# Patient Record
Sex: MALE | Race: WHITE | NOT HISPANIC OR LATINO | Employment: OTHER | URBAN - METROPOLITAN AREA
[De-identification: names, ages, dates, MRNs, and addresses within clinical notes are randomized per-mention and may not be internally consistent; named-entity substitution may affect disease eponyms.]

---

## 2017-02-12 ENCOUNTER — APPOINTMENT (OUTPATIENT)
Dept: GENERAL RADIOLOGY | Facility: HOSPITAL | Age: 67
End: 2017-02-12

## 2017-02-12 ENCOUNTER — HOSPITAL ENCOUNTER (EMERGENCY)
Facility: HOSPITAL | Age: 67
Discharge: HOME | End: 2017-02-12
Attending: EMERGENCY MEDICINE | Admitting: EMERGENCY MEDICINE

## 2017-02-12 VITALS
HEIGHT: 72 IN | WEIGHT: 210 LBS | TEMPERATURE: 97.9 F | DIASTOLIC BLOOD PRESSURE: 90 MMHG | RESPIRATION RATE: 18 BRPM | HEART RATE: 74 BPM | BODY MASS INDEX: 28.44 KG/M2 | SYSTOLIC BLOOD PRESSURE: 150 MMHG | OXYGEN SATURATION: 96 %

## 2017-02-12 DIAGNOSIS — S92.502A FRACTURE OF THIRD TOE, LEFT, CLOSED, INITIAL ENCOUNTER: Primary | ICD-10-CM

## 2017-02-12 DIAGNOSIS — L03.032 CELLULITIS OF TOE OF LEFT FOOT: ICD-10-CM

## 2017-02-12 DIAGNOSIS — S60.221A CONTUSION OF RIGHT HAND, INITIAL ENCOUNTER: ICD-10-CM

## 2017-02-12 DIAGNOSIS — I10 ESSENTIAL HYPERTENSION: ICD-10-CM

## 2017-02-12 PROCEDURE — 99283 EMERGENCY DEPT VISIT LOW MDM: CPT

## 2017-02-12 PROCEDURE — 73660 X-RAY EXAM OF TOE(S): CPT

## 2017-02-12 PROCEDURE — 73130 X-RAY EXAM OF HAND: CPT

## 2017-02-12 RX ORDER — NAPROXEN 250 MG/1
500 TABLET ORAL ONCE
Status: COMPLETED | OUTPATIENT
Start: 2017-02-12 | End: 2017-02-12

## 2017-02-12 RX ORDER — OMEPRAZOLE 20 MG/1
20 CAPSULE, DELAYED RELEASE ORAL DAILY
COMMUNITY
End: 2019-02-11 | Stop reason: SDUPTHER

## 2017-02-12 RX ORDER — METOPROLOL TARTRATE 50 MG/1
50 TABLET, FILM COATED ORAL 2 TIMES DAILY
COMMUNITY
End: 2019-03-02 | Stop reason: SDUPTHER

## 2017-02-12 RX ORDER — HYDROCODONE BITARTRATE AND ACETAMINOPHEN 10; 325 MG/1; MG/1
1 TABLET ORAL EVERY 6 HOURS PRN
COMMUNITY

## 2017-02-12 RX ORDER — GABAPENTIN 400 MG/1
400 CAPSULE ORAL 2 TIMES DAILY
COMMUNITY

## 2017-02-12 RX ORDER — TRAMADOL HYDROCHLORIDE 50 MG/1
50 TABLET ORAL EVERY 4 HOURS PRN
Qty: 20 TABLET | Refills: 0 | Status: SHIPPED | OUTPATIENT
Start: 2017-02-12 | End: 2018-07-16 | Stop reason: ALTCHOICE

## 2017-02-12 RX ORDER — PRAVASTATIN SODIUM 10 MG
10 TABLET ORAL DAILY
COMMUNITY
End: 2020-06-01

## 2017-02-12 RX ORDER — CEPHALEXIN 500 MG/1
500 CAPSULE ORAL 4 TIMES DAILY
Qty: 28 CAPSULE | Refills: 0 | Status: SHIPPED | OUTPATIENT
Start: 2017-02-12 | End: 2018-07-16 | Stop reason: ALTCHOICE

## 2017-02-12 RX ADMIN — NAPROXEN 500 MG: 250 TABLET ORAL at 23:23

## 2017-02-13 NOTE — ED PROVIDER NOTES
Subjective   HPI Comments: Behzad Guzman is a 66 y.o.male who presents to the ED with c/o toe pain onset 2 weeks ago. The patient reports he dropped a log on his left foot two weeks ago, injuring his left middle toe. He notes the pain tolerable when the incident occurred, however it has continued to worsen over the last few days. He presents to the ED for evaluation. Additionally, the patient c/o pain in his right hand, which he reports was also smashed by the log during the accident.The patient denies any other complaints at this time.    Patient is a 66 y.o. male presenting with lower extremity pain.   History provided by:  Patient  Lower Extremity Issue   Location:  Toe  Time since incident:  2 weeks  Injury: yes    Mechanism of injury comment:  Dropped log on foot  Toe location:  L third toe  Pain details:     Quality:  Unable to specify    Radiates to:  Does not radiate    Severity:  Moderate    Onset quality:  Gradual    Duration:  2 weeks    Timing:  Constant    Progression:  Worsening  Chronicity:  New  Dislocation: no    Foreign body present:  No foreign bodies  Tetanus status:  Unknown  Prior injury to area:  No  Relieved by:  None tried  Worsened by:  Nothing  Ineffective treatments:  None tried  Associated symptoms: no back pain, no fever and no numbness        Review of Systems   Constitutional: Negative for chills and fever.   HENT: Negative for congestion and rhinorrhea.    Respiratory: Negative for cough, chest tightness and shortness of breath.    Cardiovascular: Negative for chest pain.   Gastrointestinal: Negative for diarrhea, nausea and vomiting.   Musculoskeletal: Negative for back pain.        Left toe pain and right hand pain.   Neurological: Negative for dizziness, weakness and headaches.   All other systems reviewed and are negative.      Past Medical History   Diagnosis Date   • Hyperlipidemia    • Hypertension    • Neuropathy    • Prediabetes        Allergies   Allergen Reactions   •  Penicillins        Past Surgical History   Procedure Laterality Date   • Leg surgery     • Rotator cuff repair         History reviewed. No pertinent family history.    Social History     Social History   • Marital status:      Spouse name: N/A   • Number of children: N/A   • Years of education: N/A     Social History Main Topics   • Smoking status: Former Smoker     Quit date: 1987   • Smokeless tobacco: None   • Alcohol use No   • Drug use: No   • Sexual activity: Not Asked     Other Topics Concern   • None     Social History Narrative   • None         Objective   Physical Exam   Constitutional: He is oriented to person, place, and time. He appears well-developed and well-nourished. No distress.   HENT:   Head: Normocephalic and atraumatic.   Eyes: Conjunctivae are normal. No scleral icterus.   Neck: Normal range of motion. Neck supple.   Cardiovascular: Normal rate, regular rhythm, normal heart sounds and intact distal pulses.    Pulmonary/Chest: Effort normal and breath sounds normal. No respiratory distress.   Abdominal: Soft. Bowel sounds are normal. There is no tenderness.   Musculoskeletal: Normal range of motion. He exhibits no edema.   Dried scab over left middle toe with erythema.  Left third toe has erythema and swelling distally.  There is no fluctuance or abscess.  The area of superficial avulsed epidermis was removed to evaluate underneath.  There is no open wounds noted.  Pain with attempted manipulation.  No gross deformity.  Right hand demonstrates mild tenderness on the dorsal aspect around the third and fourth metacarpals.  No deformity and neurovascularly intact.   Neurological: He is alert and oriented to person, place, and time.   Skin: Skin is warm and dry. No erythema.   Psychiatric: He has a normal mood and affect. His behavior is normal.   Nursing note and vitals reviewed.      Procedures         ED Course  ED Course       No results found for this or any previous visit (from the  "past 24 hour(s)).  Note: In addition to lab results from this visit, the labs listed above may include labs taken at another facility or during a different encounter within the last 24 hours. Please correlate lab times with ED admission and discharge times for further clarification of the services performed during this visit.    XR Hand 3+ View Right   ED Interpretation   Degenerative changes otherwise no acute fracture dislocations on   3 views the right hand.      XR Toe 2+ View Left   ED Interpretation   Abnormal   Comminuted fracture of the distal aspect of the distal phalanx of   the third digit of the left foot.  No involvement of the joint   noted.  No foreign bodies noted.        Vitals:    02/12/17 2123   BP: 150/90   BP Location: Left arm   Patient Position: Sitting   Pulse: 74   Resp: 18   Temp: 97.9 °F (36.6 °C)   TempSrc: Oral   SpO2: 96%   Weight: 210 lb (95.3 kg)   Height: 72\" (182.9 cm)     Medications   naproxen (NAPROSYN) tablet 500 mg (500 mg Oral Given 2/12/17 2323)     ECG/EMG Results (last 24 hours)     ** No results found for the last 24 hours. **                      MDM  Number of Diagnoses or Management Options  Cellulitis of toe of left foot:   Contusion of right hand, initial encounter:   Essential hypertension:   Fracture of third toe, left, closed, initial encounter:      Amount and/or Complexity of Data Reviewed  Tests in the radiology section of CPT®: reviewed  Independent visualization of images, tracings, or specimens: yes        Final diagnoses:   Fracture of third toe, left, closed, initial encounter   Cellulitis of toe of left foot   Contusion of right hand, initial encounter   Essential hypertension       Documentation assistance provided by jyoti Frye.  Information recorded by the jyoti was done at my direction and has been verified and validated by me.     Enid Frye  02/12/17 2220       Enid Frye  02/12/17 2303       Wero Castellanos, " MD  02/13/17 0036

## 2017-03-16 ENCOUNTER — TRANSCRIBE ORDERS (OUTPATIENT)
Dept: PAIN MEDICINE | Facility: CLINIC | Age: 67
End: 2017-03-16

## 2017-03-16 DIAGNOSIS — G89.4 CHRONIC PAIN SYNDROME: Primary | ICD-10-CM

## 2017-11-27 ENCOUNTER — HOSPITAL ENCOUNTER (EMERGENCY)
Facility: HOSPITAL | Age: 67
Discharge: LEFT BEFORE DISCHARGE | End: 2017-11-27

## 2017-11-27 VITALS
HEIGHT: 72 IN | RESPIRATION RATE: 16 BRPM | BODY MASS INDEX: 27.77 KG/M2 | TEMPERATURE: 98.5 F | OXYGEN SATURATION: 96 % | WEIGHT: 205 LBS | DIASTOLIC BLOOD PRESSURE: 109 MMHG | HEART RATE: 81 BPM | SYSTOLIC BLOOD PRESSURE: 198 MMHG

## 2017-11-27 PROCEDURE — 99211 OFF/OP EST MAY X REQ PHY/QHP: CPT

## 2017-11-27 PROCEDURE — 93005 ELECTROCARDIOGRAM TRACING: CPT

## 2018-07-16 ENCOUNTER — APPOINTMENT (OUTPATIENT)
Dept: GENERAL RADIOLOGY | Facility: HOSPITAL | Age: 68
End: 2018-07-16

## 2018-07-16 ENCOUNTER — HOSPITAL ENCOUNTER (EMERGENCY)
Facility: HOSPITAL | Age: 68
Discharge: HOME | End: 2018-07-16
Attending: EMERGENCY MEDICINE | Admitting: EMERGENCY MEDICINE

## 2018-07-16 VITALS
RESPIRATION RATE: 16 BRPM | OXYGEN SATURATION: 93 % | DIASTOLIC BLOOD PRESSURE: 90 MMHG | WEIGHT: 193 LBS | HEART RATE: 83 BPM | BODY MASS INDEX: 26.14 KG/M2 | TEMPERATURE: 97.8 F | SYSTOLIC BLOOD PRESSURE: 118 MMHG | HEIGHT: 72 IN

## 2018-07-16 DIAGNOSIS — Z87.828 HISTORY OF TRAUMATIC INJURY TO MUSCULOSKELETAL SYSTEM: ICD-10-CM

## 2018-07-16 DIAGNOSIS — I10 ELEVATED BLOOD PRESSURE READING WITH DIAGNOSIS OF HYPERTENSION: ICD-10-CM

## 2018-07-16 DIAGNOSIS — S70.11XA HEMATOMA OF RIGHT THIGH, INITIAL ENCOUNTER: Primary | ICD-10-CM

## 2018-07-16 DIAGNOSIS — S40.012A CONTUSION OF LEFT SHOULDER, INITIAL ENCOUNTER: ICD-10-CM

## 2018-07-16 PROCEDURE — 73010 X-RAY EXAM OF SHOULDER BLADE: CPT

## 2018-07-16 PROCEDURE — 99284 EMERGENCY DEPT VISIT MOD MDM: CPT

## 2018-07-16 PROCEDURE — 73030 X-RAY EXAM OF SHOULDER: CPT

## 2018-07-16 RX ORDER — LORATADINE 10 MG/1
CAPSULE, LIQUID FILLED ORAL
COMMUNITY
End: 2018-11-09 | Stop reason: SDUPTHER

## 2018-07-16 RX ORDER — CLONIDINE HYDROCHLORIDE 0.1 MG/1
0.2 TABLET ORAL ONCE
Status: COMPLETED | OUTPATIENT
Start: 2018-07-16 | End: 2018-07-16

## 2018-07-16 RX ORDER — OXYCODONE AND ACETAMINOPHEN 10; 325 MG/1; MG/1
1 TABLET ORAL ONCE
Status: COMPLETED | OUTPATIENT
Start: 2018-07-16 | End: 2018-07-16

## 2018-07-16 RX ORDER — LISINOPRIL 20 MG/1
20 TABLET ORAL DAILY
COMMUNITY
End: 2019-03-02 | Stop reason: SDUPTHER

## 2018-07-16 RX ADMIN — CLONIDINE HYDROCHLORIDE 0.2 MG: 0.1 TABLET ORAL at 20:02

## 2018-07-16 RX ADMIN — OXYCODONE HYDROCHLORIDE AND ACETAMINOPHEN 1 TABLET: 10; 325 TABLET ORAL at 20:02

## 2018-07-18 ENCOUNTER — TRANSCRIBE ORDERS (OUTPATIENT)
Dept: ADMINISTRATIVE | Facility: HOSPITAL | Age: 68
End: 2018-07-18

## 2018-07-18 DIAGNOSIS — R22.41 MASS OF THIGH, RIGHT: Primary | ICD-10-CM

## 2018-07-23 ENCOUNTER — OFFICE VISIT (OUTPATIENT)
Dept: ORTHOPEDIC SURGERY | Facility: CLINIC | Age: 68
End: 2018-07-23

## 2018-07-23 ENCOUNTER — APPOINTMENT (OUTPATIENT)
Dept: MRI IMAGING | Facility: HOSPITAL | Age: 68
End: 2018-07-23

## 2018-07-23 VITALS — HEART RATE: 73 BPM | WEIGHT: 190.48 LBS | OXYGEN SATURATION: 98 % | BODY MASS INDEX: 25.8 KG/M2 | HEIGHT: 72 IN

## 2018-07-23 DIAGNOSIS — S79.921A INJURY OF RIGHT THIGH, INITIAL ENCOUNTER: ICD-10-CM

## 2018-07-23 DIAGNOSIS — S46.002A ROTATOR CUFF INJURY, LEFT, INITIAL ENCOUNTER: ICD-10-CM
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
## 2018-07-23 DIAGNOSIS — M25.512 ACUTE PAIN OF LEFT SHOULDER: Primary | ICD-10-CM

## 2018-07-23 PROCEDURE — 99203 OFFICE O/P NEW LOW 30 MIN: CPT | Performed by: PHYSICIAN ASSISTANT

## 2018-07-23 RX ORDER — AMLODIPINE BESYLATE 5 MG/1
5 TABLET ORAL DAILY
Refills: 0 | COMMUNITY
Start: 2018-07-18 | End: 2018-11-09

## 2018-07-23 RX ORDER — ALBUTEROL SULFATE 90 UG/1
AEROSOL, METERED RESPIRATORY (INHALATION)
Refills: 0 | COMMUNITY
Start: 2018-04-18 | End: 2020-06-01 | Stop reason: SDUPTHER

## 2018-07-23 RX ORDER — CLONIDINE HYDROCHLORIDE 0.1 MG/1
TABLET ORAL
Refills: 0 | COMMUNITY
Start: 2018-07-18 | End: 2018-11-09

## 2018-07-23 RX ORDER — BUSPIRONE HYDROCHLORIDE 10 MG/1
10 TABLET ORAL 2 TIMES DAILY
Refills: 0 | COMMUNITY
Start: 2018-07-18 | End: 2018-11-09

## 2018-07-23 NOTE — PROGRESS NOTES
[facility] Note    Subjective     Pain of the Left Shoulder (Hit by bull on May 1st, 2018)      LANIE Guzman is a 67 y.o. male.  Patient presents to orthopedic clinic for evaluation of left shoulder pain.  He states that on May 1, 2018 he was trampled by a pool resulting in significant left shoulder pain.  He denies any numbness or tingling into the extremity.  She reports pain scale to the shoulder 6/10.  Severity the pain moderate to severe.  Quality pain constant aching with intermittent sharpness and stabbing.  Associated symptoms grinding and popping with any type of movement.  Pain worsens with movement, reaching, lifting, any type of overhead activities.    Additionally he was recently seen in the emergency department on 16 July for severe right thigh pain.  This was also due to being stepped on by the bull.  He does have an MRI ordered for this area on 26 July.    No reported fever, chills, night sweats or other constitutional symptoms.     Past Medical History:   Diagnosis Date   • Hyperlipidemia    • Hypertension    • Neuropathy    • Prediabetes       Past Surgical History:   Procedure Laterality Date   • LEG SURGERY     • ROTATOR CUFF REPAIR     • WRIST SURGERY Left       Family History   Problem Relation Age of Onset   • Heart attack Father      Social History     Social History   • Marital status:      Spouse name: N/A   • Number of children: N/A   • Years of education: N/A     Occupational History   • Not on file.     Social History Main Topics   • Smoking status: Former Smoker     Quit date: 1987   • Smokeless tobacco: Never Used   • Alcohol use No   • Drug use: No   • Sexual activity: Defer     Other Topics Concern   • Not on file     Social History Narrative   • No narrative on file      Current Outpatient Prescriptions on File Prior to Visit   Medication Sig Dispense Refill   • gabapentin (NEURONTIN) 400 MG capsule Take 400 mg by mouth 3 (Three) Times a  "Day.     • GLIMEPIRIDE PO Take  by mouth.     • HYDROcodone-acetaminophen (NORCO)  MG per tablet Take 1 tablet by mouth Every 6 (Six) Hours As Needed for moderate pain (4-6).     • lisinopril (PRINIVIL,ZESTRIL) 20 MG tablet Take 20 mg by mouth Daily.     • Loratadine 10 MG capsule Take  by mouth.     • metoprolol tartrate (LOPRESSOR) 50 MG tablet Take 50 mg by mouth 2 (Two) Times a Day.     • NON FORMULARY GLIMEREX FOR PREDIABETES     • omeprazole (priLOSEC) 20 MG capsule Take 20 mg by mouth Daily.     • pravastatin (PRAVACHOL) 10 MG tablet Take 10 mg by mouth Daily.       No current facility-administered medications on file prior to visit.       Allergies   Allergen Reactions   • Penicillins         The following portions of the patient's history were reviewed and updated as appropriate: allergies, current medications, past family history, past medical history, past social history, past surgical history and problem list.    Review of Systems   Constitutional: Positive for activity change and fatigue.   Eyes: Negative.    Respiratory: Negative.    Cardiovascular: Negative.    Gastrointestinal: Negative.    Endocrine: Negative.    Genitourinary: Negative.    Musculoskeletal: Positive for arthralgias.   Skin: Negative.    Allergic/Immunologic: Negative.    Neurological: Positive for light-headedness, numbness and headaches.   Hematological: Negative.    Psychiatric/Behavioral: Positive for agitation. The patient is nervous/anxious.         Objective      Physical Exam  Pulse 73   Ht 182.9 cm (72.01\")   Wt 86.4 kg (190 lb 7.6 oz)   SpO2 98%   BMI 25.83 kg/m²     Body mass index is 25.83 kg/m².    General  Mental Status - alert  General Appearance - cooperative, well groomed, not in acute distress  Orientation - Oriented X3  Build & Nutrition - well developed and well nourished  Posture - normal posture  Gait - antalgic gait secondary to right thigh injury     Integumentary  Global Assessment  Examination of " related systems reveals - no lymphadenopathy  General Characteristics  Overall examination of the patient's skin reveals - no rashes, no evidence of scars, no suspicious lesions and no bruises.  Color - normal coloration of skin.    Ortho Exam  Musculoskeletal   Upper Extremity   Left Shoulder     Inspection and Palpation:     Tenderness - tender throughout but predominantly anterior, lateral and posterior aspect.  No significant single superior shoulder pain.    Crepitus - none    Sensation is normal    Examination reveals no ecchymosis.    Motor - grossly intact deltoid, biceps, triceps    Sensory - grossly intact to light touch axillary, musculocutaneous, radial, ulnar, median nerve distributions.      Strength and Tone:    Supraspinatus - 4-/5    External Rotators- 4-/5    Infraspinatus - 4-/5    Subscapularis - 4-/5    Deltoid - 4/5     Range of Motion   Right shoulder:    Internal Rotation: ROM - T10    External Rotation: AROM - 80 degrees    Elevation through flexion: AROM - 180 degrees    Left Shoulder:    Internal Rotation: ROM - back left hip pocket    External Rotation: AROM - 30 degrees    Elevation through flexion: AROM - 160 degrees with popping and grinding noted throughout motion     Instability   Left shoulder    Sulcus sign positive    Apprehension test positive    Ivelisse relocation test positive    Jerk test negative     Impingement   Left shoulder    Wooten-Edgardo impingement test positive    Neer impingement test positive     Functional Testing   Left shoulder    AC crossover adduction test negative    Abdominal compression test positive    Lift-off sign positive    Speed's test positive    Marrero's test positive    Hornblower's sign negative     Right thigh  Skin: Grossly intact thigh redness, warmth or swelling.  No defect is appreciable.  Tenderness: Medial thigh  Motion: Patient is able to demonstrate full range of motion of the hip however he does note pain with range of motion.  Motor:  Grossly intact quadriceps, hamstring, anterior tib, gastroc, EHL, peroneals.  Sensory: Grossly intact to light touch to deep peroneal, superficial, saphenous, sural, tibial nerve distributions.    Imaging/Studies  Imaging Results (last 24 hours)     ** No results found for the last 24 hours. **      Reviewed plain film imaging performed on 7/16/18 of left shoulder and scapula.  Also ordered new shoulder films today.  All imaging reviewed by Dr. Rosen.  No acute bony abnormality, fracture or dislocation appreciated.  Humeral head aligned within glenoid.  See chart for official report.    Assessment:  1. Acute pain of left shoulder    2. Rotator cuff injury, left, initial encounter    3. Injury of right thigh, initial encounter    4. Injury caused by animal, initial encounter        Plan:  1. Discussion was had with patient regarding exam, imaging, assessment and treatment options.    2. Patient already has an MRI ordered for the right femur.    3. Suspect rotator cuff tear therefore have ordered MRI of the left shoulder.   4. Continue pain management through his PCP.  If allowed to do so would recommend over-the-counter anti-inflammatories for inflammation and pain.   5. Follow-up after MRI is completed to discuss results and further treatment options.  6. Questions and concerns answered.    Patient was examined by Dr. Rosen and he agrees with the above assessment and plan.      Medical Decision Making  Management Options : over-the-counter medicine and prescription/IM medicine  Data/Risk: radiology tests    Zaira Knight PA-C  07/26/18  8:01 AM

## 2018-07-25 ENCOUNTER — TELEPHONE (OUTPATIENT)
Dept: ORTHOPEDIC SURGERY | Facility: CLINIC | Age: 68
End: 2018-07-25

## 2018-07-25 NOTE — TELEPHONE ENCOUNTER
THE PATIENT IS CALLING BECAUSE HE HAS SOME THINGS HE NEEDS TO DO TODAY AND HE WANTS TO KNOW IF WALKING ON HIS LEG WILL HURT HIM MORE THAN HE ALREADY IS. HIS MRI IS [facility] AT 5PM. THE PATIENT CAN BE REACHED [phone].

## 2018-07-25 NOTE — TELEPHONE ENCOUNTER
After speaking with Zaira I called  and let him know that he can do what he is able to do.  If he feels like it is causing too much pain then he should stop.  I explained that until the MRI is completed there is no way of knowing what is going on.  I explained this to him and he understood.  Sarah.

## 2018-07-27 ENCOUNTER — APPOINTMENT (OUTPATIENT)
Dept: MRI IMAGING | Facility: HOSPITAL | Age: 68
End: 2018-07-27

## 2018-07-30 ENCOUNTER — HOSPITAL ENCOUNTER (OUTPATIENT)
Dept: MRI IMAGING | Facility: HOSPITAL | Age: 68
Discharge: HOME | End: 2018-07-30
Admitting: FAMILY MEDICINE

## 2018-07-30 DIAGNOSIS — R22.41 MASS OF THIGH, RIGHT: ICD-10-CM

## 2018-07-30 PROCEDURE — 73718 MRI LOWER EXTREMITY W/O DYE: CPT

## 2018-07-31 ENCOUNTER — TELEPHONE (OUTPATIENT)
Dept: ORTHOPEDIC SURGERY | Facility: CLINIC | Age: 68
End: 2018-07-31

## 2018-07-31 NOTE — TELEPHONE ENCOUNTER
PT CALLED REGARDING HIS MRI RESULTS. HE WOULD RATHER NOT WAIT UNTIL HIS F/U APPT ON 8/13 IF POSSIBLE.

## 2018-07-31 NOTE — TELEPHONE ENCOUNTER
I called and spoke with Mr. Guzman and let him know that I could not give him the results of his MRI and that Zaira was out of the office. I told him  to call the physician that ordered it, which is Dr. Gonzalez .He stated that Dr. Gonzalez would not give him the results or do anything about it. . I let him know that I would talk to Dr. Rosen tomorrow about this when he was in the office. He understood.

## 2018-07-31 NOTE — TELEPHONE ENCOUNTER
I spoke with Dr. Rosen and he said that Mr. Guzman could wait until his follow up appointment with Zaira to get his results or contact Dr. Gonzalez.

## 2018-07-31 NOTE — TELEPHONE ENCOUNTER
Mr. Guzman called back and said that he called Dr. Gonzalez's office said  that he did not have the MRI report.

## 2018-08-06 ENCOUNTER — HOSPITAL ENCOUNTER (OUTPATIENT)
Dept: MRI IMAGING | Facility: HOSPITAL | Age: 68
Discharge: HOME | End: 2018-08-06
Admitting: PHYSICIAN ASSISTANT

## 2018-08-06 DIAGNOSIS — S46.002A ROTATOR CUFF INJURY, LEFT, INITIAL ENCOUNTER: ICD-10-CM

## 2018-08-06 DIAGNOSIS — M25.512 ACUTE PAIN OF LEFT SHOULDER: ICD-10-CM

## 2018-08-06 PROCEDURE — 73221 MRI JOINT UPR EXTREM W/O DYE: CPT

## 2018-08-08 ENCOUNTER — TELEPHONE (OUTPATIENT)
Dept: ORTHOPEDIC SURGERY | Facility: CLINIC | Age: 68
End: 2018-08-08

## 2018-08-08 NOTE — TELEPHONE ENCOUNTER
PT WANTS TO KNOW IF HE CAN BE SEEN SOONER FOR MRI RESULTS OR IF THEY CAN BE GIVEN OVER THE PHONE.

## 2018-08-08 NOTE — TELEPHONE ENCOUNTER
I spoke with Mr. Guzman and let him know that I could not give his results of his MRI over the phone, he wants Zaira to call him if possible tomorrow . I offered him an appointment but he said he really didn't want to take off work and requested Zaira to call him , if not he will try and see if he can get here for an appointment .

## 2018-08-09 NOTE — TELEPHONE ENCOUNTER
Called patient. Informed him of the tear to  per Zaira. He will come for appt on Monday 08/13/2018 at 3:00pm.     Kimi

## 2018-08-09 NOTE — TELEPHONE ENCOUNTER
Please call and inform patient that he does have a massive rotator cuff tear.  He needs to keep his appointment on Monday so that we can go over the MRI results and discuss treatment options.  Patient already has a previously scheduled appointment for that day.

## 2018-08-13 ENCOUNTER — OFFICE VISIT (OUTPATIENT)
Dept: ORTHOPEDIC SURGERY | Facility: CLINIC | Age: 68
End: 2018-08-13

## 2018-08-13 VITALS — HEIGHT: 72 IN | HEART RATE: 105 BPM | BODY MASS INDEX: 26.1 KG/M2 | OXYGEN SATURATION: 98 % | WEIGHT: 192.68 LBS

## 2018-08-13 DIAGNOSIS — M25.512 ACUTE PAIN OF LEFT SHOULDER: Primary | ICD-10-CM

## 2018-08-13 DIAGNOSIS — M75.122 COMPLETE TEAR OF LEFT ROTATOR CUFF: ICD-10-CM
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
## 2018-08-13 PROCEDURE — 99213 OFFICE O/P EST LOW 20 MIN: CPT | Performed by: PHYSICIAN ASSISTANT

## 2018-08-13 RX ORDER — TIZANIDINE 4 MG/1
TABLET ORAL
Refills: 0 | COMMUNITY
Start: 2018-08-06 | End: 2018-11-19

## 2018-08-13 RX ORDER — DIAZEPAM 5 MG/1
5 TABLET ORAL EVERY 6 HOURS PRN
Refills: 0 | COMMUNITY
Start: 2018-08-11

## 2018-08-13 NOTE — PROGRESS NOTES
"    [facility] Note    Subjective     CC: Follow-up (4 week f/u Acute pain of left shoulder after MRI)      LANIE Guzman is a 67 y.o. male.  Patient presents to clinic for follow-up MRI review left shoulder.  Past history in May 2018 he was trampled by a bull [place] an injury to his left shoulder.  He's had pain since.  He's had no change in pain level since previous exam reports pain scale 6-7/10.  Severity the pain moderate to severe.  Quality pain aching and stabbing.  He does state that his pain travels into his neck along the trapezium.  He also notes popping, grinding and giving away.       ROS:    Constiutional:Pt denies fever, chills, nausea, or vomiting.  MSK:as above    Objective      Past Medical History  Past Medical History:   Diagnosis Date   • Hyperlipidemia    • Hypertension    • Neuropathy    • Prediabetes          Physical Exam  Pulse 105   Ht 182.9 cm (72.01\")   Wt 87.4 kg (192 lb 10.9 oz)   SpO2 98%   BMI 26.13 kg/m²     Body mass index is 26.13 kg/m².    Patient is well nourished and well developed.        Ortho Exam  Musculoskeletal              Upper Extremity              Left Shoulder                           Inspection and Palpation:                           Tenderness - tender throughout but predominantly anterior, lateral and posterior aspect.  No significant tenderness superior shoulder over ACJ but positive along upper.                          Crepitus - positive                          Sensation is normal                          Examination reveals no ecchymosis.                          Motor - grossly intact deltoid, biceps, triceps                          Sensory - grossly intact to light touch axillary, musculocutaneous, radial, ulnar, median nerve distributions.                                Strength and Tone:                          Supraspinatus - 4-/5                          External Rotators- 4-/5                          " Infraspinatus - 4-/5                          Subscapularis - 4-/5                          Deltoid - 4/5                 Range of Motion              Left Shoulder:                          Internal Rotation: ROM - back left hip pocket                          External Rotation: AROM - 30 degrees                          Elevation through flexion: AROM - 160 degrees with popping and grinding noted throughout motion                 Instability              Left shoulder                          Sulcus sign positive                          Apprehension test positive                          Ivelisse relocation test positive                          Jerk test negative                 Impingement              Left shoulder                          Wooten-Edgardo impingement test positive                          Neer impingement test positive                 Functional Testing              Left shoulder                          AC crossover adduction test negative                          Abdominal compression test positive                          Lift-off sign positive                          Speed's test positive                          Marrero's test positive                          Hornblower's sign negative      Right thigh - no change from prior exam  Skin: Grossly intact thigh redness, warmth or swelling.  No defect is appreciable.  Tenderness: Medial thigh  Motion: Patient is able to demonstrate full range of motion of the hip however he does note pain with range of motion.  Motor: Grossly intact quadriceps, hamstring, anterior tib, gastroc, EHL, peroneals.  Sensory: Grossly intact to light touch to deep peroneal, superficial, saphenous, sural, tibial nerve distributions.    Imaging/Labs/EMG Reviewed:  Imaging Results (last 24 hours)     ** No results found for the last 24 hours. **      Reviewed MRI that was performed on 8/11/18.  Massive rotator cuff tear with retracted supraspinatus, infraspinatus tear,  near complete subscapularis tear and medial migration of the biceps tendon.  Probable superior labral tear.  Mild to moderate atrophy and fatty changes noted supraspinatus, infraspinatus and subscapularis.  See chart for official report.    Assessment:  1. Acute pain of left shoulder    2. Complete tear of left rotator cuff    3. Injury caused by animal, subsequent encounter        Plan:  1. Discussion was had with patient regarding exam, imaging, assessment and treatment options.   2. Based on his exam and MRI findings patient was introduced Dr. Rader to discuss surgical intervention.  Patient agreed to undergo rotator cuff repair by Dr. Rader.  He will be scheduled for this procedure.    3. Patient also under care of pain management clinic which she'll need to contact and inform them that he will be undergoing surgery on his left shoulder as they might have to adjust his pain medication so he has adequate control following the surgery.    4. Patient and wife verbalized understanding and agreement.   5. Question and concerns answered.    Indications, risks, benefits and alternatives of surgical versus continued nonsurgical treatment were discussed with the patient. Surgical risks include but are not limited to pain, bleeding, infection, failure to relieve symptoms, need for further procedures, recurrence of symptoms, damage to healthy adjacent structures, stiffness, weakness, scar, DVT, loss of limb or life. We also discussed the postoperative protocol and expected outcome. All questions were answered; the patient would like to proceed with surgical intervention.       Medical Decision Making  Management Options : major surgery with risk factors  Data/Risk: radiology tests       Zaira Knight PA-C  08/14/18  10:26 AM

## 2018-11-09 ENCOUNTER — OFFICE VISIT (OUTPATIENT)
Dept: FAMILY MEDICINE CLINIC | Facility: CLINIC | Age: 68
End: 2018-11-09

## 2018-11-09 VITALS
BODY MASS INDEX: 26.71 KG/M2 | DIASTOLIC BLOOD PRESSURE: 82 MMHG | RESPIRATION RATE: 16 BRPM | OXYGEN SATURATION: 96 % | HEIGHT: 72 IN | HEART RATE: 74 BPM | SYSTOLIC BLOOD PRESSURE: 154 MMHG | TEMPERATURE: 97.6 F | WEIGHT: 197.2 LBS

## 2018-11-09 DIAGNOSIS — M25.552 CHRONIC HIP PAIN, LEFT: ICD-10-CM

## 2018-11-09 DIAGNOSIS — G89.29 CHRONIC PAIN IN LEFT SHOULDER: ICD-10-CM

## 2018-11-09 DIAGNOSIS — M25.512 CHRONIC PAIN IN LEFT SHOULDER: ICD-10-CM

## 2018-11-09 DIAGNOSIS — M21.70 LEG LENGTH DISCREPANCY: ICD-10-CM

## 2018-11-09 DIAGNOSIS — I10 ESSENTIAL HYPERTENSION: ICD-10-CM

## 2018-11-09 DIAGNOSIS — G62.9 NEUROPATHY: ICD-10-CM

## 2018-11-09 DIAGNOSIS — F41.0 PANIC ATTACKS: ICD-10-CM

## 2018-11-09 DIAGNOSIS — M54.2 NECK PAIN, CHRONIC: ICD-10-CM

## 2018-11-09 DIAGNOSIS — J45.20 MILD INTERMITTENT ASTHMA WITHOUT COMPLICATION: ICD-10-CM

## 2018-11-09 DIAGNOSIS — E78.5 HYPERLIPIDEMIA, UNSPECIFIED HYPERLIPIDEMIA TYPE: ICD-10-CM

## 2018-11-09 DIAGNOSIS — G89.29 CHRONIC HIP PAIN, LEFT: ICD-10-CM

## 2018-11-09 DIAGNOSIS — R73.03 PRE-DIABETES: ICD-10-CM

## 2018-11-09 DIAGNOSIS — J01.80 OTHER ACUTE SINUSITIS, RECURRENCE NOT SPECIFIED: Primary | ICD-10-CM

## 2018-11-09 DIAGNOSIS — G89.29 NECK PAIN, CHRONIC: ICD-10-CM

## 2018-11-09 PROBLEM — R09.81 CONGESTION OF NASAL SINUS: Status: ACTIVE | Noted: 2017-10-16

## 2018-11-09 PROCEDURE — 99204 OFFICE O/P NEW MOD 45 MIN: CPT | Performed by: NURSE PRACTITIONER

## 2018-11-09 RX ORDER — BLOOD SUGAR DIAGNOSTIC
STRIP MISCELLANEOUS DAILY
Refills: 0 | COMMUNITY
Start: 2018-09-25 | End: 2021-09-18 | Stop reason: SDUPTHER

## 2018-11-09 RX ORDER — AMLODIPINE BESYLATE 5 MG/1
TABLET ORAL
COMMUNITY
Start: 2018-11-06 | End: 2019-05-01 | Stop reason: SDUPTHER

## 2018-11-09 RX ORDER — DEXTROMETHORPHAN HYDROBROMIDE AND PROMETHAZINE HYDROCHLORIDE 15; 6.25 MG/5ML; MG/5ML
5 SYRUP ORAL 4 TIMES DAILY PRN
Qty: 240 ML | Refills: 0 | Status: SHIPPED | OUTPATIENT
Start: 2018-11-09 | End: 2018-11-19

## 2018-11-09 RX ORDER — OXYCODONE AND ACETAMINOPHEN 10; 325 MG/1; MG/1
TABLET ORAL
Refills: 0 | COMMUNITY
Start: 2018-11-02 | End: 2018-11-19

## 2018-11-09 RX ORDER — AZITHROMYCIN 250 MG/1
TABLET, FILM COATED ORAL
Qty: 6 TABLET | Refills: 0 | Status: SHIPPED | OUTPATIENT
Start: 2018-11-09 | End: 2018-11-19

## 2018-11-09 RX ORDER — LORATADINE 10 MG/1
TABLET ORAL
Refills: 0 | COMMUNITY
Start: 2018-10-30 | End: 2018-11-09

## 2018-11-09 RX ORDER — OXYCODONE HCL 10 MG/1
10 TABLET, FILM COATED, EXTENDED RELEASE ORAL EVERY 12 HOURS PRN
Refills: 0 | COMMUNITY
Start: 2018-10-15 | End: 2021-06-29
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
## 2018-11-19 ENCOUNTER — OFFICE VISIT (OUTPATIENT)
Dept: FAMILY MEDICINE CLINIC | Facility: CLINIC | Age: 68
End: 2018-11-19

## 2018-11-19 ENCOUNTER — RESULTS ENCOUNTER (OUTPATIENT)
Dept: FAMILY MEDICINE CLINIC | Facility: CLINIC | Age: 68
End: 2018-11-19

## 2018-11-19 VITALS
HEART RATE: 78 BPM | SYSTOLIC BLOOD PRESSURE: 142 MMHG | DIASTOLIC BLOOD PRESSURE: 86 MMHG | HEIGHT: 72 IN | RESPIRATION RATE: 16 BRPM | OXYGEN SATURATION: 97 % | WEIGHT: 203 LBS | BODY MASS INDEX: 27.5 KG/M2

## 2018-11-19 DIAGNOSIS — Z12.11 COLON CANCER SCREENING: ICD-10-CM

## 2018-11-19 DIAGNOSIS — I10 ESSENTIAL HYPERTENSION: ICD-10-CM

## 2018-11-19 DIAGNOSIS — Z00.00 MEDICARE ANNUAL WELLNESS VISIT, SUBSEQUENT: Primary | ICD-10-CM

## 2018-11-19 DIAGNOSIS — E11.9 DIABETES MELLITUS WITHOUT COMPLICATION (HCC): ICD-10-CM

## 2018-11-19 DIAGNOSIS — E78.2 MIXED HYPERLIPIDEMIA: ICD-10-CM

## 2018-11-19 DIAGNOSIS — S46.002S ROTATOR CUFF INJURY, LEFT, SEQUELA: ICD-10-CM

## 2018-11-19 LAB
ALBUMIN SERPL-MCNC: 4.57 G/DL (ref 3.2–4.8)
ALBUMIN/GLOB SERPL: 2.5 G/DL (ref 1.5–2.5)
ALP SERPL-CCNC: 59 U/L (ref 25–100)
ALT SERPL W P-5'-P-CCNC: 24 U/L (ref 7–40)
ANION GAP SERPL CALCULATED.3IONS-SCNC: 7 MMOL/L (ref 3–11)
ARTICHOKE IGE QN: 156 MG/DL (ref 0–130)
AST SERPL-CCNC: 24 U/L (ref 0–33)
BASOPHILS # BLD AUTO: 0.04 10*3/MM3 (ref 0–0.2)
BASOPHILS NFR BLD AUTO: 0.5 % (ref 0–1)
BILIRUB SERPL-MCNC: 0.4 MG/DL (ref 0.3–1.2)
BUN BLD-MCNC: 12 MG/DL (ref 9–23)
BUN/CREAT SERPL: 12.9 (ref 7–25)
CALCIUM SPEC-SCNC: 9.4 MG/DL (ref 8.7–10.4)
CHLORIDE SERPL-SCNC: 101 MMOL/L (ref 99–109)
CHOLEST SERPL-MCNC: 223 MG/DL (ref 0–200)
CO2 SERPL-SCNC: 31 MMOL/L (ref 20–31)
CREAT BLD-MCNC: 0.93 MG/DL (ref 0.6–1.3)
DEPRECATED RDW RBC AUTO: 44.4 FL (ref 37–54)
EOSINOPHIL # BLD AUTO: 0.34 10*3/MM3 (ref 0–0.3)
EOSINOPHIL NFR BLD AUTO: 4.3 % (ref 0–3)
ERYTHROCYTE [DISTWIDTH] IN BLOOD BY AUTOMATED COUNT: 13.4 % (ref 11.3–14.5)
GFR SERPL CREATININE-BSD FRML MDRD: 81 ML/MIN/1.73
GLOBULIN UR ELPH-MCNC: 1.8 GM/DL
GLUCOSE BLD-MCNC: 167 MG/DL (ref 70–100)
HBA1C MFR BLD: 9.1 % (ref 4.8–5.6)
HCT VFR BLD AUTO: 48.7 % (ref 38.9–50.9)
HDLC SERPL-MCNC: 42 MG/DL (ref 40–60)
HGB BLD-MCNC: 15.7 G/DL (ref 13.1–17.5)
IMM GRANULOCYTES # BLD: 0.02 10*3/MM3 (ref 0–0.03)
IMM GRANULOCYTES NFR BLD: 0.3 % (ref 0–0.6)
LYMPHOCYTES # BLD AUTO: 2.78 10*3/MM3 (ref 0.6–4.8)
LYMPHOCYTES NFR BLD AUTO: 35.1 % (ref 24–44)
MCH RBC QN AUTO: 29.6 PG (ref 27–31)
MCHC RBC AUTO-ENTMCNC: 32.2 G/DL (ref 32–36)
MCV RBC AUTO: 91.9 FL (ref 80–99)
MONOCYTES # BLD AUTO: 0.71 10*3/MM3 (ref 0–1)
MONOCYTES NFR BLD AUTO: 9 % (ref 0–12)
NEUTROPHILS # BLD AUTO: 4.05 10*3/MM3 (ref 1.5–8.3)
NEUTROPHILS NFR BLD AUTO: 51.1 % (ref 41–71)
PLATELET # BLD AUTO: 183 10*3/MM3 (ref 150–450)
PMV BLD AUTO: 12.9 FL (ref 6–12)
POTASSIUM BLD-SCNC: 4.5 MMOL/L (ref 3.5–5.5)
PROT SERPL-MCNC: 6.4 G/DL (ref 5.7–8.2)
RBC # BLD AUTO: 5.3 10*6/MM3 (ref 4.2–5.76)
SODIUM BLD-SCNC: 139 MMOL/L (ref 132–146)
TRIGL SERPL-MCNC: 402 MG/DL (ref 0–150)
TSH SERPL DL<=0.05 MIU/L-ACNC: 3.12 MIU/ML (ref 0.35–5.35)
WBC NRBC COR # BLD: 7.92 10*3/MM3 (ref 3.5–10.8)

## 2018-11-19 PROCEDURE — 83036 HEMOGLOBIN GLYCOSYLATED A1C: CPT | Performed by: FAMILY MEDICINE

## 2018-11-19 PROCEDURE — 80061 LIPID PANEL: CPT | Performed by: FAMILY MEDICINE

## 2018-11-19 PROCEDURE — G0439 PPPS, SUBSEQ VISIT: HCPCS | Performed by: FAMILY MEDICINE

## 2018-11-19 PROCEDURE — 80053 COMPREHEN METABOLIC PANEL: CPT | Performed by: FAMILY MEDICINE

## 2018-11-19 PROCEDURE — 36415 COLL VENOUS BLD VENIPUNCTURE: CPT | Performed by: FAMILY MEDICINE

## 2018-11-19 PROCEDURE — G0444 DEPRESSION SCREEN ANNUAL: HCPCS | Performed by: FAMILY MEDICINE

## 2018-11-19 PROCEDURE — 85025 COMPLETE CBC W/AUTO DIFF WBC: CPT | Performed by: FAMILY MEDICINE

## 2018-11-19 PROCEDURE — 99397 PER PM REEVAL EST PAT 65+ YR: CPT | Performed by: FAMILY MEDICINE

## 2018-11-19 PROCEDURE — 84443 ASSAY THYROID STIM HORMONE: CPT | Performed by: FAMILY MEDICINE

## 2018-11-19 RX ORDER — HYDROCHLOROTHIAZIDE 12.5 MG/1
12.5 TABLET ORAL DAILY
Qty: 30 TABLET | Refills: 3 | Status: SHIPPED | OUTPATIENT
Start: 2018-11-19 | End: 2019-03-08 | Stop reason: SDUPTHER

## 2018-11-19 RX ORDER — METHYLPREDNISOLONE 4 MG/1
TABLET ORAL
Qty: 1 EACH | Refills: 0 | Status: SHIPPED | OUTPATIENT
Start: 2018-11-19 | End: 2018-12-19

## 2018-11-19 NOTE — PROGRESS NOTES
QUICK REFERENCE INFORMATION:  The ABCs of the Annual Wellness Visit    Subsequent Medicare Wellness Visit    HEALTH RISK ASSESSMENT    1950    Recent Hospitalizations:  No hospitalization(s) within the last year..        Current Medical Providers:  Patient Care Team:  James Do, DNP, APRN as PCP - General (Family Medicine)  Dr Lay-pain management  Ortho-[facility]  Neuro-Sheth      Smoking Status:  Social History     Tobacco Use   Smoking Status Former Smoker   • Last attempt to quit:    • Years since quittin.9   Smokeless Tobacco Never Used       Alcohol Consumption:  Social History     Substance and Sexual Activity   Alcohol Use No       Depression Screen:   PHQ-2/PHQ-9 Depression Screening 2018   Little interest or pleasure in doing things 1   Feeling down, depressed, or hopeless 0   Total Score 1       Health Habits and Functional and Cognitive Screening:  Functional & Cognitive Status 2018   Do you have difficulty preparing food and eating? No   Do you have difficulty bathing yourself, getting dressed or grooming yourself? No   Do you have difficulty using the toilet? No   Do you have difficulty moving around from place to place? No   Do you have trouble with steps or getting out of a bed or a chair? No   In the past year have you fallen or experienced a near fall? No   Current Diet Well Balanced Diet   Dental Exam Unknown   Eye Exam Unknown   Exercise (times per week) 5 times per week   Current Exercise Activities Include Aerobics   Do you need help using the phone?  No   Are you deaf or do you have serious difficulty hearing?  No   Do you need help with transportation? No   Do you need help shopping? No   Do you need help preparing meals?  No   Do you need help with housework?  No   Do you need help with laundry? No   Do you need help taking your medications? No   Do you need help managing money? No   Do you ever drive or ride in a car without wearing a seat belt? No   Have  you felt unusual stress, anger or loneliness in the last month? No   Who do you live with? Spouse   If you need help, do you have trouble finding someone available to you? No   Have you been bothered in the last four weeks by sexual problems? No   Do you have difficulty concentrating, remembering or making decisions? No           Does the patient have evidence of cognitive impairment? No    Aspirin use counseling: Taking ASA appropriately as indicated      Recent Lab Results:  CMP:     Lipid Panel:     HbA1c:       Visual Acuity:  Hearing Screening Comments: Normal hearing  Vision Screening Comments: Per ophth    Age-appropriate Screening Schedule:  Refer to the list below for future screening recommendations based on patient's age, sex and/or medical conditions. Orders for these recommended tests are listed in the plan section. The patient has been provided with a written plan.    Health Maintenance   Topic Date Due   • COLONOSCOPY  11/27/2017   • TDAP/TD VACCINES (1 - Tdap) 11/19/2018 (Originally 8/28/1969)   • ZOSTER VACCINE (1 of 2) 11/19/2018 (Originally 8/28/2001)   • LIPID PANEL  11/19/2019   • PNEUMOCOCCAL VACCINES (65+ LOW/MEDIUM RISK)  Completed   • INFLUENZA VACCINE  Addressed        Subjective   History of Present Illness    Behzad Guzman is a 68 y.o. male who presents for an Subsequent Wellness Visit.    The following portions of the patient's history were reviewed and updated as appropriate: allergies, current medications, past family history, past medical history, past social history, past surgical history and problem list.    Outpatient Medications Prior to Visit   Medication Sig Dispense Refill   • amLODIPine (NORVASC) 5 MG tablet Take  by mouth.     • diazePAM (VALIUM) 5 MG tablet Take 5 mg by mouth 2 (Two) Times a Day.  0   • gabapentin (NEURONTIN) 400 MG capsule Take 400 mg by mouth 3 (Three) Times a Day.     • GLIMEPIRIDE PO Take  by mouth.     • HYDROcodone-acetaminophen (NORCO)  MG per  tablet Take 1 tablet by mouth Every 6 (Six) Hours As Needed for moderate pain (4-6).     • lisinopril (PRINIVIL,ZESTRIL) 20 MG tablet Take 20 mg by mouth Daily.     • metoprolol tartrate (LOPRESSOR) 50 MG tablet Take 50 mg by mouth 2 (Two) Times a Day.     • NON FORMULARY GLIMEREX FOR PREDIABETES     • omeprazole (priLOSEC) 20 MG capsule Take 20 mg by mouth Daily.     • ONETOUCH VERIO test strip Daily. for testing  0   • oxyCODONE (oxyCONTIN) 10 MG 12 hr tablet Take 10 mg by mouth Every 12 (Twelve) Hours As Needed.  0   • pravastatin (PRAVACHOL) 10 MG tablet Take 10 mg by mouth Daily.     • VENTOLIN  (90 Base) MCG/ACT inhaler inhalation 2 puffs by mouth every 4 to 6 hours if needed  0   • azithromycin (ZITHROMAX) 250 MG tablet Take 2 tablets the first day, then 1 tablet daily for 4 days. 6 tablet 0   • Cetirizine HCl (ZYRTEC ALLERGY) 10 MG capsule Take 10 mg by mouth Daily. 30 capsule 5   • oxyCODONE-acetaminophen (PERCOCET)  MG per tablet take 1/2 to 1 tablet by mouth once daily if needed as directed  0   • promethazine-dextromethorphan (PROMETHAZINE-DM) 6.25-15 MG/5ML syrup Take 5 mL by mouth 4 (Four) Times a Day As Needed for Cough. 240 mL 0   • tiZANidine (ZANAFLEX) 4 MG tablet take 1 tablet by mouth every 8 hours if needed DO NOT EXCEED 3 DOSES IN 24 HOURS  0     No facility-administered medications prior to visit.        Patient Active Problem List   Diagnosis   • Chronic pain in left shoulder   • Chronic hip pain, left   • Neck pain, chronic   • Leg length discrepancy   • Panic attacks   • Pre-diabetes   • Asthma   • Spinal stenosis   • Prediabetes   • Neuropathy   • Hypertension   • Hyperlipidemia   • Benign essential hypertension   • Congestion of nasal sinus       Advance Care Planning:  has an advance directive - a copy has been provided and is in file    Identification of Risk Factors:  Risk factors include: weight , unhealthy diet, cardiovascular risk, inactivity, increased fall risk and  polypharmacy.    Review of Systems   Constitutional: Negative.  Negative for activity change, fatigue, fever and unexpected weight change.   HENT: Negative.  Negative for congestion, sneezing and sore throat.    Eyes: Negative.  Negative for visual disturbance.   Respiratory: Negative.  Negative for cough, chest tightness, shortness of breath and wheezing.    Cardiovascular: Negative.  Negative for chest pain, palpitations and leg swelling.   Gastrointestinal: Negative.  Negative for abdominal distention, abdominal pain, blood in stool, constipation, diarrhea and nausea.   Endocrine: Negative.  Negative for cold intolerance and heat intolerance.   Genitourinary: Negative.  Negative for dysuria, frequency and urgency.   Musculoskeletal: Negative.  Negative for arthralgias and myalgias.   Skin: Negative.  Negative for rash.   Allergic/Immunologic: Negative.    Neurological: Negative.  Negative for dizziness, syncope and numbness.   Hematological: Negative.  Negative for adenopathy.   Psychiatric/Behavioral: Negative.  Negative for suicidal ideas. The patient is not nervous/anxious.        Compared to one year ago, the patient feels his physical health is the same.  Compared to one year ago, the patient feels his mental health is the same.    Objective     Physical Exam   Constitutional: He is oriented to person, place, and time. He appears well-developed and well-nourished. No distress.   HENT:   Right Ear: External ear normal.   Left Ear: External ear normal.   Nose: Nose normal.   Mouth/Throat: Oropharynx is clear and moist.   Eyes: Conjunctivae and EOM are normal. Pupils are equal, round, and reactive to light.   Neck: Normal range of motion. Neck supple. No thyromegaly present.   Cardiovascular: Normal rate, regular rhythm and normal heart sounds.   No murmur heard.  Pulmonary/Chest: Effort normal and breath sounds normal. No respiratory distress. He has no wheezes.   Abdominal: Soft. Bowel sounds are normal. He  "exhibits no distension and no mass. There is no tenderness. There is no rebound and no guarding. No hernia.   Musculoskeletal: Normal range of motion. He exhibits no edema or tenderness.   Lymphadenopathy:     He has no cervical adenopathy.   Neurological: He is alert and oriented to person, place, and time. He has normal reflexes.   Skin: Skin is warm and dry. No rash noted. He is not diaphoretic. No erythema. No pallor.   Psychiatric: He has a normal mood and affect. His behavior is normal. Judgment and thought content normal.   Nursing note and vitals reviewed.      Vitals:    11/19/18 0918   BP: 142/86   Pulse: 78   Resp: 16   SpO2: 97%   Weight: 92.1 kg (203 lb)   Height: 182.9 cm (72\")   PainSc:   8   PainLoc: Shoulder       Patient's Body mass index is 27.53 kg/m². BMI is above normal parameters. Recommendations include: nutrition counseling.      Assessment/Plan   Patient Self-Management and Personalized Health Advice  The patient has been provided with information about: diet, exercise, weight management and prevention of cardiac or vascular disease and preventive services including:   · Colorectal cancer screening, cologuard test ordered, Diabetes screening, see lab orders, Exercise counseling provided, Fall Risk assessment done, Nutrition counseling provided.    Visit Diagnoses:    ICD-10-CM ICD-9-CM   1. Medicare annual wellness visit, subsequent Z00.00 V70.0   2. Essential hypertension I10 401.9   3. Rotator cuff injury, left, sequela S46.002S 908.9   4. Mixed hyperlipidemia E78.2 272.2   5. Diabetes mellitus without complication (CMS/[facility]) E11.9 250.00   6. Colon cancer screening Z12.11 V76.51       Orders Placed This Encounter   Procedures   • Comprehensive Metabolic Panel   • Lipid Panel   • TSH   • Cologuard - Stool, Per Rectum     Standing Status:   Future     Standing Expiration Date:   11/19/2019   • Hemoglobin A1c   • CBC Auto Differential   • CBC & Differential     Order Specific Question:   " Manual Differential     Answer:   No       Outpatient Encounter Medications as of 11/19/2018   Medication Sig Dispense Refill   • amLODIPine (NORVASC) 5 MG tablet Take  by mouth.     • diazePAM (VALIUM) 5 MG tablet Take 5 mg by mouth 2 (Two) Times a Day.  0   • gabapentin (NEURONTIN) 400 MG capsule Take 400 mg by mouth 3 (Three) Times a Day.     • GLIMEPIRIDE PO Take  by mouth.     • HYDROcodone-acetaminophen (NORCO)  MG per tablet Take 1 tablet by mouth Every 6 (Six) Hours As Needed for moderate pain (4-6).     • lisinopril (PRINIVIL,ZESTRIL) 20 MG tablet Take 20 mg by mouth Daily.     • metoprolol tartrate (LOPRESSOR) 50 MG tablet Take 50 mg by mouth 2 (Two) Times a Day.     • NON FORMULARY GLIMEREX FOR PREDIABETES     • omeprazole (priLOSEC) 20 MG capsule Take 20 mg by mouth Daily.     • ONETOUCH VERIO test strip Daily. for testing  0   • oxyCODONE (oxyCONTIN) 10 MG 12 hr tablet Take 10 mg by mouth Every 12 (Twelve) Hours As Needed.  0   • pravastatin (PRAVACHOL) 10 MG tablet Take 10 mg by mouth Daily.     • VENTOLIN  (90 Base) MCG/ACT inhaler inhalation 2 puffs by mouth every 4 to 6 hours if needed  0   • hydrochlorothiazide (HYDRODIURIL) 12.5 MG tablet Take 1 tablet by mouth Daily. 30 tablet 3   • MethylPREDNISolone (MEDROL, ASHLEIGH,) 4 MG tablet Take as directed on package instructions. 1 each 0   • [DISCONTINUED] azithromycin (ZITHROMAX) 250 MG tablet Take 2 tablets the first day, then 1 tablet daily for 4 days. 6 tablet 0   • [DISCONTINUED] Cetirizine HCl (ZYRTEC ALLERGY) 10 MG capsule Take 10 mg by mouth Daily. 30 capsule 5   • [DISCONTINUED] oxyCODONE-acetaminophen (PERCOCET)  MG per tablet take 1/2 to 1 tablet by mouth once daily if needed as directed  0   • [DISCONTINUED] promethazine-dextromethorphan (PROMETHAZINE-DM) 6.25-15 MG/5ML syrup Take 5 mL by mouth 4 (Four) Times a Day As Needed for Cough. 240 mL 0   • [DISCONTINUED] tiZANidine (ZANAFLEX) 4 MG tablet take 1 tablet by mouth every  8 hours if needed DO NOT EXCEED 3 DOSES IN 24 HOURS  0     No facility-administered encounter medications on file as of 11/19/2018.        Reviewed use of high risk medication in the elderly: yes  Reviewed for potential of harmful drug interactions in the elderly: yes    Follow Up:  Return in about 2 months (around 1/19/2019).   Annual depression screen complete. 15 minutes.    An After Visit Summary and PPPS with all of these plans were given to the patient.

## 2018-12-08 ENCOUNTER — TELEPHONE (OUTPATIENT)
Dept: FAMILY MEDICINE CLINIC | Facility: CLINIC | Age: 68
End: 2018-12-08

## 2018-12-08 NOTE — TELEPHONE ENCOUNTER
----- Message from Emy Mars sent at 12/7/2018  3:33 PM EST -----  Regarding: PLEASE CALL   Contact: [phone]  PLEASE CALL WITH LABS FROM 11/19

## 2018-12-19 ENCOUNTER — OFFICE VISIT (OUTPATIENT)
Dept: FAMILY MEDICINE CLINIC | Facility: CLINIC | Age: 68
End: 2018-12-19

## 2018-12-19 VITALS
WEIGHT: 204 LBS | TEMPERATURE: 97.8 F | HEART RATE: 84 BPM | HEIGHT: 72 IN | BODY MASS INDEX: 27.63 KG/M2 | RESPIRATION RATE: 18 BRPM | OXYGEN SATURATION: 97 %

## 2018-12-19 DIAGNOSIS — R21 RASH AND NONSPECIFIC SKIN ERUPTION: Primary | ICD-10-CM

## 2018-12-19 PROCEDURE — 99213 OFFICE O/P EST LOW 20 MIN: CPT | Performed by: NURSE PRACTITIONER

## 2018-12-19 PROCEDURE — 96372 THER/PROPH/DIAG INJ SC/IM: CPT | Performed by: NURSE PRACTITIONER

## 2018-12-19 RX ORDER — ESCITALOPRAM OXALATE 10 MG/1
10 TABLET ORAL DAILY
Refills: 0 | COMMUNITY
Start: 2018-11-27 | End: 2019-03-28

## 2018-12-19 RX ORDER — HYDROXYZINE HYDROCHLORIDE 25 MG/1
25 TABLET, FILM COATED ORAL 3 TIMES DAILY PRN
Qty: 30 TABLET | Refills: 0 | Status: SHIPPED | OUTPATIENT
Start: 2018-12-19 | End: 2019-02-27

## 2018-12-19 RX ORDER — METHYLPREDNISOLONE 4 MG/1
TABLET ORAL
Qty: 1 EACH | Refills: 0 | Status: CANCELLED | OUTPATIENT
Start: 2018-12-19

## 2018-12-19 RX ORDER — DEXAMETHASONE SODIUM PHOSPHATE 4 MG/ML
4 INJECTION, SOLUTION INTRA-ARTICULAR; INTRALESIONAL; INTRAMUSCULAR; INTRAVENOUS; SOFT TISSUE ONCE
Status: COMPLETED | OUTPATIENT
Start: 2018-12-19 | End: 2018-12-19

## 2018-12-19 RX ADMIN — DEXAMETHASONE SODIUM PHOSPHATE 4 MG: 4 INJECTION, SOLUTION INTRA-ARTICULAR; INTRALESIONAL; INTRAMUSCULAR; INTRAVENOUS; SOFT TISSUE at 20:05

## 2018-12-20 NOTE — PATIENT INSTRUCTIONS
Rash  A rash is a change in the color of the skin. A rash can also change the way your skin feels. There are many different conditions and factors that can cause a rash.  Follow these instructions at home:  Pay attention to any changes in your symptoms. Follow these instructions to help with your condition:  Medicine  Take or apply over-the-counter and prescription medicines only as told by your health care provider. These may include:  · Corticosteroid cream.  · Anti-itch lotions.  · Oral antihistamines.    Skin Care  · Apply cool compresses to the affected areas.  · Try taking a bath with:  ? Epsom salts. Follow the instructions on the packaging. You can get these at your local pharmacy or grocery store.  ? Baking soda. Pour a small amount into the bath as told by your health care provider.  ? Colloidal oatmeal. Follow the instructions on the packaging. You can get this at your local pharmacy or grocery store.  · Try applying baking soda paste to your skin. Stir water into baking soda until it reaches a paste-like consistency.  · Do not scratch or rub your skin.  · Avoid covering the rash. Make sure the rash is exposed to air as much as possible.  General instructions  · Avoid hot showers or baths, which can make itching worse. A cold shower may help.  · Avoid scented soaps, detergents, and perfumes. Use gentle soaps, detergents, perfumes, and other cosmetic products.  · Avoid any substance that causes your rash. Keep a journal to help track what causes your rash. Write down:  ? What you eat.  ? What cosmetic products you use.  ? What you drink.  ? What you wear. This includes jewelry.  · Keep all follow-up visits as told by your health care provider. This is important.  Contact a health care provider if:  · You sweat at night.  · You lose weight.  · You urinate more than normal.  · You feel weak.  · You vomit.  · Your skin or the whites of your eyes look yellow (jaundice).  · Your skin:  ? Tingles.  ? Is  numb.  · Your rash:  ? Does not go away after several days.  ? Gets worse.  · You are:  ? Unusually thirsty.  ? More tired than normal.  · You have:  ? New symptoms.  ? Pain in your abdomen.  ? A fever.  ? Diarrhea.  Get help right away if:  · You develop a rash that covers all or most of your body. The rash may or may not be painful.  · You develop blisters that:  ? Are on top of the rash.  ? Grow larger or grow together.  ? Are painful.  ? Are inside your nose or mouth.  · You develop a rash that:  ? Looks like purple pinprick-sized spots all over your body.  ? Has a “bull's eye” or looks like a target.  ? Is not related to sun exposure, is red and painful, and causes your skin to peel.  This information is not intended to replace advice given to you by your health care provider. Make sure you discuss any questions you have with your health care provider.  Document Released: 12/08/2003 Document Revised: 05/23/2017 Document Reviewed: 05/04/2016  [employer] Patient Education © 2018 [employer]  Dexamethasone injection  What is this medicine?  DEXAMETHASONE (dex a METH a sone) is a corticosteroid. It is used to treat inflammation of the skin, joints, lungs, and other organs. Common conditions treated include asthma, allergies, and arthritis. It is also used for other conditions, like blood disorders and diseases of the adrenal glands.  This medicine may be used for other purposes; ask your health care provider or pharmacist if you have questions.  COMMON BRAND NAME(S): Decadron, DoubleDex, Simplist Dexamethasone, Solurex  What should I tell my health care provider before I take this medicine?  They need to know if you have any of these conditions:  -blood clotting problems  -Cushing's syndrome  -diabetes  -glaucoma  -heart problems or disease  -high blood pressure  -infection like herpes, measles, tuberculosis, or chickenpox  -kidney disease  -liver disease  -mental problems  -myasthenia  gravis  -osteoporosis  -previous heart attack  -seizures  -stomach, ulcer or intestine disease including colitis and diverticulitis  -thyroid problem  -an unusual or allergic reaction to dexamethasone, corticosteroids, other medicines, lactose, foods, dyes, or preservatives  -pregnant or trying to get pregnant  -breast-feeding  How should I use this medicine?  This medicine is for injection into a muscle, joint, lesion, soft tissue, or vein. It is given by a health care professional in a hospital or clinic setting.  Talk to your pediatrician regarding the use of this medicine in children. Special care may be needed.  Overdosage: If you think you have taken too much of this medicine contact a poison control center or emergency room at once.  NOTE: This medicine is only for you. Do not share this medicine with others.  What if I miss a dose?  This may not apply. If you are having a series of injections over a prolonged period, try not to miss an appointment. Call your doctor or health care professional to reschedule if you are unable to keep an appointment.  What may interact with this medicine?  Do not take this medicine with any of the following medications:  -mifepristone, RU-486  -vaccines  This medicine may also interact with the following medications:  -amphotericin B  -antibiotics like clarithromycin, erythromycin, and troleandomycin  -aspirin and aspirin-like drugs  -barbiturates like phenobarbital  -carbamazepine  -cholestyramine  -cholinesterase inhibitors like donepezil, galantamine, rivastigmine, and tacrine  -cyclosporine  -digoxin  -diuretics  -ephedrine  -female hormones, like estrogens or progestins and birth control pills  -indinavir  -isoniazid  -ketoconazole  -medicines for diabetes  -medicines that improve muscle tone or strength for conditions like myasthenia gravis  -NSAIDs, medicines for pain and inflammation, like ibuprofen or naproxen  -phenytoin  -rifampin  -thalidomide  -warfarin  This list  may not describe all possible interactions. Give your health care provider a list of all the medicines, herbs, non-prescription drugs, or dietary supplements you use. Also tell them if you smoke, drink alcohol, or use illegal drugs. Some items may interact with your medicine.  What should I watch for while using this medicine?  Your condition will be monitored carefully while you are receiving this medicine.  If you are taking this medicine for a long time, carry an identification card with your name and address, the type and dose of your medicine, and your doctor's name and address.  This medicine may increase your risk of getting an infection. Stay away from people who are sick. Tell your doctor or health care professional if you are around anyone with measles or chickenpox. Talk to your health care provider before you get any vaccines that you take this medicine.  If you are going to have surgery, tell your doctor or health care professional that you have taken this medicine within the last twelve months.  Ask your doctor or health care professional about your diet. You may need to lower the amount of salt you eat.  The medicine can increase your blood sugar. If you are a diabetic check with your doctor if you need help adjusting the dose of your diabetic medicine.  What side effects may I notice from receiving this medicine?  Side effects that you should report to your doctor or health care professional as soon as possible:  -allergic reactions like skin rash, itching or hives, swelling of the face, lips, or tongue  -black or tarry stools  -change in the amount of urine  -changes in vision  -confusion, excitement, restlessness, a false sense of well-being  -fever, sore throat, sneezing, cough, or other signs of infection, wounds that will not heal  -hallucinations  -increased thirst  -mental depression, mood swings, mistaken feelings of self importance or of being mistreated  -pain in hips, back, ribs, arms,  shoulders, or legs  -pain, redness, or irritation at the injection site  -redness, blistering, peeling or loosening of the skin, including inside the mouth  -rounding out of face  -swelling of feet or lower legs  -unusual bleeding or bruising  -unusual tired or weak  -wounds that do not heal  Side effects that usually do not require medical attention (report to your doctor or health care professional if they continue or are bothersome):  -diarrhea or constipation  -change in taste  -headache  -nausea, vomiting  -skin problems, acne, thin and shiny skin  -touble sleeping  -unusual growth of hair on the face or body  -weight gain  This list may not describe all possible side effects. Call your doctor for medical advice about side effects. You may report side effects to FDA at 6-876-FDA-1215.  Where should I keep my medicine?  This drug is given in a hospital or clinic and will not be stored at home.  NOTE: This sheet is a summary. It may not cover all possible information. If you have questions about this medicine, talk to your doctor, pharmacist, or health care provider.  © 2018 [employer] (2009-04-09 14:04:12)  Hydroxyzine capsules or tablets  What is this medicine?  HYDROXYZINE (mai DROX i zeen) is an antihistamine. This medicine is used to treat allergy symptoms. It is also used to treat anxiety and tension. This medicine can be used with other medicines to induce sleep before surgery.  This medicine may be used for other purposes; ask your health care provider or pharmacist if you have questions.  COMMON BRAND NAME(S): ANX, Atarax, Rezine, Vistaril  What should I tell my health care provider before I take this medicine?  They need to know if you have any of these conditions:  -any chronic illness  -difficulty passing urine  -glaucoma  -heart disease  -kidney disease  -liver disease  -lung disease  -an unusual or allergic reaction to hydroxyzine, cetirizine, other medicines, foods, dyes, or  preservatives  -pregnant or trying to get pregnant  -breast-feeding  How should I use this medicine?  Take this medicine by mouth with a full glass of water. Follow the directions on the prescription label. You may take this medicine with food or on an empty stomach. Take your medicine at regular intervals. Do not take your medicine more often than directed.  Talk to your pediatrician regarding the use of this medicine in children. Special care may be needed. While this drug may be prescribed for children as young as 6 years of age for selected conditions, precautions do apply.  Patients over 65 years old may have a stronger reaction and need a smaller dose.  Overdosage: If you think you have taken too much of this medicine contact a poison control center or emergency room at once.  NOTE: This medicine is only for you. Do not share this medicine with others.  What if I miss a dose?  If you miss a dose, take it as soon as you can. If it is almost time for your next dose, take only that dose. Do not take double or extra doses.  What may interact with this medicine?  -alcohol  -barbiturate medicines for sleep or seizures  -medicines for colds, allergies  -medicines for depression, anxiety, or emotional disturbances  -medicines for pain  -medicines for sleep  -muscle relaxants  This list may not describe all possible interactions. Give your health care provider a list of all the medicines, herbs, non-prescription drugs, or dietary supplements you use. Also tell them if you smoke, drink alcohol, or use illegal drugs. Some items may interact with your medicine.  What should I watch for while using this medicine?  Tell your doctor or health care professional if your symptoms do not improve.  You may get drowsy or dizzy. Do not drive, use machinery, or do anything that needs mental alertness until you know how this medicine affects you. Do not stand or sit up quickly, especially if you are an older patient. This reduces the  risk of dizzy or fainting spells. Alcohol may interfere with the effect of this medicine. Avoid alcoholic drinks.  Your mouth may get dry. Chewing sugarless gum or sucking hard candy, and drinking plenty of water may help. Contact your doctor if the problem does not go away or is severe.  This medicine may cause dry eyes and blurred vision. If you wear contact lenses you may feel some discomfort. Lubricating drops may help. See your eye doctor if the problem does not go away or is severe.  If you are receiving skin tests for allergies, tell your doctor you are using this medicine.  What side effects may I notice from receiving this medicine?  Side effects that you should report to your doctor or health care professional as soon as possible:  -fast or irregular heartbeat  -difficulty passing urine  -seizures  -slurred speech or confusion  -tremor  Side effects that usually do not require medical attention (report to your doctor or health care professional if they continue or are bothersome):  -constipation  -drowsiness  -fatigue  -headache  -stomach upset  This list may not describe all possible side effects. Call your doctor for medical advice about side effects. You may report side effects to FDA at 5-907-FDA-9071.  Where should I keep my medicine?  Keep out of the reach of children.  Store at room temperature between 15 and 30 degrees C (59 and 86 degrees F). Keep container tightly closed. Throw away any unused medicine after the expiration date.  NOTE: This sheet is a summary. It may not cover all possible information. If you have questions about this medicine, talk to your doctor, pharmacist, or health care provider.  © 2018 [employer] (2009-05-01 14:50:59)

## 2018-12-20 NOTE — PROGRESS NOTES
Subjective   Behzad Guzman is a 68 y.o. male.     Rash   This is a new problem. The current episode started 1 to 4 weeks ago. The problem has been gradually worsening since onset. The affected locations include the chest, abdomen, back, left arm, right arm, left upper leg, right upper leg, left lower leg and right lower leg. The rash is characterized by redness and itchiness. Associated with: Thinks it may be related to having a different brand () of his Norco. Pertinent negatives include no congestion, cough, diarrhea, facial edema, fatigue, fever, shortness of breath, sore throat or vomiting. Past treatments include anti-itch cream. The treatment provided no relief. His past medical history is significant for allergies.       The following portions of the patient's history were reviewed and updated as appropriate: allergies, current medications, past family history, past medical history, past social history, past surgical history and problem list.     Allergies   Allergen Reactions   • Amoxicillin Rash   • Penicillins Rash     Current Outpatient Medications on File Prior to Visit   Medication Sig   • amLODIPine (NORVASC) 5 MG tablet Take  by mouth.   • diazePAM (VALIUM) 5 MG tablet Take 5 mg by mouth 2 (Two) Times a Day.   • escitalopram (LEXAPRO) 10 MG tablet Take 10 mg by mouth Daily.   • gabapentin (NEURONTIN) 400 MG capsule Take 400 mg by mouth 3 (Three) Times a Day.   • GLIMEPIRIDE PO Take  by mouth.   • hydrochlorothiazide (HYDRODIURIL) 12.5 MG tablet Take 1 tablet by mouth Daily.   • HYDROcodone-acetaminophen (NORCO)  MG per tablet Take 1 tablet by mouth Every 6 (Six) Hours As Needed for moderate pain (4-6).   • lisinopril (PRINIVIL,ZESTRIL) 20 MG tablet Take 20 mg by mouth Daily.   • metoprolol tartrate (LOPRESSOR) 50 MG tablet Take 50 mg by mouth 2 (Two) Times a Day.   • NON FORMULARY GLIMEREX FOR PREDIABETES   • omeprazole (priLOSEC) 20 MG capsule Take 20 mg by mouth Daily.   •  ONETOUCH VERIO test strip Daily. for testing   • oxyCODONE (oxyCONTIN) 10 MG 12 hr tablet Take 10 mg by mouth Every 12 (Twelve) Hours As Needed.   • pravastatin (PRAVACHOL) 10 MG tablet Take 10 mg by mouth Daily.   • VENTOLIN  (90 Base) MCG/ACT inhaler inhalation 2 puffs by mouth every 4 to 6 hours if needed     No current facility-administered medications on file prior to visit.      Review of Systems   Constitutional: Negative for appetite change, chills, diaphoresis, fatigue and fever.   HENT: Negative.  Negative for congestion and sore throat.    Respiratory: Negative.  Negative for cough and shortness of breath.    Cardiovascular: Negative.    Gastrointestinal: Negative.  Negative for abdominal pain, diarrhea, nausea and vomiting.   Genitourinary: Negative for dysuria.   Musculoskeletal: Negative for arthralgias and myalgias.   Skin: Positive for rash.   Hematological: Negative for adenopathy. Does not bruise/bleed easily.       Objective   Physical Exam   Constitutional: He is oriented to person, place, and time. Vital signs are normal. He appears well-developed and well-nourished. He is cooperative. He does not appear ill. No distress.   HENT:   Mouth/Throat: Uvula is midline and mucous membranes are normal.   Cardiovascular: Normal rate, regular rhythm and normal heart sounds.   Pulmonary/Chest: Effort normal and breath sounds normal.   Neurological: He is alert and oriented to person, place, and time.   Skin: Skin is warm, dry and intact. Rash noted. Rash is urticarial. He is not diaphoretic.   Urticarial rash on bilateral upper extremities, torso, bilateral legs   Psychiatric: He has a normal mood and affect. His behavior is normal. Judgment and thought content normal.   Vitals reviewed.    Vitals:    12/19/18 1939   Pulse: 84   Resp: 18   Temp: 97.8 °F (36.6 °C)   SpO2: 97%   Body mass index is 27.67 kg/m².     Assessment/Plan   Behzad was seen today for rash.    Diagnoses and all orders for this  visit:    Rash and nonspecific skin eruption  -     dexamethasone (DECADRON) injection 4 mg; Inject 1 mL into the appropriate muscle as directed by prescriber 1 (One) Time.  -     hydrOXYzine (ATARAX) 25 MG tablet; Take 1 tablet by mouth 3 (Three) Times a Day As Needed for Itching.  -     Menthol-Zinc Oxide 0.44-20.6 % ointment; Apply 4 g topically to the appropriate area as directed 3 (Three) Times a Day As Needed (apply to affected area) for up to 10 days.     Discussed the nature of the medical condition(s) risks, complications, implications, management, safe and proper use of medications. Encouraged medication compliance, and keeping scheduled follow up appointments with me and any other providers.     RTC if symptoms fail to improve, to ER if symptoms worsen.  F/U with PCP at next scheduled appointment or sooner if needed.

## 2018-12-21 ENCOUNTER — OFFICE VISIT (OUTPATIENT)
Dept: FAMILY MEDICINE CLINIC | Facility: CLINIC | Age: 68
End: 2018-12-21

## 2018-12-21 VITALS
WEIGHT: 200.38 LBS | SYSTOLIC BLOOD PRESSURE: 144 MMHG | HEIGHT: 72 IN | OXYGEN SATURATION: 96 % | HEART RATE: 72 BPM | BODY MASS INDEX: 27.14 KG/M2 | TEMPERATURE: 97.4 F | RESPIRATION RATE: 18 BRPM | DIASTOLIC BLOOD PRESSURE: 88 MMHG

## 2018-12-21 DIAGNOSIS — M79.10 MYALGIA: ICD-10-CM

## 2018-12-21 DIAGNOSIS — IMO0001 UNCONTROLLED TYPE 2 DIABETES MELLITUS WITHOUT COMPLICATION, WITHOUT LONG-TERM CURRENT USE OF INSULIN: Primary | ICD-10-CM

## 2018-12-21 DIAGNOSIS — L30.9 DERMATITIS: ICD-10-CM

## 2018-12-21 LAB
ALBUMIN SERPL-MCNC: 4.77 G/DL (ref 3.2–4.8)
ALBUMIN/GLOB SERPL: 2.2 G/DL (ref 1.5–2.5)
ALP SERPL-CCNC: 61 U/L (ref 25–100)
ALT SERPL W P-5'-P-CCNC: 26 U/L (ref 7–40)
ANION GAP SERPL CALCULATED.3IONS-SCNC: 9 MMOL/L (ref 3–11)
AST SERPL-CCNC: 23 U/L (ref 0–33)
BASOPHILS # BLD AUTO: 0.05 10*3/MM3 (ref 0–0.2)
BASOPHILS NFR BLD AUTO: 0.5 % (ref 0–1)
BILIRUB SERPL-MCNC: 0.6 MG/DL (ref 0.3–1.2)
BUN BLD-MCNC: 12 MG/DL (ref 9–23)
BUN/CREAT SERPL: 11.4 (ref 7–25)
CALCIUM SPEC-SCNC: 9.4 MG/DL (ref 8.7–10.4)
CHLORIDE SERPL-SCNC: 101 MMOL/L (ref 99–109)
CK SERPL-CCNC: 50 U/L (ref 26–174)
CO2 SERPL-SCNC: 30 MMOL/L (ref 20–31)
CREAT BLD-MCNC: 1.05 MG/DL (ref 0.6–1.3)
CRP SERPL-MCNC: 0.01 MG/DL (ref 0–1)
DEPRECATED RDW RBC AUTO: 44.4 FL (ref 37–54)
EOSINOPHIL # BLD AUTO: 0.14 10*3/MM3 (ref 0–0.3)
EOSINOPHIL NFR BLD AUTO: 1.4 % (ref 0–3)
ERYTHROCYTE [DISTWIDTH] IN BLOOD BY AUTOMATED COUNT: 13.5 % (ref 11.3–14.5)
ERYTHROCYTE [SEDIMENTATION RATE] IN BLOOD: 5 MM/HR (ref 0–20)
GFR SERPL CREATININE-BSD FRML MDRD: 70 ML/MIN/1.73
GLOBULIN UR ELPH-MCNC: 2.1 GM/DL
GLUCOSE BLD-MCNC: 148 MG/DL (ref 70–100)
HCT VFR BLD AUTO: 49.9 % (ref 38.9–50.9)
HGB BLD-MCNC: 16.4 G/DL (ref 13.1–17.5)
IMM GRANULOCYTES # BLD AUTO: 0.03 10*3/MM3 (ref 0–0.03)
IMM GRANULOCYTES NFR BLD AUTO: 0.3 % (ref 0–0.6)
LYMPHOCYTES # BLD AUTO: 3.71 10*3/MM3 (ref 0.6–4.8)
LYMPHOCYTES NFR BLD AUTO: 38.3 % (ref 24–44)
MCH RBC QN AUTO: 29.8 PG (ref 27–31)
MCHC RBC AUTO-ENTMCNC: 32.9 G/DL (ref 32–36)
MCV RBC AUTO: 90.7 FL (ref 80–99)
MONOCYTES # BLD AUTO: 0.76 10*3/MM3 (ref 0–1)
MONOCYTES NFR BLD AUTO: 7.8 % (ref 0–12)
NEUTROPHILS # BLD AUTO: 5.03 10*3/MM3 (ref 1.5–8.3)
NEUTROPHILS NFR BLD AUTO: 52 % (ref 41–71)
PLATELET # BLD AUTO: 227 10*3/MM3 (ref 150–450)
PMV BLD AUTO: 12.7 FL (ref 6–12)
POTASSIUM BLD-SCNC: 4.6 MMOL/L (ref 3.5–5.5)
PROT SERPL-MCNC: 6.9 G/DL (ref 5.7–8.2)
RBC # BLD AUTO: 5.5 10*6/MM3 (ref 4.2–5.76)
SODIUM BLD-SCNC: 140 MMOL/L (ref 132–146)
WBC NRBC COR # BLD: 9.69 10*3/MM3 (ref 3.5–10.8)

## 2018-12-21 PROCEDURE — 85025 COMPLETE CBC W/AUTO DIFF WBC: CPT | Performed by: NURSE PRACTITIONER

## 2018-12-21 PROCEDURE — 99214 OFFICE O/P EST MOD 30 MIN: CPT | Performed by: NURSE PRACTITIONER

## 2018-12-21 PROCEDURE — 86038 ANTINUCLEAR ANTIBODIES: CPT | Performed by: NURSE PRACTITIONER

## 2018-12-21 PROCEDURE — 86430 RHEUMATOID FACTOR TEST QUAL: CPT | Performed by: NURSE PRACTITIONER

## 2018-12-21 PROCEDURE — 36415 COLL VENOUS BLD VENIPUNCTURE: CPT | Performed by: NURSE PRACTITIONER

## 2018-12-21 PROCEDURE — 80053 COMPREHEN METABOLIC PANEL: CPT | Performed by: NURSE PRACTITIONER

## 2018-12-21 PROCEDURE — 86140 C-REACTIVE PROTEIN: CPT | Performed by: NURSE PRACTITIONER

## 2018-12-21 PROCEDURE — 82550 ASSAY OF CK (CPK): CPT | Performed by: NURSE PRACTITIONER

## 2018-12-21 NOTE — PROGRESS NOTES
Subjective   Behzad Guzman is a 68 y.o. male.     History of Present Illness Mr Guzman is here to follow up on a rash that developed 4 weeks ago after there was a change in the  of his Norco. He had an injection of Decadron with good relief from the itching. He did not  Atarax. The rash is still present on legs and arms but less red. Today he is requesting labs to see why he still has the rash. Denies fever, known sick exposures. Going to PT.   Recently seen for Medicare wellness exam A1c 9.1. He is currently taking Glimeperide only.  eGFR is 81.    The following portions of the patient's history were reviewed and updated as appropriate: allergies, current medications, past family history, past medical history, past social history, past surgical history and problem list.    Review of Systems   Constitutional: Negative for appetite change, fever and unexpected weight loss.   HENT: Negative for nosebleeds, sore throat and trouble swallowing.    Eyes: Negative for visual disturbance.   Respiratory: Negative for cough, shortness of breath and wheezing.    Cardiovascular: Negative for chest pain, palpitations and leg swelling.   Gastrointestinal: Negative for abdominal pain, blood in stool, constipation, diarrhea, nausea and vomiting.   Endocrine: Negative for polydipsia, polyphagia and polyuria.   Genitourinary: Negative for urinary incontinence, dysuria, frequency and hematuria.   Musculoskeletal: Positive for arthralgias, back pain, joint swelling, myalgias, neck pain and neck stiffness. Negative for gait problem.   Skin: Positive for dry skin and rash.   Neurological: Negative for dizziness, seizures, syncope and numbness.   Hematological: Negative for adenopathy. Does not bruise/bleed easily.   Psychiatric/Behavioral: Negative for sleep disturbance and depressed mood. The patient is not nervous/anxious.        Objective   Physical Exam   Constitutional: He is oriented to person, place, and time.  He appears well-developed and well-nourished. No distress.   HENT:   Head: Normocephalic and atraumatic.   Right Ear: Tympanic membrane and external ear normal.   Left Ear: Tympanic membrane and external ear normal.   Nose: Nose normal.   Mouth/Throat: Oropharynx is clear and moist. No oropharyngeal exudate.   Eyes: Conjunctivae are normal. Pupils are equal, round, and reactive to light. Right eye exhibits no discharge. Left eye exhibits no discharge. No scleral icterus.   Neck: Neck supple.   Cardiovascular: Normal rate.   Musculoskeletal: He exhibits no edema or deformity.   Neurological: He is alert and oriented to person, place, and time. Coordination normal.   Skin: Skin is warm and dry. Capillary refill takes less than 2 seconds. Rash noted. There is erythema.   Forearms with erythematous papules with scaling. Slight excoriations. LE with erythematous papules. Less scaling.   Psychiatric: He has a normal mood and affect. His speech is normal and behavior is normal. Judgment and thought content normal.   Nursing note and vitals reviewed.        Assessment/Plan   Behzad was seen today for rash.    Diagnoses and all orders for this visit:    Uncontrolled type 2 diabetes mellitus without complication, without long-term current use of insulin (CMS/[facility])  -     CBC & Differential  -     Comprehensive Metabolic Panel  -     metFORMIN (GLUCOPHAGE) 500 MG tablet; Take 1 tablet by mouth Daily With Breakfast.  -     CBC Auto Differential    Dermatitis  -     CBC & Differential  -     Comprehensive Metabolic Panel  -     C-reactive Protein  -     CK  -     Cancel: C-reactive Protein  -     ADELAIDA With / DsDNA, RNP, Sjogrens A / B, Smith  -     Rheumatoid Factor  -     Sedimentation Rate  -     CBC Auto Differential    Myalgia  -     Cancel: C-reactive Protein  -     ADELAIDA With / DsDNA, RNP, Sjogrens A / B, Smith  -     Rheumatoid Factor  -     Sedimentation Rate      Discussed the importance of low carb diet, annual dilated  eye exam, nightly foot checks, annual dentist visit, daily exercise. Monitor blood sugar at least twice a week. Maintain BMI under 25. Have regular follow up appointments for labs and screenings.  Discussed the nature of the disease including, risks, complications, implications, management, safe and proper use of medications. Encouraged therapeutic lifestyle changes including low calorie diet with plenty of fruits and vegetables, daily exercise, medication compliance, and keeping scheduled follow up appointments with me and any other providers. Encouraged patient to have appointment for complete physical, fasting labs, appropriate screenings, and immunizations on an annual basis.  Will check labs. Continue current treatment for rash. He will  Atarax.  Follow up in 4 weeks or sooner prn.

## 2018-12-21 NOTE — PATIENT INSTRUCTIONS
Diabetes Mellitus and Nutrition  When you have diabetes (diabetes mellitus), it is very important to have healthy eating habits because your blood sugar (glucose) levels are greatly affected by what you eat and drink. Eating healthy foods in the appropriate amounts, at about the same times every day, can help you:  · Control your blood glucose.  · Lower your risk of heart disease.  · Improve your blood pressure.  · Reach or maintain a healthy weight.    Every person with diabetes is different, and each person has different needs for a meal plan. Your health care provider may recommend that you work with a diet and nutrition specialist (dietitian) to make a meal plan that is best for you. Your meal plan may vary depending on factors such as:  · The calories you need.  · The medicines you take.  · Your weight.  · Your blood glucose, blood pressure, and cholesterol levels.  · Your activity level.  · Other health conditions you have, such as heart or kidney disease.    How do carbohydrates affect me?  Carbohydrates affect your blood glucose level more than any other type of food. Eating carbohydrates naturally increases the amount of glucose in your blood. Carbohydrate counting is a method for keeping track of how many carbohydrates you eat. Counting carbohydrates is important to keep your blood glucose at a healthy level, especially if you use insulin or take certain oral diabetes medicines.  It is important to know how many carbohydrates you can safely have in each meal. This is different for every person. Your dietitian can help you calculate how many carbohydrates you should have at each meal and for snack.  Foods that contain carbohydrates include:  · Bread, cereal, rice, pasta, and crackers.  · Potatoes and corn.  · Peas, beans, and lentils.  · Milk and yogurt.  · Fruit and juice.  · Desserts, such as cakes, cookies, ice cream, and candy.    How does alcohol affect me?  Alcohol can cause a sudden decrease in blood  "glucose (hypoglycemia), especially if you use insulin or take certain oral diabetes medicines. Hypoglycemia can be a life-threatening condition. Symptoms of hypoglycemia (sleepiness, dizziness, and confusion) are similar to symptoms of having too much alcohol.  If your health care provider says that alcohol is safe for you, follow these guidelines:  · Limit alcohol intake to no more than 1 drink per day for nonpregnant women and 2 drinks per day for men. One drink equals 12 oz of beer, 5 oz of wine, or 1½ oz of hard liquor.  · Do not drink on an empty stomach.  · Keep yourself hydrated with water, diet soda, or unsweetened iced tea.  · Keep in mind that regular soda, juice, and other mixers may contain a lot of sugar and must be counted as carbohydrates.    What are tips for following this plan?  Reading food labels  · Start by checking the serving size on the label. The amount of calories, carbohydrates, fats, and other nutrients listed on the label are based on one serving of the food. Many foods contain more than one serving per package.  · Check the total grams (g) of carbohydrates in one serving. You can calculate the number of servings of carbohydrates in one serving by dividing the total carbohydrates by 15. For example, if a food has 30 g of total carbohydrates, it would be equal to 2 servings of carbohydrates.  · Check the number of grams (g) of saturated and trans fats in one serving. Choose foods that have low or no amount of these fats.  · Check the number of milligrams (mg) of sodium in one serving. Most people should limit total sodium intake to less than 2,300 mg per day.  · Always check the nutrition information of foods labeled as \"low-fat\" or \"nonfat\". These foods may be higher in added sugar or refined carbohydrates and should be avoided.  · Talk to your dietitian to identify your daily goals for nutrients listed on the label.  Shopping  · Avoid buying canned, premade, or processed foods. These " foods tend to be high in fat, sodium, and added sugar.  · Shop around the outside edge of the grocery store. This includes fresh fruits and vegetables, bulk grains, fresh meats, and fresh dairy.  Cooking  · Use low-heat cooking methods, such as baking, instead of high-heat cooking methods like deep frying.  · Cook using healthy oils, such as olive, canola, or sunflower oil.  · Avoid cooking with butter, cream, or high-fat meats.  Meal planning  · Eat meals and snacks regularly, preferably at the same times every day. Avoid going long periods of time without eating.  · Eat foods high in fiber, such as fresh fruits, vegetables, beans, and whole grains. Talk to your dietitian about how many servings of carbohydrates you can eat at each meal.  · Eat 4-6 ounces of lean protein each day, such as lean meat, chicken, fish, eggs, or tofu. 1 ounce is equal to 1 ounce of meat, chicken, or fish, 1 egg, or 1/4 cup of tofu.  · Eat some foods each day that contain healthy fats, such as avocado, nuts, seeds, and fish.  Lifestyle    · Check your blood glucose regularly.  · Exercise at least 30 minutes 5 or more days each week, or as told by your health care provider.  · Take medicines as told by your health care provider.  · Do not use any products that contain nicotine or tobacco, such as cigarettes and e-cigarettes. If you need help quitting, ask your health care provider.  · Work with a counselor or diabetes educator to identify strategies to manage stress and any emotional and social challenges.  What are some questions to ask my health care provider?  · Do I need to meet with a diabetes educator?  · Do I need to meet with a dietitian?  · What number can I call if I have questions?  · When are the best times to check my blood glucose?  Where to find more information:  · American Diabetes Association: diabetes.org/food-and-fitness/food  · Academy of Nutrition and Dietetics:  www.eatright.org/resources/health/diseases-and-conditions/diabetes  · National [place] of Diabetes and Digestive and Kidney Diseases (NIH): www.niddk.nih.gov/health-information/diabetes/overview/diet-eating-physical-activity  Summary  · A healthy meal plan will help you control your blood glucose and maintain a healthy lifestyle.  · Working with a diet and nutrition specialist (dietitian) can help you make a meal plan that is best for you.  · Keep in mind that carbohydrates and alcohol have immediate effects on your blood glucose levels. It is important to count carbohydrates and to use alcohol carefully.  This information is not intended to replace advice given to you by your health care provider. Make sure you discuss any questions you have with your health care provider.  Document Released: 09/14/2006 Document Revised: 01/22/2018 Document Reviewed: 01/22/2018  [employer] Interactive Patient Education © 2018 [employer]  Blood Glucose Monitoring, Adult  Monitoring your blood sugar (glucose) helps you manage your diabetes. It also helps you and your health care provider determine how well your diabetes management plan is working. Blood glucose monitoring involves checking your blood glucose as often as directed, and keeping a record (log) of your results over time.  Why should I monitor my blood glucose?  Checking your blood glucose regularly can:  · Help you understand how food, exercise, illnesses, and medicines affect your blood glucose.  · Let you know what your blood glucose is at any time. You can quickly tell if you are having low blood glucose (hypoglycemia) or high blood glucose (hyperglycemia).  · Help you and your health care provider adjust your medicines as needed.    When should I check my blood glucose?  Follow instructions from your health care provider about how often to check your blood glucose. This may depend on:  · The type of diabetes you have.  · How well-controlled your diabetes  is.  · Medicines you are taking.    If you have type 1 diabetes:  · Check your blood glucose at least 2 times a day.  · Also check your blood glucose:  ? Before every insulin injection.  ? Before and after exercise.  ? Between meals.  ? 2 hours after a meal.  ? Occasionally between 2:00 a.m. and 3:00 a.m., as directed.  ? Before potentially dangerous tasks, like driving or using heavy machinery.  ? At bedtime.  · You may need to check your blood glucose more often, up to 6-10 times a day:  ? If you use an insulin pump.  ? If you need multiple daily injections (MDI).  ? If your diabetes is not well-controlled.  ? If you are ill.  ? If you have a history of severe hypoglycemia.  ? If you have a history of not knowing when your blood glucose is getting low (hypoglycemia unawareness).  If you have type 2 diabetes:  · If you take insulin or other diabetes medicines, check your blood glucose at least 2 times a day.  · If you are on intensive insulin therapy, check your blood glucose at least 4 times a day. Occasionally, you may also need to check between 2:00 a.m. and 3:00 a.m., as directed.  · Also check your blood glucose:  ? Before and after exercise.  ? Before potentially dangerous tasks, like driving or using heavy machinery.  · You may need to check your blood glucose more often if:  ? Your medicine is being adjusted.  ? Your diabetes is not well-controlled.  ? You are ill.  What is a blood glucose log?  · A blood glucose log is a record of your blood glucose readings. It helps you and your health care provider:  ? Look for patterns in your blood glucose over time.  ? Adjust your diabetes management plan as needed.  · Every time you check your blood glucose, write down your result and notes about things that may be affecting your blood glucose, such as your diet and exercise for the day.  · Most glucose meters store a record of glucose readings in the meter. Some meters allow you to download your records to a  computer.  How do I check my blood glucose?  Follow these steps to get accurate readings of your blood glucose:  Supplies needed    · Blood glucose meter.  · Test strips for your meter. Each meter has its own strips. You must use the strips that come with your meter.  · A needle to prick your finger (lancet). Do not use lancets more than once.  · A device that holds the lancet (lancing device).  · A journal or log book to write down your results.  Procedure  · Wash your hands with soap and water.  · Prick the side of your finger (not the tip) with the lancet. Use a different finger each time.  · Gently rub the finger until a small drop of blood appears.  · Follow instructions that come with your meter for inserting the test strip, applying blood to the strip, and using your blood glucose meter.  · Write down your result and any notes.  Alternative testing sites  · Some meters allow you to use areas of your body other than your finger (alternative sites) to test your blood.  · If you think you may have hypoglycemia, or if you have hypoglycemia unawareness, do not use alternative sites. Use your finger instead.  · Alternative sites may not be as accurate as the fingers, because blood flow is slower in these areas. This means that the result you get may be delayed, and it may be different from the result that you would get from your finger.  · The most common alternative sites are:  ? Forearm.  ? Thigh.  ? Palm of the hand.  Additional tips  · Always keep your supplies with you.  · If you have questions or need help, all blood glucose meters have a 24-hour “hotline” number that you can call. You may also contact your health care provider.  · After you use a few boxes of test strips, adjust (calibrate) your blood glucose meter by following instructions that came with your meter.  This information is not intended to replace advice given to you by your health care provider. Make sure you discuss any questions you have  with your health care provider.  Document Released: 12/20/2004 Document Revised: 07/07/2017 Document Reviewed: 05/29/2017  [employer]vier Interactive Patient Education © 2017 [employer]

## 2018-12-22 LAB — RHEUMATOID FACT SERPL-ACNC: NEGATIVE [IU]/ML

## 2018-12-23 LAB — ANA SER QL: NEGATIVE

## 2018-12-28 ENCOUNTER — TELEPHONE (OUTPATIENT)
Dept: FAMILY MEDICINE CLINIC | Facility: CLINIC | Age: 68
End: 2018-12-28

## 2018-12-28 NOTE — TELEPHONE ENCOUNTER
Called him back. No answer. His lab letter went out yesterday.        ----- Message from Hiarl Vergara sent at 12/28/2018  3:10 PM EST -----  Contact: [phone]  Pt is wanting someone to call concerning the labs that were ordered.

## 2019-01-04 ENCOUNTER — TELEPHONE (OUTPATIENT)
Dept: FAMILY MEDICINE CLINIC | Facility: CLINIC | Age: 69
End: 2019-01-04

## 2019-01-04 NOTE — TELEPHONE ENCOUNTER
----- Message from Kimi Abbott sent at 1/4/2019  8:32 AM EST -----  Regarding: RETURNING PHONE CALL  Contact: [phone]  Patient called and was returning a phone call.  Please call and advise @ [phone].  Thank you.

## 2019-01-22 ENCOUNTER — OFFICE VISIT (OUTPATIENT)
Dept: FAMILY MEDICINE CLINIC | Facility: CLINIC | Age: 69
End: 2019-01-22

## 2019-01-22 VITALS
HEART RATE: 78 BPM | HEIGHT: 72 IN | RESPIRATION RATE: 16 BRPM | BODY MASS INDEX: 27.9 KG/M2 | SYSTOLIC BLOOD PRESSURE: 142 MMHG | WEIGHT: 206 LBS | OXYGEN SATURATION: 97 % | DIASTOLIC BLOOD PRESSURE: 82 MMHG

## 2019-01-22 DIAGNOSIS — M79.605 LEFT LEG PAIN: ICD-10-CM

## 2019-01-22 DIAGNOSIS — M25.511 RIGHT SHOULDER PAIN, UNSPECIFIED CHRONICITY: ICD-10-CM

## 2019-01-22 DIAGNOSIS — E11.65 UNCONTROLLED TYPE 2 DIABETES MELLITUS WITH HYPERGLYCEMIA (HCC): Primary | ICD-10-CM

## 2019-01-22 DIAGNOSIS — L30.8 OTHER ECZEMA: ICD-10-CM

## 2019-01-22 DIAGNOSIS — I10 ESSENTIAL HYPERTENSION: ICD-10-CM

## 2019-01-22 LAB — HBA1C MFR BLD: 7.6 %

## 2019-01-22 PROCEDURE — 99214 OFFICE O/P EST MOD 30 MIN: CPT | Performed by: FAMILY MEDICINE

## 2019-01-22 PROCEDURE — 83036 HEMOGLOBIN GLYCOSYLATED A1C: CPT | Performed by: FAMILY MEDICINE

## 2019-01-22 RX ORDER — TRIAMCINOLONE ACETONIDE 1 MG/G
CREAM TOPICAL 2 TIMES DAILY
Qty: 80 G | Refills: 1 | Status: SHIPPED | OUTPATIENT
Start: 2019-01-22 | End: 2019-02-06
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
## 2019-02-01 DIAGNOSIS — R19.5 POSITIVE COLORECTAL CANCER SCREENING USING COLOGUARD TEST: Primary | ICD-10-CM

## 2019-02-02 ENCOUNTER — TELEPHONE (OUTPATIENT)
Dept: FAMILY MEDICINE CLINIC | Facility: CLINIC | Age: 69
End: 2019-02-02

## 2019-02-02 NOTE — TELEPHONE ENCOUNTER
Called patient   Discussed his positive cologard cancer screening results. He is very upset. discussed this is a screening and he will need to see a GI specialist before being determined he has cancer.   He is agreeable to call the GI specialist back on Monday to set up appointment.  He is planning shoulder surgery later this month.    Patient instructed to follow up with his PCP for any additional questions.       WILMAN Romero        ---- Message from Kayla Foster sent at 2/2/2019  2:09 PM EST -----  Regarding: COLOGUARD TEST RESULTS  Contact: [phone]  PT IS ANXIOUS ABOUT COLOGUARD RESULTS AND NEEDS THEM TODAY PLEASE CALL.

## 2019-02-06 DIAGNOSIS — Z12.11 SCREENING FOR COLON CANCER: Primary | ICD-10-CM

## 2019-02-06 RX ORDER — SODIUM, POTASSIUM,MAG SULFATES 17.5-3.13G
SOLUTION, RECONSTITUTED, ORAL ORAL
Qty: 2 BOTTLE | Refills: 0 | OUTPATIENT
Start: 2019-02-06 | End: 2019-07-14

## 2019-02-07 ENCOUNTER — TELEPHONE (OUTPATIENT)
Dept: URGENT CARE | Facility: CLINIC | Age: 69
End: 2019-02-07

## 2019-02-11 ENCOUNTER — OFFICE VISIT (OUTPATIENT)
Dept: ORTHOPEDIC SURGERY | Facility: CLINIC | Age: 69
End: 2019-02-11

## 2019-02-11 VITALS — OXYGEN SATURATION: 97 % | HEIGHT: 72 IN | WEIGHT: 205.03 LBS | BODY MASS INDEX: 27.77 KG/M2 | HEART RATE: 83 BPM

## 2019-02-11 DIAGNOSIS — S82.025A CLOSED NONDISPLACED LONGITUDINAL FRACTURE OF LEFT PATELLA, INITIAL ENCOUNTER: ICD-10-CM
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
## 2019-02-11 DIAGNOSIS — M25.562 ACUTE PAIN OF LEFT KNEE: Primary | ICD-10-CM

## 2019-02-11 PROCEDURE — 99214 OFFICE O/P EST MOD 30 MIN: CPT | Performed by: PHYSICIAN ASSISTANT

## 2019-02-11 RX ORDER — OMEPRAZOLE 40 MG/1
CAPSULE, DELAYED RELEASE ORAL
Refills: 0 | COMMUNITY
Start: 2019-02-08 | End: 2019-07-11 | Stop reason: SDUPTHER

## 2019-02-11 RX ORDER — HYDROCODONE BITARTRATE AND ACETAMINOPHEN 10; 300 MG/1; MG/1
TABLET ORAL
Refills: 0 | COMMUNITY
Start: 2019-01-21 | End: 2021-07-08 | Stop reason: SDUPTHER

## 2019-02-11 NOTE — PROGRESS NOTES
[facility] Orthopaedic Surgery Clinic Note    Subjective     Pain of the Left Knee      HPI    Behzad Guzman is a 68 y.o. male.  Patient presents to orthopedic clinic for evaluation of his left knee.  He states 10 days ago he fell resulting in a fracture to his left patella.  He was seen in urgent care on 2/6/19.  He presents today attempt use lives a walker to help assist with ambulation.  He reports 8/10 pain.  Severity the pain moderate to severe.  Quality pain dull, aching.  Associated symptoms swelling, popping, stiffness, grinding, giving away.  Symptoms are worse with walking, standing, sitting, stair climbing and it does affect his sleep.  Patient is early on chronic opioids for other medical issues.  No reported fever, chills, night sweats or other constitutional symptoms.    Patient previously seen by me in August 2018 for left shoulder issues.    Past Medical History:   Diagnosis Date   • Asthma    • Chronic hip pain, left    • Chronic pain in left shoulder    • Hyperlipidemia    • Hypertension    • Leg length discrepancy    • Neck pain, chronic    • Neuropathy    • Panic attacks    • Pre-diabetes    • Prediabetes    • Spinal stenosis       Past Surgical History:   Procedure Laterality Date   • LEG SURGERY     • ROTATOR CUFF REPAIR Right    • SHOULDER SURGERY Left    • WRIST SURGERY Left       Family History   Problem Relation Age of Onset   • Heart attack Father    • Other Mother         accident     Social History     Socioeconomic History   • Marital status:      Spouse name: Not on file   • Number of children: Not on file   • Years of education: Not on file   • Highest education level: Not on file   Social Needs   • Financial resource strain: Not on file   • Food insecurity - worry: Not on file   • Food insecurity - inability: Not on file   • Transportation needs - medical: Not on file   • Transportation needs - non-medical: Not on file   Occupational History   • Occupation: farmer   Tobacco  Use   • Smoking status: Former Smoker     Last attempt to quit:      Years since quittin.1   • Smokeless tobacco: Never Used   Substance and Sexual Activity   • Alcohol use: No   • Drug use: No   • Sexual activity: Defer   Other Topics Concern   • Not on file   Social History Narrative    Lives with wife on a farm      Current Outpatient Medications on File Prior to Visit   Medication Sig Dispense Refill   • amLODIPine (NORVASC) 5 MG tablet Take  by mouth.     • diazePAM (VALIUM) 5 MG tablet Take 5 mg by mouth 2 (Two) Times a Day.  0   • escitalopram (LEXAPRO) 10 MG tablet Take 10 mg by mouth Daily.  0   • gabapentin (NEURONTIN) 400 MG capsule Take 400 mg by mouth 3 (Three) Times a Day.     • GLIMEPIRIDE PO Take  by mouth.     • hydrochlorothiazide (HYDRODIURIL) 12.5 MG tablet Take 1 tablet by mouth Daily. 30 tablet 3   • HYDROcodone-acetaminophen (NORCO)  MG per tablet Take 1 tablet by mouth Every 6 (Six) Hours As Needed for moderate pain (4-6).     • Hydrocodone-Acetaminophen (XODOL )  MG per tablet   0   • hydrOXYzine (ATARAX) 25 MG tablet Take 1 tablet by mouth 3 (Three) Times a Day As Needed for Itching. 30 tablet 0   • lisinopril (PRINIVIL,ZESTRIL) 20 MG tablet Take 20 mg by mouth Daily.     • metFORMIN (GLUCOPHAGE) 500 MG tablet Take 1 tablet by mouth Daily With Breakfast. 30 tablet 5   • metoprolol tartrate (LOPRESSOR) 50 MG tablet Take 50 mg by mouth 2 (Two) Times a Day.     • NON FORMULARY GLIMEREX FOR PREDIABETES     • omeprazole (priLOSEC) 40 MG capsule take 1 capsule by mouth once daily before A MEAL  0   • ONETOUCH VERIO test strip Daily. for testing  0   • oxyCODONE (oxyCONTIN) 10 MG 12 hr tablet Take 10 mg by mouth Every 12 (Twelve) Hours As Needed.  0   • pravastatin (PRAVACHOL) 10 MG tablet Take 10 mg by mouth Daily.     • sodium-potassium-magnesium sulfates (SUPREP BOWEL PREP KIT) 17.5-3.13-1.6 GM/177ML solution oral solution No solid food day prior to procedure; liquid  "diet only. Follow instructions mailed to your home. Questions call [phone] 2 bottle 0   • VENTOLIN  (90 Base) MCG/ACT inhaler inhalation 2 puffs by mouth every 4 to 6 hours if needed  0     No current facility-administered medications on file prior to visit.       Allergies   Allergen Reactions   • Amoxicillin Rash   • Penicillins Rash        The following portions of the patient's history were reviewed and updated as appropriate: allergies, current medications, past family history, past medical history, past social history, past surgical history and problem list.    Review of Systems   Constitutional: Negative.    HENT: Negative.    Eyes: Negative.    Respiratory: Negative.    Cardiovascular: Negative.    Gastrointestinal: Negative.    Endocrine: Negative.    Genitourinary: Negative.    Musculoskeletal: Positive for arthralgias.   Skin: Negative.    Allergic/Immunologic: Negative.    Neurological: Negative.    Hematological: Negative.    Psychiatric/Behavioral: Negative.         Objective      Physical Exam  Pulse 83   Ht 182.9 cm (72.01\")   Wt 93 kg (205 lb 0.4 oz)   SpO2 97%   BMI 27.80 kg/m²     Body mass index is 27.8 kg/m².    General  Mental Status - alert  General Appearance - cooperative, well groomed, not in acute distress  Orientation - Oriented X3  Build & Nutrition - well developed and well nourished  Posture - normal posture  Gait - antalgic gait     Integumentary  Global Assessment  Examination of related systems reveals - no lymphadenopathy  Ears:  No abnormality  Nose:  No mucous drainage  General Characteristics  Overall examination of the patient's skin reveals - no rashes, no evidence of scars, no suspicious lesions and no bruises.  Color - normal coloration of skin.  Vascular: Brisk capillary refill in all extremities    Ortho Exam  Peripheral Vascular:    Upper Extremity:   Inspection:  Left--no cyanotic nail beds Right--no cyanotic nail beds   Bilateral:  Pink nail beds with " brisk capillary refill   Palpation:  Bilateral radial pulse normal    Musculoskeletal:  Global Assessment:  Overall assessment of Lower Extremity Muscle Strength and Tone:  Left quadriceps--4/5   Left hamstrings--5/5       Left tibialis anterior--5/5  Left gastroc-soleus--5/5  Left EHL--5/5    Inspection and Palpation:    Left knee:  Tenderness:  Positive global tenderness throughout knee with increased pain noted over the patella.  Effusion:  1+  Crepitus:  none  Pulses:  2+  Ecchymosis:  None  Warmth:  None     ROM:  Left:  Extension:0     Flexion:100    Deformities/Malalignments/Discrepancies:    Left:  none  Right:  none    Patient is able to perform a straight leg raise.  Positive bilateral lower extremity edema    Imaging/Studies    Imaging Results (last 24 hours)     ** No results found for the last 24 hours. **      Dr. Rader and I reviewed plain film imaging of the left knee, tib-fib and left foot performed on 2/6/19 which showed questionable fracture noted medial aspect the patella.  See chart for official report.    Repeat imaging of the left knee today.  Images reviewed by Dr. Rader.  Positive nondisplaced hairline vertical patellar fracture.  See chart for official report.    Assessment:  1. Acute pain of left knee    2. Closed nondisplaced longitudinal fracture of left patella, initial encounter    3. Fall, initial encounter        Plan:  1. Patient's acute knee pain is secondary to the known fracture to the patella.    2. He was placed in a hinged neoprene knee brace today which she reported gave more stability and comfort to his knee.    3. He was also provided crutches as he's having difficulty in using the walker.   4. Pain control will be through his PCP since he is on narcotics already.    5. We discussed the use of compression socks/stockings to help assist with the swelling that he notes to his lower legs.  6. Elbow up in 3 weeks for repeat evaluation this will include pre-clinic imaging of  left knee.  7. Question and concerns answered.      Medical Decision Making  Management Options : prescription/IM medicine and close treatment of fracture or dislocation  Data/Risk: radiology tests       Zaira Knight PA-C  02/12/19  3:28 PM

## 2019-02-14 ENCOUNTER — OUTSIDE FACILITY SERVICE (OUTPATIENT)
Dept: GASTROENTEROLOGY | Facility: CLINIC | Age: 69
End: 2019-02-14

## 2019-02-14 ENCOUNTER — TELEPHONE (OUTPATIENT)
Dept: GASTROENTEROLOGY | Facility: CLINIC | Age: 69
End: 2019-02-14

## 2019-02-14 ENCOUNTER — LAB REQUISITION (OUTPATIENT)
Dept: LAB | Facility: HOSPITAL | Age: 69
End: 2019-02-14

## 2019-02-14 DIAGNOSIS — Z12.11 ENCOUNTER FOR SCREENING FOR MALIGNANT NEOPLASM OF COLON: ICD-10-CM

## 2019-02-14 PROCEDURE — 45385 COLONOSCOPY W/LESION REMOVAL: CPT | Performed by: INTERNAL MEDICINE

## 2019-02-14 PROCEDURE — 88305 TISSUE EXAM BY PATHOLOGIST: CPT | Performed by: INTERNAL MEDICINE

## 2019-02-14 PROCEDURE — 45380 COLONOSCOPY AND BIOPSY: CPT | Performed by: INTERNAL MEDICINE

## 2019-02-14 NOTE — TELEPHONE ENCOUNTER
PATIENT CALLED TO ADVISE HE DID TAKE A HYDROCODONE THIS MORNING WITHOUT THINKING.  SPOKE TO SILVIA IN REGARDS TO THIS AND SHE ADVISED IT WOULD BE OKAY TO GO THROUGH WITH HIS PROCEDURE.  ADVISED PATIENT TO MAKE SURE HE LETS REGISTRATION KNOW HE DID TAKE THE MEDICATION.  PATIENT VOICED UNDERSTANDING.

## 2019-02-15 LAB
CYTO UR: NORMAL
LAB AP CASE REPORT: NORMAL
LAB AP CLINICAL INFORMATION: NORMAL
LAB AP DIAGNOSIS COMMENT: NORMAL
PATH REPORT.FINAL DX SPEC: NORMAL
PATH REPORT.GROSS SPEC: NORMAL

## 2019-02-21 ENCOUNTER — TELEPHONE (OUTPATIENT)
Dept: ORTHOPEDIC SURGERY | Facility: CLINIC | Age: 69
End: 2019-02-21

## 2019-02-21 NOTE — TELEPHONE ENCOUNTER
Patient called and stated that he was supposed to have shoulder surgery at [facility] in [place], but they cancelled due to his current knee fracture. He called to ask what he could be doing to help aid in the healing process of his knee (exercise, heat, ice, etc) so that he can get his shoulder surgery rescheduled. He says he's been using his brace as directed and that it has really been helping as well as that he has been up and walking with his knee for up to four hours a day until it hurts. He just wants to know what he can do on his part to help him.

## 2019-02-21 NOTE — TELEPHONE ENCOUNTER
Patient to continue with gentle range of motion and use of the brace.  He also needs to continue using crutches or walker until he sees me on 3/4.  At that point we will get new x-rays and depending on exam and imaging I can give him a better idea regarding when he can proceed on with his shoulder surgery.

## 2019-02-22 ENCOUNTER — TELEPHONE (OUTPATIENT)
Dept: GASTROENTEROLOGY | Facility: CLINIC | Age: 69
End: 2019-02-22

## 2019-02-22 ENCOUNTER — TELEPHONE (OUTPATIENT)
Dept: FAMILY MEDICINE CLINIC | Facility: CLINIC | Age: 69
End: 2019-02-22

## 2019-02-22 NOTE — TELEPHONE ENCOUNTER
----- Message from Kayla Trujillo sent at 2/22/2019  2:03 PM EST -----  Contact: [phone]  PT CALLED BC SOMEONE WAS SUPPOSED TO FOLLOW UP WITH HIM ABOUT HIS COLONOSCOPY AND THE MEDICATION FOR HIS ULCER. HE'S CONCERNED.

## 2019-02-25 RX ORDER — TESTOSTERONE CYPIONATE 200 MG/ML
INJECTION, SOLUTION INTRAMUSCULAR
Qty: 10 ML | Refills: 0 | OUTPATIENT
Start: 2019-02-25

## 2019-02-25 NOTE — TELEPHONE ENCOUNTER
Notified pt that Dr. Stone does not prescribe this type of medication.  Referred him to Dr. Tran's office.  BBailey, CMA

## 2019-02-27 ENCOUNTER — OFFICE VISIT (OUTPATIENT)
Dept: FAMILY MEDICINE CLINIC | Facility: CLINIC | Age: 69
End: 2019-02-27

## 2019-02-27 VITALS
OXYGEN SATURATION: 96 % | WEIGHT: 202 LBS | SYSTOLIC BLOOD PRESSURE: 138 MMHG | DIASTOLIC BLOOD PRESSURE: 82 MMHG | HEIGHT: 72 IN | HEART RATE: 68 BPM | BODY MASS INDEX: 27.36 KG/M2

## 2019-02-27 DIAGNOSIS — R79.89 LOW TESTOSTERONE IN MALE: Primary | ICD-10-CM

## 2019-02-27 DIAGNOSIS — I10 ESSENTIAL HYPERTENSION: ICD-10-CM

## 2019-02-27 DIAGNOSIS — E78.2 MIXED HYPERLIPIDEMIA: ICD-10-CM

## 2019-02-27 DIAGNOSIS — E11.65 TYPE 2 DIABETES MELLITUS WITH HYPERGLYCEMIA, WITHOUT LONG-TERM CURRENT USE OF INSULIN (HCC): ICD-10-CM

## 2019-02-27 PROCEDURE — 99204 OFFICE O/P NEW MOD 45 MIN: CPT | Performed by: INTERNAL MEDICINE

## 2019-02-27 NOTE — PROGRESS NOTES
Chief Complaint:  Low testosterone    HPI:  Behzad Guzman is a 68 y.o. male who presents today for establish care and discuss treatment of her low testosterone hypertension diabetes.  He has a history of chronic pain following the pain management.  Reports he usually takes a testosterone injection every 3 weeks.  Reports fatigue and decreased libido.  He just took an injection 3 week ago and is due for his next shot.  He reports morning sugars around 120s on average.  Currently on oral diabetic medication.  He does not check his blood pressure at home.  He has no acute complaints or concerns today.     ROS:  Constitutional: no fevers, night sweats or unexplained weight loss  Eyes: no vision changes  ENT: no runny nose, ear pain, sore throat  Cardio: no chest pain, palpitations  Pulm: no shortness of breath, wheezing, or cough  GI: no abdominal pain or changes in bowel movements  : no difficulty urinating  MSK: no difficulty ambulating, no joint pain  Neuro: no weakness, dizziness or headache  Psych: no trouble sleeping  Endo: no change in appetite      Past Medical History:   Diagnosis Date   • Asthma    • Chronic hip pain, left    • Chronic pain in left shoulder    • Hyperlipidemia    • Hypertension    • Leg length discrepancy    • Neck pain, chronic    • Neuropathy    • Panic attacks    • Pre-diabetes    • Prediabetes    • Spinal stenosis       Family History   Problem Relation Age of Onset   • Heart attack Father    • Other Mother         accident      Social History     Socioeconomic History   • Marital status:      Spouse name: Not on file   • Number of children: Not on file   • Years of education: Not on file   • Highest education level: Not on file   Social Needs   • Financial resource strain: Not on file   • Food insecurity - worry: Not on file   • Food insecurity - inability: Not on file   • Transportation needs - medical: Not on file   • Transportation needs - non-medical: Not on file    Occupational History   • Occupation: farmer   Tobacco Use   • Smoking status: Former Smoker     Last attempt to quit: 1987     Years since quittin.1   • Smokeless tobacco: Never Used   Substance and Sexual Activity   • Alcohol use: No   • Drug use: No   • Sexual activity: Defer   Other Topics Concern   • Not on file   Social History Narrative    Lives with wife on a farm      Allergies   Allergen Reactions   • Amoxicillin Rash   • Penicillins Rash      Immunization History   Administered Date(s) Administered   • Flu Vaccine Quad PF >36MO 2015   • Pneumococcal Conjugate 13-Valent (PCV13) 2016   • Pneumococcal Polysaccharide (PPSV23) 2018        PE:  Vitals:    19 1458   BP: 138/82   Pulse: 68   SpO2: 96%        Gen Appearance: NAD  HEENT: Normocephalic, PERRLA, no thyromegaly, trache midline  Heart: RRR, normal S1 and S2, no murmur  Lungs: CTA b/l, no wheezing, no crackles  Abdomen: Soft, non-tender, non-distended, no guarding and BSx4  MSK: Moves all extremities well, normal gait, no peripheral edema  Pulses: Palpable and equal b/l  Lymph nodes: No palpable lymphadenopathy   Neuro: No focal deficits      Current Outpatient Medications   Medication Sig Dispense Refill   • diazePAM (VALIUM) 5 MG tablet Take 5 mg by mouth 2 (Two) Times a Day.  0   • gabapentin (NEURONTIN) 400 MG capsule Take 400 mg by mouth 3 (Three) Times a Day.     • GLIMEPIRIDE PO Take  by mouth.     • hydrochlorothiazide (HYDRODIURIL) 12.5 MG tablet Take 1 tablet by mouth Daily. 30 tablet 3   • HYDROcodone-acetaminophen (NORCO)  MG per tablet Take 1 tablet by mouth Every 6 (Six) Hours As Needed for moderate pain (4-6).     • Hydrocodone-Acetaminophen (XODOL )  MG per tablet   0   • lisinopril (PRINIVIL,ZESTRIL) 20 MG tablet Take 20 mg by mouth Daily.     • metFORMIN (GLUCOPHAGE) 500 MG tablet Take 1 tablet by mouth Daily With Breakfast. 30 tablet 5   • metoprolol tartrate (LOPRESSOR) 50 MG tablet  Take 50 mg by mouth 2 (Two) Times a Day.     • NON FORMULARY GLIMEREX FOR PREDIABETES     • omeprazole (priLOSEC) 40 MG capsule take 1 capsule by mouth once daily before A MEAL  0   • ONETOUCH VERIO test strip Daily. for testing  0   • oxyCODONE (oxyCONTIN) 10 MG 12 hr tablet Take 10 mg by mouth Every 12 (Twelve) Hours As Needed.  0   • pravastatin (PRAVACHOL) 10 MG tablet Take 10 mg by mouth Daily.     • sodium-potassium-magnesium sulfates (SUPREP BOWEL PREP KIT) 17.5-3.13-1.6 GM/177ML solution oral solution No solid food day prior to procedure; liquid diet only. Follow instructions mailed to your home. Questions call [phone] 2 bottle 0   • VENTOLIN  (90 Base) MCG/ACT inhaler inhalation 2 puffs by mouth every 4 to 6 hours if needed  0   • amLODIPine (NORVASC) 5 MG tablet Take  by mouth.     • escitalopram (LEXAPRO) 10 MG tablet Take 10 mg by mouth Daily.  0     No current facility-administered medications for this visit.         Behzad was seen today for establish care.    Diagnoses and all orders for this visit:    Low testosterone in male  -     Testosterone; Future  Checking testosterone today.  Control substance agreement signed.  Sridhar reviewed urine drug change today.  Type 2 diabetes mellitus with hyperglycemia, without long-term current use of insulin (CMS/[facility])  A1c checked last month at 7.6.  Plan on rechecking in 2 months.  Stable on oral diabetic medications.  Goal will be less than 7%.  Essential hypertension  Stable today 130/82.  Continue current medications.  Mixed hyperlipidemia  Stable on statin.  Lipids checked recently.       Return in about 3 months (around 5/27/2019).

## 2019-02-28 ENCOUNTER — TELEPHONE (OUTPATIENT)
Dept: FAMILY MEDICINE CLINIC | Facility: CLINIC | Age: 69
End: 2019-02-28

## 2019-02-28 ENCOUNTER — LAB (OUTPATIENT)
Dept: LAB | Facility: HOSPITAL | Age: 69
End: 2019-02-28

## 2019-02-28 DIAGNOSIS — R79.89 LOW TESTOSTERONE IN MALE: ICD-10-CM

## 2019-02-28 LAB — TESTOST SERPL-MCNC: 111.55 NG/DL (ref 86.98–780.1)

## 2019-02-28 PROCEDURE — 84403 ASSAY OF TOTAL TESTOSTERONE: CPT | Performed by: INTERNAL MEDICINE

## 2019-02-28 PROCEDURE — 36415 COLL VENOUS BLD VENIPUNCTURE: CPT

## 2019-03-02 RX ORDER — METOPROLOL TARTRATE 50 MG/1
TABLET, FILM COATED ORAL
Qty: 60 TABLET | Refills: 0 | Status: SHIPPED | OUTPATIENT
Start: 2019-03-02 | End: 2019-04-02 | Stop reason: SDUPTHER

## 2019-03-02 RX ORDER — LISINOPRIL 20 MG/1
TABLET ORAL
Qty: 60 TABLET | Refills: 0 | Status: SHIPPED | OUTPATIENT
Start: 2019-03-02 | End: 2019-04-02 | Stop reason: SDUPTHER

## 2019-03-04 ENCOUNTER — OFFICE VISIT (OUTPATIENT)
Dept: ORTHOPEDIC SURGERY | Facility: CLINIC | Age: 69
End: 2019-03-04

## 2019-03-04 VITALS — HEIGHT: 72 IN | OXYGEN SATURATION: 98 % | HEART RATE: 62 BPM | BODY MASS INDEX: 27.35 KG/M2 | WEIGHT: 201.94 LBS

## 2019-03-04 DIAGNOSIS — M25.562 ACUTE PAIN OF LEFT KNEE: Primary | ICD-10-CM

## 2019-03-04 DIAGNOSIS — S82.025D CLOSED NONDISPLACED LONGITUDINAL FRACTURE OF LEFT PATELLA WITH ROUTINE HEALING, SUBSEQUENT ENCOUNTER: ICD-10-CM

## 2019-03-04 PROCEDURE — 99212 OFFICE O/P EST SF 10 MIN: CPT | Performed by: PHYSICIAN ASSISTANT

## 2019-03-04 NOTE — PROGRESS NOTES
"    [facility] Orth[facility] Clinic Note    Subjective     CC: Follow-up (3 weeks - Acute pain of Left knee & Closed nondisplaced longitudinal fracture of left patella,)      LANIE Guzman is a 68 y.o. male.  Patient returns today for one-month follow-up of left knee patella fracture.  Patient is weightbearing as tolerated and wearing a neoprene hinged brace.  He has no new complaints or issues.  Continues to endorse pain scale 5/10 but has chronic pain throughout his body.  Severity the pain moderate.  Quality pain dull, aching, shooting.  Regards to his knee he notices some popping, swelling and stiffness.  States symptoms in his knee are worse with walking, prolonged standing.  Patient continues use opioids for chronic pain issues.  No reported radiculopathy or paresthesias into the left lower extremity.  He does feel his knee has improved.    ROS:    Constiutional:Pt denies fever, chills, nausea, or vomiting.  MSK:as above    Objective      Past Medical History  Past Medical History:   Diagnosis Date   • Asthma    • Chronic hip pain, left    • Chronic pain in left shoulder    • Hyperlipidemia    • Hypertension    • Leg length discrepancy    • Neck pain, chronic    • Neuropathy    • Panic attacks    • Pre-diabetes    • Prediabetes    • Spinal stenosis          Physical Exam  Pulse 62   Ht 182.9 cm (72.01\")   Wt 91.6 kg (201 lb 15.1 oz)   SpO2 98%   BMI 27.38 kg/m²     Body mass index is 27.38 kg/m².    Patient is well nourished and well developed.        Ortho Exam  Peripheral Vascular:    Upper Extremity:   Inspection:  Left--no cyanotic nail beds Right--no cyanotic nail beds   Bilateral:  Pink nail beds with brisk capillary refill   Palpation:  Bilateral radial pulse normal    Musculoskeletal:  Global Assessment:  Overall assessment of Lower Extremity Muscle Strength and Tone:  Left quadriceps--5/5   Left hamstrings--5/5       Left tibialis anterior--5/5  Left gastroc-soleus--5/5  Left " EHL--5/5      Lower Extremity:  Knee/Patella:  No digital clubbing or cyanosis.    Examination of left knee reveals:  Normal deep tendon reflexes, coordination, strength, tone, sensation.  No known fractures or deformities.    Inspection and Palpation:    Left knee:  Tenderness:  Mild tenderness fabiana/retropatella  Effusion:  none  Crepitus:  none  Pulses:  2+  Ecchymosis:  None  Warmth:  None     ROM:  Left:  Extension:0     Flexion:130    Instability:    Left:  Lachman Test:  Negative, Varus stress test negative, Valgus stress test negative   Anterior Drawer Test:  Negative, Posterior Drawer Test:  Negative      Deformities/Malalignments/Discrepancies:    Left:  none  Right:  none    Functional Testing:  Left:  Tato's test:  Negative  Patella grind test:  Negative  Able to perform a straight leg raise.  Positive bilateral lower extremity edema      Imaging/Labs/EMG Reviewed:    Imaging Results (last 24 hours)     ** No results found for the last 24 hours. **      Ordered plain film imaging of the left knee.  Images reviewed by Dr. Rader.  Healing longitudinal fracture of the patella.  Joint spaces remain preserved.  See chart for official report.    Assessment:  1. Acute pain of left knee    2. Closed nondisplaced longitudinal fracture of left patella with routine healing, subsequent encounter        Plan:  1. Patient to continue wearing the hinged knee brace until completely asymptomatic.    2. Continue with gentle range of motion and strengthening to the lower extremity.  3. Pain control through PCP/pain management clinic as patient is already on narcotics.    4. Still recommend use of compression socks/stockings to help assist with swelling to bilateral lower legs.    5. Patient due to have left shoulder surgery performed at [facility] in [place], but the orthopedic surgeon performing the procedure wanted to have clearance from us before proceeding on.  At this time he is cleared to proceed on with  the left shoulder surgery.   6. Follow-up in 2 months for repeat evaluation of his left knee until cleared for clinic imaging.  At that time we can also evaluate his right knee pain with exam and imaging.  7. Question concerns answered.      Medical Decision Making  Management Options : prescription/IM medicine and close treatment of fracture or dislocation  Data/Risk: radiology tests       Zaira Knight PA-C  03/04/19  4:16 PM

## 2019-03-05 ENCOUNTER — TELEPHONE (OUTPATIENT)
Dept: FAMILY MEDICINE CLINIC | Facility: CLINIC | Age: 69
End: 2019-03-05

## 2019-03-05 DIAGNOSIS — R79.89 LOW TESTOSTERONE: Primary | ICD-10-CM

## 2019-03-05 DIAGNOSIS — E34.9 TESTOSTERONE DEFICIENCY: ICD-10-CM

## 2019-03-05 DIAGNOSIS — E34.9 TESTOSTERONE DEFICIENCY: Primary | ICD-10-CM

## 2019-03-05 RX ORDER — TESTOSTERONE CYPIONATE 200 MG/ML
200 INJECTION, SOLUTION INTRAMUSCULAR
Qty: 10 ML | Refills: 0 | Status: SHIPPED | OUTPATIENT
Start: 2019-03-05 | End: 2019-12-24 | Stop reason: SDUPTHER

## 2019-03-05 RX ORDER — TESTOSTERONE CYPIONATE 200 MG/ML
200 INJECTION, SOLUTION INTRAMUSCULAR
Qty: 1 ML | Refills: 5 | Status: SHIPPED | OUTPATIENT
Start: 2019-03-05 | End: 2019-03-05 | Stop reason: SDUPTHER

## 2019-03-05 NOTE — TELEPHONE ENCOUNTER
TESTOSTERONE WAS SENT IN THIS MORNING BUT WAS CALLED IN THE FORM OF 1 ML BOTTLE.    PATIENT IS USED TO GETTING THE 10 ML BOTTLES OF TESTOSTERONE, THIS IS CHEAPER FOR HIM AND HE IS ON A FIXED INCOME. SAID WE TOOK A COPY OF THE BOX OF HIS LATEST 10 ML BOTTLE. PATIENT REQUESTING WE CALL IN A 10 ML BOTTLE FOR HIM WITH REFILLS. DOES NOT LIKE THE 1 ML BOTTLE. SAID HE HAS BEEN GETTING THE 10 ML BOTTLES SINCE 2016.    COMPLETELY OUT OF [employer] ON [address] IN [place].

## 2019-03-08 RX ORDER — HYDROCHLOROTHIAZIDE 12.5 MG/1
TABLET ORAL
Qty: 30 TABLET | Refills: 3 | Status: SHIPPED | OUTPATIENT
Start: 2019-03-08 | End: 2020-06-20

## 2019-04-02 RX ORDER — METOPROLOL TARTRATE 50 MG/1
TABLET, FILM COATED ORAL
Qty: 60 TABLET | Refills: 0 | Status: SHIPPED | OUTPATIENT
Start: 2019-04-02 | End: 2019-05-30 | Stop reason: SDUPTHER

## 2019-04-02 RX ORDER — GLIMEPIRIDE 1 MG/1
TABLET ORAL
Qty: 30 TABLET | Refills: 3 | Status: SHIPPED | OUTPATIENT
Start: 2019-04-02 | End: 2019-06-13 | Stop reason: SDUPTHER

## 2019-04-02 RX ORDER — LISINOPRIL 20 MG/1
TABLET ORAL
Qty: 60 TABLET | Refills: 0 | Status: SHIPPED | OUTPATIENT
Start: 2019-04-02 | End: 2019-07-02 | Stop reason: SDUPTHER

## 2019-04-12 ENCOUNTER — OFFICE VISIT (OUTPATIENT)
Dept: FAMILY MEDICINE CLINIC | Facility: CLINIC | Age: 69
End: 2019-04-12

## 2019-04-12 VITALS
DIASTOLIC BLOOD PRESSURE: 92 MMHG | TEMPERATURE: 97 F | OXYGEN SATURATION: 98 % | HEART RATE: 81 BPM | WEIGHT: 200.2 LBS | RESPIRATION RATE: 14 BRPM | BODY MASS INDEX: 27.12 KG/M2 | SYSTOLIC BLOOD PRESSURE: 148 MMHG | HEIGHT: 72 IN

## 2019-04-12 DIAGNOSIS — H66.90 ACUTE OTITIS MEDIA, UNSPECIFIED OTITIS MEDIA TYPE: Primary | ICD-10-CM

## 2019-04-12 DIAGNOSIS — H53.8 BLURRY VISION: ICD-10-CM

## 2019-04-12 DIAGNOSIS — R09.89 OTHER SPECIFIED SYMPTOMS AND SIGNS INVOLVING THE CIRCULATORY AND RESPIRATORY SYSTEMS: ICD-10-CM

## 2019-04-12 DIAGNOSIS — H10.9 CONJUNCTIVITIS, BACTERIAL: ICD-10-CM

## 2019-04-12 DIAGNOSIS — M79.604 ACUTE PAIN OF RIGHT LOWER EXTREMITY: ICD-10-CM

## 2019-04-12 PROCEDURE — 99214 OFFICE O/P EST MOD 30 MIN: CPT | Performed by: INTERNAL MEDICINE

## 2019-04-12 RX ORDER — ERYTHROMYCIN 5 MG/G
OINTMENT OPHTHALMIC
Qty: 3.5 G | Refills: 0 | Status: SHIPPED | OUTPATIENT
Start: 2019-04-12 | End: 2020-12-24

## 2019-04-12 RX ORDER — MUPIROCIN CALCIUM 20 MG/G
CREAM TOPICAL 3 TIMES DAILY
Qty: 15 G | Refills: 0 | Status: SHIPPED | OUTPATIENT
Start: 2019-04-12 | End: 2019-04-19

## 2019-04-12 RX ORDER — CEFDINIR 300 MG/1
300 CAPSULE ORAL 2 TIMES DAILY
Qty: 14 CAPSULE | Refills: 0 | Status: SHIPPED | OUTPATIENT
Start: 2019-04-12 | End: 2019-04-19

## 2019-04-12 NOTE — PROGRESS NOTES
Chief Complaint:  Leg pain, ear pain    HPI:  Behzad Guzman is a 68 y.o. male who presents today for right leg pain for 2-week duration.  Patient denies any trauma.  He reports he has been putting on topical antibiotic which is helped with overlying erythema.  He reports continued ear pain, blurry vision both on the left side.  He would like referral to ophthalmology if possible.  He recently went to urgent care for these issues.  He was prescribed an ointment for his high which improved his symptoms some but he is out of the ointment.  He denies any fevers or chills.    ROS:  Constitutional: no fevers, night sweats or unexplained weight loss  Eyes: no vision changes  ENT: no runny nose, ear pain, sore throat  Cardio: no chest pain, palpitations  Pulm: no shortness of breath, wheezing,  +cough-nonproductive  GI: no abdominal pain or changes in bowel movements  : no difficulty urinating  MSK: no difficulty ambulating, no joint pain  Neuro: no weakness, dizziness or headache  Psych: no trouble sleeping  Endo: no change in appetite      Past Medical History:   Diagnosis Date   • Asthma    • Chronic hip pain, left    • Chronic pain in left shoulder    • Hyperlipidemia    • Hypertension    • Leg length discrepancy    • Neck pain, chronic    • Neuropathy    • Panic attacks    • Pre-diabetes    • Prediabetes    • Spinal stenosis       Family History   Problem Relation Age of Onset   • Heart attack Father    • Other Mother         accident      Social History     Socioeconomic History   • Marital status:      Spouse name: Not on file   • Number of children: Not on file   • Years of education: Not on file   • Highest education level: Not on file   Occupational History   • Occupation: farmer   Tobacco Use   • Smoking status: Former Smoker     Last attempt to quit:      Years since quittin.2   • Smokeless tobacco: Never Used   Substance and Sexual Activity   • Alcohol use: No   • Drug use: No   • Sexual  activity: Defer   Social History Narrative    [place] with wife [place] a farm      Allergies   Allergen Reactions   • Amoxicillin Rash   • Penicillins Rash      Immunization History   Administered Date(s) Administered   • Flu Vaccine Quad PF >36MO 09/30/2015   • Pneumococcal Conjugate 13-Valent (PCV13) 09/22/2016   • Pneumococcal Polysaccharide (PPSV23) 05/18/2018        PE:  Vitals:    04/12/19 1458   BP: 148/92   Pulse: 81   Resp: 14   Temp: 97 °F (36.1 °C)   SpO2: 98%        Gen Appearance: NAD  HEENT: Normocephalic, PERRLA, no thyromegaly, trache midline, erythematous TM left side, minimal erythema left conjunctiva  Heart: RRR, normal S1 and S2, no murmur  Lungs: CTA b/l, no wheezing, no crackles  Abdomen: Soft, non-tender, non-distended, no guarding and BSx4  MSK: Moves all extremities well, normal gait, no peripheral edema, erythema right anterior leg with possible underlying fluid collection.  Pain to palpation.  Pulses: Palpable and equal b/l  Lymph nodes: No palpable lymphadenopathy   Neuro: No focal deficits      Current Outpatient Medications   Medication Sig Dispense Refill   • amLODIPine (NORVASC) 5 MG tablet Take  by mouth.     • diazePAM (VALIUM) 5 MG tablet Take 5 mg by mouth 2 (Two) Times a Day.  0   • erythromycin (ROMYCIN) 5 MG/GM ophthalmic ointment Administer  to both eyes Every 4 (Four) Hours While Awake. 3.5 g 0   • gabapentin (NEURONTIN) 400 MG capsule Take 400 mg by mouth 3 (Three) Times a Day.     • glimepiride (AMARYL) 1 MG tablet take 1 tablet by mouth once daily 30 tablet 3   • GLIMEPIRIDE PO Take  by mouth.     • hydrochlorothiazide (HYDRODIURIL) 12.5 MG tablet take 1 tablet by mouth once daily 30 tablet 3   • HYDROcodone-acetaminophen (NORCO)  MG per tablet Take 1 tablet by mouth Every 6 (Six) Hours As Needed for moderate pain (4-6).     • Hydrocodone-Acetaminophen (XODOL )  MG per tablet   0   • hydrocortisone 2.5 % cream Apply  topically to the appropriate area as  directed 3 (Three) Times a Day. 60 g 3   • lisinopril (PRINIVIL,ZESTRIL) 20 MG tablet take 2 tablets by mouth once daily 60 tablet 0   • metFORMIN (GLUCOPHAGE) 500 MG tablet Take 1 tablet by mouth Daily With Breakfast. 30 tablet 5   • metoprolol tartrate (LOPRESSOR) 50 MG tablet take 1 tablet by mouth twice a day 60 tablet 0   • NON FORMULARY GLIMEREX FOR PREDIABETES     • omeprazole (priLOSEC) 40 MG capsule take 1 capsule by mouth once daily before A MEAL  0   • ONETOUCH VERIO test strip Daily. for testing  0   • oxyCODONE (oxyCONTIN) 10 MG 12 hr tablet Take 10 mg by mouth Every 12 (Twelve) Hours As Needed.  0   • pravastatin (PRAVACHOL) 10 MG tablet Take 10 mg by mouth Daily.     • predniSONE (DELTASONE) 10 MG tablet Daily taper - 6/5/4/3/2/1 21 tablet 0   • promethazine-codeine (PHENERGAN with CODEINE) 6.25-10 MG/5ML syrup Take 5 mL by mouth Every 4 (Four) Hours As Needed for Cough. 90 mL 0   • sodium-potassium-magnesium sulfates (SUPREP BOWEL PREP KIT) 17.5-3.13-1.6 GM/177ML solution oral solution No solid food day prior to procedure; liquid diet only. Follow instructions mailed to your home. Questions call [phone] 2 bottle 0   • sulfamethoxazole-trimethoprim (BACTRIM DS,SEPTRA DS) 800-160 MG per tablet Take 1 tablet by mouth 2 (Two) Times a Day. 20 tablet 0   • Testosterone Cypionate (DEPOTESTOTERONE CYPIONATE) 200 MG/ML injection Inject 1 mL into the appropriate muscle as directed by prescriber Every 21 (Twenty-One) Days. 10 mL 0   • VENTOLIN  (90 Base) MCG/ACT inhaler inhalation 2 puffs by mouth every 4 to 6 hours if needed  0   • cefdinir (OMNICEF) 300 MG capsule Take 1 capsule by mouth 2 (Two) Times a Day for 7 days. 14 capsule 0   • mupirocin (BACTROBAN) 2 % cream Apply  topically to the appropriate area as directed 3 (Three) Times a Day for 7 days. 15 g 0     No current facility-administered medications for this visit.         Behzad was seen today for leg pain.    Diagnoses and all orders  for this visit:    Acute otitis media, unspecified otitis media type  -     cefdinir (OMNICEF) 300 MG capsule; Take 1 capsule by mouth 2 (Two) Times a Day for 7 days.  Otitis media on the left.  Patient has penicillin allergy, will use Ceftin ear for the next 7 days.  Blurry vision  Blurred vision of left eye.  Patient reports erythromycin ointment helps.  He is out of this.  Will refill for 7 more days.  Referring to ophthalmology for further evaluation.  He has some slight erythema cornea on exam although otherwise normal.  No discharge.  Acute pain of right lower extremity  -     Duplex Lower Extremity Art / Grafts - Right CAR; Future  -     mupirocin (BACTROBAN) 2 % cream; Apply  topically to the appropriate area as directed 3 (Three) Times a Day for 7 days.  Rule out DVT with ultrasound.  X-ray normal.  Continue mupirocin ointment since this was effective before.  Other specified symptoms and signs involving the circulatory and respiratory systems   -     Duplex Lower Extremity Art / Grafts - Right CAR; Future         No Follow-up on file.

## 2019-04-18 ENCOUNTER — HOSPITAL ENCOUNTER (OUTPATIENT)
Dept: CARDIOLOGY | Facility: HOSPITAL | Age: 69
Setting detail: HOSPITAL OUTPATIENT SURGERY
Discharge: HOME | End: 2019-04-18
Admitting: INTERNAL MEDICINE

## 2019-04-18 ENCOUNTER — LAB (OUTPATIENT)
Dept: LAB | Facility: HOSPITAL | Age: 69
End: 2019-04-18

## 2019-04-18 ENCOUNTER — APPOINTMENT (OUTPATIENT)
Dept: CARDIOLOGY | Facility: HOSPITAL | Age: 69
End: 2019-04-18

## 2019-04-18 ENCOUNTER — OFFICE VISIT (OUTPATIENT)
Dept: FAMILY MEDICINE CLINIC | Facility: CLINIC | Age: 69
End: 2019-04-18

## 2019-04-18 ENCOUNTER — HOSPITAL ENCOUNTER (OUTPATIENT)
Dept: CT IMAGING | Facility: HOSPITAL | Age: 69
Discharge: HOME | End: 2019-04-18
Admitting: INTERNAL MEDICINE

## 2019-04-18 VITALS
BODY MASS INDEX: 27.09 KG/M2 | WEIGHT: 200 LBS | HEIGHT: 72 IN | DIASTOLIC BLOOD PRESSURE: 86 MMHG | OXYGEN SATURATION: 98 % | HEART RATE: 67 BPM | SYSTOLIC BLOOD PRESSURE: 132 MMHG

## 2019-04-18 DIAGNOSIS — R09.89 OTHER SPECIFIED SYMPTOMS AND SIGNS INVOLVING THE CIRCULATORY AND RESPIRATORY SYSTEMS: ICD-10-CM

## 2019-04-18 DIAGNOSIS — Z83.49 FAMILY HISTORY OF THYROID DISEASE: ICD-10-CM

## 2019-04-18 DIAGNOSIS — R22.41 MASS OF LOWER EXTREMITY, RIGHT: ICD-10-CM

## 2019-04-18 DIAGNOSIS — M79.604 PAIN AND SWELLING OF LOWER EXTREMITY, RIGHT: Primary | ICD-10-CM

## 2019-04-18 DIAGNOSIS — R22.41 MASS OF LOWER EXTREMITY, RIGHT: Primary | ICD-10-CM

## 2019-04-18 DIAGNOSIS — M79.89 PAIN AND SWELLING OF LOWER EXTREMITY, RIGHT: Primary | ICD-10-CM

## 2019-04-18 DIAGNOSIS — M79.604 ACUTE PAIN OF RIGHT LOWER EXTREMITY: ICD-10-CM

## 2019-04-18 LAB
BH CV ECHO MEAS - BSA(HAYCOCK): 2.2 M^2
BH CV ECHO MEAS - BSA: 2.1 M^2
BH CV ECHO MEAS - BZI_BMI: 27.5 KILOGRAMS/M^2
BH CV ECHO MEAS - BZI_METRIC_HEIGHT: 182.9 CM
BH CV ECHO MEAS - BZI_METRIC_WEIGHT: 92.1 KG
BH CV LOWER VASCULAR LEFT COMMON FEMORAL AUGMENT: NORMAL
BH CV LOWER VASCULAR LEFT COMMON FEMORAL COMPRESS: NORMAL
BH CV LOWER VASCULAR LEFT COMMON FEMORAL PHASIC: NORMAL
BH CV LOWER VASCULAR LEFT COMMON FEMORAL SPONT: NORMAL
BH CV LOWER VASCULAR RIGHT COMMON FEMORAL AUGMENT: NORMAL
BH CV LOWER VASCULAR RIGHT COMMON FEMORAL COMPRESS: NORMAL
BH CV LOWER VASCULAR RIGHT COMMON FEMORAL PHASIC: NORMAL
BH CV LOWER VASCULAR RIGHT COMMON FEMORAL SPONT: NORMAL
BH CV LOWER VASCULAR RIGHT DISTAL FEMORAL AUGMENT: NORMAL
BH CV LOWER VASCULAR RIGHT DISTAL FEMORAL COMPRESS: NORMAL
BH CV LOWER VASCULAR RIGHT GASTRONEMIUS COMPRESS: NORMAL
BH CV LOWER VASCULAR RIGHT GREATER SAPH AK COMPRESS: NORMAL
BH CV LOWER VASCULAR RIGHT GREATER SAPH BK COMPRESS: NORMAL
BH CV LOWER VASCULAR RIGHT LESSER SAPH COMPRESS: NORMAL
BH CV LOWER VASCULAR RIGHT MID FEMORAL AUGMENT: NORMAL
BH CV LOWER VASCULAR RIGHT MID FEMORAL COMPRESS: NORMAL
BH CV LOWER VASCULAR RIGHT MID FEMORAL PHASIC: NORMAL
BH CV LOWER VASCULAR RIGHT MID FEMORAL SPONT: NORMAL
BH CV LOWER VASCULAR RIGHT PERONEAL AUGMENT: NORMAL
BH CV LOWER VASCULAR RIGHT PERONEAL COMPRESS: NORMAL
BH CV LOWER VASCULAR RIGHT POPLITEAL AUGMENT: NORMAL
BH CV LOWER VASCULAR RIGHT POPLITEAL COMPETENT: NORMAL
BH CV LOWER VASCULAR RIGHT POPLITEAL COMPRESS: NORMAL
BH CV LOWER VASCULAR RIGHT POPLITEAL PHASIC: NORMAL
BH CV LOWER VASCULAR RIGHT POPLITEAL SPONT: NORMAL
BH CV LOWER VASCULAR RIGHT POSTERIOR TIBIAL AUGMENT: NORMAL
BH CV LOWER VASCULAR RIGHT POSTERIOR TIBIAL COMPRESS: NORMAL
BH CV LOWER VASCULAR RIGHT PROFUNDA FEMORAL COMPRESS: NORMAL
BH CV LOWER VASCULAR RIGHT PROXIMAL FEMORAL AUGMENT: NORMAL
BH CV LOWER VASCULAR RIGHT PROXIMAL FEMORAL COMPRESS: NORMAL
BH CV LOWER VASCULAR RIGHT SAPHENOFEMORAL JUNCTION AUGMENT: NORMAL
BH CV LOWER VASCULAR RIGHT SAPHENOFEMORAL JUNCTION COMPRESS: NORMAL
BH CV LOWER VASCULAR RIGHT SAPHENOFEMORAL JUNCTION PHASIC: NORMAL
BH CV LOWER VASCULAR RIGHT SAPHENOFEMORAL JUNCTION SPONT: NORMAL
BH CV VAS POP FLUID COLLECTED: 1

## 2019-04-18 PROCEDURE — 93971 EXTREMITY STUDY: CPT

## 2019-04-18 PROCEDURE — 80048 BASIC METABOLIC PNL TOTAL CA: CPT

## 2019-04-18 PROCEDURE — 93971 EXTREMITY STUDY: CPT | Performed by: INTERNAL MEDICINE

## 2019-04-18 PROCEDURE — 99213 OFFICE O/P EST LOW 20 MIN: CPT | Performed by: INTERNAL MEDICINE

## 2019-04-18 PROCEDURE — 84443 ASSAY THYROID STIM HORMONE: CPT

## 2019-04-18 PROCEDURE — 73700 CT LOWER EXTREMITY W/O DYE: CPT

## 2019-04-18 PROCEDURE — 36415 COLL VENOUS BLD VENIPUNCTURE: CPT

## 2019-04-18 RX ORDER — LORATADINE 10 MG/1
10 TABLET ORAL DAILY
Refills: 1 | COMMUNITY
Start: 2019-04-12 | End: 2019-12-24 | Stop reason: SDUPTHER

## 2019-04-19 ENCOUNTER — TELEPHONE (OUTPATIENT)
Dept: FAMILY MEDICINE CLINIC | Facility: CLINIC | Age: 69
End: 2019-04-19

## 2019-04-19 LAB
ANION GAP SERPL CALCULATED.3IONS-SCNC: 13 MMOL/L
BUN BLD-MCNC: 10 MG/DL (ref 8–23)
BUN/CREAT SERPL: 10.5 (ref 7–25)
CALCIUM SPEC-SCNC: 9.3 MG/DL (ref 8.6–10.5)
CHLORIDE SERPL-SCNC: 96 MMOL/L (ref 98–107)
CO2 SERPL-SCNC: 27 MMOL/L (ref 22–29)
CREAT BLD-MCNC: 0.95 MG/DL (ref 0.76–1.27)
GFR SERPL CREATININE-BSD FRML MDRD: 79 ML/MIN/1.73
GLUCOSE BLD-MCNC: 172 MG/DL (ref 65–99)
POTASSIUM BLD-SCNC: 4.5 MMOL/L (ref 3.5–5.2)
SODIUM BLD-SCNC: 136 MMOL/L (ref 136–145)
TSH SERPL DL<=0.05 MIU/L-ACNC: 1.31 MIU/ML (ref 0.27–4.2)

## 2019-04-19 NOTE — TELEPHONE ENCOUNTER
PER PT CALLED,  IS CONCERNED OF WHAT IS GOING ON WITH HIS LEG. PT WOULD LIKE TO KNOW THE RESULTS OF THE TESTS/X-RAYS THAT WERE DONE BEFORE THE WEEKEND. PLEASE ADVISE.

## 2019-04-22 DIAGNOSIS — M79.604 PAIN AND SWELLING OF LOWER EXTREMITY, RIGHT: Primary | ICD-10-CM

## 2019-04-22 DIAGNOSIS — M79.89 PAIN AND SWELLING OF LOWER EXTREMITY, RIGHT: Primary | ICD-10-CM

## 2019-04-23 ENCOUNTER — TELEPHONE (OUTPATIENT)
Dept: FAMILY MEDICINE CLINIC | Facility: CLINIC | Age: 69
End: 2019-04-23

## 2019-04-23 NOTE — TELEPHONE ENCOUNTER
Pt called about MRI , please advise     Pt is wanting to know will standing and working on his leg cause long terms issues , he needs to work

## 2019-04-24 RX ORDER — PRAVASTATIN SODIUM 20 MG
TABLET ORAL
Qty: 30 TABLET | Refills: 3 | Status: SHIPPED | OUTPATIENT
Start: 2019-04-24 | End: 2019-08-12 | Stop reason: SDUPTHER

## 2019-04-24 NOTE — TELEPHONE ENCOUNTER
Spoke with patient and advised that it is okay for the patient to work per Dr. Tran. Pt verbalized understanding and did not have any further questions.

## 2019-04-25 ENCOUNTER — HOSPITAL ENCOUNTER (OUTPATIENT)
Dept: MRI IMAGING | Facility: HOSPITAL | Age: 69
Discharge: HOME | End: 2019-04-25
Admitting: INTERNAL MEDICINE

## 2019-04-25 DIAGNOSIS — M79.89 PAIN AND SWELLING OF LOWER EXTREMITY, RIGHT: ICD-10-CM

## 2019-04-25 DIAGNOSIS — M79.604 PAIN AND SWELLING OF LOWER EXTREMITY, RIGHT: ICD-10-CM

## 2019-04-25 PROCEDURE — A9577 INJ MULTIHANCE: HCPCS | Performed by: INTERNAL MEDICINE

## 2019-04-25 PROCEDURE — 0 GADOBENATE DIMEGLUMINE 529 MG/ML SOLUTION: Performed by: INTERNAL MEDICINE

## 2019-04-25 PROCEDURE — 73720 MRI LWR EXTREMITY W/O&W/DYE: CPT

## 2019-04-25 RX ADMIN — GADOBENATE DIMEGLUMINE 19 ML: 529 INJECTION, SOLUTION INTRAVENOUS at 15:42

## 2019-04-26 ENCOUNTER — TELEPHONE (OUTPATIENT)
Dept: FAMILY MEDICINE CLINIC | Facility: CLINIC | Age: 69
End: 2019-04-26

## 2019-05-01 ENCOUNTER — OFFICE VISIT (OUTPATIENT)
Dept: FAMILY MEDICINE CLINIC | Facility: CLINIC | Age: 69
End: 2019-05-01

## 2019-05-01 VITALS
WEIGHT: 198.2 LBS | TEMPERATURE: 97.6 F | HEIGHT: 72 IN | DIASTOLIC BLOOD PRESSURE: 82 MMHG | SYSTOLIC BLOOD PRESSURE: 146 MMHG | HEART RATE: 71 BPM | BODY MASS INDEX: 26.84 KG/M2 | OXYGEN SATURATION: 98 %

## 2019-05-01 DIAGNOSIS — L98.9 SKIN ABNORMALITIES: ICD-10-CM

## 2019-05-01 DIAGNOSIS — I10 ESSENTIAL HYPERTENSION: ICD-10-CM

## 2019-05-01 DIAGNOSIS — M79.604 ANTERIOR LEG PAIN, RIGHT: Primary | ICD-10-CM

## 2019-05-01 DIAGNOSIS — L85.3 DRY SKIN DERMATITIS: ICD-10-CM

## 2019-05-01 PROCEDURE — 99214 OFFICE O/P EST MOD 30 MIN: CPT | Performed by: INTERNAL MEDICINE

## 2019-05-01 RX ORDER — AMLODIPINE BESYLATE 5 MG/1
5 TABLET ORAL DAILY
Qty: 90 TABLET | Refills: 3 | OUTPATIENT
Start: 2019-05-01 | End: 2021-06-29

## 2019-05-01 NOTE — PROGRESS NOTES
Chief Complaint:  F/u leg pain    HPI:  Behzad Guzman is a 68 y.o. male who presents today for follow-up anterior leg pain.  Patient reports his anterior leg pain and swelling has decreased in size over last week.  Pain is slowly improving.  He has been applying topical steroid which has been improving the itching and pain.  He has had CT scan and MRI completed.  He would like to review results today.  He needs a refill on his blood pressure medication today as well.  He does not check his blood pressure at home.    ROS:  Constitutional: no fevers, night sweats or unexplained weight loss  Eyes: no vision changes  ENT: no runny nose, ear pain, sore throat  Cardio: no chest pain, palpitations  Pulm: no shortness of breath, wheezing, or cough  GI: no abdominal pain or changes in bowel movements  : no difficulty urinating  MSK: no difficulty ambulating, no joint pain  Neuro: no weakness, dizziness or headache  Psych: no trouble sleeping  Endo: no change in appetite      Past Medical History:   Diagnosis Date   • Asthma    • Chronic hip pain, left    • Chronic pain in left shoulder    • Hyperlipidemia    • Hypertension    • Leg length discrepancy    • Neck pain, chronic    • Neuropathy    • Panic attacks    • Pre-diabetes    • Prediabetes    • Spinal stenosis       Family History   Problem Relation Age of Onset   • Heart attack Father    • Other Mother         accident      Social History     Socioeconomic History   • Marital status:      Spouse name: Not on file   • Number of children: Not on file   • Years of education: Not on file   • Highest education level: Not on file   Occupational History   • Occupation: farmer   Tobacco Use   • Smoking status: Former Smoker     Last attempt to quit:      Years since quittin.3   • Smokeless tobacco: Never Used   Substance and Sexual Activity   • Alcohol use: No   • Drug use: No   • Sexual activity: Defer   Social History Narrative    Lives with wife on a farm       Allergies   Allergen Reactions   • Amoxicillin Rash   • Penicillins Rash      Immunization History   Administered Date(s) Administered   • Flu Vaccine Quad PF >36MO 09/30/2015   • Pneumococcal Conjugate 13-Valent (PCV13) 09/22/2016   • Pneumococcal Polysaccharide (PPSV23) 05/18/2018        PE:  Vitals:    05/01/19 1444   BP: 146/82   Pulse: 71   Temp: 97.6 °F (36.4 °C)   SpO2: 98%        Gen Appearance: NAD  HEENT: Normocephalic, PERRLA, no thyromegaly, trache midline  Heart: RRR, normal S1 and S2, no murmur  Lungs: CTA b/l, no wheezing, no crackles  Abdomen: Soft, non-tender, non-distended, no guarding and BSx4  MSK: Moves all extremities well, normal gait, no peripheral edema, erythematous skin on the right leg, minimal swelling, decreased size of mass.  Now measuring 1 to 2 cm.  Pulses: Palpable and equal b/l  Lymph nodes: No palpable lymphadenopathy   Neuro: No focal deficits      Current Outpatient Medications   Medication Sig Dispense Refill   • amLODIPine (NORVASC) 5 MG tablet Take 1 tablet by mouth Daily. 90 tablet 3   • diazePAM (VALIUM) 5 MG tablet Take 5 mg by mouth 2 (Two) Times a Day.  0   • erythromycin (ROMYCIN) 5 MG/GM ophthalmic ointment Administer  to both eyes Every 4 (Four) Hours While Awake. 3.5 g 0   • gabapentin (NEURONTIN) 400 MG capsule Take 400 mg by mouth 3 (Three) Times a Day.     • glimepiride (AMARYL) 1 MG tablet take 1 tablet by mouth once daily 30 tablet 3   • GLIMEPIRIDE PO Take  by mouth.     • hydrochlorothiazide (HYDRODIURIL) 12.5 MG tablet take 1 tablet by mouth once daily 30 tablet 3   • HYDROcodone-acetaminophen (NORCO)  MG per tablet Take 1 tablet by mouth Every 6 (Six) Hours As Needed for moderate pain (4-6).     • Hydrocodone-Acetaminophen (XODOL )  MG per tablet   0   • hydrocortisone 2.5 % cream Apply  topically to the appropriate area as directed 3 (Three) Times a Day. 60 g 3   • lisinopril (PRINIVIL,ZESTRIL) 20 MG tablet take 2 tablets by mouth  once daily 60 tablet 0   • loratadine (CLARITIN) 10 MG tablet Take 10 mg by mouth Daily.  1   • metFORMIN (GLUCOPHAGE) 500 MG tablet Take 1 tablet by mouth Daily With Breakfast. 30 tablet 5   • metoprolol tartrate (LOPRESSOR) 50 MG tablet take 1 tablet by mouth twice a day 60 tablet 0   • NON FORMULARY GLIMEREX FOR PREDIABETES     • omeprazole (priLOSEC) 40 MG capsule take 1 capsule by mouth once daily before A MEAL  0   • ONETOUCH VERIO test strip Daily. for testing  0   • oxyCODONE (oxyCONTIN) 10 MG 12 hr tablet Take 10 mg by mouth Every 12 (Twelve) Hours As Needed.  0   • pravastatin (PRAVACHOL) 10 MG tablet Take 10 mg by mouth Daily.     • pravastatin (PRAVACHOL) 20 MG tablet take 1 tablet by mouth once daily 30 tablet 3   • predniSONE (DELTASONE) 10 MG tablet Daily taper - 6/5/4/3/2/1 21 tablet 0   • promethazine-codeine (PHENERGAN with CODEINE) 6.25-10 MG/5ML syrup Take 5 mL by mouth Every 4 (Four) Hours As Needed for Cough. 90 mL 0   • sodium-potassium-magnesium sulfates (SUPREP BOWEL PREP KIT) 17.5-3.13-1.6 GM/177ML solution oral solution No solid food day prior to procedure; liquid diet only. Follow instructions mailed to your home. Questions call [phone] 2 bottle 0   • sulfamethoxazole-trimethoprim (BACTRIM DS,SEPTRA DS) 800-160 MG per tablet Take 1 tablet by mouth 2 (Two) Times a Day. 20 tablet 0   • Testosterone Cypionate (DEPOTESTOTERONE CYPIONATE) 200 MG/ML injection Inject 1 mL into the appropriate muscle as directed by prescriber Every 21 (Twenty-One) Days. 10 mL 0   • VENTOLIN  (90 Base) MCG/ACT inhaler inhalation 2 puffs by mouth every 4 to 6 hours if needed  0     No current facility-administered medications for this visit.         Behzad was seen today for mass of the lower extremity.    Diagnoses and all orders for this visit:    Anterior leg pain, right  Patient has obvious swelling and underlying cyst or mass.  Ultrasound, CT scan and MRI all negative evaluation of the right  anterior leg.  This seems to be self resolving.  Pain improving and swelling decreasing.  No evidence of DVT on ultrasound.  No soft tissue abnormality on MRI.  Dry skin dermatitis  -     Ambulatory Referral to Dermatology  Patient would like referral to dermatology for full skin evaluation and screening.  Essential hypertension  -     amLODIPine (NORVASC) 5 MG tablet; Take 1 tablet by mouth Daily.  Elevated today 146/82.  Recommend checking blood pressure at home.  Refill blood pressure medication today.  Likely elevated due to running out of amlodipine.  Skin abnormalities  -     Ambulatory Referral to Dermatology         No Follow-up on file.

## 2019-05-06 ENCOUNTER — TELEPHONE (OUTPATIENT)
Dept: ORTHOPEDIC SURGERY | Facility: CLINIC | Age: 69
End: 2019-05-06

## 2019-05-10 ENCOUNTER — TELEPHONE (OUTPATIENT)
Dept: FAMILY MEDICINE CLINIC | Facility: CLINIC | Age: 69
End: 2019-05-10

## 2019-05-10 NOTE — TELEPHONE ENCOUNTER
Patient called wanting to speak with Dr Tran about his dermatology referral, per pt Dr Tran was supposed to call them to get him in sooner. Please call back at [phone]

## 2019-05-11 NOTE — TELEPHONE ENCOUNTER
Please advise, I do not see any documentation of you stating you would get him in sooner in his chart.

## 2019-05-13 ENCOUNTER — TELEPHONE (OUTPATIENT)
Dept: FAMILY MEDICINE CLINIC | Facility: CLINIC | Age: 69
End: 2019-05-13

## 2019-05-13 NOTE — TELEPHONE ENCOUNTER
Patient needs a letter stating that he is cleared for surgery faxed to his surgeon in [place], Dr Perez at [phone] attn to [place] or April. Patient would also like a call back at [phone] or [phone]

## 2019-05-21 NOTE — TELEPHONE ENCOUNTER
Called patient to advised. Patient stated that he only needs a letter stating that he does not have a blood clot in his leg

## 2019-05-28 ENCOUNTER — TELEPHONE (OUTPATIENT)
Dept: FAMILY MEDICINE CLINIC | Facility: CLINIC | Age: 69
End: 2019-05-28

## 2019-05-31 RX ORDER — METOPROLOL TARTRATE 50 MG/1
TABLET, FILM COATED ORAL
Qty: 60 TABLET | Refills: 0 | Status: SHIPPED | OUTPATIENT
Start: 2019-05-31 | End: 2019-05-31 | Stop reason: SDUPTHER

## 2019-05-31 RX ORDER — METOPROLOL TARTRATE 50 MG/1
TABLET, FILM COATED ORAL
Qty: 180 TABLET | Refills: 0 | Status: SHIPPED | OUTPATIENT
Start: 2019-05-31 | End: 2019-11-05 | Stop reason: SDUPTHER

## 2019-06-07 NOTE — TELEPHONE ENCOUNTER
Called patient and asked him if the dr office received the note/letter. He states they have received it and he has been scheduled for surgery.

## 2019-06-13 RX ORDER — GLIMEPIRIDE 1 MG/1
TABLET ORAL
Qty: 90 TABLET | Refills: 1 | Status: SHIPPED | OUTPATIENT
Start: 2019-06-13 | End: 2019-11-05 | Stop reason: SDUPTHER

## 2019-06-17 ENCOUNTER — OFFICE VISIT (OUTPATIENT)
Dept: ORTHOPEDIC SURGERY | Facility: CLINIC | Age: 69
End: 2019-06-17

## 2019-06-17 VITALS — WEIGHT: 192.02 LBS | HEART RATE: 76 BPM | BODY MASS INDEX: 26.01 KG/M2 | HEIGHT: 72 IN | OXYGEN SATURATION: 99 %

## 2019-06-17 DIAGNOSIS — M19.071 ARTHRITIS OF RIGHT FOOT: ICD-10-CM

## 2019-06-17 DIAGNOSIS — M79.672 PAIN IN BOTH FEET: Primary | ICD-10-CM

## 2019-06-17 DIAGNOSIS — M19.072 ARTHRITIS OF LEFT FOOT: ICD-10-CM

## 2019-06-17 DIAGNOSIS — Z78.9 POOR CONDITIONING: ICD-10-CM

## 2019-06-17 DIAGNOSIS — M79.671 PAIN IN BOTH FEET: Primary | ICD-10-CM

## 2019-06-17 PROCEDURE — 99213 OFFICE O/P EST LOW 20 MIN: CPT | Performed by: PHYSICIAN ASSISTANT

## 2019-06-17 NOTE — PROGRESS NOTES
"    [facility] [place] [facility] Note    Subjective     No chief complaint on file.       LANIE Guzman is a 68 y.o. male.  Patient presents today for evaluation of his right foot, although he has been diagnosed with arthritis to both feet.  Patient is a self-referral.  He has been seeing Dr. Geradro Dee DPM.  Patient reports history of a crush injury years ago and since then he has had pain.  He notes 7/10 pain.  Severity the pain moderate.  Quality the pain aching, shooting.  Associated symptoms swelling, stiffness and giving way.  Symptoms are worse with walking, standing, stair climbing.  Pain is localized predominantly along the dorsum of both feet and into the longitudinal medial arch.    He reports 5/8\" leg length discrepancy, with right shorter than left.  States he was given a pair of orthotics that were ill fitting and and worn on the wrong side which only exacerbated his symptoms.  He has since gone back to wearing his old orthotics and states that his ankles felt better with these orthotics but they are broken down and he is requesting a new pair.  He also states that he feels his ankle needs to be better stabilized and is requesting brace similar to that of an [place], [place] brace.    New problem of previously seen him for both his left shoulder and left knee.    Past Medical History:   Diagnosis Date   • Asthma    • Chronic hip pain, left    • Chronic pain in left shoulder    • Hyperlipidemia    • Hypertension    • Leg length discrepancy    • Neck pain, chronic    • Neuropathy    • Panic attacks    • Pre-diabetes    • Prediabetes    • Spinal stenosis       Past Surgical History:   Procedure Laterality Date   • LEG SURGERY     • ROTATOR CUFF REPAIR Right    • SHOULDER SURGERY Left    • WRIST SURGERY Left       Family History   Problem Relation Age of Onset   • Heart attack Father    • Other Mother         accident     Social History     Socioeconomic History   • Marital status:  "     Spouse name: Not on file   • Number of children: Not on file   • Years of education: Not on file   • Highest education level: Not on file   Occupational History   • Occupation: farmer   Tobacco Use   • Smoking status: Former Smoker     Last attempt to quit:      Years since quittin.4   • Smokeless tobacco: Never Used   Substance and Sexual Activity   • Alcohol use: No   • Drug use: No   • Sexual activity: Defer   Social History Narrative    [place] wife [place] a farm      Current Outpatient Medications on File Prior to Visit   Medication Sig Dispense Refill   • amLODIPine (NORVASC) 5 MG tablet Take 1 tablet by mouth Daily. 90 tablet 3   • diazePAM (VALIUM) 5 MG tablet Take 5 mg by mouth 2 (Two) Times a Day.  0   • erythromycin (ROMYCIN) 5 MG/GM ophthalmic ointment Administer  to both eyes Every 4 (Four) Hours While Awake. 3.5 g 0   • gabapentin (NEURONTIN) 400 MG capsule Take 400 mg by mouth 3 (Three) Times a Day.     • glimepiride (AMARYL) 1 MG tablet TAKE 1 TABLET BY MOUTH ONCE DAILY 90 tablet 1   • GLIMEPIRIDE PO Take  by mouth.     • hydrochlorothiazide (HYDRODIURIL) 12.5 MG tablet take 1 tablet by mouth once daily 30 tablet 3   • HYDROcodone-acetaminophen (NORCO)  MG per tablet Take 1 tablet by mouth Every 6 (Six) Hours As Needed for moderate pain (4-6).     • Hydrocodone-Acetaminophen (XODOL )  MG per tablet   0   • hydrocortisone 2.5 % cream Apply  topically to the appropriate area as directed 3 (Three) Times a Day. 60 g 3   • lisinopril (PRINIVIL,ZESTRIL) 20 MG tablet take 2 tablets by mouth once daily 60 tablet 0   • loratadine (CLARITIN) 10 MG tablet Take 10 mg by mouth Daily.  1   • metFORMIN (GLUCOPHAGE) 500 MG tablet Take 1 tablet by mouth Daily With Breakfast. 30 tablet 5   • metoprolol tartrate (LOPRESSOR) 50 MG tablet TAKE 1 TABLET BY MOUTH TWICE A  tablet 0   • NON FORMULARY GLIMEREX FOR PREDIABETES     • omeprazole (priLOSEC) 40 MG capsule take 1 capsule by  mouth once daily before A MEAL  0   • ONETOUCH VERIO test strip Daily. for testing  0   • oxyCODONE (oxyCONTIN) 10 MG 12 hr tablet Take 10 mg by mouth Every 12 (Twelve) Hours As Needed.  0   • pravastatin (PRAVACHOL) 10 MG tablet Take 10 mg by mouth Daily.     • pravastatin (PRAVACHOL) 20 MG tablet take 1 tablet by mouth once daily 30 tablet 3   • predniSONE (DELTASONE) 10 MG tablet Daily taper - 6/5/4/3/2/1 21 tablet 0   • promethazine-codeine (PHENERGAN with CODEINE) 6.25-10 MG/5ML syrup Take 5 mL by mouth Every 4 (Four) Hours As Needed for Cough. 90 mL 0   • sodium-potassium-magnesium sulfates (SUPREP BOWEL PREP KIT) 17.5-3.13-1.6 GM/177ML solution oral solution No solid food day prior to procedure; liquid diet only. Follow instructions mailed to your home. Questions call [phone] 2 bottle 0   • sulfamethoxazole-trimethoprim (BACTRIM DS,SEPTRA DS) 800-160 MG per tablet Take 1 tablet by mouth 2 (Two) Times a Day. 20 tablet 0   • Testosterone Cypionate (DEPOTESTOTERONE CYPIONATE) 200 MG/ML injection Inject 1 mL into the appropriate muscle as directed by prescriber Every 21 (Twenty-One) Days. 10 mL 0   • VENTOLIN  (90 Base) MCG/ACT inhaler inhalation 2 puffs by mouth every 4 to 6 hours if needed  0     No current facility-administered medications on file prior to visit.       Allergies   Allergen Reactions   • Amoxicillin Rash   • Penicillins Rash        The following portions of the patient's history were reviewed and updated as appropriate: allergies, current medications, past family history, past medical history, past social history, past surgical history and problem list.    Review of Systems   Constitutional: Negative.    HENT: Negative.    Eyes: Negative.    Respiratory: Negative.    Cardiovascular: Negative.    Gastrointestinal: Negative.    Endocrine: Negative.    Genitourinary: Negative.    Musculoskeletal: Positive for arthralgias (foot pain).   Skin: Negative.    Allergic/Immunologic: Negative.  "   Neurological: Negative.    Hematological: Negative.    Psychiatric/Behavioral: Negative.         Objective      Physical Exam  Pulse 76   Ht 182.9 cm (72.01\")   Wt 87.1 kg (192 lb 0.3 oz)   SpO2 99%   BMI 26.04 kg/m²     Body mass index is 26.04 kg/m².    GENERAL APPEARANCE: awake, alert & oriented x 3, in no acute distress and well developed, well nourished  PSYCH: normal mood and affect  LUNGS:  breathing nonlabored, no wheezing  EYES: sclera anicteric, pupils equal  CARDIOVASCULAR: palpable pulses dorsalis pedis, palpable posterior tibial bilaterally. Capillary refill less than 2 seconds  INTEGUMENTARY: skin intact, no clubbing, cyanosis  NEUROLOGIC:  Normal Sensation and reflexes         Ortho Exam  V:  Dorsalis Pedis:  Right: 2+; Left:2+    Posterior Tibial: Right:2+; Left:2+    Capillary Refill:  Brisk  MSK:  Hand:right handed      Tibia:  Right:  normal motor function and tender over subcutaneous border; Left:  non tender and normal motor function      Ankle:  Right: tender Dorsum of the midfoot into the longitudinal arch, ROM  normal and motor function  normal; Left:  tender Dorsum of the midfoot into the longitudinal arch but not as painful as on the right side, ROM  normal and motor function  normal      Foot:  Right: tender Dorsum of the midfoot into the longitudinal arch but not as painful as on the right side, ROM  normal and motor function  Normal; Left: tender dorsum of the midfoot into the longitudinal arch but not as painful as on the right side, ROM  normal and motor function  Normal.     NEURO: Heel Walking:  Right:  painful; Left:  painful    Toe Walking:  Right:  limited ability, painful; Left:  limited ability, painful     [place] 5.07 monofilament test: not evaluated    Lower extremity sensation: intact     Calf Atrophy:none    Motor Function: 4/5 right, 5/5 left    Bilateral lower leg with 1+ edema.      Imaging/Studies  Imaging Results (last 7 days)     ** No results found " for the last 168 hours. **      Plain film imaging of the right foot.  Images were read by Dr. Rader.  No acute bony abnormality, fracture or dislocation.  See chart for official report.  Prasanth I also reviewed plain film imaging performed on 2/6/2019 of the left foot which also showed chronic degenerative changes without acute bony abnormality, fracture or dislocation.    Right Foot X-Ray  Indication: Pain  AP, Lateral, and Oblique views     Findings: midfoot arthritis no fracture  No bony lesion  Normal soft tissues        No prior studies were available for comparison.      Assessment/Plan        ICD-10-CM ICD-9-CM   1. Pain in both feet M79.671 729.5    M79.672    2. Arthritis of right foot M19.071 716.97   3. Arthritis of left foot M19.072 716.97   4. Poor conditioning Z78.9 V69.0       Orders Placed This Encounter   Procedures   • Ambulatory Referral to Physical Therapy Evaluate and treat, Ortho        -Bilateral feet pain with osteoarthritis.  -Patient with a history of chronic pain, currently using OxyContin and Norco for pain control.  Continue to recommend pain control through PCP/pain management clinic.  -Once again recommend use of compression socks/stockings.  Patient states he does routinely wear these now just did not have them on today.  -Wrote a prescription for custom-made orthotics.  -Follow-up as needed.      Medical Decision Making  Management Options : prescription/IM medicine  Data/Risk: radiology tests       Zaira Knight PA-C  06/18/19  1:49 PM         EMR Dragon/Transcription disclaimer:  Much of this encounter note is an electronic transcription of spoken language to printed text. Electronic transcription of spoken language may permit erroneous, or at times, nonsensical words or phrases to be inadvertently transcribed. Although I have reviewed the note for such errors, some may still exist.

## 2019-07-02 DIAGNOSIS — IMO0001 UNCONTROLLED TYPE 2 DIABETES MELLITUS WITHOUT COMPLICATION, WITHOUT LONG-TERM CURRENT USE OF INSULIN: ICD-10-CM

## 2019-07-02 RX ORDER — LISINOPRIL 20 MG/1
TABLET ORAL
Qty: 60 TABLET | Refills: 0 | Status: SHIPPED | OUTPATIENT
Start: 2019-07-02 | End: 2019-07-02 | Stop reason: SDUPTHER

## 2019-07-02 RX ORDER — LISINOPRIL 20 MG/1
TABLET ORAL
Qty: 180 TABLET | Refills: 0 | Status: SHIPPED | OUTPATIENT
Start: 2019-07-02 | End: 2019-11-05 | Stop reason: SDUPTHER

## 2019-07-05 DIAGNOSIS — IMO0001 UNCONTROLLED TYPE 2 DIABETES MELLITUS WITHOUT COMPLICATION, WITHOUT LONG-TERM CURRENT USE OF INSULIN: ICD-10-CM

## 2019-07-11 RX ORDER — OMEPRAZOLE 40 MG/1
40 CAPSULE, DELAYED RELEASE ORAL DAILY
Qty: 90 CAPSULE | Refills: 1 | Status: SHIPPED | OUTPATIENT
Start: 2019-07-11 | End: 2020-02-01

## 2019-07-14 ENCOUNTER — APPOINTMENT (OUTPATIENT)
Dept: GENERAL RADIOLOGY | Facility: HOSPITAL | Age: 69
End: 2019-07-14

## 2019-07-14 ENCOUNTER — APPOINTMENT (OUTPATIENT)
Dept: CT IMAGING | Facility: HOSPITAL | Age: 69
End: 2019-07-14

## 2019-07-14 ENCOUNTER — HOSPITAL ENCOUNTER (EMERGENCY)
Facility: HOSPITAL | Age: 69
Discharge: HOME | End: 2019-07-14
Attending: EMERGENCY MEDICINE | Admitting: EMERGENCY MEDICINE

## 2019-07-14 VITALS
RESPIRATION RATE: 16 BRPM | SYSTOLIC BLOOD PRESSURE: 125 MMHG | DIASTOLIC BLOOD PRESSURE: 75 MMHG | WEIGHT: 191.6 LBS | BODY MASS INDEX: 25.95 KG/M2 | TEMPERATURE: 97.6 F | HEART RATE: 69 BPM | OXYGEN SATURATION: 98 % | HEIGHT: 72 IN

## 2019-07-14 DIAGNOSIS — M25.512 ACUTE PAIN OF LEFT SHOULDER: ICD-10-CM

## 2019-07-14 DIAGNOSIS — R06.00 DYSPNEA, UNSPECIFIED TYPE: ICD-10-CM

## 2019-07-14 DIAGNOSIS — J98.11 ATELECTASIS: ICD-10-CM

## 2019-07-14 DIAGNOSIS — R07.9 LEFT SIDED CHEST PAIN: Primary | ICD-10-CM

## 2019-07-14 DIAGNOSIS — G89.18 POST-OPERATIVE PAIN: ICD-10-CM

## 2019-07-14 LAB
ALBUMIN SERPL-MCNC: 3.6 G/DL (ref 3.5–5.2)
ALBUMIN/GLOB SERPL: 1.1 G/DL
ALP SERPL-CCNC: 143 U/L (ref 39–117)
ALT SERPL W P-5'-P-CCNC: 34 U/L (ref 1–41)
ANION GAP SERPL CALCULATED.3IONS-SCNC: 14 MMOL/L (ref 5–15)
AST SERPL-CCNC: 27 U/L (ref 1–40)
BASOPHILS # BLD AUTO: 0.06 10*3/MM3 (ref 0–0.2)
BASOPHILS NFR BLD AUTO: 0.5 % (ref 0–1.5)
BILIRUB SERPL-MCNC: 0.6 MG/DL (ref 0.2–1.2)
BUN BLD-MCNC: 14 MG/DL (ref 8–23)
BUN/CREAT SERPL: 16.5 (ref 7–25)
CALCIUM SPEC-SCNC: 9.2 MG/DL (ref 8.6–10.5)
CHLORIDE SERPL-SCNC: 88 MMOL/L (ref 98–107)
CO2 SERPL-SCNC: 28 MMOL/L (ref 22–29)
CREAT BLD-MCNC: 0.85 MG/DL (ref 0.76–1.27)
DEPRECATED RDW RBC AUTO: 42.3 FL (ref 37–54)
EOSINOPHIL # BLD AUTO: 0.44 10*3/MM3 (ref 0–0.4)
EOSINOPHIL NFR BLD AUTO: 3.6 % (ref 0.3–6.2)
ERYTHROCYTE [DISTWIDTH] IN BLOOD BY AUTOMATED COUNT: 13.2 % (ref 12.3–15.4)
GFR SERPL CREATININE-BSD FRML MDRD: 90 ML/MIN/1.73
GLOBULIN UR ELPH-MCNC: 3.4 GM/DL
GLUCOSE BLD-MCNC: 128 MG/DL (ref 65–99)
HCT VFR BLD AUTO: 36.1 % (ref 37.5–51)
HGB BLD-MCNC: 11.6 G/DL (ref 13–17.7)
HOLD SPECIMEN: NORMAL
HOLD SPECIMEN: NORMAL
IMM GRANULOCYTES # BLD AUTO: 0.07 10*3/MM3 (ref 0–0.05)
IMM GRANULOCYTES NFR BLD AUTO: 0.6 % (ref 0–0.5)
LIPASE SERPL-CCNC: 12 U/L (ref 13–60)
LYMPHOCYTES # BLD AUTO: 2.36 10*3/MM3 (ref 0.7–3.1)
LYMPHOCYTES NFR BLD AUTO: 19.1 % (ref 19.6–45.3)
MCH RBC QN AUTO: 28 PG (ref 26.6–33)
MCHC RBC AUTO-ENTMCNC: 32.1 G/DL (ref 31.5–35.7)
MCV RBC AUTO: 87.2 FL (ref 79–97)
MONOCYTES # BLD AUTO: 1.27 10*3/MM3 (ref 0.1–0.9)
MONOCYTES NFR BLD AUTO: 10.3 % (ref 5–12)
NEUTROPHILS # BLD AUTO: 8.17 10*3/MM3 (ref 1.7–7)
NEUTROPHILS NFR BLD AUTO: 65.9 % (ref 42.7–76)
NRBC BLD AUTO-RTO: 0 /100 WBC (ref 0–0.2)
NT-PROBNP SERPL-MCNC: 209.8 PG/ML (ref 5–900)
PLATELET # BLD AUTO: 396 10*3/MM3 (ref 140–450)
PMV BLD AUTO: 10.2 FL (ref 6–12)
POTASSIUM BLD-SCNC: 4.1 MMOL/L (ref 3.5–5.2)
PROT SERPL-MCNC: 7 G/DL (ref 6–8.5)
RBC # BLD AUTO: 4.14 10*6/MM3 (ref 4.14–5.8)
SODIUM BLD-SCNC: 130 MMOL/L (ref 136–145)
TROPONIN T SERPL-MCNC: <0.01 NG/ML (ref 0–0.03)
WBC NRBC COR # BLD: 12.37 10*3/MM3 (ref 3.4–10.8)
WHOLE BLOOD HOLD SPECIMEN: NORMAL
WHOLE BLOOD HOLD SPECIMEN: NORMAL

## 2019-07-14 PROCEDURE — 99285 EMERGENCY DEPT VISIT HI MDM: CPT

## 2019-07-14 PROCEDURE — 83880 ASSAY OF NATRIURETIC PEPTIDE: CPT | Performed by: EMERGENCY MEDICINE

## 2019-07-14 PROCEDURE — 25010000002 MORPHINE PER 10 MG: Performed by: EMERGENCY MEDICINE

## 2019-07-14 PROCEDURE — 93005 ELECTROCARDIOGRAM TRACING: CPT

## 2019-07-14 PROCEDURE — 93005 ELECTROCARDIOGRAM TRACING: CPT | Performed by: EMERGENCY MEDICINE

## 2019-07-14 PROCEDURE — 84484 ASSAY OF TROPONIN QUANT: CPT | Performed by: EMERGENCY MEDICINE

## 2019-07-14 PROCEDURE — 25010000002 CEFTRIAXONE PER 250 MG: Performed by: EMERGENCY MEDICINE

## 2019-07-14 PROCEDURE — 80053 COMPREHEN METABOLIC PANEL: CPT | Performed by: EMERGENCY MEDICINE

## 2019-07-14 PROCEDURE — 85025 COMPLETE CBC W/AUTO DIFF WBC: CPT | Performed by: EMERGENCY MEDICINE

## 2019-07-14 PROCEDURE — 25010000002 HYDROMORPHONE PER 4 MG: Performed by: EMERGENCY MEDICINE

## 2019-07-14 PROCEDURE — 71045 X-RAY EXAM CHEST 1 VIEW: CPT

## 2019-07-14 PROCEDURE — 83690 ASSAY OF LIPASE: CPT | Performed by: EMERGENCY MEDICINE

## 2019-07-14 PROCEDURE — 96375 TX/PRO/DX INJ NEW DRUG ADDON: CPT

## 2019-07-14 PROCEDURE — 71275 CT ANGIOGRAPHY CHEST: CPT

## 2019-07-14 PROCEDURE — 0 IOPAMIDOL PER 1 ML: Performed by: EMERGENCY MEDICINE

## 2019-07-14 PROCEDURE — 96365 THER/PROPH/DIAG IV INF INIT: CPT

## 2019-07-14 RX ORDER — SODIUM CHLORIDE 0.9 % (FLUSH) 0.9 %
10 SYRINGE (ML) INJECTION AS NEEDED
Status: DISCONTINUED | OUTPATIENT
Start: 2019-07-14 | End: 2019-07-15 | Stop reason: HOSPADM

## 2019-07-14 RX ORDER — ASPIRIN 81 MG/1
324 TABLET, CHEWABLE ORAL ONCE
Status: DISCONTINUED | OUTPATIENT
Start: 2019-07-14 | End: 2019-07-14

## 2019-07-14 RX ORDER — HYDROMORPHONE HYDROCHLORIDE 1 MG/ML
0.5 INJECTION, SOLUTION INTRAMUSCULAR; INTRAVENOUS; SUBCUTANEOUS ONCE
Status: COMPLETED | OUTPATIENT
Start: 2019-07-14 | End: 2019-07-14

## 2019-07-14 RX ORDER — MORPHINE SULFATE 4 MG/ML
4 INJECTION, SOLUTION INTRAMUSCULAR; INTRAVENOUS ONCE
Status: COMPLETED | OUTPATIENT
Start: 2019-07-14 | End: 2019-07-14

## 2019-07-14 RX ADMIN — SODIUM CHLORIDE, PRESERVATIVE FREE 10 ML: 5 INJECTION INTRAVENOUS at 22:45

## 2019-07-14 RX ADMIN — IOPAMIDOL 80 ML: 755 INJECTION, SOLUTION INTRAVENOUS at 20:23

## 2019-07-14 RX ADMIN — HYDROMORPHONE HYDROCHLORIDE 0.5 MG: 1 INJECTION, SOLUTION INTRAMUSCULAR; INTRAVENOUS; SUBCUTANEOUS at 21:51

## 2019-07-14 RX ADMIN — MORPHINE SULFATE 4 MG: 4 INJECTION INTRAVENOUS at 22:43

## 2019-07-14 RX ADMIN — CEFTRIAXONE SODIUM 1 G: 1 INJECTION, POWDER, FOR SOLUTION INTRAMUSCULAR; INTRAVENOUS at 21:55

## 2019-07-14 RX ADMIN — SODIUM CHLORIDE, PRESERVATIVE FREE 10 ML: 5 INJECTION INTRAVENOUS at 21:52

## 2019-07-14 NOTE — ED PROVIDER NOTES
Subjective   Behzad Guzman is a 68 y.o. male who presents to the emergency department with complaints of chest pain and shortness of breath that started one day ago. The patient reports that his chest pain is left sided and constant. The patient had a cough that was productive with brown sputum. The patient had a fever of 100.2 this morning. The patient had a shoulder replacement 3 weeks ago in [place] by Dr. Perez at [facility]. The patient had an increase in pain, red streaking, and swelling around his shoulder incisions one week ago. The patient was prescribed Bactrim DS that he filled but has not taken yet. The patient is seen by pain management Dr. Romano who prescribed the patient 10 mg of Oxycodone every 4 hours. The patients last stress test was normal. The patient denies any back pain, nausea, vomiting, and any other acute symptoms at this time.         History provided by:  Patient  Chest Pain   Pain location:  L chest  Pain quality: aching    Pain radiates to:  L arm  Pain severity:  Mild  Onset quality:  Sudden  Duration:  1 day  Timing:  Constant  Progression:  Unchanged  Chronicity:  New  Relieved by:  Nothing  Worsened by:  Nothing  Ineffective treatments:  None tried  Associated symptoms: cough, fever and shortness of breath    Associated symptoms: no abdominal pain, no back pain, no nausea and no vomiting    Risk factors: high cholesterol, hypertension and male sex    Risk factors: no smoking    Wound Infection   Associated symptoms: chest pain, cough, fever and shortness of breath    Associated symptoms: no abdominal pain, no nausea and no vomiting        Review of Systems   Constitutional: Positive for fever.   Respiratory: Positive for cough and shortness of breath.    Cardiovascular: Positive for chest pain.   Gastrointestinal: Negative for abdominal pain, nausea and vomiting.   Musculoskeletal: Negative for back pain.   Skin: Positive for color change.   All other systems reviewed and are  negative.      Past Medical History:   Diagnosis Date   • Asthma    • Chronic hip pain, left    • Chronic pain in left shoulder    • Hyperlipidemia    • Hypertension    • Leg length discrepancy    • Neck pain, chronic    • Neuropathy    • Panic attacks    • Pre-diabetes    • Prediabetes    • Spinal stenosis        Allergies   Allergen Reactions   • Amoxicillin Rash   • Penicillins Rash       Past Surgical History:   Procedure Laterality Date   • LEG SURGERY     • ROTATOR CUFF REPAIR Right    • SHOULDER SURGERY Left    • WRIST SURGERY Left        Family History   Problem Relation Age of Onset   • Heart attack Father    • Other Mother         accident       Social History     Socioeconomic History   • Marital status:      Spouse name: Not on file   • Number of children: Not on file   • Years of education: Not on file   • Highest education level: Not on file   Occupational History   • Occupation: farmer   Tobacco Use   • Smoking status: Former Smoker     Last attempt to quit:      Years since quittin.5   • Smokeless tobacco: Never Used   Substance and Sexual Activity   • Alcohol use: No   • Drug use: No   • Sexual activity: Defer   Social History Narrative    Lives with wife on a farm         Objective   Physical Exam   Constitutional: He is oriented to person, place, and time. He appears well-developed and well-nourished. No distress.   HENT:   Head: Normocephalic and atraumatic.   Eyes: Conjunctivae are normal. No scleral icterus.   Neck: Normal range of motion. Neck supple.   Cardiovascular: Normal rate, regular rhythm and normal heart sounds.   Pulmonary/Chest: Effort normal. No respiratory distress. He has no wheezes. He has no rales.   The patient has diminished breath sounds in the lower lung fields but the patient is splinting with his respirations possibly secondary to left shoulder discomfort.    Abdominal: Soft. There is no tenderness. There is no guarding.   Musculoskeletal: Normal range of  motion.   The patients left lateral shoulder with multiple surgical incision sites to the superior lateral site is mildly erythematous with minimal increase in warmth as compared to surrounding tissue. There is no fluctuance, no induration, no lymphogenic streaking, and no lymphadenopathy.     Neurological: He is alert and oriented to person, place, and time.   Skin: Skin is warm and dry. He is not diaphoretic. There is pallor (mild).   Psychiatric: He has a normal mood and affect. His behavior is normal.   Nursing note and vitals reviewed.      Procedures         ED Course  ED Course as of Jul 15 1158   Sun Jul 14, 2019 2132 Dr. Carroll spoke with Dr. Caba and discussed the patients case in detail to include CT findings, lab work, patient presentation, etc. He recommends that the patient take his antibiotics as prescribed and follow up in his office. Dr. Caba recommends calling first thing in the morning to try to schedule an appointment  for tomorrow afternoon.    I will administer a dose of IV antibiotics and have the patient take his oral antibiotics as previously prescribed. -MS   [CN]   2140 Dr. Carroll is at the patients bedside at this time discussing results and plan. -MS   [CN]      ED Course User Index  [CN] Melissa Rich     Recent Results (from the past 24 hour(s))   Troponin    Collection Time: 07/14/19  6:57 PM   Result Value Ref Range    Troponin T <0.010 0.000 - 0.030 ng/mL   Comprehensive Metabolic Panel    Collection Time: 07/14/19  6:57 PM   Result Value Ref Range    Glucose 128 (H) 65 - 99 mg/dL    BUN 14 8 - 23 mg/dL    Creatinine 0.85 0.76 - 1.27 mg/dL    Sodium 130 (L) 136 - 145 mmol/L    Potassium 4.1 3.5 - 5.2 mmol/L    Chloride 88 (L) 98 - 107 mmol/L    CO2 28.0 22.0 - 29.0 mmol/L    Calcium 9.2 8.6 - 10.5 mg/dL    Total Protein 7.0 6.0 - 8.5 g/dL    Albumin 3.60 3.50 - 5.20 g/dL    ALT (SGPT) 34 1 - 41 U/L    AST (SGOT) 27 1 - 40 U/L    Alkaline Phosphatase 143 (H) 39 - 117 U/L     Total Bilirubin 0.6 0.2 - 1.2 mg/dL    eGFR Non African Amer 90 >60 mL/min/1.73    Globulin 3.4 gm/dL    A/G Ratio 1.1 g/dL    BUN/Creatinine Ratio 16.5 7.0 - 25.0    Anion Gap 14.0 5.0 - 15.0 mmol/L   Lipase    Collection Time: 07/14/19  6:57 PM   Result Value Ref Range    Lipase 12 (L) 13 - 60 U/L   BNP    Collection Time: 07/14/19  6:57 PM   Result Value Ref Range    proBNP 209.8 5.0 - 900.0 pg/mL   Light Blue Top    Collection Time: 07/14/19  6:57 PM   Result Value Ref Range    Extra Tube hold for add-on    Green Top (Gel)    Collection Time: 07/14/19  6:57 PM   Result Value Ref Range    Extra Tube Hold for add-ons.    Lavender Top    Collection Time: 07/14/19  6:57 PM   Result Value Ref Range    Extra Tube hold for add-on    Gold Top - SST    Collection Time: 07/14/19  6:57 PM   Result Value Ref Range    Extra Tube Hold for add-ons.    CBC Auto Differential    Collection Time: 07/14/19  6:57 PM   Result Value Ref Range    WBC 12.37 (H) 3.40 - 10.80 10*3/mm3    RBC 4.14 4.14 - 5.80 10*6/mm3    Hemoglobin 11.6 (L) 13.0 - 17.7 g/dL    Hematocrit 36.1 (L) 37.5 - 51.0 %    MCV 87.2 79.0 - 97.0 fL    MCH 28.0 26.6 - 33.0 pg    MCHC 32.1 31.5 - 35.7 g/dL    RDW 13.2 12.3 - 15.4 %    RDW-SD 42.3 37.0 - 54.0 fl    MPV 10.2 6.0 - 12.0 fL    Platelets 396 140 - 450 10*3/mm3    Neutrophil % 65.9 42.7 - 76.0 %    Lymphocyte % 19.1 (L) 19.6 - 45.3 %    Monocyte % 10.3 5.0 - 12.0 %    Eosinophil % 3.6 0.3 - 6.2 %    Basophil % 0.5 0.0 - 1.5 %    Immature Grans % 0.6 (H) 0.0 - 0.5 %    Neutrophils, Absolute 8.17 (H) 1.70 - 7.00 10*3/mm3    Lymphocytes, Absolute 2.36 0.70 - 3.10 10*3/mm3    Monocytes, Absolute 1.27 (H) 0.10 - 0.90 10*3/mm3    Eosinophils, Absolute 0.44 (H) 0.00 - 0.40 10*3/mm3    Basophils, Absolute 0.06 0.00 - 0.20 10*3/mm3    Immature Grans, Absolute 0.07 (H) 0.00 - 0.05 10*3/mm3    nRBC 0.0 0.0 - 0.2 /100 WBC     Note: In addition to lab results from this visit, the labs listed above may include labs  taken at another facility or during a different encounter within the last 24 hours. Please correlate lab times with ED admission and discharge times for further clarification of the services performed during this visit.    CT Angiogram Chest With Contrast   Final Result   1.  No pulmonary embolism.   2.  There is elevation of the left hemidiaphragm with atelectasis of portions of the left lower lobe. There is no convincing finding to suggest the presence of pneumonia. The appearance may indicate the presence of diaphragmatic paralysis in the   appropriate clinical context. This has persisted since the recent chest radiograph.   3.  Air and fluid within the left glenohumeral joint, given reported timing of the surgery presence of air would be somewhat atypical unless there was a drain present which was recently removed. If this is not the case, intra-articular air raises the   possibility of joint infection, though it may also be postsurgical.   4.  Old granulomatous disease of the chest.      Signer Name: Tom Graf MD    Signed: 7/14/2019 9:05 PM    Workstation Name: [id number]          XR Chest 1 View   Final Result      1.  Coalescent left lower lung opacity new from the 2016 radiographs partially obscuring the left hemidiaphragm and left heart border.  This could reflect pleural fluid, airspace opacity, or some combination of the 2.      Signer Name: Tom Graf MD    Signed: 7/14/2019 7:54 PM    Workstation Name: [id number]            Vitals:    07/14/19 2055 07/14/19 2150 07/14/19 2230 07/14/19 2316   BP: 126/80 148/91 131/80 125/75   BP Location:       Patient Position:       Pulse: 66 66 67 69   Resp: 18 16 16 16   Temp:       TempSrc:       SpO2: 93% 97% 96% 98%   Weight:       Height:         Medications   iopamidol (ISOVUE-370) 76 % injection 100 mL (80 mL Intravenous Given 7/14/19 2023)   cefTRIAXone (ROCEPHIN) 1 g/100 mL 0.9% NS (MBP) (0 g Intravenous Stopped 7/14/19 2225)    HYDROmorphone (DILAUDID) injection 0.5 mg (0.5 mg Intravenous Given 7/14/19 2151)   Morphine sulfate (PF) injection 4 mg (4 mg Intravenous Given 7/14/19 2921)     ECG/EMG Results (last 24 hours)     Procedure Component Value Units Date/Time    ECG 12 Lead [[id number]] Collected:  07/14/19 1856     Updated:  07/14/19 1855        ECG 12 Lead   Final Result   Test Reason : chest pain   Blood Pressure : **/** mmHG   Vent. Rate : 071 BPM     Atrial Rate : 071 BPM      P-R Int : 182 ms          QRS Dur : 102 ms       QT Int : 392 ms       P-R-T Axes : -13 -09 010 degrees      QTc Int : 425 ms      Normal sinus rhythm   ST elevation, consider early repolarization, pericarditis, or injury   Abnormal ECG   When compared with ECG of 27-NOV-2017 13:04,   No significant change was found   Confirmed by ANISHA KELLY MD ([id number]) on 7/14/2019 11:21:56 PM      Referred By: MD ER           Confirmed By:ANISHA KELLY MD                          [facility]    Final diagnoses:   Left sided chest pain   Dyspnea, unspecified type   Atelectasis   Acute pain of left shoulder   Post-operative pain       Documentation assistance provided by scrjanes Rich.  Information recorded by the scribe was done at my direction and has been verified and validated by me.     Melissa Rich  07/14/19 2009       Anisha Kelly MD  07/15/19 1676

## 2019-07-15 NOTE — DISCHARGE INSTRUCTIONS
Follow-up with your pain management specialist tomorrow as already planned/scheduled.    Take your pain medications and your antibiotics as previously ordered.  Take your first dose of the Bactrim when you get home tonight.    Call your orthopedist tomorrow morning when they open so that you can schedule an appointment with him later tomorrow if at all possible.    Take your pain medications as previously prescribed.    Utilize your incentive spirometer that we have given you, taking deep breaths at least every 15 to 30 minutes while awake.     If you develop fever or worsening pain I recommend you be seen in a facility where Dr. Caba has privileges.    Please review the medications you are supposed to be taking according to prior physician recommendations. I have not changed your home medications during this visit. If your discharge instructions indicate that I have changed your home medications, this is not the case, and you should disregard. If you have any questions about the medication you should be taking at home, please call your physician.

## 2019-07-24 ENCOUNTER — TELEPHONE (OUTPATIENT)
Dept: FAMILY MEDICINE CLINIC | Facility: CLINIC | Age: 69
End: 2019-07-24

## 2019-07-25 ENCOUNTER — HOSPITAL ENCOUNTER (OUTPATIENT)
Dept: OTHER | Facility: HOSPITAL | Age: 69
Discharge: HOME | End: 2019-07-25
Attending: INTERNAL MEDICINE

## 2019-07-25 LAB
BASOPHILS # BLD AUTO: 0.08 10*3/UL (ref 0–0.2)
BASOPHILS NFR BLD AUTO: 0.9 % (ref 0–3)
CONV ABS IMM GRAN: 0.02 10*3/UL (ref 0–0.2)
CONV IMMATURE GRAN: 0.2 % (ref 0–1.8)
DEPRECATED RDW RBC AUTO: 42.2 FL (ref 35.1–43.9)
EOSINOPHIL # BLD AUTO: 0.28 10*3/UL (ref 0–0.7)
EOSINOPHIL # BLD AUTO: 3.1 % (ref 0–7)
ERYTHROCYTE [DISTWIDTH] IN BLOOD BY AUTOMATED COUNT: 13.3 % (ref 11.6–14.4)
HBA1C MFR BLD: 11 G/DL (ref 14–18)
HCT VFR BLD AUTO: 34.4 % (ref 42–52)
LYMPHOCYTES # BLD AUTO: 2.42 10*3/UL (ref 1–5)
MCH RBC QN AUTO: 27.4 PG (ref 27–31)
MCHC RBC AUTO-ENTMCNC: 32 G/DL (ref 33–37)
MCV RBC AUTO: 85.8 FL (ref 80–96)
MONOCYTES # BLD AUTO: 0.55 10*3/UL (ref 0.2–1.2)
MONOCYTES NFR BLD AUTO: 6.1 % (ref 3–10)
NEUTROPHILS # BLD AUTO: 5.63 10*3/UL (ref 2–8)
NEUTROPHILS NFR BLD AUTO: 62.8 % (ref 30–85)
NRBC CBCN: 0 % (ref 0–0.7)
PLATELET # BLD AUTO: 319 10*3/UL (ref 130–400)
PMV BLD AUTO: 11.8 FL (ref 9.4–12.4)
RBC # BLD AUTO: 4.01 10*6/UL (ref 4.7–6.1)
VARIANT LYMPHS NFR BLD MANUAL: 26.9 % (ref 20–45)
WBC # BLD AUTO: 8.98 10*3/UL (ref 4.8–10.8)

## 2019-07-26 ENCOUNTER — HOSPITAL ENCOUNTER (EMERGENCY)
Facility: HOSPITAL | Age: 69
Discharge: HOME | End: 2019-07-27
Attending: EMERGENCY MEDICINE | Admitting: EMERGENCY MEDICINE

## 2019-07-26 VITALS
SYSTOLIC BLOOD PRESSURE: 172 MMHG | RESPIRATION RATE: 16 BRPM | WEIGHT: 180 LBS | OXYGEN SATURATION: 97 % | HEIGHT: 72 IN | HEART RATE: 73 BPM | BODY MASS INDEX: 24.38 KG/M2 | TEMPERATURE: 97.7 F | DIASTOLIC BLOOD PRESSURE: 83 MMHG

## 2019-07-26 DIAGNOSIS — J18.9 PNEUMONIA OF LEFT LUNG DUE TO INFECTIOUS ORGANISM, UNSPECIFIED PART OF LUNG: ICD-10-CM

## 2019-07-26 DIAGNOSIS — M96.89 POSTOPERATIVE SURGICAL COMPLICATION INVOLVING MUSCULOSKELETAL SYSTEM ASSOCIATED WITH MUSCULOSKELETAL PROCEDURE, UNSPECIFIED COMPLICATION: Primary | ICD-10-CM

## 2019-07-26 DIAGNOSIS — M25.412: ICD-10-CM

## 2019-07-26 LAB
ALBUMIN SERPL-MCNC: 3.6 G/DL (ref 3.5–5.2)
ALBUMIN/GLOB SERPL: 1.1 G/DL
ALP SERPL-CCNC: 96 U/L (ref 39–117)
ALT SERPL W P-5'-P-CCNC: 15 U/L (ref 1–41)
ANION GAP SERPL CALCULATED.3IONS-SCNC: 12 MMOL/L (ref 5–15)
AST SERPL-CCNC: 17 U/L (ref 1–40)
BASOPHILS # BLD AUTO: 0.06 10*3/MM3 (ref 0–0.2)
BASOPHILS NFR BLD AUTO: 0.7 % (ref 0–1.5)
BILIRUB SERPL-MCNC: 0.3 MG/DL (ref 0.2–1.2)
BILIRUB UR QL STRIP: NEGATIVE
BUN BLD-MCNC: 11 MG/DL (ref 8–23)
BUN/CREAT SERPL: 12.6 (ref 7–25)
CALCIUM SPEC-SCNC: 8.9 MG/DL (ref 8.6–10.5)
CHLORIDE SERPL-SCNC: 96 MMOL/L (ref 98–107)
CLARITY UR: CLEAR
CO2 SERPL-SCNC: 25 MMOL/L (ref 22–29)
COLOR UR: YELLOW
CREAT BLD-MCNC: 0.87 MG/DL (ref 0.76–1.27)
DEPRECATED RDW RBC AUTO: 42 FL (ref 37–54)
EOSINOPHIL # BLD AUTO: 0.36 10*3/MM3 (ref 0–0.4)
EOSINOPHIL NFR BLD AUTO: 4.4 % (ref 0.3–6.2)
ERYTHROCYTE [DISTWIDTH] IN BLOOD BY AUTOMATED COUNT: 13.4 % (ref 12.3–15.4)
ERYTHROCYTE [SEDIMENTATION RATE] IN BLOOD: 38 MM/HR (ref 0–20)
GFR SERPL CREATININE-BSD FRML MDRD: 88 ML/MIN/1.73
GLOBULIN UR ELPH-MCNC: 3.2 GM/DL
GLUCOSE BLD-MCNC: 176 MG/DL (ref 65–99)
GLUCOSE UR STRIP-MCNC: NEGATIVE MG/DL
HCT VFR BLD AUTO: 36.1 % (ref 37.5–51)
HGB BLD-MCNC: 11.5 G/DL (ref 13–17.7)
HGB UR QL STRIP.AUTO: NEGATIVE
IMM GRANULOCYTES # BLD AUTO: 0.02 10*3/MM3 (ref 0–0.05)
IMM GRANULOCYTES NFR BLD AUTO: 0.2 % (ref 0–0.5)
KETONES UR QL STRIP: NEGATIVE
LEUKOCYTE ESTERASE UR QL STRIP.AUTO: NEGATIVE
LYMPHOCYTES # BLD AUTO: 2 10*3/MM3 (ref 0.7–3.1)
LYMPHOCYTES NFR BLD AUTO: 24.7 % (ref 19.6–45.3)
MCH RBC QN AUTO: 27.3 PG (ref 26.6–33)
MCHC RBC AUTO-ENTMCNC: 31.9 G/DL (ref 31.5–35.7)
MCV RBC AUTO: 85.7 FL (ref 79–97)
MONOCYTES # BLD AUTO: 0.59 10*3/MM3 (ref 0.1–0.9)
MONOCYTES NFR BLD AUTO: 7.3 % (ref 5–12)
NEUTROPHILS # BLD AUTO: 5.08 10*3/MM3 (ref 1.7–7)
NEUTROPHILS NFR BLD AUTO: 62.7 % (ref 42.7–76)
NITRITE UR QL STRIP: NEGATIVE
NRBC BLD AUTO-RTO: 0 /100 WBC (ref 0–0.2)
PH UR STRIP.AUTO: 5.5 [PH] (ref 5–8)
PLATELET # BLD AUTO: 300 10*3/MM3 (ref 140–450)
PMV BLD AUTO: 10.5 FL (ref 6–12)
POTASSIUM BLD-SCNC: 3.8 MMOL/L (ref 3.5–5.2)
PROT SERPL-MCNC: 6.8 G/DL (ref 6–8.5)
PROT UR QL STRIP: NEGATIVE
RBC # BLD AUTO: 4.21 10*6/MM3 (ref 4.14–5.8)
SODIUM BLD-SCNC: 133 MMOL/L (ref 136–145)
SP GR UR STRIP: 1.01 (ref 1–1.03)
UROBILINOGEN UR QL STRIP: NORMAL
WBC NRBC COR # BLD: 8.11 10*3/MM3 (ref 3.4–10.8)

## 2019-07-26 PROCEDURE — 81003 URINALYSIS AUTO W/O SCOPE: CPT | Performed by: EMERGENCY MEDICINE

## 2019-07-26 PROCEDURE — 80053 COMPREHEN METABOLIC PANEL: CPT | Performed by: EMERGENCY MEDICINE

## 2019-07-26 PROCEDURE — 85025 COMPLETE CBC W/AUTO DIFF WBC: CPT | Performed by: EMERGENCY MEDICINE

## 2019-07-26 PROCEDURE — 99283 EMERGENCY DEPT VISIT LOW MDM: CPT

## 2019-07-26 PROCEDURE — 85652 RBC SED RATE AUTOMATED: CPT | Performed by: EMERGENCY MEDICINE

## 2019-07-26 RX ORDER — SODIUM CHLORIDE 0.9 % (FLUSH) 0.9 %
10 SYRINGE (ML) INJECTION AS NEEDED
Status: DISCONTINUED | OUTPATIENT
Start: 2019-07-26 | End: 2019-07-26

## 2019-07-26 RX ORDER — SODIUM CHLORIDE 0.9 % (FLUSH) 0.9 %
20 SYRINGE (ML) INJECTION AS NEEDED
Status: DISCONTINUED | OUTPATIENT
Start: 2019-07-26 | End: 2019-07-26

## 2019-07-26 RX ORDER — SODIUM CHLORIDE 0.9 % (FLUSH) 0.9 %
10 SYRINGE (ML) INJECTION EVERY 12 HOURS SCHEDULED
Status: DISCONTINUED | OUTPATIENT
Start: 2019-07-26 | End: 2019-07-26

## 2019-07-26 RX ORDER — OXYCODONE HYDROCHLORIDE AND ACETAMINOPHEN 5; 325 MG/1; MG/1
1 TABLET ORAL ONCE
Status: COMPLETED | OUTPATIENT
Start: 2019-07-26 | End: 2019-07-26

## 2019-07-26 RX ADMIN — OXYCODONE HYDROCHLORIDE AND ACETAMINOPHEN 1 TABLET: 5; 325 TABLET ORAL at 22:52

## 2019-07-27 ENCOUNTER — APPOINTMENT (OUTPATIENT)
Dept: CT IMAGING | Facility: HOSPITAL | Age: 69
End: 2019-07-27

## 2019-07-27 PROCEDURE — 73201 CT UPPER EXTREMITY W/DYE: CPT

## 2019-07-27 PROCEDURE — 0 IOPAMIDOL PER 1 ML: Performed by: EMERGENCY MEDICINE

## 2019-07-27 RX ORDER — OXYCODONE HYDROCHLORIDE AND ACETAMINOPHEN 5; 325 MG/1; MG/1
1 TABLET ORAL EVERY 4 HOURS PRN
Qty: 15 TABLET | Refills: 0 | OUTPATIENT
Start: 2019-07-27 | End: 2021-06-29

## 2019-07-27 RX ORDER — ONDANSETRON 4 MG/1
4 TABLET, ORALLY DISINTEGRATING ORAL 4 TIMES DAILY PRN
Qty: 15 TABLET | Refills: 0 | Status: SHIPPED | OUTPATIENT
Start: 2019-07-27

## 2019-07-27 RX ADMIN — IOPAMIDOL 99 ML: 755 INJECTION, SOLUTION INTRAVENOUS at 00:34

## 2019-07-29 ENCOUNTER — TELEPHONE (OUTPATIENT)
Dept: FAMILY MEDICINE CLINIC | Facility: CLINIC | Age: 69
End: 2019-07-29

## 2019-07-29 ENCOUNTER — HOSPITAL ENCOUNTER (EMERGENCY)
Facility: HOSPITAL | Age: 69
Discharge: HOME | End: 2019-07-29
Attending: EMERGENCY MEDICINE | Admitting: EMERGENCY MEDICINE

## 2019-07-29 ENCOUNTER — APPOINTMENT (OUTPATIENT)
Dept: GENERAL RADIOLOGY | Facility: HOSPITAL | Age: 69
End: 2019-07-29

## 2019-07-29 VITALS
BODY MASS INDEX: 24.38 KG/M2 | HEART RATE: 78 BPM | RESPIRATION RATE: 16 BRPM | TEMPERATURE: 97.9 F | SYSTOLIC BLOOD PRESSURE: 158 MMHG | OXYGEN SATURATION: 96 % | DIASTOLIC BLOOD PRESSURE: 104 MMHG | WEIGHT: 180 LBS | HEIGHT: 72 IN

## 2019-07-29 DIAGNOSIS — Z95.828 STATUS POST PICC CENTRAL LINE PLACEMENT: Primary | ICD-10-CM

## 2019-07-29 DIAGNOSIS — Z87.01 HISTORY OF RECENT PNEUMONIA: ICD-10-CM

## 2019-07-29 PROCEDURE — 99283 EMERGENCY DEPT VISIT LOW MDM: CPT

## 2019-07-29 PROCEDURE — 71046 X-RAY EXAM CHEST 2 VIEWS: CPT

## 2019-07-30 ENCOUNTER — OFFICE VISIT (OUTPATIENT)
Dept: FAMILY MEDICINE CLINIC | Facility: CLINIC | Age: 69
End: 2019-07-30

## 2019-07-30 VITALS
OXYGEN SATURATION: 96 % | DIASTOLIC BLOOD PRESSURE: 88 MMHG | WEIGHT: 184 LBS | HEIGHT: 72 IN | HEART RATE: 93 BPM | SYSTOLIC BLOOD PRESSURE: 152 MMHG | BODY MASS INDEX: 24.92 KG/M2 | TEMPERATURE: 97.3 F

## 2019-07-30 DIAGNOSIS — I10 ESSENTIAL HYPERTENSION: ICD-10-CM

## 2019-07-30 DIAGNOSIS — M25.512 CHRONIC PAIN IN LEFT SHOULDER: ICD-10-CM

## 2019-07-30 DIAGNOSIS — G89.29 CHRONIC PAIN IN LEFT SHOULDER: ICD-10-CM

## 2019-07-30 DIAGNOSIS — L02.419 SHOULDER ABSCESS: Primary | ICD-10-CM

## 2019-07-30 PROCEDURE — 99214 OFFICE O/P EST MOD 30 MIN: CPT | Performed by: INTERNAL MEDICINE

## 2019-07-31 ENCOUNTER — TELEPHONE (OUTPATIENT)
Dept: FAMILY MEDICINE CLINIC | Facility: CLINIC | Age: 69
End: 2019-07-31

## 2019-07-31 NOTE — PROGRESS NOTES
Chief Complaint:  Shoulder infection    HPI:  Behzad Guzman is a 68 y.o. male who presents today for follow-up and left-sided shoulder infection.  Patient recently had procedure done on his left shoulder, he developed an abscess afterwards.  Currently following with infectious disease and orthopedics level.  He has a PICC line in place and is receiving IV cefepime for shoulder infection.  He reports the pain in his left shoulder is not improving.  He was also recently diagnosed with a pneumonia as well.  He went to the ER last night.  An orthopedic appointment was made today, however patient was too tired to drive to [place] today.  This was rescheduled for this coming Friday.  He would like assistance with administering antibiotics at home and also changing dressing with PICC line.    ROS:  Constitutional: no fevers, night sweats or unexplained weight loss  Eyes: no vision changes  ENT: no runny nose, ear pain, sore throat  Cardio: no chest pain, palpitations  Pulm: no shortness of breath, wheezing, + cough  GI: no abdominal pain or changes in bowel movements  : no difficulty urinating  MSK: no difficulty ambulating, no joint pain  Neuro: no weakness, dizziness or headache  Psych: no trouble sleeping  Endo: no change in appetite      Past Medical History:   Diagnosis Date   • Asthma    • Chronic hip pain, left    • Chronic pain in left shoulder    • Hyperlipidemia    • Hypertension    • Leg length discrepancy    • Neck pain, chronic    • Neuropathy    • Panic attacks    • Pre-diabetes    • Prediabetes    • Spinal stenosis       Family History   Problem Relation Age of Onset   • Heart attack Father    • Other Mother         accident      Social History     Socioeconomic History   • Marital status:      Spouse name: Not on file   • Number of children: Not on file   • Years of education: Not on file   • Highest education level: Not on file   Occupational History   • Occupation: farmer   Tobacco Use   •  Smoking status: Former Smoker     Last attempt to quit: 1987     Years since quittin.6   • Smokeless tobacco: Never Used   Substance and Sexual Activity   • Alcohol use: No   • Drug use: No   • Sexual activity: Defer   Social History Narrative    [place] with wife [place] a farm      Allergies   Allergen Reactions   • Amoxicillin Rash   • Penicillins Rash      Immunization History   Administered Date(s) Administered   • Flu Vaccine Quad PF >36MO 2015   • Pneumococcal Conjugate 13-Valent (PCV13) 2016   • Pneumococcal Polysaccharide (PPSV23) 2018        PE:  Vitals:    19 1420   BP: 152/88   Pulse: 93   Temp: 97.3 °F (36.3 °C)   SpO2: 96%        Gen Appearance: NAD  HEENT: Normocephalic, PERRLA, no thyromegaly, trache midline  Heart: RRR, normal S1 and S2, no murmur  Lungs: CTA b/l, no wheezing, no crackles  Abdomen: Soft, non-tender, non-distended, no guarding and BSx4  MSK: Left shoulder in sling, PICC line right upper extremity, normal gait, no peripheral edema  Pulses: Palpable and equal b/l  Lymph nodes: No palpable lymphadenopathy   Neuro: No focal deficits      Current Outpatient Medications   Medication Sig Dispense Refill   • amLODIPine (NORVASC) 5 MG tablet Take 1 tablet by mouth Daily. 90 tablet 3   • diazePAM (VALIUM) 5 MG tablet Take 5 mg by mouth 2 (Two) Times a Day.  0   • erythromycin (ROMYCIN) 5 MG/GM ophthalmic ointment Administer  to both eyes Every 4 (Four) Hours While Awake. 3.5 g 0   • gabapentin (NEURONTIN) 400 MG capsule Take 400 mg by mouth 3 (Three) Times a Day.     • glimepiride (AMARYL) 1 MG tablet TAKE 1 TABLET BY MOUTH ONCE DAILY 90 tablet 1   • hydrochlorothiazide (HYDRODIURIL) 12.5 MG tablet take 1 tablet by mouth once daily 30 tablet 3   • HYDROcodone-acetaminophen (NORCO)  MG per tablet Take 1 tablet by mouth Every 6 (Six) Hours As Needed for moderate pain (4-6).     • Hydrocodone-Acetaminophen (XODOL )  MG per tablet   0   • hydrocortisone 2.5  % cream Apply  topically to the appropriate area as directed 3 (Three) Times a Day. 60 g 3   • lisinopril (PRINIVIL,ZESTRIL) 20 MG tablet TAKE 2 TABLET BY MOUTH ONCE DAILY. 180 tablet 0   • loratadine (CLARITIN) 10 MG tablet Take 10 mg by mouth Daily.  1   • metFORMIN (GLUCOPHAGE) 500 MG tablet Take 1 tablet by mouth Daily With Breakfast. 30 tablet 1   • metoprolol tartrate (LOPRESSOR) 50 MG tablet TAKE 1 TABLET BY MOUTH TWICE A  tablet 0   • NON FORMULARY GLIMEREX FOR PREDIABETES     • omeprazole (priLOSEC) 40 MG capsule Take 1 capsule by mouth Daily. before a meal 90 capsule 1   • ondansetron ODT (ZOFRAN-ODT) 4 MG disintegrating tablet Take 1 tablet by mouth 4 (Four) Times a Day As Needed for Nausea or Vomiting. 15 tablet 0   • ONETOUCH VERIO test strip Daily. for testing  0   • oxyCODONE (oxyCONTIN) 10 MG 12 hr tablet Take 10 mg by mouth Every 12 (Twelve) Hours As Needed.  0   • oxyCODONE-acetaminophen (PERCOCET) 5-325 MG per tablet Take 1 tablet by mouth Every 4 (Four) Hours As Needed for Moderate Pain  or Severe Pain . 15 tablet 0   • pravastatin (PRAVACHOL) 10 MG tablet Take 10 mg by mouth Daily.     • pravastatin (PRAVACHOL) 20 MG tablet take 1 tablet by mouth once daily 30 tablet 3   • Testosterone Cypionate (DEPOTESTOTERONE CYPIONATE) 200 MG/ML injection Inject 1 mL into the appropriate muscle as directed by prescriber Every 21 (Twenty-One) Days. 10 mL 0   • VENTOLIN  (90 Base) MCG/ACT inhaler inhalation 2 puffs by mouth every 4 to 6 hours if needed  0     No current facility-administered medications for this visit.         Behzad was seen today for wound check and pneumonia.    Diagnoses and all orders for this visit:    Shoulder abscess  -     Ambulatory Referral to Home Health  Referral for home health to assist with PICC line and antibiotics.  He unfortunately missed his appointment with orthopedics today.  He has an abscess in his left shoulder is currently receiving IV antibiotics  through PICC line.  I recommend that he follow-up with orthopedics infectious disease as scheduled.  He does have a pneumonia that shows no worsening on imaging in ER yesterday.  His IV cefepime should be covering his pneumonia.  Could potentially add atypical coverage such as azithromycin if he does not improve.  Essential hypertension  Elevated today although likely due to shoulder pain.  Chronic pain in left shoulder  See above.  Pneumonia  Continue cefepime.  Follow-up with infectious disease as scheduled.  She does not improve and recommend adding azithromycin.  No Follow-up on file.

## 2019-08-01 ENCOUNTER — TELEPHONE (OUTPATIENT)
Dept: FAMILY MEDICINE CLINIC | Facility: CLINIC | Age: 69
End: 2019-08-01

## 2019-08-01 NOTE — TELEPHONE ENCOUNTER
[facility] called to inform PCP that they have admitted him and will see him weekly for picc line care and labs

## 2019-08-01 NOTE — TELEPHONE ENCOUNTER
"Contacted PT and stated that he has been experiencing some \"bubbling\" air flow on the left side of his chest. He is concerned that this may be related to his pneumonia. He has a HH nurse will be coming into the home at 10 a/m today. I advised him to keep us updated on whatever is asessed      "

## 2019-08-05 ENCOUNTER — LAB REQUISITION (OUTPATIENT)
Dept: LAB | Facility: HOSPITAL | Age: 69
End: 2019-08-05

## 2019-08-05 DIAGNOSIS — R53.81 DEBILITY: Primary | ICD-10-CM

## 2019-08-05 DIAGNOSIS — L02.419 CUTANEOUS ABSCESS OF EXTREMITY: ICD-10-CM

## 2019-08-05 LAB
ALBUMIN SERPL-MCNC: 3.9 G/DL (ref 3.5–5.2)
ALBUMIN/GLOB SERPL: 1.1 G/DL
ALP SERPL-CCNC: 87 U/L (ref 39–117)
ALT SERPL W P-5'-P-CCNC: 11 U/L (ref 1–41)
ANION GAP SERPL CALCULATED.3IONS-SCNC: 18 MMOL/L (ref 5–15)
AST SERPL-CCNC: 18 U/L (ref 1–40)
BASOPHILS # BLD AUTO: 0.04 10*3/MM3 (ref 0–0.2)
BASOPHILS NFR BLD AUTO: 0.7 % (ref 0–1.5)
BILIRUB SERPL-MCNC: 0.5 MG/DL (ref 0.2–1.2)
BUN BLD-MCNC: 12 MG/DL (ref 8–23)
BUN/CREAT SERPL: 16 (ref 7–25)
CALCIUM SPEC-SCNC: 9.8 MG/DL (ref 8.6–10.5)
CHLORIDE SERPL-SCNC: 95 MMOL/L (ref 98–107)
CO2 SERPL-SCNC: 24 MMOL/L (ref 22–29)
CREAT BLD-MCNC: 0.75 MG/DL (ref 0.76–1.27)
CRP SERPL-MCNC: 4.15 MG/DL (ref 0–0.5)
DEPRECATED RDW RBC AUTO: 46.3 FL (ref 37–54)
EOSINOPHIL # BLD AUTO: 0.3 10*3/MM3 (ref 0–0.4)
EOSINOPHIL NFR BLD AUTO: 5.2 % (ref 0.3–6.2)
ERYTHROCYTE [DISTWIDTH] IN BLOOD BY AUTOMATED COUNT: 14.4 % (ref 12.3–15.4)
ERYTHROCYTE [SEDIMENTATION RATE] IN BLOOD: 35 MM/HR (ref 0–20)
GFR SERPL CREATININE-BSD FRML MDRD: 104 ML/MIN/1.73
GLOBULIN UR ELPH-MCNC: 3.5 GM/DL
GLUCOSE BLD-MCNC: 101 MG/DL (ref 65–99)
HCT VFR BLD AUTO: 39.6 % (ref 37.5–51)
HGB BLD-MCNC: 12 G/DL (ref 13–17.7)
IMM GRANULOCYTES # BLD AUTO: 0.01 10*3/MM3 (ref 0–0.05)
IMM GRANULOCYTES NFR BLD AUTO: 0.2 % (ref 0–0.5)
LYMPHOCYTES # BLD AUTO: 1.22 10*3/MM3 (ref 0.7–3.1)
LYMPHOCYTES NFR BLD AUTO: 21.1 % (ref 19.6–45.3)
MCH RBC QN AUTO: 26.7 PG (ref 26.6–33)
MCHC RBC AUTO-ENTMCNC: 30.3 G/DL (ref 31.5–35.7)
MCV RBC AUTO: 88 FL (ref 79–97)
MONOCYTES # BLD AUTO: 0.66 10*3/MM3 (ref 0.1–0.9)
MONOCYTES NFR BLD AUTO: 11.4 % (ref 5–12)
NEUTROPHILS # BLD AUTO: 3.56 10*3/MM3 (ref 1.7–7)
NEUTROPHILS NFR BLD AUTO: 61.4 % (ref 42.7–76)
NRBC BLD AUTO-RTO: 0 /100 WBC (ref 0–0.2)
PLATELET # BLD AUTO: 226 10*3/MM3 (ref 140–450)
PMV BLD AUTO: 11.8 FL (ref 6–12)
POTASSIUM BLD-SCNC: 3.7 MMOL/L (ref 3.5–5.2)
PROT SERPL-MCNC: 7.4 G/DL (ref 6–8.5)
RBC # BLD AUTO: 4.5 10*6/MM3 (ref 4.14–5.8)
SODIUM BLD-SCNC: 137 MMOL/L (ref 136–145)
WBC NRBC COR # BLD: 5.79 10*3/MM3 (ref 3.4–10.8)

## 2019-08-05 PROCEDURE — 85025 COMPLETE CBC W/AUTO DIFF WBC: CPT | Performed by: INTERNAL MEDICINE

## 2019-08-05 PROCEDURE — 80053 COMPREHEN METABOLIC PANEL: CPT | Performed by: INTERNAL MEDICINE

## 2019-08-05 PROCEDURE — 85652 RBC SED RATE AUTOMATED: CPT | Performed by: INTERNAL MEDICINE

## 2019-08-05 PROCEDURE — 86140 C-REACTIVE PROTEIN: CPT | Performed by: INTERNAL MEDICINE

## 2019-08-12 RX ORDER — PRAVASTATIN SODIUM 20 MG
TABLET ORAL
Qty: 120 TABLET | Refills: 0 | Status: SHIPPED | OUTPATIENT
Start: 2019-08-12 | End: 2019-12-01 | Stop reason: SDUPTHER

## 2019-08-15 ENCOUNTER — APPOINTMENT (OUTPATIENT)
Dept: GENERAL RADIOLOGY | Facility: HOSPITAL | Age: 69
End: 2019-08-15

## 2019-08-15 ENCOUNTER — HOSPITAL ENCOUNTER (EMERGENCY)
Facility: HOSPITAL | Age: 69
Discharge: HOME | End: 2019-08-15
Attending: EMERGENCY MEDICINE | Admitting: EMERGENCY MEDICINE

## 2019-08-15 VITALS
BODY MASS INDEX: 24.79 KG/M2 | OXYGEN SATURATION: 97 % | HEIGHT: 72 IN | SYSTOLIC BLOOD PRESSURE: 122 MMHG | RESPIRATION RATE: 18 BRPM | WEIGHT: 183 LBS | DIASTOLIC BLOOD PRESSURE: 78 MMHG | TEMPERATURE: 97.6 F | HEART RATE: 71 BPM

## 2019-08-15 DIAGNOSIS — Z95.828 STATUS POST PICC CENTRAL LINE PLACEMENT: Primary | ICD-10-CM

## 2019-08-15 LAB
ANION GAP SERPL CALCULATED.3IONS-SCNC: 12 MMOL/L (ref 5–15)
BASOPHILS # BLD AUTO: 0.06 10*3/MM3 (ref 0–0.2)
BASOPHILS NFR BLD AUTO: 0.8 % (ref 0–1.5)
BUN BLD-MCNC: 15 MG/DL (ref 8–23)
BUN/CREAT SERPL: 16.3 (ref 7–25)
CALCIUM SPEC-SCNC: 9.5 MG/DL (ref 8.6–10.5)
CHLORIDE SERPL-SCNC: 99 MMOL/L (ref 98–107)
CO2 SERPL-SCNC: 28 MMOL/L (ref 22–29)
CREAT BLD-MCNC: 0.92 MG/DL (ref 0.76–1.27)
DEPRECATED RDW RBC AUTO: 44.2 FL (ref 37–54)
EOSINOPHIL # BLD AUTO: 0.36 10*3/MM3 (ref 0–0.4)
EOSINOPHIL NFR BLD AUTO: 4.7 % (ref 0.3–6.2)
ERYTHROCYTE [DISTWIDTH] IN BLOOD BY AUTOMATED COUNT: 14.1 % (ref 12.3–15.4)
GFR SERPL CREATININE-BSD FRML MDRD: 82 ML/MIN/1.73
GLUCOSE BLD-MCNC: 117 MG/DL (ref 65–99)
HCT VFR BLD AUTO: 37.9 % (ref 37.5–51)
HGB BLD-MCNC: 11.6 G/DL (ref 13–17.7)
IMM GRANULOCYTES # BLD AUTO: 0.03 10*3/MM3 (ref 0–0.05)
IMM GRANULOCYTES NFR BLD AUTO: 0.4 % (ref 0–0.5)
LYMPHOCYTES # BLD AUTO: 2.2 10*3/MM3 (ref 0.7–3.1)
LYMPHOCYTES NFR BLD AUTO: 28.5 % (ref 19.6–45.3)
MCH RBC QN AUTO: 26.3 PG (ref 26.6–33)
MCHC RBC AUTO-ENTMCNC: 30.6 G/DL (ref 31.5–35.7)
MCV RBC AUTO: 85.9 FL (ref 79–97)
MONOCYTES # BLD AUTO: 0.61 10*3/MM3 (ref 0.1–0.9)
MONOCYTES NFR BLD AUTO: 7.9 % (ref 5–12)
NEUTROPHILS # BLD AUTO: 4.46 10*3/MM3 (ref 1.7–7)
NEUTROPHILS NFR BLD AUTO: 57.7 % (ref 42.7–76)
NRBC BLD AUTO-RTO: 0 /100 WBC (ref 0–0.2)
PLATELET # BLD AUTO: 219 10*3/MM3 (ref 140–450)
PMV BLD AUTO: 11.8 FL (ref 6–12)
POTASSIUM BLD-SCNC: 4.1 MMOL/L (ref 3.5–5.2)
RBC # BLD AUTO: 4.41 10*6/MM3 (ref 4.14–5.8)
SODIUM BLD-SCNC: 139 MMOL/L (ref 136–145)
WBC NRBC COR # BLD: 7.72 10*3/MM3 (ref 3.4–10.8)

## 2019-08-15 PROCEDURE — 71045 X-RAY EXAM CHEST 1 VIEW: CPT

## 2019-08-15 PROCEDURE — 99284 EMERGENCY DEPT VISIT MOD MDM: CPT

## 2019-08-15 PROCEDURE — 80048 BASIC METABOLIC PNL TOTAL CA: CPT | Performed by: NURSE PRACTITIONER

## 2019-08-15 PROCEDURE — 85025 COMPLETE CBC W/AUTO DIFF WBC: CPT | Performed by: NURSE PRACTITIONER

## 2019-08-15 NOTE — ED PROVIDER NOTES
Subjective   Behzad Guzman is a 68 yr old white male that presents to the ER with c/o possible picc line dislodgement.  Pt explains that he had a shoulder operation a few weeks ago. Pt reports that he developed an infection and has been getting robotic infusions through his PICC line.  Patient reports today he was out in the heat all day he explains that he had 3 cows die.  Patient reports that the dressing became loose as a result of him sweating and the excessive heat.  Patient reports that the PICC line has dislodged approximately 1 inch.  Patient contacted the home health nurse who would advised him to come to the ER for evaluation.  Pt denies any SOA, denies chest pain. Pt denies any further complaints at this time.          History provided by:  Patient      Review of Systems   Respiratory: Negative for cough and shortness of breath.    Cardiovascular: Negative for chest pain.   Gastrointestinal: Negative for nausea and vomiting.   Skin:        picc line dislodgement   All other systems reviewed and are negative.      Past Medical History:   Diagnosis Date   • Asthma    • Chronic hip pain, left    • Chronic pain in left shoulder    • Hyperlipidemia    • Hypertension    • Leg length discrepancy    • Neck pain, chronic    • Neuropathy    • Panic attacks    • Pre-diabetes    • Prediabetes    • Spinal stenosis        Allergies   Allergen Reactions   • Amoxicillin Rash   • Penicillins Rash       Past Surgical History:   Procedure Laterality Date   • LEG SURGERY     • ROTATOR CUFF REPAIR Right    • SHOULDER SURGERY Left    • WRIST SURGERY Left        Family History   Problem Relation Age of Onset   • Heart attack Father    • Other Mother         accident       Social History     Socioeconomic History   • Marital status:      Spouse name: Not on file   • Number of children: Not on file   • Years of education: Not on file   • Highest education level: Not on file   Occupational History   • Occupation: farmer    Tobacco Use   • Smoking status: Former Smoker     Last attempt to quit:      Years since quittin.6   • Smokeless tobacco: Never Used   Substance and Sexual Activity   • Alcohol use: No   • Drug use: No   • Sexual activity: Defer   Social History Narrative    Lives with wife on a farm           Objective   Physical Exam   Constitutional: He is oriented to person, place, and time. He appears well-developed and well-nourished. No distress.   HENT:   Head: Normocephalic and atraumatic.   Eyes: Conjunctivae and EOM are normal.   Neck: Normal range of motion.   Cardiovascular: Normal rate, regular rhythm and intact distal pulses.   Pulmonary/Chest: Effort normal and breath sounds normal.   Musculoskeletal:   Rt upper arm:  Picc line disloged ~ 1 inch, tegaderm dressing is loose at this time. No tenderness, no erythema.    Neurological: He is alert and oriented to person, place, and time.   Skin: Skin is warm and dry.   Psychiatric: He has a normal mood and affect.   Nursing note and vitals reviewed.      Procedures           ED Course  ED Course as of Aug 16 050   Thu Aug 15, 2019   0412 X-ray results were discussed with patient at this time.  PICC line is in place.  Patient to follow-up with PCP and continue current medications.  Patient agrees and verbalized understanding.  [KG]      ED Course User Index  [KG] Saira Cherry, APRN        No results found for this or any previous visit (from the past 24 hour(s)).  Note: In addition to lab results from this visit, the labs listed above may include labs taken at another facility or during a different encounter within the last 24 hours. Please correlate lab times with ED admission and discharge times for further clarification of the services performed during this visit.    XR Chest 1 View   Final Result   Right arm PICC line, tip mid to lower SVC.      Signer Name: José Miguel Vital MD    Signed: 8/15/2019 1:41 AM    Workstation Name: [employer]      "[facility]        Vitals:    08/15/19 0038 08/15/19 0230 08/15/19 0517   BP: 150/81 138/80 122/78   BP Location: Right arm     Patient Position: Sitting     Pulse: 67 62 71   Resp: 16 18 18   Temp: 97.6 °F (36.4 °C)     TempSrc: Oral     SpO2: 96% 98% 97%   Weight: 83 kg (183 lb)     Height: 182.9 cm (72\")       Medications - No data to display  ECG/EMG Results (last 24 hours)     ** No results found for the last 24 hours. **        No orders to display                 MDM      Final diagnoses:   Status post PICC central line placement            Saira Cherry, APRN  08/16/19 2203    "

## 2019-08-19 ENCOUNTER — LAB REQUISITION (OUTPATIENT)
Dept: LAB | Facility: HOSPITAL | Age: 69
End: 2019-08-19

## 2019-08-19 DIAGNOSIS — L02.414 CUTANEOUS ABSCESS OF LEFT UPPER EXTREMITY: ICD-10-CM

## 2019-08-19 LAB
ALBUMIN SERPL-MCNC: 4.3 G/DL (ref 3.5–5.2)
ALBUMIN/GLOB SERPL: 1.4 G/DL
ALP SERPL-CCNC: 78 U/L (ref 39–117)
ALT SERPL W P-5'-P-CCNC: 12 U/L (ref 1–41)
ANION GAP SERPL CALCULATED.3IONS-SCNC: 14 MMOL/L (ref 5–15)
AST SERPL-CCNC: 19 U/L (ref 1–40)
BASOPHILS # BLD AUTO: 0.05 10*3/MM3 (ref 0–0.2)
BASOPHILS NFR BLD AUTO: 0.7 % (ref 0–1.5)
BILIRUB SERPL-MCNC: 0.3 MG/DL (ref 0.2–1.2)
BUN BLD-MCNC: 9 MG/DL (ref 8–23)
BUN/CREAT SERPL: 10.5 (ref 7–25)
CALCIUM SPEC-SCNC: 9.4 MG/DL (ref 8.6–10.5)
CHLORIDE SERPL-SCNC: 93 MMOL/L (ref 98–107)
CO2 SERPL-SCNC: 27 MMOL/L (ref 22–29)
CREAT BLD-MCNC: 0.86 MG/DL (ref 0.76–1.27)
CRP SERPL-MCNC: 0.44 MG/DL (ref 0–0.5)
DEPRECATED RDW RBC AUTO: 42.5 FL (ref 37–54)
EOSINOPHIL # BLD AUTO: 0.34 10*3/MM3 (ref 0–0.4)
EOSINOPHIL NFR BLD AUTO: 4.7 % (ref 0.3–6.2)
ERYTHROCYTE [DISTWIDTH] IN BLOOD BY AUTOMATED COUNT: 13.9 % (ref 12.3–15.4)
ERYTHROCYTE [SEDIMENTATION RATE] IN BLOOD: 26 MM/HR (ref 0–20)
GFR SERPL CREATININE-BSD FRML MDRD: 89 ML/MIN/1.73
GLOBULIN UR ELPH-MCNC: 3 GM/DL
GLUCOSE BLD-MCNC: 90 MG/DL (ref 65–99)
HCT VFR BLD AUTO: 39.9 % (ref 37.5–51)
HGB BLD-MCNC: 12.4 G/DL (ref 13–17.7)
IMM GRANULOCYTES # BLD AUTO: 0.02 10*3/MM3 (ref 0–0.05)
IMM GRANULOCYTES NFR BLD AUTO: 0.3 % (ref 0–0.5)
LYMPHOCYTES # BLD AUTO: 1.93 10*3/MM3 (ref 0.7–3.1)
LYMPHOCYTES NFR BLD AUTO: 26.9 % (ref 19.6–45.3)
MCH RBC QN AUTO: 26 PG (ref 26.6–33)
MCHC RBC AUTO-ENTMCNC: 31.1 G/DL (ref 31.5–35.7)
MCV RBC AUTO: 83.6 FL (ref 79–97)
MONOCYTES # BLD AUTO: 0.72 10*3/MM3 (ref 0.1–0.9)
MONOCYTES NFR BLD AUTO: 10 % (ref 5–12)
NEUTROPHILS # BLD AUTO: 4.12 10*3/MM3 (ref 1.7–7)
NEUTROPHILS NFR BLD AUTO: 57.4 % (ref 42.7–76)
NRBC BLD AUTO-RTO: 0 /100 WBC (ref 0–0.2)
PLATELET # BLD AUTO: 212 10*3/MM3 (ref 140–450)
PMV BLD AUTO: 12 FL (ref 6–12)
POTASSIUM BLD-SCNC: 3.8 MMOL/L (ref 3.5–5.2)
PROT SERPL-MCNC: 7.3 G/DL (ref 6–8.5)
RBC # BLD AUTO: 4.77 10*6/MM3 (ref 4.14–5.8)
SODIUM BLD-SCNC: 134 MMOL/L (ref 136–145)
WBC NRBC COR # BLD: 7.18 10*3/MM3 (ref 3.4–10.8)

## 2019-08-19 PROCEDURE — 85652 RBC SED RATE AUTOMATED: CPT | Performed by: INTERNAL MEDICINE

## 2019-08-19 PROCEDURE — 86140 C-REACTIVE PROTEIN: CPT | Performed by: INTERNAL MEDICINE

## 2019-08-19 PROCEDURE — 80053 COMPREHEN METABOLIC PANEL: CPT | Performed by: INTERNAL MEDICINE

## 2019-08-19 PROCEDURE — 85025 COMPLETE CBC W/AUTO DIFF WBC: CPT | Performed by: INTERNAL MEDICINE

## 2019-08-21 ENCOUNTER — TELEPHONE (OUTPATIENT)
Dept: FAMILY MEDICINE CLINIC | Facility: CLINIC | Age: 69
End: 2019-08-21

## 2019-08-26 ENCOUNTER — LAB REQUISITION (OUTPATIENT)
Dept: LAB | Facility: HOSPITAL | Age: 69
End: 2019-08-26

## 2019-08-26 ENCOUNTER — TELEPHONE (OUTPATIENT)
Dept: FAMILY MEDICINE CLINIC | Facility: CLINIC | Age: 69
End: 2019-08-26

## 2019-08-26 DIAGNOSIS — L02.414 CUTANEOUS ABSCESS OF LEFT UPPER EXTREMITY: ICD-10-CM

## 2019-08-26 LAB
ALBUMIN SERPL-MCNC: 4.2 G/DL (ref 3.5–5.2)
ALBUMIN/GLOB SERPL: 1.5 G/DL
ALP SERPL-CCNC: 74 U/L (ref 39–117)
ALT SERPL W P-5'-P-CCNC: 14 U/L (ref 1–41)
ANION GAP SERPL CALCULATED.3IONS-SCNC: 21 MMOL/L (ref 5–15)
AST SERPL-CCNC: 19 U/L (ref 1–40)
BILIRUB SERPL-MCNC: 0.2 MG/DL (ref 0.2–1.2)
BUN BLD-MCNC: 8 MG/DL (ref 8–23)
BUN/CREAT SERPL: 10.3 (ref 7–25)
CALCIUM SPEC-SCNC: 9.5 MG/DL (ref 8.6–10.5)
CHLORIDE SERPL-SCNC: 95 MMOL/L (ref 98–107)
CO2 SERPL-SCNC: 22 MMOL/L (ref 22–29)
CREAT BLD-MCNC: 0.78 MG/DL (ref 0.76–1.27)
CRP SERPL-MCNC: 0.23 MG/DL (ref 0–0.5)
DEPRECATED RDW RBC AUTO: 45.5 FL (ref 37–54)
ERYTHROCYTE [DISTWIDTH] IN BLOOD BY AUTOMATED COUNT: 14.2 % (ref 12.3–15.4)
ERYTHROCYTE [SEDIMENTATION RATE] IN BLOOD: 4 MM/HR (ref 0–20)
GFR SERPL CREATININE-BSD FRML MDRD: 99 ML/MIN/1.73
GLOBULIN UR ELPH-MCNC: 2.8 GM/DL
GLUCOSE BLD-MCNC: 70 MG/DL (ref 65–99)
HCT VFR BLD AUTO: 41.4 % (ref 37.5–51)
HGB BLD-MCNC: 12.2 G/DL (ref 13–17.7)
MCH RBC QN AUTO: 25.9 PG (ref 26.6–33)
MCHC RBC AUTO-ENTMCNC: 29.5 G/DL (ref 31.5–35.7)
MCV RBC AUTO: 87.9 FL (ref 79–97)
PLATELET # BLD AUTO: 220 10*3/MM3 (ref 140–450)
PMV BLD AUTO: 12 FL (ref 6–12)
POTASSIUM BLD-SCNC: 3.5 MMOL/L (ref 3.5–5.2)
PROT SERPL-MCNC: 7 G/DL (ref 6–8.5)
RBC # BLD AUTO: 4.71 10*6/MM3 (ref 4.14–5.8)
SODIUM BLD-SCNC: 138 MMOL/L (ref 136–145)
WBC NRBC COR # BLD: 8.16 10*3/MM3 (ref 3.4–10.8)

## 2019-08-26 PROCEDURE — 86140 C-REACTIVE PROTEIN: CPT | Performed by: INTERNAL MEDICINE

## 2019-08-26 PROCEDURE — 85027 COMPLETE CBC AUTOMATED: CPT | Performed by: INTERNAL MEDICINE

## 2019-08-26 PROCEDURE — 80053 COMPREHEN METABOLIC PANEL: CPT | Performed by: INTERNAL MEDICINE

## 2019-08-26 PROCEDURE — 85652 RBC SED RATE AUTOMATED: CPT | Performed by: INTERNAL MEDICINE

## 2019-08-26 NOTE — TELEPHONE ENCOUNTER
Called patient's wife back. She stated that he having lots of pain in the chest, that he is complaining of pain in his PIC line and that when she tries to flush the line its hard to push the saline through. She also stated that he has had a few dizzy spells and is very weak. I advised her that if he has chest pain, he needs to go to the ER      PHYLLIS

## 2019-08-26 NOTE — TELEPHONE ENCOUNTER
Pt's wife (Ariela) called in and stated that patient is very weak and is unable to sit up for for more than 5 minutes. He is losing weight. Pt is complaining about having pain the center of the chest. Ariela is very concerned. The Home Health nurse advised her to call his PCP.    Ariela can be reached at [phone]

## 2019-08-29 NOTE — TELEPHONE ENCOUNTER
The patient called in stating he wants his lab results. These were ordered by infectious disease but they told the patient to speak with his PCP. I explained that Dr. Tran is not in the office right now that it would be tomorrow. The patient was very aggravated by this and asked if another physician could review his labs. I advised the patient that I could see who is still here but it may be tomorrow before someone can review them. The patient verbalized understanding.

## 2019-08-29 NOTE — TELEPHONE ENCOUNTER
Spoke to patient, no concerning findings on lab tests, advised to follow up with ID since they had ordered the labs. Patient denies fever or chills, still having shoulder pain.  message forwarded to Dr. Tran for review as well.

## 2019-09-03 ENCOUNTER — TELEPHONE (OUTPATIENT)
Dept: FAMILY MEDICINE CLINIC | Facility: CLINIC | Age: 69
End: 2019-09-03

## 2019-09-03 ENCOUNTER — LAB REQUISITION (OUTPATIENT)
Dept: LAB | Facility: HOSPITAL | Age: 69
End: 2019-09-03

## 2019-09-03 DIAGNOSIS — L02.414 CUTANEOUS ABSCESS OF LEFT UPPER EXTREMITY: ICD-10-CM

## 2019-09-03 LAB
ALBUMIN SERPL-MCNC: 4.1 G/DL (ref 3.5–5.2)
ALBUMIN/GLOB SERPL: 1.5 G/DL
ALP SERPL-CCNC: 77 U/L (ref 39–117)
ALT SERPL W P-5'-P-CCNC: 15 U/L (ref 1–41)
ANION GAP SERPL CALCULATED.3IONS-SCNC: 18 MMOL/L (ref 5–15)
AST SERPL-CCNC: 23 U/L (ref 1–40)
BILIRUB SERPL-MCNC: 0.3 MG/DL (ref 0.2–1.2)
BUN BLD-MCNC: 9 MG/DL (ref 8–23)
BUN/CREAT SERPL: 11.3 (ref 7–25)
CALCIUM SPEC-SCNC: 9.8 MG/DL (ref 8.6–10.5)
CHLORIDE SERPL-SCNC: 96 MMOL/L (ref 98–107)
CO2 SERPL-SCNC: 27 MMOL/L (ref 22–29)
CREAT BLD-MCNC: 0.8 MG/DL (ref 0.76–1.27)
CRP SERPL-MCNC: 0.22 MG/DL (ref 0–0.5)
DEPRECATED RDW RBC AUTO: 49 FL (ref 37–54)
ERYTHROCYTE [DISTWIDTH] IN BLOOD BY AUTOMATED COUNT: 15.3 % (ref 12.3–15.4)
ERYTHROCYTE [SEDIMENTATION RATE] IN BLOOD: 3 MM/HR (ref 0–20)
GFR SERPL CREATININE-BSD FRML MDRD: 96 ML/MIN/1.73
GLOBULIN UR ELPH-MCNC: 2.7 GM/DL
GLUCOSE BLD-MCNC: 73 MG/DL (ref 65–99)
HCT VFR BLD AUTO: 43.2 % (ref 37.5–51)
HGB BLD-MCNC: 12.9 G/DL (ref 13–17.7)
MCH RBC QN AUTO: 26.2 PG (ref 26.6–33)
MCHC RBC AUTO-ENTMCNC: 29.9 G/DL (ref 31.5–35.7)
MCV RBC AUTO: 87.8 FL (ref 79–97)
PLATELET # BLD AUTO: 198 10*3/MM3 (ref 140–450)
PMV BLD AUTO: 11.6 FL (ref 6–12)
POTASSIUM BLD-SCNC: 3.5 MMOL/L (ref 3.5–5.2)
PROT SERPL-MCNC: 6.8 G/DL (ref 6–8.5)
RBC # BLD AUTO: 4.92 10*6/MM3 (ref 4.14–5.8)
SODIUM BLD-SCNC: 141 MMOL/L (ref 136–145)
WBC NRBC COR # BLD: 7.68 10*3/MM3 (ref 3.4–10.8)

## 2019-09-03 PROCEDURE — 85027 COMPLETE CBC AUTOMATED: CPT | Performed by: INTERNAL MEDICINE

## 2019-09-03 PROCEDURE — 85652 RBC SED RATE AUTOMATED: CPT | Performed by: INTERNAL MEDICINE

## 2019-09-03 PROCEDURE — 86140 C-REACTIVE PROTEIN: CPT | Performed by: INTERNAL MEDICINE

## 2019-09-03 PROCEDURE — 80053 COMPREHEN METABOLIC PANEL: CPT | Performed by: INTERNAL MEDICINE

## 2019-09-03 NOTE — TELEPHONE ENCOUNTER
[facility] nurse called, request verbal order nursing piccline dressing and labs, once a week.  Please give Malina viera a call back at [phone]

## 2019-09-03 NOTE — TELEPHONE ENCOUNTER
Patient called requesting labs regarding his weakness and shoulder issues. He also wanted to let Dr Tran know he is having his pic line removed on Thursday and that he would like to see Dr Tran on that day if possible. Patient was informed that it was Dr Tran's half day.

## 2019-09-04 NOTE — TELEPHONE ENCOUNTER
Patient was notified of lab results and to make an appt to discuss weakness and shoulder issues.   The patient said he would call at a later date to schedule an appt.   The patient explained to get his picc line out he was only given options to go to [place] or [place]. The patient was confused on why he had to go to these places and not [facility] in [place]. The patient would like an order to have it taken out here instead. I advised that I would send a message back to Dr. Tran and we could give him a return call.

## 2019-09-06 NOTE — TELEPHONE ENCOUNTER
Returned call, no answer, left . He is seeing infectious disease in [place]. He could try giving them a call as they ordered the PICC and will need to be the ones who remove it when his antibiotics are finished.

## 2019-09-11 ENCOUNTER — OFFICE VISIT (OUTPATIENT)
Dept: FAMILY MEDICINE CLINIC | Facility: CLINIC | Age: 69
End: 2019-09-11

## 2019-09-11 VITALS
OXYGEN SATURATION: 95 % | SYSTOLIC BLOOD PRESSURE: 136 MMHG | HEIGHT: 72 IN | HEART RATE: 97 BPM | WEIGHT: 185.8 LBS | DIASTOLIC BLOOD PRESSURE: 82 MMHG | TEMPERATURE: 97.8 F | RESPIRATION RATE: 18 BRPM | BODY MASS INDEX: 25.17 KG/M2

## 2019-09-11 DIAGNOSIS — J18.9 PNEUMONIA DUE TO INFECTIOUS ORGANISM, UNSPECIFIED LATERALITY, UNSPECIFIED PART OF LUNG: ICD-10-CM

## 2019-09-11 DIAGNOSIS — E11.65 TYPE 2 DIABETES MELLITUS WITH HYPERGLYCEMIA, WITHOUT LONG-TERM CURRENT USE OF INSULIN (HCC): Primary | ICD-10-CM

## 2019-09-11 DIAGNOSIS — I10 ESSENTIAL HYPERTENSION: ICD-10-CM

## 2019-09-11 DIAGNOSIS — R53.83 FATIGUE, UNSPECIFIED TYPE: ICD-10-CM

## 2019-09-11 PROCEDURE — 99214 OFFICE O/P EST MOD 30 MIN: CPT | Performed by: INTERNAL MEDICINE

## 2019-09-11 PROCEDURE — 36415 COLL VENOUS BLD VENIPUNCTURE: CPT | Performed by: INTERNAL MEDICINE

## 2019-09-11 PROCEDURE — 85025 COMPLETE CBC W/AUTO DIFF WBC: CPT | Performed by: INTERNAL MEDICINE

## 2019-09-11 PROCEDURE — 84443 ASSAY THYROID STIM HORMONE: CPT | Performed by: INTERNAL MEDICINE

## 2019-09-11 PROCEDURE — 83036 HEMOGLOBIN GLYCOSYLATED A1C: CPT | Performed by: INTERNAL MEDICINE

## 2019-09-11 PROCEDURE — 80053 COMPREHEN METABOLIC PANEL: CPT | Performed by: INTERNAL MEDICINE

## 2019-09-11 PROCEDURE — 80061 LIPID PANEL: CPT | Performed by: INTERNAL MEDICINE

## 2019-09-11 RX ORDER — DOXYCYCLINE HYCLATE 100 MG
TABLET ORAL
COMMUNITY
Start: 2019-09-06 | End: 2020-05-12 | Stop reason: SDUPTHER

## 2019-09-12 LAB
ALBUMIN SERPL-MCNC: 5.1 G/DL (ref 3.5–5.2)
ALBUMIN/GLOB SERPL: 2 G/DL
ALP SERPL-CCNC: 82 U/L (ref 39–117)
ALT SERPL W P-5'-P-CCNC: 13 U/L (ref 1–41)
ANION GAP SERPL CALCULATED.3IONS-SCNC: 11.9 MMOL/L (ref 5–15)
AST SERPL-CCNC: 16 U/L (ref 1–40)
BASOPHILS # BLD AUTO: 0.08 10*3/MM3 (ref 0–0.2)
BASOPHILS NFR BLD AUTO: 1.1 % (ref 0–1.5)
BILIRUB SERPL-MCNC: 0.3 MG/DL (ref 0.2–1.2)
BUN BLD-MCNC: 17 MG/DL (ref 8–23)
BUN/CREAT SERPL: 19.1 (ref 7–25)
CALCIUM SPEC-SCNC: 9.7 MG/DL (ref 8.6–10.5)
CHLORIDE SERPL-SCNC: 101 MMOL/L (ref 98–107)
CHOLEST SERPL-MCNC: 153 MG/DL (ref 0–200)
CO2 SERPL-SCNC: 28.1 MMOL/L (ref 22–29)
CREAT BLD-MCNC: 0.89 MG/DL (ref 0.76–1.27)
DEPRECATED RDW RBC AUTO: 51.5 FL (ref 37–54)
EOSINOPHIL # BLD AUTO: 0.46 10*3/MM3 (ref 0–0.4)
EOSINOPHIL NFR BLD AUTO: 6.1 % (ref 0.3–6.2)
ERYTHROCYTE [DISTWIDTH] IN BLOOD BY AUTOMATED COUNT: 15.9 % (ref 12.3–15.4)
GFR SERPL CREATININE-BSD FRML MDRD: 85 ML/MIN/1.73
GLOBULIN UR ELPH-MCNC: 2.5 GM/DL
GLUCOSE BLD-MCNC: 98 MG/DL (ref 65–99)
HBA1C MFR BLD: 5.87 % (ref 4.8–5.6)
HCT VFR BLD AUTO: 46.1 % (ref 37.5–51)
HDLC SERPL-MCNC: 40 MG/DL (ref 40–60)
HGB BLD-MCNC: 14.1 G/DL (ref 13–17.7)
LDLC SERPL CALC-MCNC: 57 MG/DL (ref 0–100)
LDLC/HDLC SERPL: 1.42 {RATIO}
LYMPHOCYTES # BLD AUTO: 2.22 10*3/MM3 (ref 0.7–3.1)
LYMPHOCYTES NFR BLD AUTO: 29.2 % (ref 19.6–45.3)
MCH RBC QN AUTO: 27 PG (ref 26.6–33)
MCHC RBC AUTO-ENTMCNC: 30.6 G/DL (ref 31.5–35.7)
MCV RBC AUTO: 88.1 FL (ref 79–97)
MONOCYTES # BLD AUTO: 0.78 10*3/MM3 (ref 0.1–0.9)
MONOCYTES NFR BLD AUTO: 10.3 % (ref 5–12)
NEUTROPHILS # BLD AUTO: 4.03 10*3/MM3 (ref 1.7–7)
NEUTROPHILS NFR BLD AUTO: 52.9 % (ref 42.7–76)
PLATELET # BLD AUTO: 183 10*3/MM3 (ref 140–450)
PMV BLD AUTO: 13.3 FL (ref 6–12)
POTASSIUM BLD-SCNC: 4.4 MMOL/L (ref 3.5–5.2)
PROT SERPL-MCNC: 7.6 G/DL (ref 6–8.5)
RBC # BLD AUTO: 5.23 10*6/MM3 (ref 4.14–5.8)
SODIUM BLD-SCNC: 141 MMOL/L (ref 136–145)
TRIGL SERPL-MCNC: 281 MG/DL (ref 0–150)
TSH SERPL DL<=0.05 MIU/L-ACNC: 1.88 UIU/ML (ref 0.27–4.2)
VLDLC SERPL-MCNC: 56.2 MG/DL (ref 5–40)
WBC NRBC COR # BLD: 7.6 10*3/MM3 (ref 3.4–10.8)

## 2019-09-12 NOTE — PROGRESS NOTES
Chief Complaint:  F/u shoulder pain    HPI:  Behzad Guzman is a 69 y.o. male who presents today for shoulder pain and pneumonia.  Status post shoulder surgery.  Following with a surgeon and infectious disease and level.  He recently finished a PICC line antibiotics.  He is now on doxycycline for an additional 60 days.  He denies any shortness of breath although he does have a nonproductive cough.  He reports worsening shoulder pain over the last 2 to 3 weeks.  He has follow-up with his surgeon and 4 weeks.  He has felt extremely fatigued over the last 4 weeks since his surgery.  He would like blood work checked today.  He denies any fevers or chills at home.  He is currently not doing any physical therapy due to cost.    ROS:  Constitutional: no fevers, night sweats or unexplained weight loss  Eyes: no vision changes  ENT: no runny nose, ear pain, sore throat  Cardio: no chest pain, palpitations  Pulm: no shortness of breath, wheezing, or cough  GI: no abdominal pain or changes in bowel movements  : no difficulty urinating  MSK: no difficulty ambulating, no joint pain  Neuro: no weakness, dizziness or headache  Psych: no trouble sleeping  Endo: no change in appetite      Past Medical History:   Diagnosis Date   • Asthma    • Chronic hip pain, left    • Chronic pain in left shoulder    • Hyperlipidemia    • Hypertension    • Leg length discrepancy    • Neck pain, chronic    • Neuropathy    • Panic attacks    • Pre-diabetes    • Prediabetes    • Spinal stenosis       Family History   Problem Relation Age of Onset   • Heart attack Father    • Other Mother         accident      Social History     Socioeconomic History   • Marital status:      Spouse name: Not on file   • Number of children: Not on file   • Years of education: Not on file   • Highest education level: Not on file   Occupational History   • Occupation: farmer   Tobacco Use   • Smoking status: Former Smoker     Last attempt to quit: 1987      Years since quittin.7   • Smokeless tobacco: Never Used   Substance and Sexual Activity   • Alcohol use: No   • Drug use: No   • Sexual activity: Defer   Social History Narrative    Lives with wife on a farm      Allergies   Allergen Reactions   • Amoxicillin Rash   • Penicillins Rash      Immunization History   Administered Date(s) Administered   • Flu Vaccine Quad PF >36MO 2015   • Pneumococcal Conjugate 13-Valent (PCV13) 2016   • Pneumococcal Polysaccharide (PPSV23) 2018        PE:  Vitals:    19 1548   BP: 136/82   Pulse: 97   Resp: 18   Temp: 97.8 °F (36.6 °C)   SpO2: 95%        Gen Appearance: NAD  HEENT: Normocephalic, PERRLA, no thyromegaly, trache midline  Heart: RRR, normal S1 and S2, no murmur  Lungs: CTA b/l, no wheezing, no crackles  Abdomen: Soft, non-tender, non-distended, no guarding and BSx4  MSK: Significant limited range of motion of left shoulder, erythema posterior left shoulder, antalgic gait, no peripheral edema  Pulses: Palpable and equal b/l  Lymph nodes: No palpable lymphadenopathy   Neuro: No focal deficits      Current Outpatient Medications   Medication Sig Dispense Refill   • amLODIPine (NORVASC) 5 MG tablet Take 1 tablet by mouth Daily. 90 tablet 3   • diazePAM (VALIUM) 5 MG tablet Take 5 mg by mouth 2 (Two) Times a Day.  0   • doxycycline (VIBRAMYICN) 100 MG tablet      • gabapentin (NEURONTIN) 400 MG capsule Take 400 mg by mouth 3 (Three) Times a Day.     • glimepiride (AMARYL) 1 MG tablet TAKE 1 TABLET BY MOUTH ONCE DAILY 90 tablet 1   • hydrochlorothiazide (HYDRODIURIL) 12.5 MG tablet take 1 tablet by mouth once daily 30 tablet 3   • HYDROcodone-acetaminophen (NORCO)  MG per tablet Take 1 tablet by mouth Every 6 (Six) Hours As Needed for moderate pain (4-6).     • Hydrocodone-Acetaminophen (XODOL )  MG per tablet   0   • hydrocortisone 2.5 % cream Apply  topically to the appropriate area as directed 3 (Three) Times a Day. 60 g 3   •  lisinopril (PRINIVIL,ZESTRIL) 20 MG tablet TAKE 2 TABLET BY MOUTH ONCE DAILY. 180 tablet 0   • loratadine (CLARITIN) 10 MG tablet Take 10 mg by mouth Daily.  1   • metFORMIN (GLUCOPHAGE) 500 MG tablet Take 1 tablet by mouth Daily With Breakfast. 30 tablet 1   • metoprolol tartrate (LOPRESSOR) 50 MG tablet TAKE 1 TABLET BY MOUTH TWICE A  tablet 0   • NON FORMULARY GLIMEREX FOR PREDIABETES     • omeprazole (priLOSEC) 40 MG capsule Take 1 capsule by mouth Daily. before a meal 90 capsule 1   • ondansetron ODT (ZOFRAN-ODT) 4 MG disintegrating tablet Take 1 tablet by mouth 4 (Four) Times a Day As Needed for Nausea or Vomiting. 15 tablet 0   • ONETOUCH VERIO test strip Daily. for testing  0   • oxyCODONE (oxyCONTIN) 10 MG 12 hr tablet Take 10 mg by mouth Every 12 (Twelve) Hours As Needed.  0   • oxyCODONE-acetaminophen (PERCOCET) 5-325 MG per tablet Take 1 tablet by mouth Every 4 (Four) Hours As Needed for Moderate Pain  or Severe Pain . 15 tablet 0   • pravastatin (PRAVACHOL) 10 MG tablet Take 10 mg by mouth Daily.     • pravastatin (PRAVACHOL) 20 MG tablet TAKE 1 TABLET BY MOUTH EVERY  tablet 0   • Testosterone Cypionate (DEPOTESTOTERONE CYPIONATE) 200 MG/ML injection Inject 1 mL into the appropriate muscle as directed by prescriber Every 21 (Twenty-One) Days. 10 mL 0   • VENTOLIN  (90 Base) MCG/ACT inhaler inhalation 2 puffs by mouth every 4 to 6 hours if needed  0   • erythromycin (ROMYCIN) 5 MG/GM ophthalmic ointment Administer  to both eyes Every 4 (Four) Hours While Awake. 3.5 g 0     No current facility-administered medications for this visit.         Behzad was seen today for abscess.    Diagnoses and all orders for this visit:    Type 2 diabetes mellitus with hyperglycemia, without long-term current use of insulin (CMS/[facility])  -     CBC w AUTO Differential; Future  -     Comprehensive metabolic panel; Future  -     Lipid panel; Future  -     Hemoglobin A1c; Future  -     CBC w AUTO  Differential  -     Comprehensive metabolic panel  -     Lipid panel  -     Hemoglobin A1c  -     CBC Auto Differential  -     Manual Differential; Future  -     Cancel: Manual Differential  Rechecking A1c today.  Does not check sugar at home.  Taking medication as prescribed.  Essential hypertension  -     CBC w AUTO Differential; Future  -     Comprehensive metabolic panel; Future  -     Lipid panel; Future  -     CBC w AUTO Differential  -     Comprehensive metabolic panel  -     Lipid panel  -     CBC Auto Differential  -     Manual Differential; Future  -     Cancel: Manual Differential  Slightly elevated today.  Recommend checking blood pressures at home when able.  Would like him to be less than 130/80 on average..  Fatigue, unspecified type  -     TSH; Future  -     TSH  Checking blood work and thyroid levels today.  Fatigue likely from surgery antibiotics and infections.  Pneumonia due to infectious organism, unspecified laterality, unspecified part of lung  -     XR Chest PA & Lateral; Future  Checking x-ray to ensure resolution of pneumonia.  He is continued on doxycycline daily for shoulder infection.       No Follow-up on file.     Please note that portions of this document were completed with a voice recognition program. Efforts were made to edit the dictations, but occasionally words are mis-transcribed.

## 2019-09-16 ENCOUNTER — TELEPHONE (OUTPATIENT)
Dept: FAMILY MEDICINE CLINIC | Facility: CLINIC | Age: 69
End: 2019-09-16

## 2019-09-16 NOTE — TELEPHONE ENCOUNTER
Patient called wanting his lab results from 9/11/19. He is concerned about a possible infection in his shoulder. Please call back at [phone]

## 2019-09-18 NOTE — TELEPHONE ENCOUNTER
All of his labs were normal. No signs of infection. His a1c was significantly improved from last time. Please call and notify patient.

## 2019-09-18 NOTE — TELEPHONE ENCOUNTER
Patient was informed of his lab results.   Patient states he lost his medication, his surgeon in [place] is hard to get a hold of.   He was placed on doxycycline and he cannot find it. I explained that normally we refer patients back to the prescribing physicians. He is going to contact Dr. Perez for a new prescription.

## 2019-09-24 ENCOUNTER — OFFICE VISIT (OUTPATIENT)
Dept: FAMILY MEDICINE CLINIC | Facility: CLINIC | Age: 69
End: 2019-09-24

## 2019-09-24 ENCOUNTER — HOSPITAL ENCOUNTER (OUTPATIENT)
Dept: GENERAL RADIOLOGY | Facility: HOSPITAL | Age: 69
Discharge: HOME | End: 2019-09-24
Admitting: INTERNAL MEDICINE

## 2019-09-24 VITALS
HEIGHT: 72 IN | TEMPERATURE: 98.3 F | WEIGHT: 183 LBS | BODY MASS INDEX: 24.79 KG/M2 | RESPIRATION RATE: 20 BRPM | DIASTOLIC BLOOD PRESSURE: 76 MMHG | SYSTOLIC BLOOD PRESSURE: 110 MMHG | HEART RATE: 88 BPM

## 2019-09-24 DIAGNOSIS — L02.419 SHOULDER ABSCESS: Primary | ICD-10-CM

## 2019-09-24 DIAGNOSIS — J18.9 PNEUMONIA DUE TO INFECTIOUS ORGANISM, UNSPECIFIED LATERALITY, UNSPECIFIED PART OF LUNG: ICD-10-CM

## 2019-09-24 DIAGNOSIS — Z87.01 HISTORY OF PNEUMONIA: ICD-10-CM

## 2019-09-24 DIAGNOSIS — I10 ESSENTIAL HYPERTENSION: ICD-10-CM

## 2019-09-24 PROCEDURE — 71046 X-RAY EXAM CHEST 2 VIEWS: CPT

## 2019-09-24 PROCEDURE — 99214 OFFICE O/P EST MOD 30 MIN: CPT | Performed by: INTERNAL MEDICINE

## 2019-09-25 NOTE — PROGRESS NOTES
Chief Complaint:  Shoulder pain    HPI:  Behzad Guzman is a 69 y.o. male who presents today for follow-up chronic medical conditions.  He is currently following with orthopedic surgery and infectious disease and local.  He reports continued shoulder pain and redness of his left shoulder, status post surgery and IV antibiotics.  Symptoms ongoing for several weeks now.  He has follow-up with a surgeon in 2 weeks.  He denies any fevers or chills although he does report more fatigue over the last several weeks.  He is taking the rest of his medications as prescribed.  Hypertension-does not check pressure at home  Hyperlipidemia-taking statin without myalgias  Diabetes type 2-currently taking metformin.  Does not check sugars at home.  History of pneumonia-status post antibiotic, he had repeat chest x-ray done today would like to review results.  ROS:  Constitutional: no fevers, night sweats or unexplained weight loss  Eyes: no vision changes  ENT: no runny nose, ear pain, sore throat  Cardio: no chest pain, palpitations  Pulm: no shortness of breath, wheezing, or cough  GI: no abdominal pain or changes in bowel movements  : no difficulty urinating  MSK: no difficulty ambulating, positive shoulder pain   neuro: no weakness, dizziness or headache  Psych: no trouble sleeping  Endo: no change in appetite      Past Medical History:   Diagnosis Date   • Asthma    • Chronic hip pain, left    • Chronic pain in left shoulder    • Hyperlipidemia    • Hypertension    • Leg length discrepancy    • Neck pain, chronic    • Neuropathy    • Panic attacks    • Pre-diabetes    • Prediabetes    • Spinal stenosis       Family History   Problem Relation Age of Onset   • Heart attack Father    • Other Mother         accident      Social History     Socioeconomic History   • Marital status:      Spouse name: Not on file   • Number of children: Not on file   • Years of education: Not on file   • Highest education level: Not on file    Occupational History   • Occupation: farmer   Tobacco Use   • Smoking status: Former Smoker     Last attempt to quit: 1987     Years since quittin.7   • Smokeless tobacco: Never Used   Substance and Sexual Activity   • Alcohol use: No   • Drug use: No   • Sexual activity: Defer   Social History Narrative    Lives with wife on a farm      Allergies   Allergen Reactions   • Amoxicillin Rash   • Penicillins Rash      Immunization History   Administered Date(s) Administered   • Flu Vaccine Quad PF >36MO 2015   • Pneumococcal Conjugate 13-Valent (PCV13) 2016   • Pneumococcal Polysaccharide (PPSV23) 2018        PE:  Vitals:    19 1554   BP: 110/76   Pulse: 88   Resp: 20   Temp: 98.3 °F (36.8 °C)        Gen Appearance: NAD  HEENT: Normocephalic, PERRLA, no thyromegaly, trache midline  Heart: RRR, normal S1 and S2, no murmur  Lungs: CTA b/l, no wheezing, no crackles  Abdomen: Soft, non-tender, non-distended, no guarding and BSx4  MSK: Moves all extremities well, normal gait, no peripheral edema, pain with any movement of left shoulder, especially in the abduction, erythematous patches posterior left shoulder  Pulses: Palpable and equal b/l  Lymph nodes: No palpable lymphadenopathy   Neuro: No focal deficits      Current Outpatient Medications   Medication Sig Dispense Refill   • amLODIPine (NORVASC) 5 MG tablet Take 1 tablet by mouth Daily. 90 tablet 3   • diazePAM (VALIUM) 5 MG tablet Take 5 mg by mouth 2 (Two) Times a Day.  0   • doxycycline (VIBRAMYICN) 100 MG tablet      • erythromycin (ROMYCIN) 5 MG/GM ophthalmic ointment Administer  to both eyes Every 4 (Four) Hours While Awake. 3.5 g 0   • gabapentin (NEURONTIN) 400 MG capsule Take 400 mg by mouth 3 (Three) Times a Day.     • glimepiride (AMARYL) 1 MG tablet TAKE 1 TABLET BY MOUTH ONCE DAILY 90 tablet 1   • hydrochlorothiazide (HYDRODIURIL) 12.5 MG tablet take 1 tablet by mouth once daily 30 tablet 3   • HYDROcodone-acetaminophen  (NORCO)  MG per tablet Take 1 tablet by mouth Every 6 (Six) Hours As Needed for moderate pain (4-6).     • Hydrocodone-Acetaminophen (XODOL )  MG per tablet   0   • hydrocortisone 2.5 % cream Apply  topically to the appropriate area as directed 3 (Three) Times a Day. 60 g 3   • lisinopril (PRINIVIL,ZESTRIL) 20 MG tablet TAKE 2 TABLET BY MOUTH ONCE DAILY. 180 tablet 0   • loratadine (CLARITIN) 10 MG tablet Take 10 mg by mouth Daily.  1   • metFORMIN (GLUCOPHAGE) 500 MG tablet Take 1 tablet by mouth Daily With Breakfast. 30 tablet 1   • metoprolol tartrate (LOPRESSOR) 50 MG tablet TAKE 1 TABLET BY MOUTH TWICE A  tablet 0   • NON FORMULARY GLIMEREX FOR PREDIABETES     • omeprazole (priLOSEC) 40 MG capsule Take 1 capsule by mouth Daily. before a meal 90 capsule 1   • ondansetron ODT (ZOFRAN-ODT) 4 MG disintegrating tablet Take 1 tablet by mouth 4 (Four) Times a Day As Needed for Nausea or Vomiting. 15 tablet 0   • ONETOUCH VERIO test strip Daily. for testing  0   • oxyCODONE (oxyCONTIN) 10 MG 12 hr tablet Take 10 mg by mouth Every 12 (Twelve) Hours As Needed.  0   • oxyCODONE-acetaminophen (PERCOCET) 5-325 MG per tablet Take 1 tablet by mouth Every 4 (Four) Hours As Needed for Moderate Pain  or Severe Pain . 15 tablet 0   • pravastatin (PRAVACHOL) 10 MG tablet Take 10 mg by mouth Daily.     • pravastatin (PRAVACHOL) 20 MG tablet TAKE 1 TABLET BY MOUTH EVERY  tablet 0   • Testosterone Cypionate (DEPOTESTOTERONE CYPIONATE) 200 MG/ML injection Inject 1 mL into the appropriate muscle as directed by prescriber Every 21 (Twenty-One) Days. 10 mL 0   • VENTOLIN  (90 Base) MCG/ACT inhaler inhalation 2 puffs by mouth every 4 to 6 hours if needed  0     No current facility-administered medications for this visit.         Behzad was seen today for wound infection.    Diagnoses and all orders for this visit:    Shoulder abscess  -     MRI Shoulder Left With Contrast; Future  No signs of  obvious infection on shoulder exam although he does have significant limited range of motion.  This may be expected with his recent surgery.  Recommend follow-up with orthopedics infectious disease in 2 weeks.  Rechecking shoulder imaging today to rule out abscess.  He had CT scan done several weeks ago consistent with abscess although he has had antibiotics since then.  Essential hypertension  Blood pressure well controlled today.  Continue current medication.  History of pneumonia  Clear on exam today.  No signs of pneumonia on recent chest x-ray.       No Follow-up on file.     Please note that portions of this document were completed with a voice recognition program. Efforts were made to edit the dictations, but occasionally words are mis-transcribed.

## 2019-09-30 ENCOUNTER — TELEPHONE (OUTPATIENT)
Dept: FAMILY MEDICINE CLINIC | Facility: CLINIC | Age: 69
End: 2019-09-30

## 2019-10-01 NOTE — TELEPHONE ENCOUNTER
He is already established with infectious disease in [place]. If he needs another referral closer to [place] I can order that. If he would rather make an apt that is okay as well.

## 2019-10-02 DIAGNOSIS — M25.512 CHRONIC LEFT SHOULDER PAIN: Primary | ICD-10-CM

## 2019-10-02 DIAGNOSIS — L02.419 SHOULDER ABSCESS: ICD-10-CM

## 2019-10-02 DIAGNOSIS — G89.29 CHRONIC LEFT SHOULDER PAIN: Primary | ICD-10-CM

## 2019-10-02 NOTE — TELEPHONE ENCOUNTER
Pt called back and advised that MRI was reordered.      Went to see doctor yesterday and he feels that the doctor is in denial.  Not Dr. Tran.    He has been trying to get his records from ID in [place] and he is unable to get them as the doctor is never in and only has a call service.  He is going to call [facility]    He is also wanting to get rid of a BP medicine.  Pain management also recommended this.  Also needs new referral with ID in [place].

## 2019-10-07 ENCOUNTER — OFFICE VISIT (OUTPATIENT)
Dept: FAMILY MEDICINE CLINIC | Facility: CLINIC | Age: 69
End: 2019-10-07

## 2019-10-07 VITALS
WEIGHT: 181 LBS | SYSTOLIC BLOOD PRESSURE: 120 MMHG | TEMPERATURE: 97.5 F | OXYGEN SATURATION: 98 % | BODY MASS INDEX: 24.52 KG/M2 | HEART RATE: 76 BPM | DIASTOLIC BLOOD PRESSURE: 80 MMHG | HEIGHT: 72 IN

## 2019-10-07 DIAGNOSIS — L02.419 SHOULDER ABSCESS: Primary | ICD-10-CM

## 2019-10-07 DIAGNOSIS — I10 ESSENTIAL HYPERTENSION: ICD-10-CM

## 2019-10-07 PROCEDURE — 99214 OFFICE O/P EST MOD 30 MIN: CPT | Performed by: INTERNAL MEDICINE

## 2019-10-07 NOTE — TELEPHONE ENCOUNTER
Called and informed pt of recommendations and asked if he wants to come in before his scheduled appt on Friday 10/11. He verbalized understanding and stated he is having issues with his shoulder again, he is worried about an infection as it has some red streaks and is discolored. Pt stated he feels he should be seen sooner. Pt scheduled for today, 10/7 at 4:15pm. Pt had no further questions.

## 2019-10-08 NOTE — PROGRESS NOTES
Chief Complaint:  Shoulder pain    HPI:  Behzad Guzman is a 69 y.o. male who presents today for follow-up shoulder pain.  Patient reports he had some skin changes on his shoulder over the last few days.  He denies any fevers or chills.  He suspects he may have another infection.  He continues to take doxycycline prescribed by his infectious disease doctor.  He would like this evaluated today.  Hypertension-patient would like to review current blood pressure medications.  Reports he has been off his amlodipine for the last week and would like his blood pressure check.    ROS:  Constitutional: no fevers, night sweats or unexplained weight loss  Eyes: no vision changes  ENT: no runny nose, ear pain, sore throat  Cardio: no chest pain, palpitations  Pulm: no shortness of breath, wheezing, or cough  GI: no abdominal pain or changes in bowel movements  : no difficulty urinating  MSK: no difficulty ambulating, no joint pain  Neuro: no weakness, dizziness or headache  Psych: no trouble sleeping  Endo: no change in appetite      Past Medical History:   Diagnosis Date   • Asthma    • Chronic hip pain, left    • Chronic pain in left shoulder    • Hyperlipidemia    • Hypertension    • Leg length discrepancy    • Neck pain, chronic    • Neuropathy    • Panic attacks    • Pre-diabetes    • Prediabetes    • Spinal stenosis       Family History   Problem Relation Age of Onset   • Heart attack Father    • Other Mother         accident      Social History     Socioeconomic History   • Marital status:      Spouse name: Not on file   • Number of children: Not on file   • Years of education: Not on file   • Highest education level: Not on file   Occupational History   • Occupation: farmer   Tobacco Use   • Smoking status: Former Smoker     Last attempt to quit:      Years since quittin.7   • Smokeless tobacco: Never Used   Substance and Sexual Activity   • Alcohol use: No   • Drug use: No   • Sexual activity: Defer    Social History Narrative    Lives with wife on a farm      Allergies   Allergen Reactions   • Amoxicillin Rash   • Penicillins Rash      Immunization History   Administered Date(s) Administered   • Flu Vaccine Quad PF >36MO 09/30/2015   • Pneumococcal Conjugate 13-Valent (PCV13) 09/22/2016   • Pneumococcal Polysaccharide (PPSV23) 05/18/2018        PE:  Vitals:    10/07/19 1641   BP: 120/80   Pulse: 76   Temp: 97.5 °F (36.4 °C)   SpO2: 98%        Gen Appearance: NAD  HEENT: Normocephalic, PERRLA, no thyromegaly, trache midline  Heart: RRR, normal S1 and S2, no murmur  Lungs: CTA b/l, no wheezing, no crackles  Abdomen: Soft, non-tender, non-distended, no guarding and BSx4  MSK: Limited range of motion of left shoulder, erythema overlying incisions from prior surgery.  Normal gait, no peripheral edema  Pulses: Palpable and equal b/l  Lymph nodes: No palpable lymphadenopathy   Neuro: No focal deficits      Current Outpatient Medications   Medication Sig Dispense Refill   • amLODIPine (NORVASC) 5 MG tablet Take 1 tablet by mouth Daily. 90 tablet 3   • diazePAM (VALIUM) 5 MG tablet Take 5 mg by mouth 2 (Two) Times a Day.  0   • doxycycline (VIBRAMYICN) 100 MG tablet      • erythromycin (ROMYCIN) 5 MG/GM ophthalmic ointment Administer  to both eyes Every 4 (Four) Hours While Awake. 3.5 g 0   • gabapentin (NEURONTIN) 400 MG capsule Take 400 mg by mouth 3 (Three) Times a Day.     • glimepiride (AMARYL) 1 MG tablet TAKE 1 TABLET BY MOUTH ONCE DAILY 90 tablet 1   • hydrochlorothiazide (HYDRODIURIL) 12.5 MG tablet take 1 tablet by mouth once daily 30 tablet 3   • HYDROcodone-acetaminophen (NORCO)  MG per tablet Take 1 tablet by mouth Every 6 (Six) Hours As Needed for moderate pain (4-6).     • Hydrocodone-Acetaminophen (XODOL )  MG per tablet   0   • hydrocortisone 2.5 % cream Apply  topically to the appropriate area as directed 3 (Three) Times a Day. 60 g 3   • lisinopril (PRINIVIL,ZESTRIL) 20 MG tablet  TAKE 2 TABLET BY MOUTH ONCE DAILY. 180 tablet 0   • loratadine (CLARITIN) 10 MG tablet Take 10 mg by mouth Daily.  1   • metFORMIN (GLUCOPHAGE) 500 MG tablet Take 1 tablet by mouth Daily With Breakfast. 30 tablet 1   • metoprolol tartrate (LOPRESSOR) 50 MG tablet TAKE 1 TABLET BY MOUTH TWICE A  tablet 0   • NON FORMULARY GLIMEREX FOR PREDIABETES     • omeprazole (priLOSEC) 40 MG capsule Take 1 capsule by mouth Daily. before a meal 90 capsule 1   • ondansetron ODT (ZOFRAN-ODT) 4 MG disintegrating tablet Take 1 tablet by mouth 4 (Four) Times a Day As Needed for Nausea or Vomiting. 15 tablet 0   • ONETOUCH VERIO test strip Daily. for testing  0   • oxyCODONE (oxyCONTIN) 10 MG 12 hr tablet Take 10 mg by mouth Every 12 (Twelve) Hours As Needed.  0   • oxyCODONE-acetaminophen (PERCOCET) 5-325 MG per tablet Take 1 tablet by mouth Every 4 (Four) Hours As Needed for Moderate Pain  or Severe Pain . 15 tablet 0   • pravastatin (PRAVACHOL) 10 MG tablet Take 10 mg by mouth Daily.     • pravastatin (PRAVACHOL) 20 MG tablet TAKE 1 TABLET BY MOUTH EVERY  tablet 0   • Testosterone Cypionate (DEPOTESTOTERONE CYPIONATE) 200 MG/ML injection Inject 1 mL into the appropriate muscle as directed by prescriber Every 21 (Twenty-One) Days. 10 mL 0   • VENTOLIN  (90 Base) MCG/ACT inhaler inhalation 2 puffs by mouth every 4 to 6 hours if needed  0     No current facility-administered medications for this visit.         Behzad was seen today for shoulder pain.  Previous blood work reviewed.    Diagnoses and all orders for this visit:    Shoulder abscess  -     CBC & Differential; Future  -     Procalcitonin; Future  Checking additional blood work today.  Continue antibiotics as prescribed by infectious disease.  Checking MRI later this week.  This is [place] been ordered before.  Essential hypertension  Blood pressure stable today.  Recommend DC amlodipine.       Return in about 4 weeks (around 11/4/2019).     Please note  that portions of this document were completed with a voice recognition program. Efforts were made to edit the dictations, but occasionally words are mis-transcribed.

## 2019-10-09 ENCOUNTER — LAB (OUTPATIENT)
Dept: LAB | Facility: HOSPITAL | Age: 69
End: 2019-10-09

## 2019-10-09 ENCOUNTER — HOSPITAL ENCOUNTER (OUTPATIENT)
Dept: MRI IMAGING | Facility: HOSPITAL | Age: 69
Discharge: HOME | End: 2019-10-09
Admitting: INTERNAL MEDICINE

## 2019-10-09 DIAGNOSIS — G89.29 CHRONIC LEFT SHOULDER PAIN: ICD-10-CM

## 2019-10-09 DIAGNOSIS — L02.419 SHOULDER ABSCESS: ICD-10-CM

## 2019-10-09 DIAGNOSIS — M25.512 CHRONIC LEFT SHOULDER PAIN: ICD-10-CM

## 2019-10-09 LAB
BASOPHILS # BLD AUTO: 0.06 10*3/MM3 (ref 0–0.2)
BASOPHILS NFR BLD AUTO: 0.7 % (ref 0–1.5)
DEPRECATED RDW RBC AUTO: 43.9 FL (ref 37–54)
EOSINOPHIL # BLD AUTO: 0.26 10*3/MM3 (ref 0–0.4)
EOSINOPHIL NFR BLD AUTO: 3 % (ref 0.3–6.2)
ERYTHROCYTE [DISTWIDTH] IN BLOOD BY AUTOMATED COUNT: 15.2 % (ref 12.3–15.4)
HCT VFR BLD AUTO: 44.1 % (ref 37.5–51)
HGB BLD-MCNC: 14.6 G/DL (ref 13–17.7)
IMM GRANULOCYTES # BLD AUTO: 0.02 10*3/MM3 (ref 0–0.05)
IMM GRANULOCYTES NFR BLD AUTO: 0.2 % (ref 0–0.5)
LYMPHOCYTES # BLD AUTO: 2.81 10*3/MM3 (ref 0.7–3.1)
LYMPHOCYTES NFR BLD AUTO: 32.1 % (ref 19.6–45.3)
MCH RBC QN AUTO: 26.9 PG (ref 26.6–33)
MCHC RBC AUTO-ENTMCNC: 33.1 G/DL (ref 31.5–35.7)
MCV RBC AUTO: 81.2 FL (ref 79–97)
MONOCYTES # BLD AUTO: 0.68 10*3/MM3 (ref 0.1–0.9)
MONOCYTES NFR BLD AUTO: 7.8 % (ref 5–12)
NEUTROPHILS # BLD AUTO: 4.93 10*3/MM3 (ref 1.7–7)
NEUTROPHILS NFR BLD AUTO: 56.2 % (ref 42.7–76)
NRBC BLD AUTO-RTO: 0 /100 WBC (ref 0–0.2)
PLATELET # BLD AUTO: 238 10*3/MM3 (ref 140–450)
PMV BLD AUTO: 12.7 FL (ref 6–12)
PROCALCITONIN SERPL-MCNC: 0.03 NG/ML (ref 0.1–0.25)
RBC # BLD AUTO: 5.43 10*6/MM3 (ref 4.14–5.8)
WBC NRBC COR # BLD: 8.76 10*3/MM3 (ref 3.4–10.8)

## 2019-10-09 PROCEDURE — 85025 COMPLETE CBC W/AUTO DIFF WBC: CPT

## 2019-10-09 PROCEDURE — 73223 MRI JOINT UPR EXTR W/O&W/DYE: CPT

## 2019-10-09 PROCEDURE — 0 GADOBENATE DIMEGLUMINE 529 MG/ML SOLUTION: Performed by: INTERNAL MEDICINE

## 2019-10-09 PROCEDURE — 84145 PROCALCITONIN (PCT): CPT

## 2019-10-09 PROCEDURE — A9577 INJ MULTIHANCE: HCPCS | Performed by: INTERNAL MEDICINE

## 2019-10-09 RX ADMIN — GADOBENATE DIMEGLUMINE 15 ML: 529 INJECTION, SOLUTION INTRAVENOUS at 08:54

## 2019-10-11 ENCOUNTER — TELEPHONE (OUTPATIENT)
Dept: FAMILY MEDICINE CLINIC | Facility: CLINIC | Age: 69
End: 2019-10-11

## 2019-10-11 ENCOUNTER — APPOINTMENT (OUTPATIENT)
Dept: GENERAL RADIOLOGY | Facility: HOSPITAL | Age: 69
End: 2019-10-11

## 2019-10-11 ENCOUNTER — HOSPITAL ENCOUNTER (EMERGENCY)
Facility: HOSPITAL | Age: 69
Discharge: ANOTHER HOSPITAL | End: 2019-10-12
Attending: EMERGENCY MEDICINE | Admitting: EMERGENCY MEDICINE

## 2019-10-11 VITALS
BODY MASS INDEX: 24.65 KG/M2 | WEIGHT: 182 LBS | HEIGHT: 72 IN | TEMPERATURE: 97.5 F | RESPIRATION RATE: 16 BRPM | OXYGEN SATURATION: 95 % | DIASTOLIC BLOOD PRESSURE: 79 MMHG | SYSTOLIC BLOOD PRESSURE: 130 MMHG | HEART RATE: 72 BPM

## 2019-10-11 DIAGNOSIS — T81.40XA POSTOPERATIVE INFECTION, UNSPECIFIED TYPE, INITIAL ENCOUNTER: Primary | ICD-10-CM

## 2019-10-11 DIAGNOSIS — L02.414 ABSCESS OF LEFT SHOULDER: ICD-10-CM

## 2019-10-11 LAB
ALBUMIN SERPL-MCNC: 4.8 G/DL (ref 3.5–5.2)
ALBUMIN/GLOB SERPL: 1.7 G/DL
ALP SERPL-CCNC: 88 U/L (ref 39–117)
ALT SERPL W P-5'-P-CCNC: 12 U/L (ref 1–41)
ANION GAP SERPL CALCULATED.3IONS-SCNC: 9 MMOL/L (ref 5–15)
AST SERPL-CCNC: 18 U/L (ref 1–40)
BASOPHILS # BLD AUTO: 0.06 10*3/MM3 (ref 0–0.2)
BASOPHILS NFR BLD AUTO: 0.6 % (ref 0–1.5)
BILIRUB SERPL-MCNC: 0.3 MG/DL (ref 0.2–1.2)
BILIRUB UR QL STRIP: NEGATIVE
BUN BLD-MCNC: 8 MG/DL (ref 8–23)
BUN/CREAT SERPL: 8.8 (ref 7–25)
CALCIUM SPEC-SCNC: 9.7 MG/DL (ref 8.6–10.5)
CHLORIDE SERPL-SCNC: 100 MMOL/L (ref 98–107)
CLARITY UR: CLEAR
CO2 SERPL-SCNC: 31 MMOL/L (ref 22–29)
COLOR UR: YELLOW
CREAT BLD-MCNC: 0.91 MG/DL (ref 0.76–1.27)
CRP SERPL-MCNC: 0.36 MG/DL (ref 0–0.5)
DEPRECATED RDW RBC AUTO: 47.6 FL (ref 37–54)
EOSINOPHIL # BLD AUTO: 0.33 10*3/MM3 (ref 0–0.4)
EOSINOPHIL NFR BLD AUTO: 3.2 % (ref 0.3–6.2)
ERYTHROCYTE [DISTWIDTH] IN BLOOD BY AUTOMATED COUNT: 15.6 % (ref 12.3–15.4)
ERYTHROCYTE [SEDIMENTATION RATE] IN BLOOD: 16 MM/HR (ref 0–20)
GFR SERPL CREATININE-BSD FRML MDRD: 83 ML/MIN/1.73
GLOBULIN UR ELPH-MCNC: 2.9 GM/DL
GLUCOSE BLD-MCNC: 132 MG/DL (ref 65–99)
GLUCOSE UR STRIP-MCNC: NEGATIVE MG/DL
HCT VFR BLD AUTO: 48.7 % (ref 37.5–51)
HGB BLD-MCNC: 15.3 G/DL (ref 13–17.7)
HGB UR QL STRIP.AUTO: NEGATIVE
HOLD SPECIMEN: NORMAL
HOLD SPECIMEN: NORMAL
IMM GRANULOCYTES # BLD AUTO: 0.02 10*3/MM3 (ref 0–0.05)
IMM GRANULOCYTES NFR BLD AUTO: 0.2 % (ref 0–0.5)
KETONES UR QL STRIP: NEGATIVE
LEUKOCYTE ESTERASE UR QL STRIP.AUTO: NEGATIVE
LIPASE SERPL-CCNC: 22 U/L (ref 13–60)
LYMPHOCYTES # BLD AUTO: 2.94 10*3/MM3 (ref 0.7–3.1)
LYMPHOCYTES NFR BLD AUTO: 28.6 % (ref 19.6–45.3)
MCH RBC QN AUTO: 26.3 PG (ref 26.6–33)
MCHC RBC AUTO-ENTMCNC: 31.4 G/DL (ref 31.5–35.7)
MCV RBC AUTO: 83.8 FL (ref 79–97)
MONOCYTES # BLD AUTO: 0.76 10*3/MM3 (ref 0.1–0.9)
MONOCYTES NFR BLD AUTO: 7.4 % (ref 5–12)
NEUTROPHILS # BLD AUTO: 6.17 10*3/MM3 (ref 1.7–7)
NEUTROPHILS NFR BLD AUTO: 60 % (ref 42.7–76)
NITRITE UR QL STRIP: NEGATIVE
NRBC BLD AUTO-RTO: 0 /100 WBC (ref 0–0.2)
NT-PROBNP SERPL-MCNC: 84 PG/ML (ref 5–900)
PH UR STRIP.AUTO: 5.5 [PH] (ref 5–8)
PLATELET # BLD AUTO: 272 10*3/MM3 (ref 140–450)
PMV BLD AUTO: 11.7 FL (ref 6–12)
POTASSIUM BLD-SCNC: 3.9 MMOL/L (ref 3.5–5.2)
PROCALCITONIN SERPL-MCNC: 0.04 NG/ML (ref 0.1–0.25)
PROT SERPL-MCNC: 7.7 G/DL (ref 6–8.5)
PROT UR QL STRIP: NEGATIVE
RBC # BLD AUTO: 5.81 10*6/MM3 (ref 4.14–5.8)
SODIUM BLD-SCNC: 140 MMOL/L (ref 136–145)
SP GR UR STRIP: 1.01 (ref 1–1.03)
TROPONIN T SERPL-MCNC: <0.01 NG/ML (ref 0–0.03)
UROBILINOGEN UR QL STRIP: NORMAL
WBC NRBC COR # BLD: 10.28 10*3/MM3 (ref 3.4–10.8)
WHOLE BLOOD HOLD SPECIMEN: NORMAL
WHOLE BLOOD HOLD SPECIMEN: NORMAL

## 2019-10-11 PROCEDURE — 71045 X-RAY EXAM CHEST 1 VIEW: CPT

## 2019-10-11 PROCEDURE — 93005 ELECTROCARDIOGRAM TRACING: CPT | Performed by: EMERGENCY MEDICINE

## 2019-10-11 PROCEDURE — 99285 EMERGENCY DEPT VISIT HI MDM: CPT

## 2019-10-11 PROCEDURE — 83880 ASSAY OF NATRIURETIC PEPTIDE: CPT | Performed by: EMERGENCY MEDICINE

## 2019-10-11 PROCEDURE — 80053 COMPREHEN METABOLIC PANEL: CPT | Performed by: EMERGENCY MEDICINE

## 2019-10-11 PROCEDURE — 85025 COMPLETE CBC W/AUTO DIFF WBC: CPT | Performed by: EMERGENCY MEDICINE

## 2019-10-11 PROCEDURE — 84145 PROCALCITONIN (PCT): CPT | Performed by: EMERGENCY MEDICINE

## 2019-10-11 PROCEDURE — 86140 C-REACTIVE PROTEIN: CPT | Performed by: EMERGENCY MEDICINE

## 2019-10-11 PROCEDURE — 81003 URINALYSIS AUTO W/O SCOPE: CPT | Performed by: EMERGENCY MEDICINE

## 2019-10-11 PROCEDURE — 85652 RBC SED RATE AUTOMATED: CPT | Performed by: EMERGENCY MEDICINE

## 2019-10-11 PROCEDURE — 83690 ASSAY OF LIPASE: CPT | Performed by: EMERGENCY MEDICINE

## 2019-10-11 PROCEDURE — 84484 ASSAY OF TROPONIN QUANT: CPT | Performed by: EMERGENCY MEDICINE

## 2019-10-11 RX ORDER — SODIUM CHLORIDE 0.9 % (FLUSH) 0.9 %
10 SYRINGE (ML) INJECTION AS NEEDED
Status: DISCONTINUED | OUTPATIENT
Start: 2019-10-11 | End: 2019-10-12 | Stop reason: HOSPADM

## 2019-10-11 RX ORDER — ASPIRIN 81 MG/1
324 TABLET, CHEWABLE ORAL ONCE
Status: COMPLETED | OUTPATIENT
Start: 2019-10-11 | End: 2019-10-11

## 2019-10-11 RX ADMIN — ASPIRIN 81 MG 324 MG: 81 TABLET ORAL at 18:04

## 2019-10-11 NOTE — TELEPHONE ENCOUNTER
Paged to call Dr. Carroll regarding patient being sent to ER by Dr. Tran, contacted Dr. Tran, asked him to call Dr. Carroll back with information regarding diagnosis, Dr. Tran given contact info.

## 2019-10-11 NOTE — TELEPHONE ENCOUNTER
Pt called, in regards to wound infection and would like for his wound infectious disease  DR Ward [phone] to be called.

## 2019-10-11 NOTE — TELEPHONE ENCOUNTER
Clinton called back and provided some numbers to for his infectious disease doctor.     Dr. Muir - [phone], if you are unable to reach him at this number you can call [facility] where he works our of at [phone] to get his records.     Callback for the patient: [phone]

## 2019-10-11 NOTE — TELEPHONE ENCOUNTER
CHECKING ON THE STATUS OF THE MRI OF HIS SHOULDER.    PATIENT SAID HE HAD A REACTION TO THE INJECTABLE SOLUTION THAT THEY GIVE FOR THE MRI. HAVING TROUBLE URINATING, PAIN IN KIDNEYS AND A FEELING OF UN-WELLNESS.    PLEASE ADVISE

## 2019-10-11 NOTE — ED PROVIDER NOTES
Subjective   Mr. Behzad Guzman is a 69 y.o. male who presents to the ED with c/o shoulder pain. He reports he has been experiencing left shoulder pain since he was trampled by a bull in May 2018. The pain radiates to his chest. He had surgery on his left shoulder performed by Dr. Hamilton at [facility]. He then had the hardware removed 2 weeks later due to infection and he had a PICC line placed and was treated with IV antibiotics. He has been on oral Doxycycline 50 mg twice daily for the past 3 weeks. He had an MRI performed 2 days ago which showed areas of infection in the left shoulder. He was directed to present to the ED. He reports he has had drainage, swelling, and redness in recent days. He also complains of chills, diaphoresis, subjective fever, dysuria, difficulty urinating, urinary frequency, and flank pain. Dr. Hamilton, who initially performed the shoulder surgery, saw the patient 1 week ago. He is followed by Dr. Spencer, pain management. There are no other acute symptoms at this time.        History provided by:  Patient and spouse  Arm Pain   Location:  Left shoulder  Severity:  Moderate  Onset quality:  Gradual  Duration:  18 months  Timing:  Constant  Progression:  Worsening  Chronicity:  Chronic  Relieved by:  Nothing  Worsened by:  Nothing  Associated symptoms: chest pain and fever (subjective)        Review of Systems   Constitutional: Positive for chills, diaphoresis, fever (subjective) and unexpected weight change (lost 40 pounds).   Cardiovascular: Positive for chest pain.   Genitourinary: Positive for difficulty urinating, dysuria, flank pain and frequency.   All other systems reviewed and are negative.      Past Medical History:   Diagnosis Date   • Asthma    • Chronic hip pain, left    • Chronic pain in left shoulder    • Hyperlipidemia    • Hypertension    • Leg length discrepancy    • Neck pain, chronic    • Neuropathy    • Panic attacks    • Pre-diabetes    • Prediabetes    •  Spinal stenosis        Allergies   Allergen Reactions   • Amoxicillin Rash   • Penicillins Rash       Past Surgical History:   Procedure Laterality Date   • LEG SURGERY     • ROTATOR CUFF REPAIR Right    • SHOULDER SURGERY Left    • WRIST SURGERY Left        Family History   Problem Relation Age of Onset   • Heart attack Father    • Other Mother         accident       Social History     Socioeconomic History   • Marital status:      Spouse name: Not on file   • Number of children: Not on file   • Years of education: Not on file   • Highest education level: Not on file   Occupational History   • Occupation: farmer   Tobacco Use   • Smoking status: Former Smoker     Last attempt to quit:      Years since quittin.8   • Smokeless tobacco: Never Used   Substance and Sexual Activity   • Alcohol use: No   • Drug use: No   • Sexual activity: Defer   Social History Narrative    Lives with wife on a farm         Objective   Physical Exam   Constitutional: He is oriented to person, place, and time. He appears well-developed and well-nourished. No distress.   Pleasant, wandering historian. Appears non-toxic.   HENT:   Head: Normocephalic and atraumatic.   Nose: Nose normal.   Eyes: Conjunctivae are normal. No scleral icterus.   Neck: Normal range of motion. Neck supple.   Cardiovascular: Normal rate, regular rhythm and normal heart sounds.   No murmur heard.  Pulmonary/Chest: Effort normal and breath sounds normal. No respiratory distress.   Abdominal: Soft. There is no tenderness.   Musculoskeletal: Normal range of motion.   Neurological: He is alert and oriented to person, place, and time.   Skin: Skin is dry.   Mild erythema overlying 2 of the 3 arthroscopic sites of left shoulder. This area is diffusely tender to palpation. No warmth, induration or fluctuance is noted.   Psychiatric: He has a normal mood and affect. His behavior is normal.   Nursing note and vitals reviewed.      Procedures         ED  Course  ED Course as of Oct 13 0102   Fri Oct 11, 2019   1847 Physician on-call for Dr. Tran has been paged.  [JW]   1910 Dr. Carroll discussed with Dr. Lorenzo, orthopedics, who feels it is safest for the patient to be seen by the physicians already caring for the patient.  [JW]   1913 Dr. Carroll spoke with Katherine Agudelo, on-call for Dr. Tran, who will have Dr. Tran call Dr. Carroll.  [JW]   1918 Dr. Muir, the patient's infectious disease specialist, has been paged.  [JW]   1935 Dr. Carroll spoke with Dr. Muir who feels the joint infection is worse than it was prev will need drained defintiely abx he and Dr. Carroll both feel   [JW]   2035 Spoke again with the exchange in order to get ahold of the hospitalist who will be able to accept transfer. -MAS.  [JW]   2048 Spoke with Dr. Zuleta, hospitalist at [facility], who does not feel the patient should be accepted until the case is discussed with Dr. Hamilton, the patient's surgeon. -MAS  [JW]   2125 Case discussed in detail with Dr. Hamilton, the patient's orthopedic surgeon. He will accept the patient in transfer with Dr. Zuleta admitting. -MAS  [JW]   2230 Sending with CD of MRI.  [MS]      ED Course User Index  [JW] Logan Holloway  [MS] Randy Carroll MD     No results found for this or any previous visit (from the past 24 hour(s)).  Note: In addition to lab results from this visit, the labs listed above may include labs taken at another facility or during a different encounter within the last 24 hours. Please correlate lab times with ED admission and discharge times for further clarification of the services performed during this visit.    XR Chest 1 View   Final Result   Persistent elevation of the left hemidiaphragm and   associated discoid atelectasis. Atelectasis may be slightly increased   today compared to prior study. No new chest disease is seen elsewhere.       DICTATED:   10/11/2019   EDITED/ls :   10/11/2019        This report was finalized on  10/11/2019 10:29 PM by DR. Neal Menjivar MD.            Vitals:    10/11/19 2030 10/11/19 2059 10/11/19 2100 10/11/19 2130   BP: 118/81  (!) 123/104 130/79   BP Location:       Patient Position:       Pulse: 79 78  72   Resp: 16 16  16   Temp:       TempSrc:       SpO2: 93% 94%  95%   Weight:       Height:         Medications   aspirin chewable tablet 324 mg (324 mg Oral Given 10/11/19 1804)     ECG/EMG Results (last 24 hours)     Procedure Component Value Units Date/Time    ECG 12 Lead [[id number]] Collected:  10/11/19 1743     Updated:  10/11/19 1744        ECG 12 Lead   Final Result   Test Reason : CHEST PAIN   Blood Pressure : **/** mmHG   Vent. Rate : 078 BPM     Atrial Rate : 078 BPM      P-R Int : 184 ms          QRS Dur : 104 ms       QT Int : 370 ms       P-R-T Axes : 024 -02 038 degrees      QTc Int : 421 ms      Normal sinus rhythm   Cannot rule out Inferior infarct , age undetermined   Abnormal ECG   When compared with ECG of 14-JUL-2019 18:56,   No significant change was found   Confirmed by ANISHA KELLY MD ([id number]) on 10/12/2019 2:46:15 PM      Referred By:  EDMD           Confirmed By:ANISHA KELLY MD                          [facility]    Final diagnoses:   Postoperative infection, unspecified type, initial encounter   Abscess of left shoulder       Documentation assistance provided by scribe Logan Holloway.  Information recorded by the scribe was done at my direction and has been verified and validated by me.     Logan Holloway  10/11/19 2127       Anisha Kelly MD  10/13/19 0102

## 2019-10-12 NOTE — ED NOTES
[facility] called with pt assignment. Pt going to  458 phone # for report [phone]      Florecita Clark  10/11/19 7808

## 2019-10-29 ENCOUNTER — TELEPHONE (OUTPATIENT)
Dept: FAMILY MEDICINE CLINIC | Facility: CLINIC | Age: 69
End: 2019-10-29

## 2019-10-30 ENCOUNTER — APPOINTMENT (OUTPATIENT)
Dept: GENERAL RADIOLOGY | Facility: HOSPITAL | Age: 69
End: 2019-10-30

## 2019-10-30 ENCOUNTER — APPOINTMENT (OUTPATIENT)
Dept: MRI IMAGING | Facility: HOSPITAL | Age: 69
End: 2019-10-30

## 2019-10-30 ENCOUNTER — HOSPITAL ENCOUNTER (EMERGENCY)
Facility: HOSPITAL | Age: 69
Discharge: HOME | End: 2019-10-31
Attending: EMERGENCY MEDICINE | Admitting: EMERGENCY MEDICINE

## 2019-10-30 DIAGNOSIS — M86.9: ICD-10-CM

## 2019-10-30 DIAGNOSIS — L02.419 SHOULDER ABSCESS: Primary | ICD-10-CM

## 2019-10-30 LAB
ALBUMIN SERPL-MCNC: 4.7 G/DL (ref 3.5–5.2)
ALBUMIN/GLOB SERPL: 1.3 G/DL
ALP SERPL-CCNC: 93 U/L (ref 39–117)
ALT SERPL W P-5'-P-CCNC: 13 U/L (ref 1–41)
ANION GAP SERPL CALCULATED.3IONS-SCNC: 13 MMOL/L (ref 5–15)
AST SERPL-CCNC: 19 U/L (ref 1–40)
BASOPHILS # BLD AUTO: 0.05 10*3/MM3 (ref 0–0.2)
BASOPHILS NFR BLD AUTO: 0.6 % (ref 0–1.5)
BILIRUB SERPL-MCNC: 0.4 MG/DL (ref 0.2–1.2)
BUN BLD-MCNC: 10 MG/DL (ref 8–23)
BUN/CREAT SERPL: 12.3 (ref 7–25)
CALCIUM SPEC-SCNC: 9.5 MG/DL (ref 8.6–10.5)
CHLORIDE SERPL-SCNC: 101 MMOL/L (ref 98–107)
CO2 SERPL-SCNC: 27 MMOL/L (ref 22–29)
CREAT BLD-MCNC: 0.81 MG/DL (ref 0.76–1.27)
D-LACTATE SERPL-SCNC: 1.2 MMOL/L (ref 0.5–2)
DEPRECATED RDW RBC AUTO: 43.8 FL (ref 37–54)
EOSINOPHIL # BLD AUTO: 0.44 10*3/MM3 (ref 0–0.4)
EOSINOPHIL NFR BLD AUTO: 5.2 % (ref 0.3–6.2)
ERYTHROCYTE [DISTWIDTH] IN BLOOD BY AUTOMATED COUNT: 14.6 % (ref 12.3–15.4)
GFR SERPL CREATININE-BSD FRML MDRD: 95 ML/MIN/1.73
GLOBULIN UR ELPH-MCNC: 3.6 GM/DL
GLUCOSE BLD-MCNC: 108 MG/DL (ref 65–99)
HCT VFR BLD AUTO: 49 % (ref 37.5–51)
HGB BLD-MCNC: 15.7 G/DL (ref 13–17.7)
IMM GRANULOCYTES # BLD AUTO: 0.01 10*3/MM3 (ref 0–0.05)
IMM GRANULOCYTES NFR BLD AUTO: 0.1 % (ref 0–0.5)
LYMPHOCYTES # BLD AUTO: 2.22 10*3/MM3 (ref 0.7–3.1)
LYMPHOCYTES NFR BLD AUTO: 26.2 % (ref 19.6–45.3)
MCH RBC QN AUTO: 26.6 PG (ref 26.6–33)
MCHC RBC AUTO-ENTMCNC: 32 G/DL (ref 31.5–35.7)
MCV RBC AUTO: 82.9 FL (ref 79–97)
MONOCYTES # BLD AUTO: 0.65 10*3/MM3 (ref 0.1–0.9)
MONOCYTES NFR BLD AUTO: 7.7 % (ref 5–12)
NEUTROPHILS # BLD AUTO: 5.1 10*3/MM3 (ref 1.7–7)
NEUTROPHILS NFR BLD AUTO: 60.2 % (ref 42.7–76)
NRBC BLD AUTO-RTO: 0 /100 WBC (ref 0–0.2)
PLATELET # BLD AUTO: 196 10*3/MM3 (ref 140–450)
PMV BLD AUTO: 11.8 FL (ref 6–12)
POTASSIUM BLD-SCNC: 4 MMOL/L (ref 3.5–5.2)
PROT SERPL-MCNC: 8.3 G/DL (ref 6–8.5)
RBC # BLD AUTO: 5.91 10*6/MM3 (ref 4.14–5.8)
SODIUM BLD-SCNC: 141 MMOL/L (ref 136–145)
WBC NRBC COR # BLD: 8.47 10*3/MM3 (ref 3.4–10.8)

## 2019-10-30 PROCEDURE — 71045 X-RAY EXAM CHEST 1 VIEW: CPT

## 2019-10-30 PROCEDURE — 83605 ASSAY OF LACTIC ACID: CPT | Performed by: NURSE PRACTITIONER

## 2019-10-30 PROCEDURE — 96375 TX/PRO/DX INJ NEW DRUG ADDON: CPT

## 2019-10-30 PROCEDURE — A9577 INJ MULTIHANCE: HCPCS | Performed by: EMERGENCY MEDICINE

## 2019-10-30 PROCEDURE — 80053 COMPREHEN METABOLIC PANEL: CPT | Performed by: NURSE PRACTITIONER

## 2019-10-30 PROCEDURE — 85025 COMPLETE CBC W/AUTO DIFF WBC: CPT | Performed by: NURSE PRACTITIONER

## 2019-10-30 PROCEDURE — 73223 MRI JOINT UPR EXTR W/O&W/DYE: CPT

## 2019-10-30 PROCEDURE — 25010000002 LORAZEPAM PER 2 MG

## 2019-10-30 PROCEDURE — 96374 THER/PROPH/DIAG INJ IV PUSH: CPT

## 2019-10-30 PROCEDURE — 0 GADOBENATE DIMEGLUMINE 529 MG/ML SOLUTION: Performed by: EMERGENCY MEDICINE

## 2019-10-30 PROCEDURE — 25010000002 DIPHENHYDRAMINE PER 50 MG

## 2019-10-30 PROCEDURE — 99283 EMERGENCY DEPT VISIT LOW MDM: CPT

## 2019-10-30 RX ORDER — LORAZEPAM 2 MG/ML
INJECTION INTRAMUSCULAR
Status: COMPLETED
Start: 2019-10-30 | End: 2019-10-30

## 2019-10-30 RX ORDER — LORAZEPAM 2 MG/ML
1 INJECTION INTRAMUSCULAR ONCE
Status: COMPLETED | OUTPATIENT
Start: 2019-10-30 | End: 2019-10-30

## 2019-10-30 RX ORDER — DIPHENHYDRAMINE HYDROCHLORIDE 50 MG/ML
25 INJECTION INTRAMUSCULAR; INTRAVENOUS ONCE
Status: COMPLETED | OUTPATIENT
Start: 2019-10-30 | End: 2019-10-30

## 2019-10-30 RX ORDER — DIPHENHYDRAMINE HYDROCHLORIDE 50 MG/ML
INJECTION INTRAMUSCULAR; INTRAVENOUS
Status: COMPLETED
Start: 2019-10-30 | End: 2019-10-30

## 2019-10-30 RX ADMIN — GADOBENATE DIMEGLUMINE 15 ML: 529 INJECTION, SOLUTION INTRAVENOUS at 22:30

## 2019-10-30 RX ADMIN — DIPHENHYDRAMINE HYDROCHLORIDE 25 MG: 50 INJECTION INTRAMUSCULAR; INTRAVENOUS at 23:12

## 2019-10-30 RX ADMIN — LORAZEPAM 1 MG: 2 INJECTION INTRAMUSCULAR at 21:56

## 2019-10-30 RX ADMIN — LORAZEPAM 1 MG: 2 INJECTION INTRAMUSCULAR; INTRAVENOUS at 21:56

## 2019-10-31 VITALS
DIASTOLIC BLOOD PRESSURE: 93 MMHG | BODY MASS INDEX: 23.57 KG/M2 | TEMPERATURE: 98.3 F | HEART RATE: 69 BPM | OXYGEN SATURATION: 98 % | RESPIRATION RATE: 16 BRPM | SYSTOLIC BLOOD PRESSURE: 183 MMHG | HEIGHT: 72 IN | WEIGHT: 174 LBS

## 2019-11-04 ENCOUNTER — OFFICE VISIT (OUTPATIENT)
Dept: FAMILY MEDICINE CLINIC | Facility: CLINIC | Age: 69
End: 2019-11-04

## 2019-11-04 ENCOUNTER — TELEPHONE (OUTPATIENT)
Dept: FAMILY MEDICINE CLINIC | Facility: CLINIC | Age: 69
End: 2019-11-04

## 2019-11-04 VITALS
HEART RATE: 62 BPM | SYSTOLIC BLOOD PRESSURE: 142 MMHG | OXYGEN SATURATION: 96 % | DIASTOLIC BLOOD PRESSURE: 80 MMHG | BODY MASS INDEX: 24.38 KG/M2 | WEIGHT: 180 LBS | HEIGHT: 72 IN

## 2019-11-04 DIAGNOSIS — L02.419 SHOULDER ABSCESS: ICD-10-CM

## 2019-11-04 DIAGNOSIS — I10 ESSENTIAL HYPERTENSION: Primary | ICD-10-CM

## 2019-11-04 DIAGNOSIS — IMO0001 UNCONTROLLED TYPE 2 DIABETES MELLITUS WITHOUT COMPLICATION, WITHOUT LONG-TERM CURRENT USE OF INSULIN: ICD-10-CM

## 2019-11-04 DIAGNOSIS — F33.9 RECURRENT MAJOR DEPRESSIVE DISORDER, REMISSION STATUS UNSPECIFIED (HCC): ICD-10-CM

## 2019-11-04 PROCEDURE — 99215 OFFICE O/P EST HI 40 MIN: CPT | Performed by: INTERNAL MEDICINE

## 2019-11-04 NOTE — TELEPHONE ENCOUNTER
Before patient left this evening he had questions regarding his metformin. With his healing process he was concerned if metformin would decrease the process or hinder it in any way. He has heard bad things about metformin and wanted to confirm with Dr. Tran if he should continue this medication.  The patient is also on glimepiride and would need refills.     Please advise.

## 2019-11-05 RX ORDER — LISINOPRIL 20 MG/1
TABLET ORAL
Qty: 180 TABLET | Refills: 0 | Status: SHIPPED | OUTPATIENT
Start: 2019-11-05 | End: 2020-02-01

## 2019-11-05 RX ORDER — METOPROLOL TARTRATE 50 MG/1
TABLET, FILM COATED ORAL
Qty: 180 TABLET | Refills: 0 | Status: SHIPPED | OUTPATIENT
Start: 2019-11-05 | End: 2020-02-01

## 2019-11-05 RX ORDER — GLIMEPIRIDE 1 MG/1
1 TABLET ORAL DAILY
Qty: 90 TABLET | Refills: 1 | Status: SHIPPED | OUTPATIENT
Start: 2019-11-05 | End: 2020-01-30

## 2019-11-05 NOTE — TELEPHONE ENCOUNTER
Prescriptions have been sent in. Patient was informed to continue taking metformin. Pt verbalized understanding.

## 2019-11-05 NOTE — TELEPHONE ENCOUNTER
Would recommend continuing both medications. Wound healing will improve with better glucose control. Can we please send in refills on these medications.

## 2019-11-05 NOTE — PROGRESS NOTES
Chief Complaint:  Shoulder abscess    HPI:  Behzad Guzman is a 69 y.o. male who presents today for follow-up shoulder abscess.  Patient reports he recently saw his orthopedic surgeon.  Per patient he had approximately 50 mL of fluid drained out of his left shoulder.  He has a PICC line in place and is receiving IV antibiotics per infectious disease.  Is requesting referral to [facility] today for second opinion.  He previously requested a second opinion locally in [place].  He is concerned that he may need surgery in the shoulder.  He has several questions about the possible treatments of his infection shoulder pain.  He reports he does feel depressed at times due to his chronic pain and lack of improvement of shoulder mobility.  He reports he has not been taking his lisinopril because he thought it was discontinued.  He is taking metoprolol and HCTZ as prescribed.  No blood pressure checks at home.    ROS:  Constitutional: no fevers, night sweats or unexplained weight loss  Eyes: no vision changes  ENT: no runny nose, ear pain, sore throat  Cardio: no chest pain, palpitations  Pulm: no shortness of breath, wheezing, or cough  GI: no abdominal pain or changes in bowel movements  : no difficulty urinating  MSK: no difficulty ambulating, + joint pain  Neuro: no weakness, dizziness or headache  Psych: no trouble sleeping  Endo: no change in appetite      Past Medical History:   Diagnosis Date   • Asthma    • Chronic hip pain, left    • Chronic pain in left shoulder    • Hyperlipidemia    • Hypertension    • Leg length discrepancy    • Neck pain, chronic    • Neuropathy    • Panic attacks    • Pre-diabetes    • Prediabetes    • Spinal stenosis       Family History   Problem Relation Age of Onset   • Heart attack Father    • Other Mother         accident      Social History     Socioeconomic History   • Marital status:      Spouse name: Not on file   • Number of children: Not on file   • Years of  education: Not on file   • Highest education level: Not on file   Occupational History   • Occupation: farmer   Tobacco Use   • Smoking status: Former Smoker     Last attempt to quit: 1987     Years since quittin.8   • Smokeless tobacco: Never Used   Substance and Sexual Activity   • Alcohol use: No   • Drug use: No   • Sexual activity: Defer   Social History Narrative    Lives with wife on a farm      Allergies   Allergen Reactions   • Amoxicillin Rash   • Penicillins Rash      Immunization History   Administered Date(s) Administered   • Flu Vaccine Quad PF >36MO 2015   • Pneumococcal Conjugate 13-Valent (PCV13) 2016   • Pneumococcal Polysaccharide (PPSV23) 2018        PE:  Vitals:    19 1615   BP: 142/80   Pulse: 62   SpO2: 96%      Body mass index is 24.41 kg/m².    Gen Appearance: NAD  HEENT: Normocephalic, PERRLA, no thyromegaly, trache midline  Heart: RRR, normal S1 and S2, no murmur  Lungs: CTA b/l, no wheezing, no crackles  Abdomen: Soft, non-tender, non-distended, no guarding and BSx4  MSK: Posterior atrophy left shoulder, no swelling, minimal erythema at incision sites, no warmth compared to right, normal gait, no peripheral edema   Pulses: Palpable and equal b/l  Lymph nodes: No palpable lymphadenopathy   Neuro: No focal deficits      Current Outpatient Medications   Medication Sig Dispense Refill   • amLODIPine (NORVASC) 5 MG tablet Take 1 tablet by mouth Daily. 90 tablet 3   • diazePAM (VALIUM) 5 MG tablet Take 5 mg by mouth 2 (Two) Times a Day.  0   • doxycycline (VIBRAMYICN) 100 MG tablet      • erythromycin (ROMYCIN) 5 MG/GM ophthalmic ointment Administer  to both eyes Every 4 (Four) Hours While Awake. 3.5 g 0   • gabapentin (NEURONTIN) 400 MG capsule Take 400 mg by mouth 3 (Three) Times a Day.     • glimepiride (AMARYL) 1 MG tablet TAKE 1 TABLET BY MOUTH ONCE DAILY 90 tablet 1   • hydrochlorothiazide (HYDRODIURIL) 12.5 MG tablet take 1 tablet by mouth once daily 30  tablet 3   • HYDROcodone-acetaminophen (NORCO)  MG per tablet Take 1 tablet by mouth Every 6 (Six) Hours As Needed for moderate pain (4-6).     • Hydrocodone-Acetaminophen (XODOL )  MG per tablet   0   • hydrocortisone 2.5 % cream Apply  topically to the appropriate area as directed 3 (Three) Times a Day. 60 g 3   • loratadine (CLARITIN) 10 MG tablet Take 10 mg by mouth Daily.  1   • metFORMIN (GLUCOPHAGE) 500 MG tablet Take 1 tablet by mouth Daily With Breakfast. 30 tablet 1   • NON FORMULARY GLIMEREX FOR PREDIABETES     • omeprazole (priLOSEC) 40 MG capsule Take 1 capsule by mouth Daily. before a meal 90 capsule 1   • ondansetron ODT (ZOFRAN-ODT) 4 MG disintegrating tablet Take 1 tablet by mouth 4 (Four) Times a Day As Needed for Nausea or Vomiting. 15 tablet 0   • ONETOUCH VERIO test strip Daily. for testing  0   • oxyCODONE (oxyCONTIN) 10 MG 12 hr tablet Take 10 mg by mouth Every 12 (Twelve) Hours As Needed.  0   • oxyCODONE-acetaminophen (PERCOCET) 5-325 MG per tablet Take 1 tablet by mouth Every 4 (Four) Hours As Needed for Moderate Pain  or Severe Pain . 15 tablet 0   • pravastatin (PRAVACHOL) 10 MG tablet Take 10 mg by mouth Daily.     • pravastatin (PRAVACHOL) 20 MG tablet TAKE 1 TABLET BY MOUTH EVERY  tablet 0   • Testosterone Cypionate (DEPOTESTOTERONE CYPIONATE) 200 MG/ML injection Inject 1 mL into the appropriate muscle as directed by prescriber Every 21 (Twenty-One) Days. 10 mL 0   • VENTOLIN  (90 Base) MCG/ACT inhaler inhalation 2 puffs by mouth every 4 to 6 hours if needed  0   • lisinopril (PRINIVIL,ZESTRIL) 20 MG tablet TAKE 2 TABLET BY MOUTH ONCE DAILY. 180 tablet 0   • metoprolol tartrate (LOPRESSOR) 50 MG tablet TAKE 1 TABLET BY MOUTH TWICE A  tablet 0     No current facility-administered medications for this visit.     Counseling was given to patient for the following topics: diagnostic results, instructions for management, impressions and importance of  treatment compliance shoulder abscess, hypertension, depression. Total time of the encounter was 45 minutes and 30 minutes was spent face to face counseling.      Behzad was seen today for shoulder injury and med refill.    Diagnoses and all orders for this visit:    Essential hypertension  Elevated today.  Patient not been taking his lisinopril.  Recommend resuming.  Follow-up 4 weeks for recheck.  Shoulder abscess  Recommend follow-up with Dr. Maynard in 2 weeks as scheduled.  Will refer for second opinion if needed per patient request.  He would like to follow-up in over one more time.  Recurrent major depressive disorder, remission status unspecified (CMS/[facility])  Almost 12 months of depressed mood daily, diminished interest in usual activities, loss of energy, difficulty concentrating consistent with depression.  Patient does not want to start a new medication at this time.  No suicidal ideations.       Return in about 4 weeks (around 12/2/2019).     Please note that portions of this document were completed with a voice recognition program. Efforts were made to edit the dictations, but occasionally words are mis-transcribed.

## 2019-11-06 ENCOUNTER — TELEPHONE (OUTPATIENT)
Dept: FAMILY MEDICINE CLINIC | Facility: CLINIC | Age: 69
End: 2019-11-06

## 2019-11-06 NOTE — TELEPHONE ENCOUNTER
Pt wants to know if Dr. Tran wanted to go forth about getting him into [facility] for his shoulder.

## 2019-11-06 NOTE — TELEPHONE ENCOUNTER
PATIENT CALLED BACK AGAIN, HE WOULD LIKE GABBY TO CALL REGARDING HIS LABS AND SOMETHING ELSE THAT HE SAID HE WOULD ONLY TALK TO DR. BERNABE OR GABBY ABOUT. PLEASE CALL [phone].

## 2019-11-06 NOTE — TELEPHONE ENCOUNTER
Patient called in requesting to speak to Abraham in regards to his recent labs results. He says he is needing to speak to her as soon as possible.

## 2019-11-08 ENCOUNTER — TELEPHONE (OUTPATIENT)
Dept: FAMILY MEDICINE CLINIC | Facility: CLINIC | Age: 69
End: 2019-11-08

## 2019-11-08 NOTE — TELEPHONE ENCOUNTER
Patient states that he's been informed that the testings that he's been sent to get are not checking the bone in his arm.    Patient says that he was informed by someone that he needed to get the correct blood work done or else he wont be seen at the [facility].     Please call and advise

## 2019-11-08 NOTE — TELEPHONE ENCOUNTER
It's up to the patient. If he wants a referral I can set that up. During his last office visit he was going to see his current orthopedic surgeon first then decide.

## 2019-11-11 DIAGNOSIS — M86.8X1: Primary | ICD-10-CM

## 2019-11-11 NOTE — TELEPHONE ENCOUNTER
Ordered repeat CBC, ESR and CRP. He had a recent MRI of shoulder 10/30/19. Is [facility] requesting anything specific?

## 2019-11-12 NOTE — TELEPHONE ENCOUNTER
Contacted patient. He has all needed labwork to bee seen by an alternate provider. He states that he is having difficulty with his Infectious Disease surgeon.      I advised him I would contact [facility] for his recent labs on 11/2.     He seemed very confused and spoke unclearly. I advised him we would follow up with him further as needed

## 2019-11-21 ENCOUNTER — TELEPHONE (OUTPATIENT)
Dept: FAMILY MEDICINE CLINIC | Facility: CLINIC | Age: 69
End: 2019-11-21

## 2019-11-27 ENCOUNTER — OFFICE VISIT (OUTPATIENT)
Dept: FAMILY MEDICINE CLINIC | Facility: CLINIC | Age: 69
End: 2019-11-27

## 2019-11-27 ENCOUNTER — HOSPITAL ENCOUNTER (OUTPATIENT)
Dept: GENERAL RADIOLOGY | Facility: HOSPITAL | Age: 69
Discharge: HOME | End: 2019-11-27
Admitting: INTERNAL MEDICINE

## 2019-11-27 VITALS
BODY MASS INDEX: 24.92 KG/M2 | WEIGHT: 184 LBS | OXYGEN SATURATION: 98 % | SYSTOLIC BLOOD PRESSURE: 152 MMHG | DIASTOLIC BLOOD PRESSURE: 88 MMHG | HEART RATE: 73 BPM | HEIGHT: 72 IN

## 2019-11-27 DIAGNOSIS — I10 ESSENTIAL HYPERTENSION: Primary | ICD-10-CM

## 2019-11-27 DIAGNOSIS — R05.9 COUGH: ICD-10-CM

## 2019-11-27 DIAGNOSIS — F33.9 RECURRENT MAJOR DEPRESSIVE DISORDER, REMISSION STATUS UNSPECIFIED (HCC): ICD-10-CM

## 2019-11-27 PROCEDURE — 71046 X-RAY EXAM CHEST 2 VIEWS: CPT

## 2019-11-27 PROCEDURE — 99214 OFFICE O/P EST MOD 30 MIN: CPT | Performed by: INTERNAL MEDICINE

## 2019-11-27 RX ORDER — DULOXETIN HYDROCHLORIDE 60 MG/1
60 CAPSULE, DELAYED RELEASE ORAL DAILY
Qty: 30 CAPSULE | Refills: 5 | Status: SHIPPED | OUTPATIENT
Start: 2019-11-27 | End: 2020-01-14

## 2019-11-27 RX ORDER — DULOXETIN HYDROCHLORIDE 30 MG/1
30 CAPSULE, DELAYED RELEASE ORAL DAILY
Qty: 7 CAPSULE | Refills: 0 | Status: SHIPPED | OUTPATIENT
Start: 2019-11-27 | End: 2020-01-14

## 2019-11-27 NOTE — PROGRESS NOTES
Chief Complaint:  htn    HPI:  Behzad Guzman is a 69 y.o. male who presents today for follow-up chronic medical conditions.  Hypertension- take medication as prescribed, does not check blood pressure at home.  Depression-patient was like to start medication today if able.  He is tried Lexapro Prozac and Zoloft in the past without success.  Reports daily symptoms of dry depressed mood, anhedonia, difficulty concentrating, difficulty sleeping.  Denies any suicidal ideation.  Cough-patient reports he is coughing up productive sputum for the last several weeks.  He is currently on IV antibiotics for shoulder infection.    ROS:  Constitutional: no fevers, night sweats or unexplained weight loss  Eyes: no vision changes  ENT: no runny nose, ear pain, sore throat  Cardio: no chest pain, palpitations  Pulm: no shortness of breath, wheezing, + cough  GI: no abdominal pain or changes in bowel movements  : no difficulty urinating  MSK: no difficulty ambulating, no joint pain  Neuro: no weakness, dizziness or headache  Psych: no trouble sleeping  Endo: no change in appetite      Past Medical History:   Diagnosis Date   • Asthma    • Chronic hip pain, left    • Chronic pain in left shoulder    • Hyperlipidemia    • Hypertension    • Leg length discrepancy    • Neck pain, chronic    • Neuropathy    • Panic attacks    • Pre-diabetes    • Prediabetes    • Spinal stenosis       Family History   Problem Relation Age of Onset   • Heart attack Father    • Other Mother         accident      Social History     Socioeconomic History   • Marital status:      Spouse name: Not on file   • Number of children: Not on file   • Years of education: Not on file   • Highest education level: Not on file   Occupational History   • Occupation: farmer   Tobacco Use   • Smoking status: Former Smoker     Last attempt to quit:      Years since quittin.9   • Smokeless tobacco: Never Used   Substance and Sexual Activity   • Alcohol use:  No   • Drug use: No   • Sexual activity: Defer   Social History Narrative    Lives with wife on a farm      Allergies   Allergen Reactions   • Amoxicillin Rash   • Penicillins Rash      Immunization History   Administered Date(s) Administered   • Flu Vaccine Quad PF >36MO 09/30/2015   • Pneumococcal Conjugate 13-Valent (PCV13) 09/22/2016   • Pneumococcal Polysaccharide (PPSV23) 05/18/2018        PE:  Vitals:    11/27/19 1557   BP: 152/88   Pulse: 73   SpO2: 98%      Body mass index is 24.95 kg/m².    Gen Appearance: NAD  HEENT: Normocephalic, PERRLA, no thyromegaly, trache midline  Heart: RRR, normal S1 and S2, no murmur  Lungs: CTA b/l, no wheezing, no crackles  Abdomen: Soft, non-tender, non-distended, no guarding and BSx4  MSK: Moves all extremities well, normal gait, no peripheral edema  Pulses: Palpable and equal b/l  Lymph nodes: No palpable lymphadenopathy   Neuro: No focal deficits      Current Outpatient Medications   Medication Sig Dispense Refill   • amLODIPine (NORVASC) 5 MG tablet Take 1 tablet by mouth Daily. 90 tablet 3   • diazePAM (VALIUM) 5 MG tablet Take 5 mg by mouth 2 (Two) Times a Day.  0   • doxycycline (VIBRAMYICN) 100 MG tablet      • DULoxetine (CYMBALTA) 30 MG capsule Take 1 capsule by mouth Daily. 7 capsule 0   • DULoxetine (CYMBALTA) 60 MG capsule Take 1 capsule by mouth Daily. 30 capsule 5   • erythromycin (ROMYCIN) 5 MG/GM ophthalmic ointment Administer  to both eyes Every 4 (Four) Hours While Awake. 3.5 g 0   • gabapentin (NEURONTIN) 400 MG capsule Take 400 mg by mouth 3 (Three) Times a Day.     • glimepiride (AMARYL) 1 MG tablet Take 1 tablet by mouth Daily. 90 tablet 1   • hydrochlorothiazide (HYDRODIURIL) 12.5 MG tablet take 1 tablet by mouth once daily 30 tablet 3   • HYDROcodone-acetaminophen (NORCO)  MG per tablet Take 1 tablet by mouth Every 6 (Six) Hours As Needed for moderate pain (4-6).     • Hydrocodone-Acetaminophen (XODOL )  MG per tablet   0   •  hydrocortisone 2.5 % cream Apply  topically to the appropriate area as directed 3 (Three) Times a Day. 60 g 3   • lisinopril (PRINIVIL,ZESTRIL) 20 MG tablet TAKE 2 TABLET BY MOUTH ONCE DAILY. 180 tablet 0   • loratadine (CLARITIN) 10 MG tablet Take 10 mg by mouth Daily.  1   • metFORMIN (GLUCOPHAGE) 500 MG tablet Take 1 tablet by mouth Daily With Breakfast. 30 tablet 1   • metoprolol tartrate (LOPRESSOR) 50 MG tablet TAKE 1 TABLET BY MOUTH TWICE A  tablet 0   • NON FORMULARY GLIMEREX FOR PREDIABETES     • omeprazole (priLOSEC) 40 MG capsule Take 1 capsule by mouth Daily. before a meal 90 capsule 1   • ondansetron ODT (ZOFRAN-ODT) 4 MG disintegrating tablet Take 1 tablet by mouth 4 (Four) Times a Day As Needed for Nausea or Vomiting. 15 tablet 0   • ONETOUCH VERIO test strip Daily. for testing  0   • oxyCODONE (oxyCONTIN) 10 MG 12 hr tablet Take 10 mg by mouth Every 12 (Twelve) Hours As Needed.  0   • oxyCODONE-acetaminophen (PERCOCET) 5-325 MG per tablet Take 1 tablet by mouth Every 4 (Four) Hours As Needed for Moderate Pain  or Severe Pain . 15 tablet 0   • pravastatin (PRAVACHOL) 10 MG tablet Take 10 mg by mouth Daily.     • pravastatin (PRAVACHOL) 20 MG tablet TAKE 1 TABLET BY MOUTH EVERY  tablet 0   • Testosterone Cypionate (DEPOTESTOTERONE CYPIONATE) 200 MG/ML injection Inject 1 mL into the appropriate muscle as directed by prescriber Every 21 (Twenty-One) Days. 10 mL 0   • VENTOLIN  (90 Base) MCG/ACT inhaler inhalation 2 puffs by mouth every 4 to 6 hours if needed  0     No current facility-administered medications for this visit.         Behzad was seen today for shoulder injury and hypertension.    Diagnoses and all orders for this visit:    Essential hypertension  Recommend checking blood pressure at home when able.  Recheck blood pressure in 4 weeks.  If consistently elevated with need to increase blood pressure medication.  Recurrent major depressive disorder, remission status  unspecified (CMS/HCC)  -     DULoxetine (CYMBALTA) 30 MG capsule; Take 1 capsule by mouth Daily.  -     DULoxetine (CYMBALTA) 60 MG capsule; Take 1 capsule by mouth Daily.  Recommend Cymbalta 30 mg for the first week then increase to 60 mg daily.  Follow-up 6 weeks for reassessment.  Cough  -     XR Chest PA & Lateral; Future  Lungs clear on exam.  Checking chest x-ray to rule out pneumonia.  Currently on IV vancomycin and meropenem per patient.       Return in about 6 weeks (around 1/8/2020).     Please note that portions of this document were completed with a voice recognition program. Efforts were made to edit the dictations, but occasionally words are mis-transcribed.

## 2019-12-01 DIAGNOSIS — F33.9 RECURRENT MAJOR DEPRESSIVE DISORDER, REMISSION STATUS UNSPECIFIED (HCC): ICD-10-CM

## 2019-12-02 RX ORDER — DULOXETIN HYDROCHLORIDE 30 MG/1
CAPSULE, DELAYED RELEASE ORAL
Qty: 7 CAPSULE | Refills: 0 | OUTPATIENT
Start: 2019-12-02

## 2019-12-02 RX ORDER — PRAVASTATIN SODIUM 20 MG
TABLET ORAL
Qty: 120 TABLET | Refills: 2 | Status: SHIPPED | OUTPATIENT
Start: 2019-12-02 | End: 2020-06-01 | Stop reason: SDUPTHER

## 2019-12-04 ENCOUNTER — HOSPITAL ENCOUNTER (EMERGENCY)
Facility: HOSPITAL | Age: 69
Discharge: HOME | End: 2019-12-04
Attending: EMERGENCY MEDICINE | Admitting: EMERGENCY MEDICINE

## 2019-12-04 VITALS
WEIGHT: 171 LBS | DIASTOLIC BLOOD PRESSURE: 76 MMHG | BODY MASS INDEX: 23.16 KG/M2 | TEMPERATURE: 97.9 F | SYSTOLIC BLOOD PRESSURE: 132 MMHG | RESPIRATION RATE: 18 BRPM | HEART RATE: 61 BPM | OXYGEN SATURATION: 95 % | HEIGHT: 72 IN

## 2019-12-04 DIAGNOSIS — Z45.2 ENCOUNTER FOR REMOVAL OF PERIPHERALLY INSERTED CENTRAL CATHETER (PICC): Primary | ICD-10-CM

## 2019-12-04 PROCEDURE — 99282 EMERGENCY DEPT VISIT SF MDM: CPT

## 2019-12-04 RX ORDER — BACITRACIN, NEOMYCIN, POLYMYXIN B 400; 3.5; 5 [USP'U]/G; MG/G; [USP'U]/G
1 OINTMENT TOPICAL ONCE
Status: DISCONTINUED | OUTPATIENT
Start: 2019-12-04 | End: 2019-12-04 | Stop reason: HOSPADM

## 2019-12-04 NOTE — ED NOTES
Pt was called to be triaged no answer. Pt and family member had left for cafeteria for a packet of sugar.      Katherine Hunter  12/04/19 2837

## 2019-12-05 NOTE — ED PROVIDER NOTES
Subjective   Patient presents the ED for removal of picc line.    Patient reports the PICC line was put in in [place].  He completed his home treatments on Sunday and his physician told him his PICC line could be removed.  His local doctor told him to go to his PCP to have his PICC line refused removed however is primary care doctor informed him that he did not do that.  When called again they suggest go to the ER.  Patient reports he is completed his antibiotic therapy.  He has no discharge at the PICC line site.  He otherwise has no other fevers chills cough congestion URI symptoms or shortness of breath                Vascular Access Problem   Location:  Right arm  Severity:  Unable to specify  Associated symptoms: no abdominal pain, no chest pain, no congestion, no cough, no fever and no shortness of breath        Review of Systems   Constitutional: Negative for fever.   HENT: Negative for congestion.    Respiratory: Negative for cough and shortness of breath.    Cardiovascular: Negative for chest pain.   Gastrointestinal: Negative for abdominal pain and blood in stool.   Genitourinary: Negative for hematuria.       Past Medical History:   Diagnosis Date   • Asthma    • Chronic hip pain, left    • Chronic pain in left shoulder    • Hyperlipidemia    • Hypertension    • Leg length discrepancy    • Neck pain, chronic    • Neuropathy    • Panic attacks    • Pre-diabetes    • Prediabetes    • Spinal stenosis        Allergies   Allergen Reactions   • Amoxicillin Rash   • Penicillins Rash       Past Surgical History:   Procedure Laterality Date   • LEG SURGERY     • ROTATOR CUFF REPAIR Right    • SHOULDER SURGERY Left    • WRIST SURGERY Left        Family History   Problem Relation Age of Onset   • Heart attack Father    • Other Mother         accident       Social History     Socioeconomic History   • Marital status:      Spouse name: Not on file   • Number of children: Not on file   • Years of education:  Not on file   • Highest education level: Not on file   Occupational History   • Occupation: farmer   Tobacco Use   • Smoking status: Former Smoker     Last attempt to quit:      Years since quittin.9   • Smokeless tobacco: Never Used   Substance and Sexual Activity   • Alcohol use: No   • Drug use: No   • Sexual activity: Defer   Social History Narrative    Lives with wife on a farm           Objective   Physical Exam   Constitutional: He is oriented to person, place, and time. No distress.   HENT:   Head: Normocephalic and atraumatic.   Nose: Nose normal.   Mouth/Throat: Oropharynx is clear and moist.   Eyes: Conjunctivae are normal.   Neck: Normal range of motion.   Cardiovascular: Normal rate, regular rhythm, normal heart sounds and intact distal pulses.   Pulmonary/Chest: Effort normal and breath sounds normal. No respiratory distress. He has no wheezes. He has no rales. He exhibits no tenderness.   Abdominal: Soft. Bowel sounds are normal. He exhibits no distension. There is no tenderness.   Musculoskeletal: Normal range of motion.   Neurological: He is alert and oriented to person, place, and time.   Skin: Skin is warm and dry.   Right upper extremity with PICC line in the distal upper arm.  Entry site without erythema swelling tenderness or signs of infection, and no palpable cords in the upper extremity.   Psychiatric: He has a normal mood and affect.   Nursing note and vitals reviewed.      Procedures           ED Course  ED Course as of Dec 04 2146   Wed Dec 04, 2019   2122 I discussed removal with the patient.  He is in agreement.  Care was cleansed with dressing removed and a PICC line was removed without difficulty.  There is no discharge at the time of removal no discomfort and no complications.  I discussed follow-up and indications for return.  I have given him PICC line removal instructions.  We discussed indications for immediate returnVery pleasant patient and family verbalized  understanding agreement plan of care indications for follow-up and indications for immediate return.  [HH]      ED Course User Index  [HH] Colton Izaguirre MD                  [facility]    Final diagnoses:   Encounter for removal of peripherally inserted central catheter (PICC)              Colton Izaguirre MD  12/04/19 [id number]

## 2019-12-05 NOTE — DISCHARGE INSTRUCTIONS
Keep the PICC line removal area clean and dry and covered for the next 24 hours and until fully scabbed    Follow-up with your PCP or the answering doctor if you have any further difficulties at the site    Return to the ED at once if you have any acute urgent emergent or significant symptoms as discussed

## 2019-12-24 ENCOUNTER — OFFICE VISIT (OUTPATIENT)
Dept: FAMILY MEDICINE CLINIC | Facility: CLINIC | Age: 69
End: 2019-12-24

## 2019-12-24 ENCOUNTER — HOSPITAL ENCOUNTER (OUTPATIENT)
Dept: GENERAL RADIOLOGY | Facility: HOSPITAL | Age: 69
Discharge: HOME | End: 2019-12-24
Admitting: INTERNAL MEDICINE

## 2019-12-24 ENCOUNTER — TELEPHONE (OUTPATIENT)
Dept: FAMILY MEDICINE CLINIC | Facility: CLINIC | Age: 69
End: 2019-12-24

## 2019-12-24 VITALS
BODY MASS INDEX: 24.65 KG/M2 | HEART RATE: 63 BPM | SYSTOLIC BLOOD PRESSURE: 132 MMHG | OXYGEN SATURATION: 98 % | TEMPERATURE: 97.7 F | WEIGHT: 182 LBS | DIASTOLIC BLOOD PRESSURE: 86 MMHG | HEIGHT: 72 IN

## 2019-12-24 DIAGNOSIS — J40 BRONCHITIS: Primary | ICD-10-CM

## 2019-12-24 DIAGNOSIS — E34.9 TESTOSTERONE DEFICIENCY: ICD-10-CM

## 2019-12-24 DIAGNOSIS — J40 BRONCHITIS: ICD-10-CM

## 2019-12-24 PROCEDURE — 99214 OFFICE O/P EST MOD 30 MIN: CPT | Performed by: INTERNAL MEDICINE

## 2019-12-24 PROCEDURE — 71046 X-RAY EXAM CHEST 2 VIEWS: CPT

## 2019-12-24 RX ORDER — TESTOSTERONE CYPIONATE 200 MG/ML
200 INJECTION, SOLUTION INTRAMUSCULAR
Qty: 10 ML | Refills: 0 | Status: SHIPPED | OUTPATIENT
Start: 2019-12-24 | End: 2020-06-22 | Stop reason: SDUPTHER

## 2019-12-24 RX ORDER — CLONIDINE HYDROCHLORIDE 0.1 MG/1
TABLET ORAL
COMMUNITY
End: 2020-12-24

## 2019-12-24 RX ORDER — ESCITALOPRAM OXALATE 10 MG/1
TABLET ORAL
COMMUNITY
End: 2020-01-14

## 2019-12-24 RX ORDER — LORATADINE 10 MG/1
10 TABLET ORAL DAILY
Qty: 90 TABLET | Refills: 3 | Status: SHIPPED | OUTPATIENT
Start: 2019-12-24 | End: 2021-01-25

## 2019-12-24 NOTE — PROGRESS NOTES
Chief Complaint:  congestion    HPI:  Behzad Guzman is a 69 y.o. male who presents today for follow-up chronic medical conditions.  Acute complaints today include chills, runny nose, cough for the last 7 to 10 days.  He is currently taking doxycycline twice daily for recent infection.  He would like to review labs checked at [facility] yesterday.  He would like a refill on his testosterone supplement and also loratadine.  He has no other acute complaints or concerns today.    ROS:  Constitutional: no fevers, night sweats or unexplained weight loss  Eyes: no vision changes  ENT: + runny nose, +ear pain, +sore throat  Cardio: no chest pain, palpitations  Pulm: no shortness of breath, wheezing, +cough  GI: no abdominal pain or changes in bowel movements  : no difficulty urinating  MSK: no difficulty ambulating, no joint pain  Neuro: no weakness, dizziness or headache  Psych: no trouble sleeping  Endo: no change in appetite      Past Medical History:   Diagnosis Date   • Asthma    • Chronic hip pain, left    • Chronic pain in left shoulder    • Hyperlipidemia    • Hypertension    • Leg length discrepancy    • Neck pain, chronic    • Neuropathy    • Panic attacks    • Pre-diabetes    • Prediabetes    • Spinal stenosis       Family History   Problem Relation Age of Onset   • Heart attack Father    • Other Mother         accident      Social History     Socioeconomic History   • Marital status:      Spouse name: Not on file   • Number of children: Not on file   • Years of education: Not on file   • Highest education level: Not on file   Occupational History   • Occupation: farmer   Tobacco Use   • Smoking status: Former Smoker     Last attempt to quit:      Years since quittin.0   • Smokeless tobacco: Never Used   Substance and Sexual Activity   • Alcohol use: No   • Drug use: No   • Sexual activity: Defer   Social History Narrative    [place] with wife [place] a farm      Allergies   Allergen  Reactions   • Amoxicillin Rash   • Penicillins Rash      Immunization History   Administered Date(s) Administered   • Flu Vaccine Quad PF >36MO 09/30/2015   • Pneumococcal Conjugate 13-Valent (PCV13) 09/22/2016   • Pneumococcal Polysaccharide (PPSV23) 05/18/2018        PE:  Vitals:    12/24/19 1010   BP: 132/86   Pulse: 63   Temp: 97.7 °F (36.5 °C)   SpO2: 98%      Body mass index is 24.68 kg/m².    Gen Appearance: NAD  HEENT: Normocephalic, PERRLA, no thyromegaly, trache midline  Heart: RRR, normal S1 and S2, no murmur  Lungs: CTA b/l, no wheezing, no crackles  Abdomen: Soft, non-tender, non-distended, no guarding and BSx4  MSK: Moves all extremities well, normal gait, no peripheral edema  Pulses: Palpable and equal b/l  Lymph nodes: No palpable lymphadenopathy   Neuro: No focal deficits      Current Outpatient Medications   Medication Sig Dispense Refill   • amLODIPine (NORVASC) 5 MG tablet Take 1 tablet by mouth Daily. 90 tablet 3   • cloNIDine (CATAPRES) 0.1 MG tablet clonidine HCl 0.1 mg tablet     • diazePAM (VALIUM) 5 MG tablet Take 5 mg by mouth 2 (Two) Times a Day.  0   • doxycycline (VIBRAMYICN) 100 MG tablet      • DULoxetine (CYMBALTA) 30 MG capsule Take 1 capsule by mouth Daily. 7 capsule 0   • DULoxetine (CYMBALTA) 60 MG capsule Take 1 capsule by mouth Daily. 30 capsule 5   • erythromycin (ROMYCIN) 5 MG/GM ophthalmic ointment Administer  to both eyes Every 4 (Four) Hours While Awake. 3.5 g 0   • escitalopram (LEXAPRO) 10 MG tablet escitalopram 10 mg tablet     • gabapentin (NEURONTIN) 400 MG capsule Take 400 mg by mouth 3 (Three) Times a Day.     • glimepiride (AMARYL) 1 MG tablet Take 1 tablet by mouth Daily. 90 tablet 1   • hydrochlorothiazide (HYDRODIURIL) 12.5 MG tablet take 1 tablet by mouth once daily 30 tablet 3   • HYDROcodone-acetaminophen (NORCO)  MG per tablet Take 1 tablet by mouth Every 6 (Six) Hours As Needed for moderate pain (4-6).     • Hydrocodone-Acetaminophen (XODOL )   MG per tablet   0   • hydrocortisone 2.5 % cream Apply  topically to the appropriate area as directed 3 (Three) Times a Day. 60 g 3   • lisinopril (PRINIVIL,ZESTRIL) 20 MG tablet TAKE 2 TABLET BY MOUTH ONCE DAILY. 180 tablet 0   • loratadine (CLARITIN) 10 MG tablet Take 1 tablet by mouth Daily. 90 tablet 3   • metFORMIN (GLUCOPHAGE) 500 MG tablet Take 1 tablet by mouth Daily With Breakfast. 30 tablet 1   • metoprolol tartrate (LOPRESSOR) 50 MG tablet TAKE 1 TABLET BY MOUTH TWICE A  tablet 0   • Naloxegol Oxalate (MOVANTIK) 12.5 MG tablet Movantik 12.5 mg tablet     • NON FORMULARY GLIMEREX FOR PREDIABETES     • omeprazole (priLOSEC) 40 MG capsule Take 1 capsule by mouth Daily. before a meal 90 capsule 1   • ondansetron ODT (ZOFRAN-ODT) 4 MG disintegrating tablet Take 1 tablet by mouth 4 (Four) Times a Day As Needed for Nausea or Vomiting. 15 tablet 0   • ONETOUCH VERIO test strip Daily. for testing  0   • oxyCODONE (oxyCONTIN) 10 MG 12 hr tablet Take 10 mg by mouth Every 12 (Twelve) Hours As Needed.  0   • oxyCODONE-acetaminophen (PERCOCET) 5-325 MG per tablet Take 1 tablet by mouth Every 4 (Four) Hours As Needed for Moderate Pain  or Severe Pain . 15 tablet 0   • pravastatin (PRAVACHOL) 10 MG tablet Take 10 mg by mouth Daily.     • pravastatin (PRAVACHOL) 20 MG tablet TAKE 1 TABLET BY MOUTH EVERY  tablet 2   • Testosterone Cypionate (DEPOTESTOTERONE CYPIONATE) 200 MG/ML injection Inject 1 mL into the appropriate muscle as directed by prescriber Every 21 (Twenty-One) Days. 10 mL 0   • VENTOLIN  (90 Base) MCG/ACT inhaler inhalation 2 puffs by mouth every 4 to 6 hours if needed  0     No current facility-administered medications for this visit.       Counseling was given to patient for the following topics: instructions for management, risk factor reductions, impressions and risks and benefits of treatment options bronchitis and low testosterone. Total time of the encounter was 25 minutes  and 15 minutes was spent face to face counseling.    Behzad was seen today for chills, med refill and labs only.    Diagnoses and all orders for this visit:    Bronchitis  -     XR Chest PA & Lateral; Future  Lungs clear on exam.  Likely viral URI.  Checking chest x-ray with recent infections.  He is currently taking doxycycline twice daily.  He is following with infectious disease.  Testosterone deficiency  -     Testosterone Cypionate (DEPOTESTOTERONE CYPIONATE) 200 MG/ML injection; Inject 1 mL into the appropriate muscle as directed by prescriber Every 21 (Twenty-One) Days.  Controlled substance agreement on file.  Refill testosterone.  Recommend checking testosterone levels mid injection cycle at next visit.  Other orders  -     loratadine (CLARITIN) 10 MG tablet; Take 1 tablet by mouth Daily.  Refill loratadine.       No follow-ups on file.     Please note that portions of this document were completed with a voice recognition program. Efforts were made to edit the dictations, but occasionally words are mis-transcribed.

## 2019-12-26 DIAGNOSIS — J18.0 BRONCHOPNEUMONIA: Primary | ICD-10-CM

## 2019-12-26 RX ORDER — LEVOFLOXACIN 750 MG/1
750 TABLET ORAL DAILY
Qty: 5 TABLET | Refills: 0 | Status: SHIPPED | OUTPATIENT
Start: 2019-12-26 | End: 2020-05-12

## 2019-12-27 ENCOUNTER — HOSPITAL ENCOUNTER (OUTPATIENT)
Dept: CT IMAGING | Facility: HOSPITAL | Age: 69
Discharge: HOME | End: 2019-12-27
Admitting: INTERNAL MEDICINE

## 2019-12-27 DIAGNOSIS — J18.0 BRONCHOPNEUMONIA: ICD-10-CM

## 2019-12-27 LAB — CREAT BLDA-MCNC: 1 MG/DL (ref 0.6–1.3)

## 2019-12-27 PROCEDURE — 82565 ASSAY OF CREATININE: CPT

## 2019-12-27 PROCEDURE — 25010000002 IOPAMIDOL 61 % SOLUTION: Performed by: INTERNAL MEDICINE

## 2019-12-27 PROCEDURE — 71260 CT THORAX DX C+: CPT

## 2019-12-27 RX ADMIN — IOPAMIDOL 70 ML: 612 INJECTION, SOLUTION INTRAVENOUS at 15:52

## 2019-12-31 ENCOUNTER — PRIOR AUTHORIZATION (OUTPATIENT)
Dept: FAMILY MEDICINE CLINIC | Facility: CLINIC | Age: 69
End: 2019-12-31

## 2020-01-07 ENCOUNTER — TELEPHONE (OUTPATIENT)
Dept: FAMILY MEDICINE CLINIC | Facility: CLINIC | Age: 70
End: 2020-01-07

## 2020-01-07 NOTE — TELEPHONE ENCOUNTER
Patient called stating that he went to the hospital in [facility] yesterday for his lab results. He wanted to know if we received them and if Dr. Tran was able to review them. I explained that we have not gotten the results that I will call and request the labs he had done recently.     The patient began to explain he is experiencing fatigue, weakness, and that he's had a picline twice. He believes that his infection has came back that nobody has told him this has went away.     He was scheduled an appt to come in and discuss his sx further with Dr. Tran tomorrow at 1:30 pm.

## 2020-01-08 ENCOUNTER — OFFICE VISIT (OUTPATIENT)
Dept: FAMILY MEDICINE CLINIC | Facility: CLINIC | Age: 70
End: 2020-01-08

## 2020-01-08 ENCOUNTER — LAB (OUTPATIENT)
Dept: LAB | Facility: HOSPITAL | Age: 70
End: 2020-01-08

## 2020-01-08 VITALS
HEIGHT: 72 IN | OXYGEN SATURATION: 98 % | WEIGHT: 181 LBS | BODY MASS INDEX: 24.52 KG/M2 | TEMPERATURE: 97.6 F | SYSTOLIC BLOOD PRESSURE: 124 MMHG | DIASTOLIC BLOOD PRESSURE: 82 MMHG | HEART RATE: 79 BPM

## 2020-01-08 DIAGNOSIS — R53.83 FATIGUE, UNSPECIFIED TYPE: Primary | ICD-10-CM

## 2020-01-08 DIAGNOSIS — R52 GENERALIZED BODY ACHES: ICD-10-CM

## 2020-01-08 DIAGNOSIS — I10 ESSENTIAL HYPERTENSION: ICD-10-CM

## 2020-01-08 DIAGNOSIS — G89.29 CHRONIC LEFT SHOULDER PAIN: ICD-10-CM

## 2020-01-08 DIAGNOSIS — R53.83 FATIGUE, UNSPECIFIED TYPE: ICD-10-CM

## 2020-01-08 DIAGNOSIS — M25.512 CHRONIC LEFT SHOULDER PAIN: ICD-10-CM

## 2020-01-08 DIAGNOSIS — R79.9 ABNORMAL FINDING OF BLOOD CHEMISTRY, UNSPECIFIED: ICD-10-CM

## 2020-01-08 DIAGNOSIS — Z87.39 HISTORY OF OSTEOMYELITIS: ICD-10-CM

## 2020-01-08 LAB
ALBUMIN SERPL-MCNC: 4.2 G/DL (ref 3.5–5.2)
ALBUMIN/GLOB SERPL: 1.4 G/DL
ALP SERPL-CCNC: 58 U/L (ref 39–117)
ALT SERPL W P-5'-P-CCNC: 22 U/L (ref 1–41)
ANION GAP SERPL CALCULATED.3IONS-SCNC: 15.1 MMOL/L (ref 5–15)
AST SERPL-CCNC: 24 U/L (ref 1–40)
BASOPHILS # BLD AUTO: 0.05 10*3/MM3 (ref 0–0.2)
BASOPHILS NFR BLD AUTO: 0.7 % (ref 0–1.5)
BILIRUB SERPL-MCNC: 0.4 MG/DL (ref 0.2–1.2)
BUN BLD-MCNC: 3 MG/DL (ref 8–23)
BUN/CREAT SERPL: 3.9 (ref 7–25)
CALCIUM SPEC-SCNC: 9.4 MG/DL (ref 8.6–10.5)
CHLORIDE SERPL-SCNC: 90 MMOL/L (ref 98–107)
CO2 SERPL-SCNC: 24.9 MMOL/L (ref 22–29)
CREAT BLD-MCNC: 0.76 MG/DL (ref 0.76–1.27)
CRP SERPL-MCNC: 1.47 MG/DL (ref 0–0.5)
DEPRECATED RDW RBC AUTO: 46.6 FL (ref 37–54)
EOSINOPHIL # BLD AUTO: 0.25 10*3/MM3 (ref 0–0.4)
EOSINOPHIL NFR BLD AUTO: 3.4 % (ref 0.3–6.2)
ERYTHROCYTE [DISTWIDTH] IN BLOOD BY AUTOMATED COUNT: 15.4 % (ref 12.3–15.4)
ERYTHROCYTE [SEDIMENTATION RATE] IN BLOOD: 11 MM/HR (ref 0–20)
GFR SERPL CREATININE-BSD FRML MDRD: 102 ML/MIN/1.73
GLOBULIN UR ELPH-MCNC: 3 GM/DL
GLUCOSE BLD-MCNC: 140 MG/DL (ref 65–99)
HBA1C MFR BLD: 6.3 % (ref 4.8–5.6)
HCT VFR BLD AUTO: 39.3 % (ref 37.5–51)
HGB BLD-MCNC: 13.7 G/DL (ref 13–17.7)
IMM GRANULOCYTES # BLD AUTO: 0.02 10*3/MM3 (ref 0–0.05)
IMM GRANULOCYTES NFR BLD AUTO: 0.3 % (ref 0–0.5)
LYMPHOCYTES # BLD AUTO: 1.79 10*3/MM3 (ref 0.7–3.1)
LYMPHOCYTES NFR BLD AUTO: 24.2 % (ref 19.6–45.3)
MCH RBC QN AUTO: 28.6 PG (ref 26.6–33)
MCHC RBC AUTO-ENTMCNC: 34.9 G/DL (ref 31.5–35.7)
MCV RBC AUTO: 82 FL (ref 79–97)
MONOCYTES # BLD AUTO: 0.62 10*3/MM3 (ref 0.1–0.9)
MONOCYTES NFR BLD AUTO: 8.4 % (ref 5–12)
NEUTROPHILS # BLD AUTO: 4.67 10*3/MM3 (ref 1.7–7)
NEUTROPHILS NFR BLD AUTO: 63 % (ref 42.7–76)
NRBC BLD AUTO-RTO: 0 /100 WBC (ref 0–0.2)
PLATELET # BLD AUTO: 226 10*3/MM3 (ref 140–450)
PMV BLD AUTO: 12.7 FL (ref 6–12)
POTASSIUM BLD-SCNC: 3.6 MMOL/L (ref 3.5–5.2)
PROT SERPL-MCNC: 7.2 G/DL (ref 6–8.5)
RBC # BLD AUTO: 4.79 10*6/MM3 (ref 4.14–5.8)
SODIUM BLD-SCNC: 130 MMOL/L (ref 136–145)
T4 FREE SERPL-MCNC: 1.05 NG/DL (ref 0.93–1.7)
TSH SERPL DL<=0.05 MIU/L-ACNC: 1.25 UIU/ML (ref 0.27–4.2)
WBC NRBC COR # BLD: 7.4 10*3/MM3 (ref 3.4–10.8)

## 2020-01-08 PROCEDURE — 83036 HEMOGLOBIN GLYCOSYLATED A1C: CPT

## 2020-01-08 PROCEDURE — 80053 COMPREHEN METABOLIC PANEL: CPT

## 2020-01-08 PROCEDURE — 84443 ASSAY THYROID STIM HORMONE: CPT

## 2020-01-08 PROCEDURE — 86140 C-REACTIVE PROTEIN: CPT

## 2020-01-08 PROCEDURE — 85652 RBC SED RATE AUTOMATED: CPT

## 2020-01-08 PROCEDURE — 99214 OFFICE O/P EST MOD 30 MIN: CPT | Performed by: INTERNAL MEDICINE

## 2020-01-08 PROCEDURE — 85025 COMPLETE CBC W/AUTO DIFF WBC: CPT

## 2020-01-08 PROCEDURE — 84439 ASSAY OF FREE THYROXINE: CPT

## 2020-01-08 NOTE — PROGRESS NOTES
Chief Complaint:  fatigue    HPI:  Behzad Guzman is a 69 y.o. male who presents today for fatigue, cough, congestion, body aches for the last few weeks. Denies any fevers or chills.  He recently weaned down on oral pain medication as well worse since decreasing his dose.  He recently had lab work at [facility] and would like to review these results.  He continues to have chronic left shoulder pain, he has a complicated history of osteomyelitis and multiple surgeries.  He is trying to save money for evaluation at [facility].    ROS:  Constitutional: no fevers, night sweats or unexplained weight loss  Eyes: no vision changes  ENT: + runny nose, ear pain, +sore throat  Cardio: no chest pain, palpitations  Pulm: no shortness of breath, wheezing, + cough  GI: no abdominal pain or changes in bowel movements  : no difficulty urinating  MSK: no difficulty ambulating, + joint pain  Neuro: no weakness, dizziness or headache  Psych: no trouble sleeping  Endo: no change in appetite      Past Medical History:   Diagnosis Date   • Asthma    • Chronic hip pain, left    • Chronic pain in left shoulder    • Hyperlipidemia    • Hypertension    • Leg length discrepancy    • Neck pain, chronic    • Neuropathy    • Panic attacks    • Pre-diabetes    • Prediabetes    • Spinal stenosis       Family History   Problem Relation Age of Onset   • Heart attack Father    • Other Mother         accident      Social History     Socioeconomic History   • Marital status:      Spouse name: Not on file   • Number of children: Not on file   • Years of education: Not on file   • Highest education level: Not on file   Occupational History   • Occupation: farmer   Tobacco Use   • Smoking status: Former Smoker     Last attempt to quit:      Years since quittin.0   • Smokeless tobacco: Never Used   Substance and Sexual Activity   • Alcohol use: No   • Drug use: No   • Sexual activity: Defer   Social History Narrative     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    Gen Appearance: NAD  HEENT: Normocephalic, PERRLA, no thyromegaly, trache midline  Heart: RRR, normal S1 and S2, no murmur  Lungs: CTA b/l, no wheezing, no crackles  Abdomen: Soft, non-tender, non-distended, no guarding and BSx4  MSK: Moves all extremities well, normal gait, no peripheral edema  Pulses: Palpable and equal b/l  Lymph nodes: No palpable lymphadenopathy   Neuro: No focal deficits      Current Outpatient Medications   Medication Sig Dispense Refill   • amLODIPine (NORVASC) 5 MG tablet Take 1 tablet by mouth Daily. 90 tablet 3   • cloNIDine (CATAPRES) 0.1 MG tablet clonidine HCl 0.1 mg tablet     • diazePAM (VALIUM) 5 MG tablet Take 5 mg by mouth 2 (Two) Times a Day.  0   • doxycycline (VIBRAMYICN) 100 MG tablet      • DULoxetine (CYMBALTA) 30 MG capsule Take 1 capsule by mouth Daily. 7 capsule 0   • DULoxetine (CYMBALTA) 60 MG capsule Take 1 capsule by mouth Daily. 30 capsule 5   • erythromycin (ROMYCIN) 5 MG/GM ophthalmic ointment Administer  to both eyes Every 4 (Four) Hours While Awake. 3.5 g 0   • escitalopram (LEXAPRO) 10 MG tablet escitalopram 10 mg tablet     • gabapentin (NEURONTIN) 400 MG capsule Take 400 mg by mouth 3 (Three) Times a Day.     • glimepiride (AMARYL) 1 MG tablet Take 1 tablet by mouth Daily. 90 tablet 1   • hydrochlorothiazide (HYDRODIURIL) 12.5 MG tablet take 1 tablet by mouth once daily 30 tablet 3   • HYDROcodone-acetaminophen (NORCO)  MG per tablet Take 1 tablet by mouth Every 6 (Six) Hours As Needed for moderate pain  (4-6).     • Hydrocodone-Acetaminophen (XODOL )  MG per tablet   0   • hydrocortisone 2.5 % cream Apply  topically to the appropriate area as directed 3 (Three) Times a Day. 60 g 3   • levoFLOXacin (LEVAQUIN) 750 MG tablet Take 1 tablet by mouth Daily. 5 tablet 0   • lisinopril (PRINIVIL,ZESTRIL) 20 MG tablet TAKE 2 TABLET BY MOUTH ONCE DAILY. 180 tablet 0   • loratadine (CLARITIN) 10 MG tablet Take 1 tablet by mouth Daily. 90 tablet 3   • metFORMIN (GLUCOPHAGE) 500 MG tablet Take 1 tablet by mouth Daily With Breakfast. 30 tablet 1   • metoprolol tartrate (LOPRESSOR) 50 MG tablet TAKE 1 TABLET BY MOUTH TWICE A  tablet 0   • Naloxegol Oxalate (MOVANTIK) 12.5 MG tablet Movantik 12.5 mg tablet     • NON FORMULARY GLIMEREX FOR PREDIABETES     • omeprazole (priLOSEC) 40 MG capsule Take 1 capsule by mouth Daily. before a meal 90 capsule 1   • ondansetron ODT (ZOFRAN-ODT) 4 MG disintegrating tablet Take 1 tablet by mouth 4 (Four) Times a Day As Needed for Nausea or Vomiting. 15 tablet 0   • ONETOUCH VERIO test strip Daily. for testing  0   • oxyCODONE (oxyCONTIN) 10 MG 12 hr tablet Take 10 mg by mouth Every 12 (Twelve) Hours As Needed.  0   • oxyCODONE-acetaminophen (PERCOCET) 5-325 MG per tablet Take 1 tablet by mouth Every 4 (Four) Hours As Needed for Moderate Pain  or Severe Pain . 15 tablet 0   • pravastatin (PRAVACHOL) 10 MG tablet Take 10 mg by mouth Daily.     • pravastatin (PRAVACHOL) 20 MG tablet TAKE 1 TABLET BY MOUTH EVERY  tablet 2   • Testosterone Cypionate (DEPOTESTOTERONE CYPIONATE) 200 MG/ML injection Inject 1 mL into the appropriate muscle as directed by prescriber Every 21 (Twenty-One) Days. 10 mL 0   • VENTOLIN  (90 Base) MCG/ACT inhaler inhalation 2 puffs by mouth every 4 to 6 hours if needed  0     No current facility-administered medications for this visit.         Behzad was seen today for fatigue.    Diagnoses and all orders for this visit:    Fatigue, unspecified  type  -     CBC & Differential; Future  -     Comprehensive Metabolic Panel; Future  -     Hemoglobin A1c; Future  -     TSH+Free T4; Future  -     Sedimentation rate, automated; Future  -     C-reactive protein; Future  Checking screening blood work.  A lot of his symptoms are worsened when he decreased opioid dose.  Would recommend calling return to clinic no improvement over the next 7 to 10 days.  Lungs clear on exam.  Generalized body aches  See above.  Essential hypertension  -     CBC & Differential; Future  -     Comprehensive Metabolic Panel; Future  -     Hemoglobin A1c; Future  -     TSH+Free T4; Future  Stable.  Continue current medication.  Chronic left shoulder pain  -     Sedimentation rate, automated; Future  -     C-reactive protein; Future  Follow-up with orthopedics.  History of osteomyelitis  -     Sedimentation rate, automated; Future  -     C-reactive protein; Future    Abnormal finding of blood chemistry, unspecified   -     Hemoglobin A1c; Future         Return in about 4 weeks (around 2/5/2020) for Medicare Wellness.     Please note that portions of this document were completed with a voice recognition program. Efforts were made to edit the dictations, but occasionally words are mis-transcribed.

## 2020-01-13 ENCOUNTER — TELEPHONE (OUTPATIENT)
Dept: FAMILY MEDICINE CLINIC | Facility: CLINIC | Age: 70
End: 2020-01-13

## 2020-01-14 RX ORDER — DULOXETIN HYDROCHLORIDE 30 MG/1
30 CAPSULE, DELAYED RELEASE ORAL DAILY
Qty: 90 CAPSULE | Refills: 3 | Status: SHIPPED | OUTPATIENT
Start: 2020-01-14 | End: 2020-02-10

## 2020-01-14 NOTE — TELEPHONE ENCOUNTER
Called patient back, no answer.  Lab work looks okay besides elevated inflammatory markers.  This is nonspecific and is likely due to his ongoing shoulder infection.  Will need follow-up with orthopedics versus further imaging if his shoulder symptoms worsen.

## 2020-01-23 ENCOUNTER — TELEPHONE (OUTPATIENT)
Dept: ORTHOPEDIC SURGERY | Facility: CLINIC | Age: 70
End: 2020-01-23

## 2020-01-23 NOTE — TELEPHONE ENCOUNTER
PATIENT IS REQUESTING AN UPDATED SCRIPT FOR CUSTOM-MADE ORTHOTICS. HE DID NOT USE THE ONE GIVEN TO HIM AT 19 VISIT BEFORE IT . PATIENT CAN BE REACHED [phone].

## 2020-01-24 NOTE — TELEPHONE ENCOUNTER
Prescription was written for custom orthotics.  Patient may .  If he has further issues with his feet he will need to establish care with foot and ankle specialist.

## 2020-01-30 RX ORDER — GLIMEPIRIDE 1 MG/1
TABLET ORAL
Qty: 90 TABLET | Refills: 1 | Status: SHIPPED | OUTPATIENT
Start: 2020-01-30 | End: 2021-02-12

## 2020-02-01 RX ORDER — METOPROLOL TARTRATE 50 MG/1
TABLET, FILM COATED ORAL
Qty: 180 TABLET | Refills: 0 | Status: SHIPPED | OUTPATIENT
Start: 2020-02-01 | End: 2020-05-01

## 2020-02-01 RX ORDER — LISINOPRIL 20 MG/1
TABLET ORAL
Qty: 180 TABLET | Refills: 0 | Status: SHIPPED | OUTPATIENT
Start: 2020-02-01 | End: 2020-05-01

## 2020-02-01 RX ORDER — OMEPRAZOLE 40 MG/1
40 CAPSULE, DELAYED RELEASE ORAL DAILY
Qty: 90 CAPSULE | Refills: 1 | Status: SHIPPED | OUTPATIENT
Start: 2020-02-01 | End: 2021-07-12

## 2020-02-06 ENCOUNTER — TELEPHONE (OUTPATIENT)
Dept: FAMILY MEDICINE CLINIC | Facility: CLINIC | Age: 70
End: 2020-02-06

## 2020-02-06 NOTE — TELEPHONE ENCOUNTER
PATIENT CALLED AND MADE APPOINTMENT FOR Monday 10, 2020 WOULD LIKE TO HAVE DR BERNABE CALL HIM IN REGARDS TO MRI THAT HE HAS A PRESCRIPTION FROM DR. QUITA YEN FROM [place] AT [facility]     PLEASE CALL PATIENT [phone]

## 2020-02-10 ENCOUNTER — OFFICE VISIT (OUTPATIENT)
Dept: FAMILY MEDICINE CLINIC | Facility: CLINIC | Age: 70
End: 2020-02-10

## 2020-02-10 ENCOUNTER — LAB (OUTPATIENT)
Dept: LAB | Facility: HOSPITAL | Age: 70
End: 2020-02-10

## 2020-02-10 VITALS
OXYGEN SATURATION: 98 % | DIASTOLIC BLOOD PRESSURE: 86 MMHG | HEIGHT: 72 IN | BODY MASS INDEX: 24.92 KG/M2 | WEIGHT: 184 LBS | HEART RATE: 75 BPM | SYSTOLIC BLOOD PRESSURE: 130 MMHG

## 2020-02-10 DIAGNOSIS — G89.29 CHRONIC LEFT SHOULDER PAIN: Primary | ICD-10-CM

## 2020-02-10 DIAGNOSIS — M25.512 CHRONIC LEFT SHOULDER PAIN: ICD-10-CM

## 2020-02-10 DIAGNOSIS — Z87.39 HISTORY OF OSTEOMYELITIS: ICD-10-CM

## 2020-02-10 DIAGNOSIS — G89.29 CHRONIC LEFT SHOULDER PAIN: ICD-10-CM

## 2020-02-10 DIAGNOSIS — F41.1 GENERALIZED ANXIETY DISORDER: ICD-10-CM

## 2020-02-10 DIAGNOSIS — E87.1 HYPONATREMIA: ICD-10-CM

## 2020-02-10 DIAGNOSIS — M75.102 TEAR OF LEFT ROTATOR CUFF, UNSPECIFIED TEAR EXTENT, UNSPECIFIED WHETHER TRAUMATIC: ICD-10-CM

## 2020-02-10 DIAGNOSIS — M19.049 OSTEOARTHRITIS OF HAND, UNSPECIFIED LATERALITY, UNSPECIFIED OSTEOARTHRITIS TYPE: ICD-10-CM

## 2020-02-10 DIAGNOSIS — M25.512 CHRONIC LEFT SHOULDER PAIN: Primary | ICD-10-CM

## 2020-02-10 LAB
ALBUMIN SERPL-MCNC: 5 G/DL (ref 3.5–5.2)
ALBUMIN/GLOB SERPL: 2 G/DL
ALP SERPL-CCNC: 68 U/L (ref 39–117)
ALT SERPL W P-5'-P-CCNC: 20 U/L (ref 1–41)
ANION GAP SERPL CALCULATED.3IONS-SCNC: 16.1 MMOL/L (ref 5–15)
AST SERPL-CCNC: 22 U/L (ref 1–40)
BASOPHILS # BLD AUTO: 0.07 10*3/MM3 (ref 0–0.2)
BASOPHILS NFR BLD AUTO: 0.9 % (ref 0–1.5)
BILIRUB SERPL-MCNC: 0.7 MG/DL (ref 0.2–1.2)
BUN BLD-MCNC: 8 MG/DL (ref 8–23)
BUN/CREAT SERPL: 7.1 (ref 7–25)
CALCIUM SPEC-SCNC: 9.7 MG/DL (ref 8.6–10.5)
CHLORIDE SERPL-SCNC: 95 MMOL/L (ref 98–107)
CO2 SERPL-SCNC: 24.9 MMOL/L (ref 22–29)
CREAT BLD-MCNC: 1.13 MG/DL (ref 0.76–1.27)
CRP SERPL-MCNC: 0.04 MG/DL (ref 0–0.5)
DEPRECATED RDW RBC AUTO: 45.3 FL (ref 37–54)
EOSINOPHIL # BLD AUTO: 0.4 10*3/MM3 (ref 0–0.4)
EOSINOPHIL NFR BLD AUTO: 5.2 % (ref 0.3–6.2)
ERYTHROCYTE [DISTWIDTH] IN BLOOD BY AUTOMATED COUNT: 13.9 % (ref 12.3–15.4)
ERYTHROCYTE [SEDIMENTATION RATE] IN BLOOD: 2 MM/HR (ref 0–20)
GFR SERPL CREATININE-BSD FRML MDRD: 65 ML/MIN/1.73
GLOBULIN UR ELPH-MCNC: 2.5 GM/DL
GLUCOSE BLD-MCNC: 100 MG/DL (ref 65–99)
HCT VFR BLD AUTO: 47.1 % (ref 37.5–51)
HGB BLD-MCNC: 15.9 G/DL (ref 13–17.7)
IMM GRANULOCYTES # BLD AUTO: 0.02 10*3/MM3 (ref 0–0.05)
IMM GRANULOCYTES NFR BLD AUTO: 0.3 % (ref 0–0.5)
LYMPHOCYTES # BLD AUTO: 2.39 10*3/MM3 (ref 0.7–3.1)
LYMPHOCYTES NFR BLD AUTO: 31.4 % (ref 19.6–45.3)
MCH RBC QN AUTO: 30 PG (ref 26.6–33)
MCHC RBC AUTO-ENTMCNC: 33.8 G/DL (ref 31.5–35.7)
MCV RBC AUTO: 88.9 FL (ref 79–97)
MONOCYTES # BLD AUTO: 0.81 10*3/MM3 (ref 0.1–0.9)
MONOCYTES NFR BLD AUTO: 10.6 % (ref 5–12)
NEUTROPHILS # BLD AUTO: 3.93 10*3/MM3 (ref 1.7–7)
NEUTROPHILS NFR BLD AUTO: 51.6 % (ref 42.7–76)
NRBC BLD AUTO-RTO: 0 /100 WBC (ref 0–0.2)
PLATELET # BLD AUTO: 215 10*3/MM3 (ref 140–450)
PMV BLD AUTO: 12.9 FL (ref 6–12)
POTASSIUM BLD-SCNC: 4 MMOL/L (ref 3.5–5.2)
PROT SERPL-MCNC: 7.5 G/DL (ref 6–8.5)
RBC # BLD AUTO: 5.3 10*6/MM3 (ref 4.14–5.8)
SODIUM BLD-SCNC: 136 MMOL/L (ref 136–145)
WBC NRBC COR # BLD: 7.62 10*3/MM3 (ref 3.4–10.8)

## 2020-02-10 PROCEDURE — 80053 COMPREHEN METABOLIC PANEL: CPT

## 2020-02-10 PROCEDURE — 85652 RBC SED RATE AUTOMATED: CPT

## 2020-02-10 PROCEDURE — 36415 COLL VENOUS BLD VENIPUNCTURE: CPT

## 2020-02-10 PROCEDURE — 85025 COMPLETE CBC W/AUTO DIFF WBC: CPT

## 2020-02-10 PROCEDURE — 86140 C-REACTIVE PROTEIN: CPT

## 2020-02-10 PROCEDURE — 99214 OFFICE O/P EST MOD 30 MIN: CPT | Performed by: INTERNAL MEDICINE

## 2020-02-10 RX ORDER — ESCITALOPRAM OXALATE 10 MG/1
10 TABLET ORAL DAILY
Qty: 30 TABLET | Refills: 5 | OUTPATIENT
Start: 2020-02-10 | End: 2021-06-29

## 2020-02-11 NOTE — PROGRESS NOTES
Chief Complaint   Patient presents with   • Shoulder Pain     Discuss order for MRI        HPI:  Behzad Guzman is a 69 y.o. male who presents today for f/u chronic left shoulder pain. He brings in a script for MRI shoulder (L) w/o contrast per Dr. Caba. He is concerned he may still have an infection.  He reports daily symptoms of deep shoulder.  Worsening fatigue over the last few months.  Patient is unsure what to do with the MRI prescription.  Generalized anxiety-patient self discontinued Cymbalta.  He would like to try Lexapro again.  He was previously taking this medication without side effects.  Daily symptoms of anxiety.  Osteoarthritis of multiple joints-patient requesting pain gel for his hands, he was using a compound cream prescribed by pain management.    ROS:  Constitutional: no fevers, night sweats or unexplained weight loss  Eyes: no vision changes  ENT: no runny nose, ear pain, sore throat  Cardio: no chest pain, palpitations  Pulm: no shortness of breath, wheezing, or cough  GI: no abdominal pain or changes in bowel movements  : no difficulty urinating  MSK: no difficulty ambulating, + joint pain  Neuro: no weakness, dizziness or headache  Psych: no trouble sleeping  Endo: no change in appetite      Past Medical History:   Diagnosis Date   • Asthma    • Chronic hip pain, left    • Chronic pain in left shoulder    • Hyperlipidemia    • Hypertension    • Leg length discrepancy    • Neck pain, chronic    • Neuropathy    • Panic attacks    • Pre-diabetes    • Prediabetes    • Spinal stenosis       Family History   Problem Relation Age of Onset   • Heart attack Father    • Other Mother         accident      Social History     Socioeconomic History   • Marital status:      Spouse name: Not on file   • Number of children: Not on file   • Years of education: Not on file   • Highest education level: Not on file   Occupational History   • Occupation: farmer   Tobacco Use   • Smoking status:  Former Smoker     Last attempt to quit: 1987     Years since quittin.1   • Smokeless tobacco: Never Used   Substance and Sexual Activity   • Alcohol use: No   • Drug use: No   • Sexual activity: Defer   Social History Narrative    Lives with wife on a farm      Allergies   Allergen Reactions   • Amoxicillin Rash   • Penicillins Rash      Immunization History   Administered Date(s) Administered   • Flu Vaccine Quad PF >36MO 2015   • Pneumococcal Conjugate 13-Valent (PCV13) 2016   • Pneumococcal Polysaccharide (PPSV23) 2018        PE:  Vitals:    02/10/20 1530   BP: 130/86   Pulse: 75   SpO2: 98%      Body mass index is 24.95 kg/m².    Gen Appearance: NAD  HEENT: Normocephalic, PERRLA, no thyromegaly, trache midline  Heart: RRR, normal S1 and S2, no murmur  Lungs: CTA b/l, no wheezing, no crackles  Abdomen: Soft, non-tender, non-distended, no guarding and BSx4  MSK: Moves all extremities well, normal gait, no peripheral edema  Pulses: Palpable and equal b/l  Lymph nodes: No palpable lymphadenopathy   Neuro: No focal deficits      Current Outpatient Medications   Medication Sig Dispense Refill   • amLODIPine (NORVASC) 5 MG tablet Take 1 tablet by mouth Daily. 90 tablet 3   • cloNIDine (CATAPRES) 0.1 MG tablet clonidine HCl 0.1 mg tablet     • diazePAM (VALIUM) 5 MG tablet Take 5 mg by mouth 2 (Two) Times a Day.  0   • diclofenac (VOLTAREN) 1 % gel gel Apply 4 g topically to the appropriate area as directed 3 (Three) Times a Day As Needed (hand pain). 100 g 1   • doxycycline (VIBRAMYICN) 100 MG tablet      • erythromycin (ROMYCIN) 5 MG/GM ophthalmic ointment Administer  to both eyes Every 4 (Four) Hours While Awake. 3.5 g 0   • escitalopram (LEXAPRO) 10 MG tablet Take 1 tablet by mouth Daily. 30 tablet 5   • gabapentin (NEURONTIN) 400 MG capsule Take 400 mg by mouth 3 (Three) Times a Day.     • glimepiride (AMARYL) 1 MG tablet TAKE 1 TABLET BY MOUTH ONCE DAILY 90 tablet 1   • hydrochlorothiazide  (HYDRODIURIL) 12.5 MG tablet take 1 tablet by mouth once daily 30 tablet 3   • HYDROcodone-acetaminophen (NORCO)  MG per tablet Take 1 tablet by mouth Every 6 (Six) Hours As Needed for moderate pain (4-6).     • Hydrocodone-Acetaminophen (XODOL )  MG per tablet   0   • hydrocortisone 2.5 % cream Apply  topically to the appropriate area as directed 3 (Three) Times a Day. 60 g 3   • levoFLOXacin (LEVAQUIN) 750 MG tablet Take 1 tablet by mouth Daily. 5 tablet 0   • lisinopril (PRINIVIL,ZESTRIL) 20 MG tablet TAKE 2 TABLET BY MOUTH ONCE DAILY. 180 tablet 0   • loratadine (CLARITIN) 10 MG tablet Take 1 tablet by mouth Daily. 90 tablet 3   • metFORMIN (GLUCOPHAGE) 500 MG tablet Take 1 tablet by mouth Daily With Breakfast. 30 tablet 1   • metoprolol tartrate (LOPRESSOR) 50 MG tablet TAKE 1 TABLET BY MOUTH TWICE A  tablet 0   • Naloxegol Oxalate (MOVANTIK) 12.5 MG tablet Movantik 12.5 mg tablet     • NON FORMULARY GLIMEREX FOR PREDIABETES     • omeprazole (priLOSEC) 40 MG capsule TAKE 1 CAPSULE BY MOUTH DAILY BEFORE A MEAL 90 capsule 1   • ondansetron ODT (ZOFRAN-ODT) 4 MG disintegrating tablet Take 1 tablet by mouth 4 (Four) Times a Day As Needed for Nausea or Vomiting. 15 tablet 0   • ONETOUCH VERIO test strip Daily. for testing  0   • oxyCODONE (oxyCONTIN) 10 MG 12 hr tablet Take 10 mg by mouth Every 12 (Twelve) Hours As Needed.  0   • oxyCODONE-acetaminophen (PERCOCET) 5-325 MG per tablet Take 1 tablet by mouth Every 4 (Four) Hours As Needed for Moderate Pain  or Severe Pain . 15 tablet 0   • pravastatin (PRAVACHOL) 10 MG tablet Take 10 mg by mouth Daily.     • pravastatin (PRAVACHOL) 20 MG tablet TAKE 1 TABLET BY MOUTH EVERY  tablet 2   • Testosterone Cypionate (DEPOTESTOTERONE CYPIONATE) 200 MG/ML injection Inject 1 mL into the appropriate muscle as directed by prescriber Every 21 (Twenty-One) Days. 10 mL 0   • VENTOLIN  (90 Base) MCG/ACT inhaler inhalation 2 puffs by mouth every  4 to 6 hours if needed  0     No current facility-administered medications for this visit.         Behzad was seen today for shoulder pain.  Patient requesting that we order MRI of left shoulder.  We will need to send results to his surgeon in [place].  Starting on Lexapro for anxiety, Voltaren gel for osteoarthritis of hand.  Checking blood work.    Diagnoses and all orders for this visit:    Chronic left shoulder pain  -     MRI Shoulder Left Without Contrast; Future  -     CBC & Differential; Future  -     Comprehensive Metabolic Panel; Future  -     Sedimentation rate, automated; Future  -     C-reactive protein; Future    History of osteomyelitis  -     MRI Shoulder Left Without Contrast; Future  -     CBC & Differential; Future  -     Comprehensive Metabolic Panel; Future  -     Sedimentation rate, automated; Future  -     C-reactive protein; Future    Tear of left rotator cuff, unspecified tear extent, unspecified whether traumatic  -     MRI Shoulder Left Without Contrast; Future    Generalized anxiety disorder  -     escitalopram (LEXAPRO) 10 MG tablet; Take 1 tablet by mouth Daily.    Osteoarthritis of hand, unspecified laterality, unspecified osteoarthritis type  -     diclofenac (VOLTAREN) 1 % gel gel; Apply 4 g topically to the appropriate area as directed 3 (Three) Times a Day As Needed (hand pain).    Hyponatremia  -     Comprehensive Metabolic Panel; Future         No follow-ups on file.     Please note that portions of this document were completed with a voice recognition program. Efforts were made to edit the dictations, but occasionally words are mis-transcribed.

## 2020-02-18 ENCOUNTER — TELEPHONE (OUTPATIENT)
Dept: FAMILY MEDICINE CLINIC | Facility: CLINIC | Age: 70
End: 2020-02-18

## 2020-02-18 NOTE — TELEPHONE ENCOUNTER
Pt called about a cream that he could use right hand. He don't remember the name.. Please advise.. Also would like some to call about blood test labs done    Pt ca be reached at [phone]

## 2020-02-19 NOTE — TELEPHONE ENCOUNTER
Cream is voltaren gel. This was sent to his pharmacy. If not effective then I could send alternative pain gel to compound pharmacy on [address].     His blood tests are improved compared to last time. One of his inflammatory makers is still mildly elevated. Would recommend proceeding with MRI of shoulder as discussed. A letter was sent for his records.

## 2020-02-19 NOTE — TELEPHONE ENCOUNTER
Called patient and advised. Pt verbalized understanding and stated that he thought it was for his legs and he got confused.

## 2020-02-20 ENCOUNTER — HOSPITAL ENCOUNTER (OUTPATIENT)
Dept: MRI IMAGING | Facility: HOSPITAL | Age: 70
Discharge: HOME | End: 2020-02-20
Admitting: INTERNAL MEDICINE

## 2020-02-20 DIAGNOSIS — Z87.39 HISTORY OF OSTEOMYELITIS: ICD-10-CM

## 2020-02-20 DIAGNOSIS — M25.512 CHRONIC LEFT SHOULDER PAIN: ICD-10-CM

## 2020-02-20 DIAGNOSIS — G89.29 CHRONIC LEFT SHOULDER PAIN: ICD-10-CM

## 2020-02-20 DIAGNOSIS — M75.102 TEAR OF LEFT ROTATOR CUFF, UNSPECIFIED TEAR EXTENT, UNSPECIFIED WHETHER TRAUMATIC: ICD-10-CM

## 2020-02-20 PROCEDURE — 73221 MRI JOINT UPR EXTREM W/O DYE: CPT

## 2020-02-21 ENCOUNTER — TELEPHONE (OUTPATIENT)
Dept: FAMILY MEDICINE CLINIC | Facility: CLINIC | Age: 70
End: 2020-02-21

## 2020-02-21 NOTE — TELEPHONE ENCOUNTER
Mri results from [phone] [facility].    Please advise.  Call back:[phone]

## 2020-03-04 ENCOUNTER — OFFICE VISIT (OUTPATIENT)
Dept: FAMILY MEDICINE CLINIC | Facility: CLINIC | Age: 70
End: 2020-03-04

## 2020-03-04 ENCOUNTER — HOSPITAL ENCOUNTER (OUTPATIENT)
Dept: RADIOLOGY | Facility: CLINIC | Age: 70
Discharge: HOME | End: 2020-03-04

## 2020-03-04 VITALS
OXYGEN SATURATION: 98 % | HEIGHT: 72 IN | SYSTOLIC BLOOD PRESSURE: 126 MMHG | BODY MASS INDEX: 25.33 KG/M2 | TEMPERATURE: 98 F | WEIGHT: 187 LBS | DIASTOLIC BLOOD PRESSURE: 78 MMHG | HEART RATE: 77 BPM

## 2020-03-04 DIAGNOSIS — L57.0 ACTINIC KERATOSIS: ICD-10-CM

## 2020-03-04 DIAGNOSIS — J01.00 ACUTE NON-RECURRENT MAXILLARY SINUSITIS: Primary | ICD-10-CM

## 2020-03-04 DIAGNOSIS — M19.049 ARTHRITIS OF HAND: ICD-10-CM

## 2020-03-04 DIAGNOSIS — M65.30 TRIGGER FINGER OF RIGHT HAND, UNSPECIFIED FINGER: ICD-10-CM

## 2020-03-04 PROCEDURE — 73130 X-RAY EXAM OF HAND: CPT | Performed by: INTERNAL MEDICINE

## 2020-03-04 PROCEDURE — 99214 OFFICE O/P EST MOD 30 MIN: CPT | Performed by: INTERNAL MEDICINE

## 2020-03-04 RX ORDER — CEFDINIR 300 MG/1
300 CAPSULE ORAL 2 TIMES DAILY
Qty: 14 CAPSULE | Refills: 0 | Status: SHIPPED | OUTPATIENT
Start: 2020-03-04 | End: 2020-03-11

## 2020-03-04 NOTE — PROGRESS NOTES
Chief Complaint   Patient presents with   • Sinusitis     nasal congestion, yellow mucous.    • Eye Drainage       HPI:  Behzad Guzman is a 69 y.o. male who presents today for [place], productive cough and postnasal drip for the last 3 days.  Patient reports fevers and chills at home.  Denies any recent flu exposures.  He said symptoms for the last 5 days.  Slowly worsening since onset.  Currently not taking any over-the-counter medication at this time.  Rash- bilateral upper extremities, itching, onset several months ago.  He would like to establish with dermatology.  Right hand pain- swelling and right hand pain worsening over the last 4 weeks.  He reports his fingers get locked in the flexed position.  Primarily middle and index finger although it occurs in all digits.    ROS:  Constitutional: no fevers, night sweats or unexplained weight loss  Eyes: no vision changes  ENT:+runny nose,+ ear pain, +sore throat  Cardio: no chest pain, palpitations  Pulm: no shortness of breath, wheezing, + cough  GI: no abdominal pain or changes in bowel movements  : no difficulty urinating  MSK: no difficulty ambulating, + joint pain  Neuro: no weakness, dizziness or headache  Psych: no trouble sleeping  Endo: no change in appetite      Past Medical History:   Diagnosis Date   • Asthma    • Chronic hip pain, left    • Chronic pain in left shoulder    • Hyperlipidemia    • Hypertension    • Leg length discrepancy    • Neck pain, chronic    • Neuropathy    • Panic attacks    • Pre-diabetes    • Prediabetes    • Spinal stenosis       Family History   Problem Relation Age of Onset   • Heart attack Father    • Other Mother         accident      Social History     Socioeconomic History   • Marital status:      Spouse name: Not on file   • Number of children: Not on file   • Years of education: Not on file   • Highest education level: Not on file   Occupational History   • Occupation: farmer   Tobacco Use   • Smoking status:  Former Smoker     Last attempt to quit: 1987     Years since quittin.1   • Smokeless tobacco: Never Used   Substance and Sexual Activity   • Alcohol use: No   • Drug use: No   • Sexual activity: Defer   Social History Narrative    Lives with wife on a farm      Allergies   Allergen Reactions   • Amoxicillin Rash   • Penicillins Rash      Immunization History   Administered Date(s) Administered   • Flu Vaccine Quad PF >36MO 2015   • Pneumococcal Conjugate 13-Valent (PCV13) 2016   • Pneumococcal Polysaccharide (PPSV23) 2018        PE:  Vitals:    20 1125   BP: 126/78   Pulse: 77   Temp: 98 °F (36.7 °C)   SpO2: 98%      Body mass index is 25.36 kg/m².    Gen Appearance: NAD  HEENT: Normocephalic, PERRLA, no thyromegaly, trache midline, clear TM bilaterally  Heart: RRR, normal S1 and S2, no murmur  Lungs: CTA b/l, no wheezing, no crackles  Abdomen: Soft, non-tender, non-distended, no guarding and BSx4  MSK: Moves all extremities well, normal gait, no peripheral edema  Pulses: Palpable and equal b/l  Lymph nodes: No palpable lymphadenopathy   Neuro: No focal deficits      Current Outpatient Medications   Medication Sig Dispense Refill   • amLODIPine (NORVASC) 5 MG tablet Take 1 tablet by mouth Daily. 90 tablet 3   • cefdinir (OMNICEF) 300 MG capsule Take 1 capsule by mouth 2 (Two) Times a Day for 7 days. 14 capsule 0   • cloNIDine (CATAPRES) 0.1 MG tablet clonidine HCl 0.1 mg tablet     • diazePAM (VALIUM) 5 MG tablet Take 5 mg by mouth 2 (Two) Times a Day.  0   • diclofenac (VOLTAREN) 1 % gel gel Apply 4 g topically to the appropriate area as directed 3 (Three) Times a Day As Needed (hand pain). 100 g 1   • doxycycline (VIBRAMYICN) 100 MG tablet      • erythromycin (ROMYCIN) 5 MG/GM ophthalmic ointment Administer  to both eyes Every 4 (Four) Hours While Awake. 3.5 g 0   • escitalopram (LEXAPRO) 10 MG tablet Take 1 tablet by mouth Daily. 30 tablet 5   • gabapentin (NEURONTIN) 400 MG capsule  Take 400 mg by mouth 3 (Three) Times a Day.     • glimepiride (AMARYL) 1 MG tablet TAKE 1 TABLET BY MOUTH ONCE DAILY 90 tablet 1   • hydrochlorothiazide (HYDRODIURIL) 12.5 MG tablet take 1 tablet by mouth once daily 30 tablet 3   • HYDROcodone-acetaminophen (NORCO)  MG per tablet Take 1 tablet by mouth Every 6 (Six) Hours As Needed for moderate pain (4-6).     • Hydrocodone-Acetaminophen (XODOL )  MG per tablet   0   • hydrocortisone 2.5 % cream Apply  topically to the appropriate area as directed 3 (Three) Times a Day. 60 g 3   • levoFLOXacin (LEVAQUIN) 750 MG tablet Take 1 tablet by mouth Daily. 5 tablet 0   • lisinopril (PRINIVIL,ZESTRIL) 20 MG tablet TAKE 2 TABLET BY MOUTH ONCE DAILY. 180 tablet 0   • loratadine (CLARITIN) 10 MG tablet Take 1 tablet by mouth Daily. 90 tablet 3   • metFORMIN (GLUCOPHAGE) 500 MG tablet Take 1 tablet by mouth Daily With Breakfast. 30 tablet 1   • metoprolol tartrate (LOPRESSOR) 50 MG tablet TAKE 1 TABLET BY MOUTH TWICE A  tablet 0   • Naloxegol Oxalate (MOVANTIK) 12.5 MG tablet Movantik 12.5 mg tablet     • NON FORMULARY GLIMEREX FOR PREDIABETES     • omeprazole (priLOSEC) 40 MG capsule TAKE 1 CAPSULE BY MOUTH DAILY BEFORE A MEAL 90 capsule 1   • ondansetron ODT (ZOFRAN-ODT) 4 MG disintegrating tablet Take 1 tablet by mouth 4 (Four) Times a Day As Needed for Nausea or Vomiting. 15 tablet 0   • ONETOUCH VERIO test strip Daily. for testing  0   • oxyCODONE (oxyCONTIN) 10 MG 12 hr tablet Take 10 mg by mouth Every 12 (Twelve) Hours As Needed.  0   • oxyCODONE-acetaminophen (PERCOCET) 5-325 MG per tablet Take 1 tablet by mouth Every 4 (Four) Hours As Needed for Moderate Pain  or Severe Pain . 15 tablet 0   • pravastatin (PRAVACHOL) 10 MG tablet Take 10 mg by mouth Daily.     • pravastatin (PRAVACHOL) 20 MG tablet TAKE 1 TABLET BY MOUTH EVERY  tablet 2   • Testosterone Cypionate (DEPOTESTOTERONE CYPIONATE) 200 MG/ML injection Inject 1 mL into the  appropriate muscle as directed by prescriber Every 21 (Twenty-One) Days. 10 mL 0   • VENTOLIN  (90 Base) MCG/ACT inhaler inhalation 2 puffs by mouth every 4 to 6 hours if needed  0     No current facility-administered medications for this visit.     Advance Care Planning   ACP discussion was declined by the patient.      Behzad was seen today for sinusitis and eye drainage.    Diagnoses and all orders for this visit:    Acute non-recurrent maxillary sinusitis  -     cefdinir (OMNICEF) 300 MG capsule; Take 1 capsule by mouth 2 (Two) Times a Day for 7 days.  Penicillin allergy.  Treat for bacterial sinusitis.  Call return to clinic if no improvement over the next 7 days.  Arthritis of hand  -     XR Hand 3+ View Right; Future  -     Ambulatory Referral to Orthopedic Surgery  Checking x-ray of pain today, no locking on exam although he is describing trigger finger.  Recommend establishing with orthopedics pending x-ray results.  Actinic keratosis  -     Ambulatory Referral to Dermatology  Refer to dermatology to establish care for actinic keratosis of bilateral upper extremities.  He has a lower extremity rash ongoing for the last several months as well.  He would like a full skin check as well.  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  Trigger finger of right hand, unspecified finger  -     Ambulatory Referral to Orthopedic Surgery  See above.       No follow-ups on file.     Please note that portions of this document were completed with a voice recognition program. Efforts were made to edit the dictations, but occasionally words are mis-transcribed.

## 2020-03-06 ENCOUNTER — TELEPHONE (OUTPATIENT)
Dept: FAMILY MEDICINE CLINIC | Facility: CLINIC | Age: 70
End: 2020-03-06

## 2020-03-06 NOTE — TELEPHONE ENCOUNTER
Pt said a cream was supposed to be sent to the compounding pharmacy for his right hand and arthritis.

## 2020-03-06 NOTE — TELEPHONE ENCOUNTER
This should have been faxed already, it was scanned in under media on 3/4.  If the pharmacy has not received it, can someone please resend fax?

## 2020-03-09 DIAGNOSIS — D16.11: Primary | ICD-10-CM

## 2020-03-11 ENCOUNTER — OFFICE VISIT (OUTPATIENT)
Dept: FAMILY MEDICINE CLINIC | Facility: CLINIC | Age: 70
End: 2020-03-11

## 2020-03-11 VITALS
WEIGHT: 187 LBS | SYSTOLIC BLOOD PRESSURE: 132 MMHG | HEART RATE: 70 BPM | OXYGEN SATURATION: 97 % | BODY MASS INDEX: 25.33 KG/M2 | TEMPERATURE: 98.1 F | HEIGHT: 72 IN | DIASTOLIC BLOOD PRESSURE: 86 MMHG

## 2020-03-11 DIAGNOSIS — R53.83 FATIGUE, UNSPECIFIED TYPE: ICD-10-CM

## 2020-03-11 DIAGNOSIS — G89.29 CHRONIC PAIN IN LEFT SHOULDER: ICD-10-CM

## 2020-03-11 DIAGNOSIS — M25.512 CHRONIC PAIN IN LEFT SHOULDER: ICD-10-CM

## 2020-03-11 DIAGNOSIS — D16.11: Primary | ICD-10-CM

## 2020-03-11 PROCEDURE — 99215 OFFICE O/P EST HI 40 MIN: CPT | Performed by: INTERNAL MEDICINE

## 2020-03-11 NOTE — PROGRESS NOTES
Chief Complaint   Patient presents with   • Shoulder Pain     4 wk f/u    • Extremity Weakness     bilateral legs        HPI:  Behzad Guzman is a 69 y.o. male who presents today for follow-up.  He continued to have right hand pain.  He has not heard from orthopedic referral yet.  He was unable to get pain gel from helping pharmacy.  He would like to review x-ray results.  Chronic shoulder pain- plans on following up at [facility] or [facility] for further evaluation.  He is having some financial difficulties at this time.  Fatigue- ongoing since recent sinus infection, slowly improving at this time.  Denies any fever or chills, no trouble breathing.    ROS:  Constitutional: no fevers, night sweats or unexplained weight loss  Eyes: no vision changes  ENT: no runny nose, ear pain, sore throat  Cardio: no chest pain, palpitations  Pulm: no shortness of breath, wheezing, or cough  GI: no abdominal pain or changes in bowel movements  : no difficulty urinating  MSK: no difficulty ambulating, + joint pain  Neuro: no weakness, dizziness or headache  Psych: no trouble sleeping  Endo: no change in appetite      Past Medical History:   Diagnosis Date   • Asthma    • Chronic hip pain, left    • Chronic pain in left shoulder    • Hyperlipidemia    • Hypertension    • Leg length discrepancy    • Neck pain, chronic    • Neuropathy    • Panic attacks    • Pre-diabetes    • Prediabetes    • Spinal stenosis       Family History   Problem Relation Age of Onset   • Heart attack Father    • Other Mother         accident      Social History     Socioeconomic History   • Marital status:      Spouse name: Not on file   • Number of children: Not on file   • Years of education: Not on file   • Highest education level: Not on file   Occupational History   • Occupation: farmer   Tobacco Use   • Smoking status: Former Smoker     Last attempt to quit:      Years since quittin.2   • Smokeless tobacco: Never Used    Substance and Sexual Activity   • Alcohol use: No   • Drug use: No   • Sexual activity: Defer   Social History Narrative    Lives with wife on a farm      Allergies   Allergen Reactions   • Amoxicillin Rash   • Penicillins Rash      Immunization History   Administered Date(s) Administered   • Flu Vaccine Quad PF >36MO 09/30/2015   • Pneumococcal Conjugate 13-Valent (PCV13) 09/22/2016   • Pneumococcal Polysaccharide (PPSV23) 05/18/2018        PE:  Vitals:    03/11/20 1251   BP: 132/86   Pulse: 70   Temp: 98.1 °F (36.7 °C)   SpO2: 97%      Body mass index is 25.36 kg/m².    Gen Appearance: NAD  HEENT: Normocephalic, PERRLA, no thyromegaly, trache midline  Heart: RRR, normal S1 and S2, no murmur  Lungs: CTA b/l, no wheezing, no crackles  Abdomen: Soft, non-tender, non-distended, no guarding and BSx4  MSK: Moves all extremities well, normal gait, swelling and pain fourth finger right hand near knuckle  Pulses: Palpable and equal b/l  Lymph nodes: No palpable lymphadenopathy   Neuro: No focal deficits      Current Outpatient Medications   Medication Sig Dispense Refill   • amLODIPine (NORVASC) 5 MG tablet Take 1 tablet by mouth Daily. 90 tablet 3   • cefdinir (OMNICEF) 300 MG capsule Take 1 capsule by mouth 2 (Two) Times a Day for 7 days. 14 capsule 0   • cloNIDine (CATAPRES) 0.1 MG tablet clonidine HCl 0.1 mg tablet     • diazePAM (VALIUM) 5 MG tablet Take 5 mg by mouth 2 (Two) Times a Day.  0   • diclofenac (VOLTAREN) 1 % gel gel Apply 4 g topically to the appropriate area as directed 3 (Three) Times a Day As Needed (hand pain). 100 g 1   • doxycycline (VIBRAMYICN) 100 MG tablet      • erythromycin (ROMYCIN) 5 MG/GM ophthalmic ointment Administer  to both eyes Every 4 (Four) Hours While Awake. 3.5 g 0   • escitalopram (LEXAPRO) 10 MG tablet Take 1 tablet by mouth Daily. 30 tablet 5   • gabapentin (NEURONTIN) 400 MG capsule Take 400 mg by mouth 3 (Three) Times a Day.     • glimepiride (AMARYL) 1 MG tablet TAKE 1  TABLET BY MOUTH ONCE DAILY 90 tablet 1   • hydrochlorothiazide (HYDRODIURIL) 12.5 MG tablet take 1 tablet by mouth once daily 30 tablet 3   • HYDROcodone-acetaminophen (NORCO)  MG per tablet Take 1 tablet by mouth Every 6 (Six) Hours As Needed for moderate pain (4-6).     • Hydrocodone-Acetaminophen (XODOL )  MG per tablet   0   • hydrocortisone 2.5 % cream Apply  topically to the appropriate area as directed 3 (Three) Times a Day. 60 g 3   • levoFLOXacin (LEVAQUIN) 750 MG tablet Take 1 tablet by mouth Daily. 5 tablet 0   • lisinopril (PRINIVIL,ZESTRIL) 20 MG tablet TAKE 2 TABLET BY MOUTH ONCE DAILY. 180 tablet 0   • loratadine (CLARITIN) 10 MG tablet Take 1 tablet by mouth Daily. 90 tablet 3   • metFORMIN (GLUCOPHAGE) 500 MG tablet Take 1 tablet by mouth Daily With Breakfast. 30 tablet 1   • metoprolol tartrate (LOPRESSOR) 50 MG tablet TAKE 1 TABLET BY MOUTH TWICE A  tablet 0   • Naloxegol Oxalate (MOVANTIK) 12.5 MG tablet Movantik 12.5 mg tablet     • NON FORMULARY GLIMEREX FOR PREDIABETES     • omeprazole (priLOSEC) 40 MG capsule TAKE 1 CAPSULE BY MOUTH DAILY BEFORE A MEAL 90 capsule 1   • ondansetron ODT (ZOFRAN-ODT) 4 MG disintegrating tablet Take 1 tablet by mouth 4 (Four) Times a Day As Needed for Nausea or Vomiting. 15 tablet 0   • ONETOUCH VERIO test strip Daily. for testing  0   • oxyCODONE (oxyCONTIN) 10 MG 12 hr tablet Take 10 mg by mouth Every 12 (Twelve) Hours As Needed.  0   • oxyCODONE-acetaminophen (PERCOCET) 5-325 MG per tablet Take 1 tablet by mouth Every 4 (Four) Hours As Needed for Moderate Pain  or Severe Pain . 15 tablet 0   • pravastatin (PRAVACHOL) 10 MG tablet Take 10 mg by mouth Daily.     • pravastatin (PRAVACHOL) 20 MG tablet TAKE 1 TABLET BY MOUTH EVERY  tablet 2   • Testosterone Cypionate (DEPOTESTOTERONE CYPIONATE) 200 MG/ML injection Inject 1 mL into the appropriate muscle as directed by prescriber Every 21 (Twenty-One) Days. 10 mL 0   • VENTOLIN HFA  108 (90 Base) MCG/ACT inhaler inhalation 2 puffs by mouth every 4 to 6 hours if needed  0     No current facility-administered medications for this visit.       Counseling was given to patient for the following topics: diagnostic results, patient and family education and impressions recent XR/CT/MRI results, fatigue, shoulder pain and hand pain. Total time of the encounter was 40 minutes and 21 minutes was spent face to face counseling.      Behzad was seen today for shoulder pain and extremity weakness.    Diagnoses and all orders for this visit:    Enchondroma of right hand  Referred to orthopedics establish care.  Patient requesting [facility].  Fatigue, unspecified type  Reviewed recent blood work with patient.  Continues to improve.  Chronic pain in left shoulder  Counseled patient.  Plans on following up with orthopedics scheduled.       No follow-ups on file.     Please note that portions of this document were completed with a voice recognition program. Efforts were made to edit the dictations, but occasionally words are mis-transcribed.

## 2020-03-12 ENCOUNTER — TELEPHONE (OUTPATIENT)
Dept: FAMILY MEDICINE CLINIC | Facility: CLINIC | Age: 70
End: 2020-03-12

## 2020-03-12 NOTE — TELEPHONE ENCOUNTER
Can you call him and see where he is wanting to go? He was requesting [facility] yesterday and is now wanting  again.

## 2020-03-12 NOTE — TELEPHONE ENCOUNTER
PT. CALLED IN REGARDS TO HIS HAND. PT STATED THAT HE WAS REFERRED TO ORTHOPEDICS FOR A POTENTIAL PROCEDURE FOR HIS HAND.    PT. STATED THAT HE NOW HAS TO GO TO  AND WANTS DOCTOR SRINIVASA'S DIRECTIONS AND ADVISE T MOVE FOR LESLIE WITH THIS.     PLEASE ADVISE PT. [phone]

## 2020-04-20 ENCOUNTER — TELEPHONE (OUTPATIENT)
Dept: FAMILY MEDICINE CLINIC | Facility: CLINIC | Age: 70
End: 2020-04-20

## 2020-04-20 RX ORDER — CEFDINIR 300 MG/1
300 CAPSULE ORAL 2 TIMES DAILY
Qty: 14 CAPSULE | Refills: 0 | Status: SHIPPED | OUTPATIENT
Start: 2020-04-20 | End: 2020-04-27

## 2020-04-20 NOTE — TELEPHONE ENCOUNTER
PT CALLING IN, SAYS THAT HE IS A [place] AND HIS CHART SHOULD SHOW THAT HE HAS HAD ALLERGY PROBLEMS MOST OF HIS LIFE.  PATIENT IS REQUESTING THAT DR BERNABE CALL IN MEDICINE FOR POSSIBLE SINUS INFECTION.      PT SAYS THAT HIS NOSE IS RUNNING, LEFT SIDE OF NOSE/FACE.  HAVING HEADACHE AROUND EYE AND EVEN HIS FACE IS HURTING. THAT WHEN HE BLOWS HIS NOSE, GREEN MUCUS/SCAB. LEFT SINUS SEEMS TO BE RAW ON HIS LEFT SIDE. PT SAYS HE HAS HAD IT BAD BEFORE.     PT IS NOT UNDERSTANDING WHY DOCTOR CAN'T JUST CALL HIM IN MEDICATION TO HELP HIM. PT SAYS THAT DR BERNABE HAD PROMISED THAT IF PROBLEM GOT WORSE, THAT HE WOULD CALL IN MEDICINE.     PATIENT WANTS DOCTOR SRINIVASA TO READ HIS CHART AND NOT JUST THE MA. PT SAYS HE MEANS NO DISRESPECT.     PT SAYS THAT HE IS 69 YRS OLD, HIS IMMUNE SYSTEM IS VERY LOW, HAD PIC LINE FOR 3 MONTHS DUE TO PRIOR MEDICAL ISSUES AND IS CONSIDERED HIGH RISK DURING THIS COVID 19. PLEASE, PLEASE CALL IN MED.     [id number] [address] HERNAN[address] IN [place].     PLEASE CALL PATIENT [phone]

## 2020-05-01 RX ORDER — LISINOPRIL 20 MG/1
TABLET ORAL
Qty: 180 TABLET | Refills: 0 | Status: SHIPPED | OUTPATIENT
Start: 2020-05-01 | End: 2020-07-20

## 2020-05-01 RX ORDER — METOPROLOL TARTRATE 50 MG/1
TABLET, FILM COATED ORAL
Qty: 180 TABLET | Refills: 0 | Status: SHIPPED | OUTPATIENT
Start: 2020-05-01 | End: 2020-07-20

## 2020-05-12 ENCOUNTER — TELEMEDICINE (OUTPATIENT)
Dept: FAMILY MEDICINE CLINIC | Facility: CLINIC | Age: 70
End: 2020-05-12

## 2020-05-12 DIAGNOSIS — M79.10 MYALGIA DUE TO STATIN: ICD-10-CM

## 2020-05-12 DIAGNOSIS — T78.40XA ALLERGIC STATE, INITIAL ENCOUNTER: ICD-10-CM

## 2020-05-12 DIAGNOSIS — J01.01 ACUTE RECURRENT MAXILLARY SINUSITIS: Primary | ICD-10-CM

## 2020-05-12 DIAGNOSIS — T46.6X5A MYALGIA DUE TO STATIN: ICD-10-CM

## 2020-05-12 DIAGNOSIS — E55.9 VITAMIN D DEFICIENCY: ICD-10-CM

## 2020-05-12 PROCEDURE — 99214 OFFICE O/P EST MOD 30 MIN: CPT | Performed by: INTERNAL MEDICINE

## 2020-05-12 RX ORDER — PREDNISONE 20 MG/1
40 TABLET ORAL DAILY
Qty: 10 TABLET | Refills: 0 | Status: SHIPPED | OUTPATIENT
Start: 2020-05-12 | End: 2020-05-17

## 2020-05-12 RX ORDER — DOXYCYCLINE HYCLATE 100 MG
100 TABLET ORAL 2 TIMES DAILY
Qty: 14 TABLET | Refills: 0 | Status: SHIPPED | OUTPATIENT
Start: 2020-05-12 | End: 2020-05-19

## 2020-05-12 RX ORDER — CHOLECALCIFEROL (VITAMIN D3) 125 MCG
5000 CAPSULE ORAL DAILY
Qty: 30 CAPSULE | Refills: 0 | Status: SHIPPED | OUTPATIENT
Start: 2020-05-12

## 2020-05-12 NOTE — PROGRESS NOTES
Cc:Sinus infection  You have chosen to receive care through a telehealth visit.  Do you consent to use a video/audio connection for your medical care today? Yes      HPI:  Behzad Guzman is a 69 y.o. male who presents today for sinus infection for 1 week duration.  Patient reports brown-green nasal discharge and left-sided maxillary facial pain.  He has history of recurrent sinus infections and seasonal allergies.  Currently taking Claritin and Flonase daily.  He reports he recently established with an allergist.  Denies any fevers or chills or shortness of breath.  He has a cough intermittently.  No coronavirus exposures that he knows of.  Complaining of daily runny nose and itchy watery eyes.  He is asking about recommendations on co-Q10 since he has a history of muscle aches and pains due to statins.    ROS:  Constitutional: no fevers, night sweats or unexplained weight loss  Eyes: no vision changes  ENT: + runny nose, +ear pain, -sore throat  Cardio: no chest pain, palpitations  Pulm: no shortness of breath, wheezing, or cough  GI: no abdominal pain or changes in bowel movements  : no difficulty urinating  MSK: no difficulty ambulating, no joint pain  Neuro: no weakness, dizziness or headache  Psych: no trouble sleeping  Endo: no change in appetite      Past Medical History:   Diagnosis Date   • Asthma    • Chronic hip pain, left    • Chronic pain in left shoulder    • Hyperlipidemia    • Hypertension    • Leg length discrepancy    • Neck pain, chronic    • Neuropathy    • Panic attacks    • Pre-diabetes    • Prediabetes    • Spinal stenosis       Family History   Problem Relation Age of Onset   • Heart attack Father    • Other Mother         accident      Social History     Socioeconomic History   • Marital status:      Spouse name: Not on file   • Number of children: Not on file   • Years of education: Not on file   • Highest education level: Not on file   Occupational History   • Occupation: farmer    Tobacco Use   • Smoking status: Former Smoker     Last attempt to quit:      Years since quittin.3   • Smokeless tobacco: Never Used   Substance and Sexual Activity   • Alcohol use: No   • Drug use: No   • Sexual activity: Defer   Social History Narrative    Lives with wife on a farm      Allergies   Allergen Reactions   • Amoxicillin Rash   • Penicillins Rash      Immunization History   Administered Date(s) Administered   • Flu Vaccine Quad PF >36MO 2015   • Pneumococcal Conjugate 13-Valent (PCV13) 2016   • Pneumococcal Polysaccharide (PPSV23) 2018        PE:  There were no vitals filed for this visit.   There is no height or weight on file to calculate BMI.    Gen Appearance: NAD  HEENT: Normocephalic  MSK: Moves all extremities well, normal gait, no peripheral edema    Neuro: No focal deficits      Current Outpatient Medications   Medication Sig Dispense Refill   • amLODIPine (NORVASC) 5 MG tablet Take 1 tablet by mouth Daily. 90 tablet 3   • Cholecalciferol (VITAMIN D) 125 MCG (5000 UT) capsule capsule Take 1 capsule by mouth Daily. 30 capsule 0   • cloNIDine (CATAPRES) 0.1 MG tablet clonidine HCl 0.1 mg tablet     • co-enzyme Q-10 50 MG capsule Take 1 capsule by mouth Daily. 90 capsule 3   • diazePAM (VALIUM) 5 MG tablet Take 5 mg by mouth 2 (Two) Times a Day.  0   • diclofenac (VOLTAREN) 1 % gel gel Apply 4 g topically to the appropriate area as directed 3 (Three) Times a Day As Needed (hand pain). 100 g 1   • doxycycline (VIBRAMYICN) 100 MG tablet Take 1 tablet by mouth 2 (Two) Times a Day for 7 days. 14 tablet 0   • erythromycin (ROMYCIN) 5 MG/GM ophthalmic ointment Administer  to both eyes Every 4 (Four) Hours While Awake. 3.5 g 0   • escitalopram (LEXAPRO) 10 MG tablet Take 1 tablet by mouth Daily. 30 tablet 5   • gabapentin (NEURONTIN) 400 MG capsule Take 400 mg by mouth 3 (Three) Times a Day.     • glimepiride (AMARYL) 1 MG tablet TAKE 1 TABLET BY MOUTH ONCE DAILY 90 tablet 1    • hydrochlorothiazide (HYDRODIURIL) 12.5 MG tablet take 1 tablet by mouth once daily 30 tablet 3   • HYDROcodone-acetaminophen (NORCO)  MG per tablet Take 1 tablet by mouth Every 6 (Six) Hours As Needed for moderate pain (4-6).     • Hydrocodone-Acetaminophen (XODOL )  MG per tablet   0   • hydrocortisone 2.5 % cream Apply  topically to the appropriate area as directed 3 (Three) Times a Day. 60 g 3   • lisinopril (PRINIVIL,ZESTRIL) 20 MG tablet TAKE 2 TABLETS BY MOUTH EVERY  tablet 0   • loratadine (CLARITIN) 10 MG tablet Take 1 tablet by mouth Daily. 90 tablet 3   • metFORMIN (GLUCOPHAGE) 500 MG tablet Take 1 tablet by mouth Daily With Breakfast. 30 tablet 1   • metoprolol tartrate (LOPRESSOR) 50 MG tablet TAKE 1 TABLET BY MOUTH TWICE DAILY 180 tablet 0   • Naloxegol Oxalate (MOVANTIK) 12.5 MG tablet Movantik 12.5 mg tablet     • NON FORMULARY GLIMEREX FOR PREDIABETES     • omeprazole (priLOSEC) 40 MG capsule TAKE 1 CAPSULE BY MOUTH DAILY BEFORE A MEAL 90 capsule 1   • ondansetron ODT (ZOFRAN-ODT) 4 MG disintegrating tablet Take 1 tablet by mouth 4 (Four) Times a Day As Needed for Nausea or Vomiting. 15 tablet 0   • ONETOUCH VERIO test strip Daily. for testing  0   • oxyCODONE (oxyCONTIN) 10 MG 12 hr tablet Take 10 mg by mouth Every 12 (Twelve) Hours As Needed.  0   • oxyCODONE-acetaminophen (PERCOCET) 5-325 MG per tablet Take 1 tablet by mouth Every 4 (Four) Hours As Needed for Moderate Pain  or Severe Pain . 15 tablet 0   • pravastatin (PRAVACHOL) 10 MG tablet Take 10 mg by mouth Daily.     • pravastatin (PRAVACHOL) 20 MG tablet TAKE 1 TABLET BY MOUTH EVERY  tablet 2   • predniSONE (DELTASONE) 20 MG tablet Take 2 tablets by mouth Daily for 5 days. 10 tablet 0   • Testosterone Cypionate (DEPOTESTOTERONE CYPIONATE) 200 MG/ML injection Inject 1 mL into the appropriate muscle as directed by prescriber Every 21 (Twenty-One) Days. 10 mL 0   • VENTOLIN  (90 Base) MCG/ACT inhaler  inhalation 2 puffs by mouth every 4 to 6 hours if needed  0     No current facility-administered medications for this visit.    15 minutes was spent discussing with patient.    Diagnoses and all orders for this visit:    Acute recurrent maxillary sinusitis  -     doxycycline (VIBRAMYICN) 100 MG tablet; Take 1 tablet by mouth 2 (Two) Times a Day for 7 days.  Penicillin allergy.  Treat with doxycycline for the next 7 days.  Allergic state, initial encounter  -     predniSONE (DELTASONE) 20 MG tablet; Take 2 tablets by mouth Daily for 5 days.  Steroid burst for allergy flare.  Follow-up with allergist as scheduled.  Myalgia due to statin  -     co-enzyme Q-10 50 MG capsule; Take 1 capsule by mouth Daily.  -     Cholecalciferol (VITAMIN D) 125 MCG (5000 UT) capsule capsule; Take 1 capsule by mouth Daily.  Add on coenzyme Q 10 supplementation and vitamin D for statin myalgia.  Check vitamin D levels with blood work at follow-up visit.  Vitamin D deficiency  -     co-enzyme Q-10 50 MG capsule; Take 1 capsule by mouth Daily.  -     Cholecalciferol (VITAMIN D) 125 MCG (5000 UT) capsule capsule; Take 1 capsule by mouth Daily.         No follow-ups on file.     Please note that portions of this document were completed with a voice recognition program. Efforts were made to edit the dictations, but occasionally words are mis-transcribed.

## 2020-05-20 ENCOUNTER — TELEPHONE (OUTPATIENT)
Dept: FAMILY MEDICINE CLINIC | Facility: CLINIC | Age: 70
End: 2020-05-20

## 2020-05-20 NOTE — TELEPHONE ENCOUNTER
PATIENT IS REQUESTING A CALL BACK FROM DR BERNABE CONCERNING HIS MEDICAL CONDITION. PLEASE CALL  [phone]

## 2020-05-22 NOTE — TELEPHONE ENCOUNTER
Called patient to discuss his sx , he stated from his last visit regarding the sinus infection. He finished his medications and is still not feeling better. He stated that Dr. Tran was wanting to know what he had taken in the past and he stated it was called doxycycline.    Please advise.

## 2020-05-22 NOTE — TELEPHONE ENCOUNTER
Pt called back and stated that he gave Lucrecia the incorrect information.  Please call him back.  Patient knows that Dr. Tran has requested a telephone appointment but he asked to speak to Abraham.    Pt callback: [phone]    Please advise.

## 2020-05-26 PROCEDURE — U0003 INFECTIOUS AGENT DETECTION BY NUCLEIC ACID (DNA OR RNA); SEVERE ACUTE RESPIRATORY SYNDROME CORONAVIRUS 2 (SARS-COV-2) (CORONAVIRUS DISEASE [COVID-19]), AMPLIFIED PROBE TECHNIQUE, MAKING USE OF HIGH THROUGHPUT TECHNOLOGIES AS DESCRIBED BY CMS-2020-01-R: HCPCS | Performed by: FAMILY MEDICINE

## 2020-06-01 ENCOUNTER — OFFICE VISIT (OUTPATIENT)
Dept: FAMILY MEDICINE CLINIC | Facility: CLINIC | Age: 70
End: 2020-06-01

## 2020-06-01 DIAGNOSIS — J06.9 UPPER RESPIRATORY TRACT INFECTION, UNSPECIFIED TYPE: ICD-10-CM

## 2020-06-01 DIAGNOSIS — E78.5 HYPERLIPIDEMIA, UNSPECIFIED HYPERLIPIDEMIA TYPE: ICD-10-CM

## 2020-06-01 DIAGNOSIS — J45.909 EXTRINSIC ASTHMA, UNSPECIFIED ASTHMA SEVERITY, UNSPECIFIED WHETHER COMPLICATED, UNSPECIFIED WHETHER PERSISTENT: Primary | ICD-10-CM

## 2020-06-01 PROCEDURE — 99442 PR PHYS/QHP TELEPHONE EVALUATION 11-20 MIN: CPT | Performed by: INTERNAL MEDICINE

## 2020-06-01 RX ORDER — PRAVASTATIN SODIUM 20 MG
20 TABLET ORAL DAILY
Qty: 90 TABLET | Refills: 3 | Status: SHIPPED | OUTPATIENT
Start: 2020-06-01 | End: 2021-02-12

## 2020-06-01 RX ORDER — ALBUTEROL SULFATE 90 UG/1
2 AEROSOL, METERED RESPIRATORY (INHALATION) EVERY 6 HOURS PRN
Qty: 1 INHALER | Refills: 5 | Status: SHIPPED | OUTPATIENT
Start: 2020-06-01 | End: 2021-01-08 | Stop reason: SDUPTHER

## 2020-06-01 RX ORDER — PREDNISONE 20 MG/1
20 TABLET ORAL DAILY
Qty: 10 TABLET | Refills: 0 | Status: SHIPPED | OUTPATIENT
Start: 2020-06-01 | End: 2020-10-30 | Stop reason: SDUPTHER

## 2020-06-02 NOTE — PROGRESS NOTES
Chief Complaint   Patient presents with   • URI   • Allergies       HPI:  Behzad Guzman is a 69 y.o. male who presents today for follow-up.  Patient recently went to urgent care for her first reinfection.  He tested negative for coronavirus.  He started on antibiotics and steroid taper.  He reports this has helped his symptoms significantly although he still congested with runny nose and itchy watery eyes.  He has been using albuterol as needed for shortness of breath and cough.  He is requesting referral to an allergist today.  Hyperlipidemia-taking statin, no myalgias.  Requesting refill today.    ROS:  Constitutional: no fevers, night sweats or unexplained weight loss  Eyes: no vision changes  ENT: no runny nose, ear pain, sore throat  Cardio: no chest pain, palpitations  Pulm: no shortness of breath, wheezing, or cough  GI: no abdominal pain or changes in bowel movements  : no difficulty urinating  MSK: no difficulty ambulating, no joint pain  Neuro: no weakness, dizziness or headache  Psych: no trouble sleeping  Endo: no change in appetite      Past Medical History:   Diagnosis Date   • Asthma    • Chronic hip pain, left    • Chronic pain in left shoulder    • Hyperlipidemia    • Hypertension    • Leg length discrepancy    • Neck pain, chronic    • Neuropathy    • Panic attacks    • Pre-diabetes    • Prediabetes    • Spinal stenosis       Family History   Problem Relation Age of Onset   • Heart attack Father    • Other Mother         accident      Social History     Socioeconomic History   • Marital status:      Spouse name: Not on file   • Number of children: Not on file   • Years of education: Not on file   • Highest education level: Not on file   Occupational History   • Occupation: farmer   Tobacco Use   • Smoking status: Former Smoker     Last attempt to quit:      Years since quittin.4   • Smokeless tobacco: Never Used   Substance and Sexual Activity   • Alcohol use: No   • Drug use:  No   • Sexual activity: Defer   Social History Narrative    [place] with wife [place] a farm      Allergies   Allergen Reactions   • Amoxicillin Rash   • Penicillins Rash      Immunization History   Administered Date(s) Administered   • Flu Vaccine Quad PF >36MO 09/30/2015   • Pneumococcal Conjugate 13-Valent (PCV13) 09/22/2016   • Pneumococcal Polysaccharide (PPSV23) 05/18/2018        PE:  There were no vitals filed for this visit.   There is no height or weight on file to calculate BMI.      Current Outpatient Medications   Medication Sig Dispense Refill   • amLODIPine (NORVASC) 5 MG tablet Take 1 tablet by mouth Daily. 90 tablet 3   • cephalexin (KEFLEX) 500 MG capsule Take 1 capsule by mouth 3 (Three) Times a Day. 30 capsule 0   • Cholecalciferol (VITAMIN D) 125 MCG (5000 UT) capsule capsule Take 1 capsule by mouth Daily. 30 capsule 0   • cloNIDine (CATAPRES) 0.1 MG tablet clonidine HCl 0.1 mg tablet     • co-enzyme Q-10 50 MG capsule Take 1 capsule by mouth Daily. 90 capsule 3   • diazePAM (VALIUM) 5 MG tablet Take 5 mg by mouth 2 (Two) Times a Day.  0   • diclofenac (VOLTAREN) 1 % gel gel Apply 4 g topically to the appropriate area as directed 3 (Three) Times a Day As Needed (hand pain). 100 g 1   • erythromycin (ROMYCIN) 5 MG/GM ophthalmic ointment Administer  to both eyes Every 4 (Four) Hours While Awake. 3.5 g 0   • escitalopram (LEXAPRO) 10 MG tablet Take 1 tablet by mouth Daily. 30 tablet 5   • gabapentin (NEURONTIN) 400 MG capsule Take 400 mg by mouth 3 (Three) Times a Day.     • glimepiride (AMARYL) 1 MG tablet TAKE 1 TABLET BY MOUTH ONCE DAILY 90 tablet 1   • hydrochlorothiazide (HYDRODIURIL) 12.5 MG tablet take 1 tablet by mouth once daily 30 tablet 3   • HYDROcodone-acetaminophen (NORCO)  MG per tablet Take 1 tablet by mouth Every 6 (Six) Hours As Needed for moderate pain (4-6).     • Hydrocodone-Acetaminophen (XODOL )  MG per tablet   0   • hydrocortisone 2.5 % cream Apply  topically to  the appropriate area as directed 3 (Three) Times a Day. 60 g 3   • lisinopril (PRINIVIL,ZESTRIL) 20 MG tablet TAKE 2 TABLETS BY MOUTH EVERY  tablet 0   • loratadine (CLARITIN) 10 MG tablet Take 1 tablet by mouth Daily. 90 tablet 3   • metFORMIN (GLUCOPHAGE) 500 MG tablet Take 1 tablet by mouth Daily With Breakfast. 30 tablet 1   • metoprolol tartrate (LOPRESSOR) 50 MG tablet TAKE 1 TABLET BY MOUTH TWICE DAILY 180 tablet 0   • Naloxegol Oxalate (MOVANTIK) 12.5 MG tablet Movantik 12.5 mg tablet     • NON FORMULARY GLIMEREX FOR PREDIABETES     • omeprazole (priLOSEC) 40 MG capsule TAKE 1 CAPSULE BY MOUTH DAILY BEFORE A MEAL 90 capsule 1   • ondansetron ODT (ZOFRAN-ODT) 4 MG disintegrating tablet Take 1 tablet by mouth 4 (Four) Times a Day As Needed for Nausea or Vomiting. 15 tablet 0   • ONETOUCH VERIO test strip Daily. for testing  0   • oxyCODONE (oxyCONTIN) 10 MG 12 hr tablet Take 10 mg by mouth Every 12 (Twelve) Hours As Needed.  0   • oxyCODONE-acetaminophen (PERCOCET) 5-325 MG per tablet Take 1 tablet by mouth Every 4 (Four) Hours As Needed for Moderate Pain  or Severe Pain . 15 tablet 0   • pravastatin (PRAVACHOL) 20 MG tablet Take 1 tablet by mouth Daily. 90 tablet 3   • predniSONE (DELTASONE) 20 MG tablet Take 1 tablet by mouth Daily. 10 tablet 0   • promethazine-dextromethorphan (PROMETHAZINE-DM) 6.25-15 MG/5ML syrup Take 5 ml QID PRN cough/congestion 180 mL 0   • Testosterone Cypionate (DEPOTESTOTERONE CYPIONATE) 200 MG/ML injection Inject 1 mL into the appropriate muscle as directed by prescriber Every 21 (Twenty-One) Days. 10 mL 0   • VENTOLIN  (90 Base) MCG/ACT inhaler Inhale 2 puffs Every 6 (Six) Hours As Needed for Wheezing or Shortness of Air. 1 inhaler 5     No current facility-administered medications for this visit.       11 min was spent discussing with patient on phone.  Behzad was seen today for uri and allergies.    Diagnoses and all orders for this visit:    Extrinsic asthma,  unspecified asthma severity, unspecified whether complicated, unspecified whether persistent  -     Ambulatory Referral to Allergy  -     predniSONE (DELTASONE) 20 MG tablet; Take 1 tablet by mouth Daily.  -     VENTOLIN  (90 Base) MCG/ACT inhaler; Inhale 2 puffs Every 6 (Six) Hours As Needed for Wheezing or Shortness of Air.  -     XR Chest PA & Lateral; Future  Refill albuterol.  Will refer to allergist establish care.  Currently taking Claritin and Flonase.  Recommend checking x-ray due to shortness of breath and wheezing.  Extended duration low-dose steroid for the next 10 days.  See back in clinic 2 weeks for reassessment.  Upper respiratory tract infection, unspecified type  -     XR Chest PA & Lateral; Future    Hyperlipidemia, unspecified hyperlipidemia type  Continue pravastatin.  Refill today.  Other orders  -     pravastatin (PRAVACHOL) 20 MG tablet; Take 1 tablet by mouth Daily.         No follow-ups on file.     Please note that portions of this document were completed with a voice recognition program. Efforts were made to edit the dictations, but occasionally words are mis-transcribed.

## 2020-06-04 DIAGNOSIS — IMO0001 UNCONTROLLED TYPE 2 DIABETES MELLITUS WITHOUT COMPLICATION, WITHOUT LONG-TERM CURRENT USE OF INSULIN: ICD-10-CM

## 2020-06-05 RX ORDER — HYDROCHLOROTHIAZIDE 12.5 MG/1
TABLET ORAL
Qty: 120 TABLET | OUTPATIENT
Start: 2020-06-05

## 2020-06-18 ENCOUNTER — TELEPHONE (OUTPATIENT)
Dept: FAMILY MEDICINE CLINIC | Facility: CLINIC | Age: 70
End: 2020-06-18

## 2020-06-18 NOTE — TELEPHONE ENCOUNTER
Patient is scheduled for wellness on 6/22/20.    He reports that he thinks he is running a low fever since yesterday.  He has green mucus when blowing his nose and it is brown when coughing.  He is very congested in his chest and his head.  He would like a prescription ordered.  He thinks he may need a strong abx.  He states that fsbs is in 70's.  He is very emotional.  He doesn't wish to be seen in the office but is agreeable to a telephone visit. Please advise patient.  Thank you.

## 2020-06-18 NOTE — TELEPHONE ENCOUNTER
Contacted patient and advised him that we are changing his visit to a virtual visit. He verbalized understanding he asked about his allergy symptoms. I advised him to contact his allergist and schedule an appt. He verbalized understanding and agreed.

## 2020-06-18 NOTE — TELEPHONE ENCOUNTER
Contacted patient and he states that he completed a round of antibiotics and his allergy sx returned. He asked about his allergy referral and I provided him with the clinic's contact information       He stated that he lives about 39 miles away and does not want to drive to [place] and be redirected to [facility], is it possible to change his appt to a video visit?

## 2020-06-20 RX ORDER — HYDROCHLOROTHIAZIDE 12.5 MG/1
TABLET ORAL
Qty: 120 TABLET | Refills: 0 | Status: SHIPPED | OUTPATIENT
Start: 2020-06-20 | End: 2021-01-08 | Stop reason: SDUPTHER

## 2020-06-22 ENCOUNTER — OFFICE VISIT (OUTPATIENT)
Dept: FAMILY MEDICINE CLINIC | Facility: CLINIC | Age: 70
End: 2020-06-22

## 2020-06-22 VITALS
BODY MASS INDEX: 25.47 KG/M2 | HEART RATE: 78 BPM | HEIGHT: 72 IN | DIASTOLIC BLOOD PRESSURE: 78 MMHG | WEIGHT: 188 LBS | OXYGEN SATURATION: 98 % | SYSTOLIC BLOOD PRESSURE: 134 MMHG

## 2020-06-22 DIAGNOSIS — E55.9 VITAMIN D DEFICIENCY: ICD-10-CM

## 2020-06-22 DIAGNOSIS — Z00.00 MEDICARE ANNUAL WELLNESS VISIT, SUBSEQUENT: Primary | ICD-10-CM

## 2020-06-22 DIAGNOSIS — R73.03 PREDIABETES: ICD-10-CM

## 2020-06-22 DIAGNOSIS — E34.9 TESTOSTERONE DEFICIENCY: ICD-10-CM

## 2020-06-22 DIAGNOSIS — I10 ESSENTIAL HYPERTENSION: ICD-10-CM

## 2020-06-22 DIAGNOSIS — M48.00 SPINAL STENOSIS, UNSPECIFIED SPINAL REGION: ICD-10-CM

## 2020-06-22 DIAGNOSIS — E78.5 HYPERLIPIDEMIA, UNSPECIFIED HYPERLIPIDEMIA TYPE: ICD-10-CM

## 2020-06-22 DIAGNOSIS — Z12.5 ENCOUNTER FOR PROSTATE CANCER SCREENING: ICD-10-CM

## 2020-06-22 DIAGNOSIS — R09.81 CONGESTION OF NASAL SINUS: ICD-10-CM

## 2020-06-22 DIAGNOSIS — J45.20 MILD INTERMITTENT ASTHMA WITHOUT COMPLICATION: ICD-10-CM

## 2020-06-22 DIAGNOSIS — R53.83 FATIGUE, UNSPECIFIED TYPE: ICD-10-CM

## 2020-06-22 PROCEDURE — G0439 PPPS, SUBSEQ VISIT: HCPCS | Performed by: INTERNAL MEDICINE

## 2020-06-22 RX ORDER — TESTOSTERONE CYPIONATE 200 MG/ML
200 INJECTION, SOLUTION INTRAMUSCULAR
Qty: 10 ML | Refills: 0 | Status: SHIPPED | OUTPATIENT
Start: 2020-06-22 | End: 2020-07-29 | Stop reason: SDUPTHER

## 2020-06-22 NOTE — PROGRESS NOTES
QUICK REFERENCE INFORMATION:  The ABCs of the Annual Wellness Visit  Behzad Guzman is a 69 y.o. male presenting forMedicare Subsequent Wellness visit and  Medicare Wellness-subsequent  .     Medicare Subsequent Wellness Visit    Chief Complaint   Patient presents with   • Medicare Wellness-subsequent        HPI   Here for Medicare wellness visit.  He has continued sinus congestion and productive cough.  He plans on following up with allergist in the next few days.  Chronic medical conditions are stable.  Continues to have chronic fatigue.  ROS:  Constitutional: no fevers, night sweats or unexplained weight loss  Eyes: no vision changes  ENT: no runny nose, ear pain, sore throat  Cardio: no chest pain, palpitations  Pulm: no shortness of breath, wheezing, + cough  GI: no abdominal pain or changes in bowel movements  : no difficulty urinating  MSK: no difficulty ambulating, + joint pain  Neuro: no weakness, dizziness or headache  Psych: no trouble sleeping  Endo: no change in appetite     Past Medical History:   Diagnosis Date   • Asthma    • Chronic hip pain, left    • Chronic pain in left shoulder    • Hyperlipidemia    • Hypertension    • Leg length discrepancy    • Neck pain, chronic    • Neuropathy    • Panic attacks    • Pre-diabetes    • Prediabetes    • Spinal stenosis         Past Surgical History:   Procedure Laterality Date   • LEG SURGERY     • ROTATOR CUFF REPAIR Right    • SHOULDER SURGERY Left    • WRIST SURGERY Left        Family History   Problem Relation Age of Onset   • Heart attack Father    • Other Mother         accident        Social History     Socioeconomic History   • Marital status:      Spouse name: Not on file   • Number of children: Not on file   • Years of education: Not on file   • Highest education level: Not on file   Occupational History   • Occupation: farmer   Tobacco Use   • Smoking status: Former Smoker     Last attempt to quit:      Years since quittin.4   •  Smokeless tobacco: Never Used   Substance and Sexual Activity   • Alcohol use: No   • Drug use: No   • Sexual activity: Defer   Social History Narrative    Lives with wife on a farm        Current Outpatient Medications   Medication Sig Dispense Refill   • amLODIPine (NORVASC) 5 MG tablet Take 1 tablet by mouth Daily. 90 tablet 3   • Cholecalciferol (VITAMIN D) 125 MCG (5000 UT) capsule capsule Take 1 capsule by mouth Daily. 30 capsule 0   • cloNIDine (CATAPRES) 0.1 MG tablet clonidine HCl 0.1 mg tablet     • co-enzyme Q-10 50 MG capsule Take 1 capsule by mouth Daily. 90 capsule 3   • diazePAM (VALIUM) 5 MG tablet Take 5 mg by mouth 2 (Two) Times a Day.  0   • diclofenac (VOLTAREN) 1 % gel gel Apply 4 g topically to the appropriate area as directed 3 (Three) Times a Day As Needed (hand pain). 100 g 1   • erythromycin (ROMYCIN) 5 MG/GM ophthalmic ointment Administer  to both eyes Every 4 (Four) Hours While Awake. 3.5 g 0   • escitalopram (LEXAPRO) 10 MG tablet Take 1 tablet by mouth Daily. 30 tablet 5   • gabapentin (NEURONTIN) 400 MG capsule Take 400 mg by mouth 3 (Three) Times a Day.     • glimepiride (AMARYL) 1 MG tablet TAKE 1 TABLET BY MOUTH ONCE DAILY 90 tablet 1   • hydroCHLOROthiazide (HYDRODIURIL) 12.5 MG tablet TAKE 1 TABLET BY MOUTH ONCE DAILY 120 tablet 0   • HYDROcodone-acetaminophen (NORCO)  MG per tablet Take 1 tablet by mouth Every 6 (Six) Hours As Needed for moderate pain (4-6).     • Hydrocodone-Acetaminophen (XODOL )  MG per tablet   0   • hydrocortisone 2.5 % cream Apply  topically to the appropriate area as directed 3 (Three) Times a Day. 60 g 3   • lisinopril (PRINIVIL,ZESTRIL) 20 MG tablet TAKE 2 TABLETS BY MOUTH EVERY  tablet 0   • loratadine (CLARITIN) 10 MG tablet Take 1 tablet by mouth Daily. 90 tablet 3   • metFORMIN (GLUCOPHAGE) 500 MG tablet TAKE 1 TABLET BY MOUTH DAILY WITH BREAKFAST 30 tablet 1   • metoprolol tartrate (LOPRESSOR) 50 MG tablet TAKE 1 TABLET BY  MOUTH TWICE DAILY 180 tablet 0   • Naloxegol Oxalate (MOVANTIK) 12.5 MG tablet Movantik 12.5 mg tablet     • NON FORMULARY GLIMEREX FOR PREDIABETES     • omeprazole (priLOSEC) 40 MG capsule TAKE 1 CAPSULE BY MOUTH DAILY BEFORE A MEAL 90 capsule 1   • ondansetron ODT (ZOFRAN-ODT) 4 MG disintegrating tablet Take 1 tablet by mouth 4 (Four) Times a Day As Needed for Nausea or Vomiting. 15 tablet 0   • ONETOUCH VERIO test strip Daily. for testing  0   • oxyCODONE (oxyCONTIN) 10 MG 12 hr tablet Take 10 mg by mouth Every 12 (Twelve) Hours As Needed.  0   • oxyCODONE-acetaminophen (PERCOCET) 5-325 MG per tablet Take 1 tablet by mouth Every 4 (Four) Hours As Needed for Moderate Pain  or Severe Pain . 15 tablet 0   • pravastatin (PRAVACHOL) 20 MG tablet Take 1 tablet by mouth Daily. 90 tablet 3   • predniSONE (DELTASONE) 20 MG tablet Take 1 tablet by mouth Daily. 10 tablet 0   • Testosterone Cypionate (DEPOTESTOTERONE CYPIONATE) 200 MG/ML injection Inject 1 mL into the appropriate muscle as directed by prescriber Every 21 (Twenty-One) Days. 10 mL 0   • VENTOLIN  (90 Base) MCG/ACT inhaler Inhale 2 puffs Every 6 (Six) Hours As Needed for Wheezing or Shortness of Air. 1 inhaler 5   • cephalexin (KEFLEX) 500 MG capsule Take 1 capsule by mouth 3 (Three) Times a Day. 30 capsule 0   • promethazine-dextromethorphan (PROMETHAZINE-DM) 6.25-15 MG/5ML syrup Take 5 ml QID PRN cough/congestion 180 mL 0     No current facility-administered medications for this visit.         Allergies   Allergen Reactions   • Amoxicillin Rash   • Penicillins Rash        Immunization History   Administered Date(s) Administered   • Flu Vaccine Quad PF >36MO 09/30/2015   • Pneumococcal Conjugate 13-Valent (PCV13) 09/22/2016   • Pneumococcal Polysaccharide (PPSV23) 05/18/2018        The following portions of the patient's history were reviewed and updated as appropriate: allergies, current medications, past family history, past medical history, past  "social history, past surgical history and problem list.      Objective    Visit Vitals  /78   Pulse 78   Ht 182.9 cm (72\")   Wt 85.3 kg (188 lb)   SpO2 98%   BMI 25.50 kg/m²        Physical Exam  Gen Appearance: NAD  HEENT: Normocephalic, PERRLA, no thyromegaly, trache midline  Heart: RRR, normal S1 and S2, no murmur  Lungs: CTA b/l, no wheezing, no crackles  Abdomen: Soft, non-tender, non-distended, no guarding and BSx4  MSK: Moves all extremities well, normal gait, no peripheral edema  Pulses: Palpable and equal b/l  Lymph nodes: No palpable lymphadenopathy   Neuro: No focal deficits       HEALTH RISK ASSESSMENT    1950    Recent Hospitalizations:  Recently treated at the following:  [facility].      Current Medical Providers:  Patient Care Team:  Zach Tran DO as PCP - General (Internal Medicine)      Smoking Status:  Social History     Tobacco Use   Smoking Status Former Smoker   • Last attempt to quit:    • Years since quittin.4   Smokeless Tobacco Never Used       Alcohol Consumption:  Social History     Substance and Sexual Activity   Alcohol Use No       Depression Screen:   PHQ-2/PHQ-9 Depression Screening 2020   Little interest or pleasure in doing things 1   Feeling down, depressed, or hopeless 0   Total Score 1       Health Habits and Functional and Cognitive Screening:  Functional & Cognitive Status 2020   Do you have difficulty preparing food and eating? No   Do you have difficulty bathing yourself, getting dressed or grooming yourself? No   Do you have difficulty using the toilet? No   Do you have difficulty moving around from place to place? No   Do you have trouble with steps or getting out of a bed or a chair? No   Current Diet Well Balanced Diet   Dental Exam Not up to date   Eye Exam Up to date   Exercise (times per week) 7 times per week   Current Exercise Activities Include Yard Work   Do you need help using the phone?  No   Are you deaf " or do you have serious difficulty hearing?  No   Do you need help with transportation? No   Do you need help shopping? No   Do you need help preparing meals?  No   Do you need help with housework?  No   Do you need help with laundry? No   Do you need help taking your medications? No   Do you need help managing money? No   Do you ever drive or ride in a car without wearing a seat belt? No   Have you felt unusual stress, anger or loneliness in the last month? Yes   Who do you live with? Alone   If you need help, do you have trouble finding someone available to you? Yes   Have you been bothered in the last four weeks by sexual problems? No   Do you have difficulty concentrating, remembering or making decisions? No         Does the patient have evidence of cognitive impairment? No    Aspirin use counseling? Does not need ASA (and currently is not on it)      Recent Lab Results:  CMP:  Lab Results   Component Value Date    BUN 8 02/10/2020    CREATININE 1.13 02/10/2020    EGFRIFNONA 65 02/10/2020    BCR 7.1 02/10/2020     02/10/2020    K 4.0 02/10/2020    CO2 24.9 02/10/2020    CALCIUM 9.7 02/10/2020    ALBUMIN 5.00 02/10/2020    BILITOT 0.7 02/10/2020    ALKPHOS 68 02/10/2020    AST 22 02/10/2020    ALT 20 02/10/2020     Lipid Panel:  Lab Results   Component Value Date    CHOL 153 09/11/2019    TRIG 281 (H) 09/11/2019    HDL 40 09/11/2019    VLDL 56.2 (H) 09/11/2019    LDLHDL 1.42 09/11/2019     HbA1c:  Lab Results   Component Value Date    HGBA1C 6.30 (H) 01/08/2020       Visual Acuity:  No exam data present    Age-appropriate Screening Schedule:  Refer to the list below for future screening recommendations based on patient's age, sex and/or medical conditions. Orders for these recommended tests are listed in the plan section. The patient has been provided with a written plan.    Health Maintenance   Topic Date Due   • URINE MICROALBUMIN  1950   • TDAP/TD VACCINES (1 - Tdap) 08/28/1962   • ZOSTER VACCINE (1  of 2) 08/28/2001   • DIABETIC FOOT EXAM  07/23/2018   • DIABETIC EYE EXAM  07/23/2018   • HEMOGLOBIN A1C  07/08/2020   • INFLUENZA VACCINE  08/01/2020   • LIPID PANEL  09/11/2020   • COLONOSCOPY  02/14/2029          Advance Care Planning:  ACP discussion was declined by the patient. Patient does not have an advance directive, declines further assistance.    Identification of Risk Factors:  Risk factors include: Advance Directive Discussion  Cardiovascular risk  Chronic Pain   Colon Cancer Screening  Inactivity/Sedentary  Polypharmacy  Prostate Cancer Screening .    Compared to one year ago, the patient feels his physical health is the same.  Compared to one year ago, the patient feels his mental health is the same.  Reviewed use of high risk medication in the elderly: yes  Reviewed for potential of harmful drug interactions in the elderly: yes    Behzad was seen today for medicare wellness-subsequent.    Diagnoses and all orders for this visit:    Medicare annual wellness visit, subsequent  Counseled on healthy weight, nutrition, physical activity, cancer screening, and immunizations.  Checking screening blood work.  Up-to-date on colonoscopy.  Checking PSA with labs.  Recommend follow-up with allergist as scheduled.  Would recommend repeat chest x-ray with productive cough.    Essential hypertension  -     CBC & Differential; Future  -     Comprehensive Metabolic Panel; Future  -     Hemoglobin A1c; Future  -     Lipid Panel; Future  -     PSA Screen; Future  -     Urinalysis With Culture If Indicated - Urine, Clean Catch; Future  -     Vitamin D 25 Hydroxy; Future  -     TSH+Free T4; Future  -     Vitamin B12; Future    Hyperlipidemia, unspecified hyperlipidemia type  -     CBC & Differential; Future  -     Comprehensive Metabolic Panel; Future  -     Hemoglobin A1c; Future  -     Lipid Panel; Future  -     PSA Screen; Future  -     Urinalysis With Culture If Indicated - Urine, Clean Catch; Future  -     Vitamin  D 25 Hydroxy; Future  -     TSH+Free T4; Future  -     Vitamin B12; Future    Mild intermittent asthma without complication    Congestion of nasal sinus    Prediabetes  -     CBC & Differential; Future  -     Comprehensive Metabolic Panel; Future  -     Hemoglobin A1c; Future  -     Lipid Panel; Future  -     PSA Screen; Future  -     Urinalysis With Culture If Indicated - Urine, Clean Catch; Future  -     Vitamin D 25 Hydroxy; Future  -     TSH+Free T4; Future  -     Vitamin B12; Future    Spinal stenosis, unspecified spinal region    Fatigue, unspecified type  -     CBC & Differential; Future  -     Comprehensive Metabolic Panel; Future  -     Hemoglobin A1c; Future  -     Lipid Panel; Future  -     PSA Screen; Future  -     Urinalysis With Culture If Indicated - Urine, Clean Catch; Future  -     Vitamin D 25 Hydroxy; Future  -     TSH+Free T4; Future  -     Vitamin B12; Future  -     Sedimentation rate, automated; Future  -     C-reactive protein; Future    Vitamin D deficiency  -     Vitamin D 25 Hydroxy; Future    Encounter for prostate cancer screening  -     PSA Screen; Future    Testosterone deficiency  -     Testosterone Cypionate (DEPOTESTOTERONE CYPIONATE) 200 MG/ML injection; Inject 1 mL into the appropriate muscle as directed by prescriber Every 21 (Twenty-One) Days.          Patient Self-Management and Personalized Health Advice  The patient has been provided with information about: diet, exercise, prevention of cardiac or vascular disease and designing advance directives and preventive services including:   · Annual Wellness Visit (AWV)  · Cardiovascular Disease Screening Tests (may do this order every 5 years in beneficiaries without signs or symptoms of cardiovascular disease)  · Prostate Cancer Screening .      Follow Up:  Return in about 3 months (around 9/22/2020).     An After Visit Summary and PPPS with all of these plans were given to the patient.           Please note that portions of this  document were completed with a voice recognition program. Efforts were made to edit the dictations, but occasionally words are mis-transcribed.

## 2020-06-23 ENCOUNTER — TELEPHONE (OUTPATIENT)
Dept: FAMILY MEDICINE CLINIC | Facility: CLINIC | Age: 70
End: 2020-06-23

## 2020-06-23 NOTE — TELEPHONE ENCOUNTER
Pt remembers that he has taken Cipro 500mg DR BERNABE told him to let us know what it was he took before  in the past and it works for him.  Could this be called in for his chest Infection/Face.

## 2020-06-24 DIAGNOSIS — J32.9 RECURRENT SINUSITIS: Primary | ICD-10-CM

## 2020-06-24 RX ORDER — LEVOFLOXACIN 500 MG/1
500 TABLET, FILM COATED ORAL DAILY
Qty: 5 TABLET | Refills: 0 | Status: SHIPPED | OUTPATIENT
Start: 2020-06-24 | End: 2020-06-29

## 2020-06-30 ENCOUNTER — TELEPHONE (OUTPATIENT)
Dept: FAMILY MEDICINE CLINIC | Facility: CLINIC | Age: 70
End: 2020-06-30

## 2020-06-30 NOTE — TELEPHONE ENCOUNTER
Caller: Ariela Guzman    Relationship: Emergency Contact    Best call back number:[phone]    What medications are you currently taking:   Current Outpatient Medications on File Prior to Visit   Medication Sig Dispense Refill   • amLODIPine (NORVASC) 5 MG tablet Take 1 tablet by mouth Daily. 90 tablet 3   • cephalexin (KEFLEX) 500 MG capsule Take 1 capsule by mouth 3 (Three) Times a Day. 30 capsule 0   • Cholecalciferol (VITAMIN D) 125 MCG (5000 UT) capsule capsule Take 1 capsule by mouth Daily. 30 capsule 0   • cloNIDine (CATAPRES) 0.1 MG tablet clonidine HCl 0.1 mg tablet     • co-enzyme Q-10 50 MG capsule Take 1 capsule by mouth Daily. 90 capsule 3   • diazePAM (VALIUM) 5 MG tablet Take 5 mg by mouth 2 (Two) Times a Day.  0   • diclofenac (VOLTAREN) 1 % gel gel Apply 4 g topically to the appropriate area as directed 3 (Three) Times a Day As Needed (hand pain). 100 g 1   • erythromycin (ROMYCIN) 5 MG/GM ophthalmic ointment Administer  to both eyes Every 4 (Four) Hours While Awake. 3.5 g 0   • escitalopram (LEXAPRO) 10 MG tablet Take 1 tablet by mouth Daily. 30 tablet 5   • gabapentin (NEURONTIN) 400 MG capsule Take 400 mg by mouth 3 (Three) Times a Day.     • glimepiride (AMARYL) 1 MG tablet TAKE 1 TABLET BY MOUTH ONCE DAILY 90 tablet 1   • hydroCHLOROthiazide (HYDRODIURIL) 12.5 MG tablet TAKE 1 TABLET BY MOUTH ONCE DAILY 120 tablet 0   • HYDROcodone-acetaminophen (NORCO)  MG per tablet Take 1 tablet by mouth Every 6 (Six) Hours As Needed for moderate pain (4-6).     • Hydrocodone-Acetaminophen (XODOL )  MG per tablet   0   • hydrocortisone 2.5 % cream Apply  topically to the appropriate area as directed 3 (Three) Times a Day. 60 g 3   • [] levoFLOXacin (Levaquin) 500 MG tablet Take 1 tablet by mouth Daily for 5 days. 5 tablet 0   • lisinopril (PRINIVIL,ZESTRIL) 20 MG tablet TAKE 2 TABLETS BY MOUTH EVERY  tablet 0   • loratadine (CLARITIN) 10 MG tablet Take 1 tablet by mouth  Daily. 90 tablet 3   • metFORMIN (GLUCOPHAGE) 500 MG tablet TAKE 1 TABLET BY MOUTH DAILY WITH BREAKFAST 30 tablet 1   • metoprolol tartrate (LOPRESSOR) 50 MG tablet TAKE 1 TABLET BY MOUTH TWICE DAILY 180 tablet 0   • Naloxegol Oxalate (MOVANTIK) 12.5 MG tablet Movantik 12.5 mg tablet     • NON FORMULARY GLIMEREX FOR PREDIABETES     • omeprazole (priLOSEC) 40 MG capsule TAKE 1 CAPSULE BY MOUTH DAILY BEFORE A MEAL 90 capsule 1   • ondansetron ODT (ZOFRAN-ODT) 4 MG disintegrating tablet Take 1 tablet by mouth 4 (Four) Times a Day As Needed for Nausea or Vomiting. 15 tablet 0   • ONETOUCH VERIO test strip Daily. for testing  0   • oxyCODONE (oxyCONTIN) 10 MG 12 hr tablet Take 10 mg by mouth Every 12 (Twelve) Hours As Needed.  0   • oxyCODONE-acetaminophen (PERCOCET) 5-325 MG per tablet Take 1 tablet by mouth Every 4 (Four) Hours As Needed for Moderate Pain  or Severe Pain . 15 tablet 0   • pravastatin (PRAVACHOL) 20 MG tablet Take 1 tablet by mouth Daily. 90 tablet 3   • predniSONE (DELTASONE) 20 MG tablet Take 1 tablet by mouth Daily. 10 tablet 0   • promethazine-dextromethorphan (PROMETHAZINE-DM) 6.25-15 MG/5ML syrup Take 5 ml QID PRN cough/congestion 180 mL 0   • Testosterone Cypionate (DEPOTESTOTERONE CYPIONATE) 200 MG/ML injection Inject 1 mL into the appropriate muscle as directed by prescriber Every 21 (Twenty-One) Days. 10 mL 0   • VENTOLIN  (90 Base) MCG/ACT inhaler Inhale 2 puffs Every 6 (Six) Hours As Needed for Wheezing or Shortness of Air. 1 inhaler 5     No current facility-administered medications on file prior to visit.         When did you start taking these medications: June 24,2020    Which medication are you concerned about: LEVOFLOXACIN 500MG    Who prescribed you this medication: Dr. Megan Tran    What are your concerns: Making the patient sick to his stomach and muscle tightening    How long have you been taking these medications: A FEW DAYS    How long have you had these concerns: ABOUT A  DAY OR SO FROM THE STARTING DOSE

## 2020-07-01 ENCOUNTER — TELEPHONE (OUTPATIENT)
Dept: FAMILY MEDICINE CLINIC | Facility: CLINIC | Age: 70
End: 2020-07-01
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
## 2020-07-07 ENCOUNTER — HOSPITAL ENCOUNTER (OUTPATIENT)
Dept: GENERAL RADIOLOGY | Facility: HOSPITAL | Age: 70
Discharge: HOME | End: 2020-07-07
Admitting: INTERNAL MEDICINE

## 2020-07-07 ENCOUNTER — LAB (OUTPATIENT)
Dept: LAB | Facility: HOSPITAL | Age: 70
End: 2020-07-07

## 2020-07-07 DIAGNOSIS — R73.03 PREDIABETES: ICD-10-CM

## 2020-07-07 DIAGNOSIS — I10 ESSENTIAL HYPERTENSION: ICD-10-CM

## 2020-07-07 DIAGNOSIS — Z12.5 ENCOUNTER FOR PROSTATE CANCER SCREENING: ICD-10-CM

## 2020-07-07 DIAGNOSIS — J45.909 EXTRINSIC ASTHMA, UNSPECIFIED ASTHMA SEVERITY, UNSPECIFIED WHETHER COMPLICATED, UNSPECIFIED WHETHER PERSISTENT: ICD-10-CM

## 2020-07-07 DIAGNOSIS — J06.9 UPPER RESPIRATORY TRACT INFECTION, UNSPECIFIED TYPE: ICD-10-CM

## 2020-07-07 DIAGNOSIS — E78.5 HYPERLIPIDEMIA, UNSPECIFIED HYPERLIPIDEMIA TYPE: ICD-10-CM

## 2020-07-07 DIAGNOSIS — E55.9 VITAMIN D DEFICIENCY: ICD-10-CM

## 2020-07-07 DIAGNOSIS — R53.83 FATIGUE, UNSPECIFIED TYPE: ICD-10-CM

## 2020-07-07 LAB
ALBUMIN SERPL-MCNC: 4.6 G/DL (ref 3.5–5.2)
ALBUMIN/GLOB SERPL: 1.6 G/DL
ALP SERPL-CCNC: 53 U/L (ref 39–117)
ALT SERPL W P-5'-P-CCNC: 15 U/L (ref 1–41)
ANION GAP SERPL CALCULATED.3IONS-SCNC: 10.9 MMOL/L (ref 5–15)
AST SERPL-CCNC: 20 U/L (ref 1–40)
BASOPHILS # BLD AUTO: 0.06 10*3/MM3 (ref 0–0.2)
BASOPHILS NFR BLD AUTO: 0.7 % (ref 0–1.5)
BILIRUB SERPL-MCNC: 0.5 MG/DL (ref 0–1.2)
BILIRUB UR QL STRIP: NEGATIVE
BUN SERPL-MCNC: 9 MG/DL (ref 8–23)
BUN/CREAT SERPL: 9.4 (ref 7–25)
CALCIUM SPEC-SCNC: 9.7 MG/DL (ref 8.6–10.5)
CHLORIDE SERPL-SCNC: 97 MMOL/L (ref 98–107)
CHOLEST SERPL-MCNC: 154 MG/DL (ref 0–200)
CLARITY UR: CLEAR
CO2 SERPL-SCNC: 26.1 MMOL/L (ref 22–29)
COLOR UR: YELLOW
CREAT SERPL-MCNC: 0.96 MG/DL (ref 0.76–1.27)
CRP SERPL-MCNC: 0.07 MG/DL (ref 0–0.5)
DEPRECATED RDW RBC AUTO: 44.4 FL (ref 37–54)
EOSINOPHIL # BLD AUTO: 0.26 10*3/MM3 (ref 0–0.4)
EOSINOPHIL NFR BLD AUTO: 3 % (ref 0.3–6.2)
ERYTHROCYTE [DISTWIDTH] IN BLOOD BY AUTOMATED COUNT: 13.8 % (ref 12.3–15.4)
GFR SERPL CREATININE-BSD FRML MDRD: 78 ML/MIN/1.73
GLOBULIN UR ELPH-MCNC: 2.8 GM/DL
GLUCOSE SERPL-MCNC: 88 MG/DL (ref 65–99)
GLUCOSE UR STRIP-MCNC: NEGATIVE MG/DL
HBA1C MFR BLD: 6.4 % (ref 4.8–5.6)
HCT VFR BLD AUTO: 44.2 % (ref 37.5–51)
HDLC SERPL-MCNC: 37 MG/DL (ref 40–60)
HGB BLD-MCNC: 15.3 G/DL (ref 13–17.7)
HGB UR QL STRIP.AUTO: NEGATIVE
IMM GRANULOCYTES # BLD AUTO: 0.03 10*3/MM3 (ref 0–0.05)
IMM GRANULOCYTES NFR BLD AUTO: 0.3 % (ref 0–0.5)
KETONES UR QL STRIP: NEGATIVE
LDLC SERPL CALC-MCNC: 71 MG/DL (ref 0–100)
LDLC/HDLC SERPL: 1.92 {RATIO}
LEUKOCYTE ESTERASE UR QL STRIP.AUTO: NEGATIVE
LYMPHOCYTES # BLD AUTO: 2.74 10*3/MM3 (ref 0.7–3.1)
LYMPHOCYTES NFR BLD AUTO: 31.2 % (ref 19.6–45.3)
MCH RBC QN AUTO: 30.1 PG (ref 26.6–33)
MCHC RBC AUTO-ENTMCNC: 34.6 G/DL (ref 31.5–35.7)
MCV RBC AUTO: 86.8 FL (ref 79–97)
MONOCYTES # BLD AUTO: 0.92 10*3/MM3 (ref 0.1–0.9)
MONOCYTES NFR BLD AUTO: 10.5 % (ref 5–12)
NEUTROPHILS NFR BLD AUTO: 4.76 10*3/MM3 (ref 1.7–7)
NEUTROPHILS NFR BLD AUTO: 54.3 % (ref 42.7–76)
NITRITE UR QL STRIP: NEGATIVE
NRBC BLD AUTO-RTO: 0.1 /100 WBC (ref 0–0.2)
PH UR STRIP.AUTO: 7 [PH] (ref 5–8)
PLATELET # BLD AUTO: 236 10*3/MM3 (ref 140–450)
PMV BLD AUTO: 11.9 FL (ref 6–12)
POTASSIUM SERPL-SCNC: 3.9 MMOL/L (ref 3.5–5.2)
PROT SERPL-MCNC: 7.4 G/DL (ref 6–8.5)
PROT UR QL STRIP: NEGATIVE
RBC # BLD AUTO: 5.09 10*6/MM3 (ref 4.14–5.8)
SODIUM SERPL-SCNC: 134 MMOL/L (ref 136–145)
SP GR UR STRIP: 1.01 (ref 1–1.03)
TRIGL SERPL-MCNC: 230 MG/DL (ref 0–150)
UROBILINOGEN UR QL STRIP: NORMAL
VLDLC SERPL-MCNC: 46 MG/DL (ref 5–40)
WBC # BLD AUTO: 8.77 10*3/MM3 (ref 3.4–10.8)

## 2020-07-07 PROCEDURE — 83036 HEMOGLOBIN GLYCOSYLATED A1C: CPT

## 2020-07-07 PROCEDURE — 84443 ASSAY THYROID STIM HORMONE: CPT

## 2020-07-07 PROCEDURE — 85025 COMPLETE CBC W/AUTO DIFF WBC: CPT

## 2020-07-07 PROCEDURE — G0103 PSA SCREENING: HCPCS

## 2020-07-07 PROCEDURE — 82306 VITAMIN D 25 HYDROXY: CPT

## 2020-07-07 PROCEDURE — 80061 LIPID PANEL: CPT

## 2020-07-07 PROCEDURE — 81003 URINALYSIS AUTO W/O SCOPE: CPT

## 2020-07-07 PROCEDURE — 85652 RBC SED RATE AUTOMATED: CPT

## 2020-07-07 PROCEDURE — 80053 COMPREHEN METABOLIC PANEL: CPT

## 2020-07-07 PROCEDURE — 82607 VITAMIN B-12: CPT

## 2020-07-07 PROCEDURE — 84439 ASSAY OF FREE THYROXINE: CPT

## 2020-07-07 PROCEDURE — 36415 COLL VENOUS BLD VENIPUNCTURE: CPT

## 2020-07-07 PROCEDURE — 86140 C-REACTIVE PROTEIN: CPT

## 2020-07-07 PROCEDURE — 71046 X-RAY EXAM CHEST 2 VIEWS: CPT

## 2020-07-08 LAB
25(OH)D3 SERPL-MCNC: 25.9 NG/ML (ref 30–100)
ERYTHROCYTE [SEDIMENTATION RATE] IN BLOOD: 2 MM/HR (ref 0–20)
PSA SERPL-MCNC: 0.76 NG/ML (ref 0–4)
T4 FREE SERPL-MCNC: 1.15 NG/DL (ref 0.93–1.7)
TSH SERPL DL<=0.05 MIU/L-ACNC: 3.07 UIU/ML (ref 0.27–4.2)
VIT B12 BLD-MCNC: 443 PG/ML (ref 211–946)

## 2020-07-10 ENCOUNTER — TELEPHONE (OUTPATIENT)
Dept: FAMILY MEDICINE CLINIC | Facility: CLINIC | Age: 70
End: 2020-07-10

## 2020-07-10 NOTE — TELEPHONE ENCOUNTER
PT CALLED TO REQUEST TO BE PRESCRIBED SOME PROBIOTICS.    PLEASE ADVISE.  CALL BACK:[phone]       [id number] - [place], [place] - [address] AT [facility] [address]

## 2020-07-10 NOTE — TELEPHONE ENCOUNTER
PT CALLED STATED THAT [facility] NEED[facility] THE ORDER FOR DIABETIC SHOES REQUEST TO BE FAXED TO THEM.     FAX NUMBER:[phone]    PLEASE ADVISE.  CALL BACK:[phone]

## 2020-07-11 NOTE — TELEPHONE ENCOUNTER
No diabetic shoe form in patient's chart, have you seen these forms, and please advise on an probiotic for the patient

## 2020-07-13 RX ORDER — SACCHAROMYCES BOULARDII 250 MG
250 CAPSULE ORAL 2 TIMES DAILY
Qty: 60 CAPSULE | Refills: 5 | OUTPATIENT
Start: 2020-07-13 | End: 2021-06-29

## 2020-07-15 ENCOUNTER — PATIENT MESSAGE (OUTPATIENT)
Dept: FAMILY MEDICINE CLINIC | Facility: CLINIC | Age: 70
End: 2020-07-15

## 2020-07-15 ENCOUNTER — TELEPHONE (OUTPATIENT)
Dept: ORTHOPEDIC SURGERY | Facility: CLINIC | Age: 70
End: 2020-07-15

## 2020-07-15 DIAGNOSIS — R73.03 PREDIABETES: ICD-10-CM

## 2020-07-15 DIAGNOSIS — G62.9 NEUROPATHY: Primary | ICD-10-CM

## 2020-07-15 NOTE — TELEPHONE ENCOUNTER
The patient called in and wants to know where he can go to get his leg measured to see if it has gotten any longer before he gets his orthotics. I do see in the note where he wanted a brace that was similar to the AFO brace but where would you recommend him to go?    Zo

## 2020-07-15 NOTE — TELEPHONE ENCOUNTER
PATIENT IS INTERESTED IN GETTING ORTHOTICS. PATIENT STATES THE [facility] [place] DOES NOT DO MEASUREMENTS FOR THIS. PATIENT WOULD LIKE TO KNOW IF OUR OFFICE WILL DO MEASUREMENTS.

## 2020-07-17 ENCOUNTER — TELEPHONE (OUTPATIENT)
Dept: FAMILY MEDICINE CLINIC | Facility: CLINIC | Age: 70
End: 2020-07-17

## 2020-07-17 NOTE — TELEPHONE ENCOUNTER
BRITT WITH [facility] [place]   IS REQUESTING A CALL BACK     SHE STATED THE PAPER WORK FOR THE PATIENT WAS NOT FILLED OUT COMPLETELY    BRITT CALL BACK [phone]

## 2020-07-17 NOTE — TELEPHONE ENCOUNTER
"Patient returning Zo's call; Wasn't sure if Zo had find out of any places that would measure for a leg length discrepancy; I mentioned [facility] to the pt but he stated he already has been measured for orthotics there and they did not measure him for this; I did mention to patient that there are blocks of woods that are different measurements that can be used to place under the \"shorter\" leg that can help determine how much longer the leg is but I'm not sure exactly where he would go for this; But he was satisfied with this information and said he would figure it out from here.    Emily  "

## 2020-07-17 NOTE — TELEPHONE ENCOUNTER
Medicare will not pay if patient does not have Diabetes.  Notes that were sent do not say patient is Diabetic.  Is this accurate?    [facility] was advised you are ooo until next week to be able to address this.

## 2020-07-20 RX ORDER — METOPROLOL TARTRATE 50 MG/1
TABLET, FILM COATED ORAL
Qty: 180 TABLET | Refills: 0 | Status: SHIPPED | OUTPATIENT
Start: 2020-07-20 | End: 2020-10-15

## 2020-07-20 RX ORDER — LISINOPRIL 20 MG/1
TABLET ORAL
Qty: 180 TABLET | Refills: 0 | Status: SHIPPED | OUTPATIENT
Start: 2020-07-20 | End: 2020-10-15

## 2020-07-20 NOTE — TELEPHONE ENCOUNTER
Attempted to contact Melissa, the phone continued to ring with no answer. I then called the main number and I LVM.     They are not yet operating with regular hours yet so I left a detailed message requesting paperwork to be resent

## 2020-07-27 ENCOUNTER — PATIENT MESSAGE (OUTPATIENT)
Dept: FAMILY MEDICINE CLINIC | Facility: CLINIC | Age: 70
End: 2020-07-27

## 2020-07-29 ENCOUNTER — TELEPHONE (OUTPATIENT)
Dept: FAMILY MEDICINE CLINIC | Facility: CLINIC | Age: 70
End: 2020-07-29

## 2020-07-29 DIAGNOSIS — E34.9 TESTOSTERONE DEFICIENCY: ICD-10-CM

## 2020-07-29 RX ORDER — TESTOSTERONE CYPIONATE 200 MG/ML
200 INJECTION, SOLUTION INTRAMUSCULAR
Qty: 10 ML | Refills: 0 | Status: SHIPPED | OUTPATIENT
Start: 2020-07-29 | End: 2021-03-26 | Stop reason: SDUPTHER

## 2020-07-29 NOTE — TELEPHONE ENCOUNTER
PHARMACY CALLED TO CONFIRM THAT Testosterone Cypionate (DEPOTESTOTERONE CYPIONATE) 200 MG/ML injection WRITTEN PRESCRIPTION DATED FOR 6/22/20 IS CORRECT    PLEASE ADVISE:  [id number] - [place] - [facility] [address] AT [facility] [address] - [phone]  - [phone] FX

## 2020-08-20 ENCOUNTER — EPISODE CHANGES (OUTPATIENT)
Dept: CASE MANAGEMENT | Facility: OTHER | Age: 70
End: 2020-08-20

## 2020-08-21 ENCOUNTER — PATIENT OUTREACH (OUTPATIENT)
Dept: CASE MANAGEMENT | Facility: OTHER | Age: 70
End: 2020-08-21
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
## 2020-09-21 RX ORDER — HYDROCHLOROTHIAZIDE 12.5 MG/1
TABLET ORAL
Qty: 120 TABLET | Refills: 0 | OUTPATIENT
Start: 2020-09-21

## 2020-10-15 RX ORDER — LISINOPRIL 20 MG/1
TABLET ORAL
Qty: 180 TABLET | Refills: 3 | Status: SHIPPED | OUTPATIENT
Start: 2020-10-15 | End: 2022-06-15

## 2020-10-15 RX ORDER — METOPROLOL TARTRATE 50 MG/1
TABLET, FILM COATED ORAL
Qty: 180 TABLET | Refills: 3 | Status: SHIPPED | OUTPATIENT
Start: 2020-10-15 | End: 2021-04-14

## 2020-10-30 ENCOUNTER — OFFICE VISIT (OUTPATIENT)
Dept: FAMILY MEDICINE CLINIC | Facility: CLINIC | Age: 70
End: 2020-10-30

## 2020-10-30 ENCOUNTER — HOSPITAL ENCOUNTER (OUTPATIENT)
Dept: GENERAL RADIOLOGY | Facility: HOSPITAL | Age: 70
Discharge: HOME | End: 2020-10-30
Admitting: INTERNAL MEDICINE

## 2020-10-30 VITALS
OXYGEN SATURATION: 99 % | HEART RATE: 75 BPM | DIASTOLIC BLOOD PRESSURE: 94 MMHG | BODY MASS INDEX: 25.73 KG/M2 | WEIGHT: 190 LBS | SYSTOLIC BLOOD PRESSURE: 144 MMHG | HEIGHT: 72 IN

## 2020-10-30 DIAGNOSIS — J30.2 SEASONAL ALLERGIES: ICD-10-CM

## 2020-10-30 DIAGNOSIS — M79.671 FOOT PAIN, BILATERAL: Primary | ICD-10-CM

## 2020-10-30 DIAGNOSIS — I10 ESSENTIAL HYPERTENSION: ICD-10-CM

## 2020-10-30 DIAGNOSIS — M79.671 FOOT PAIN, BILATERAL: ICD-10-CM

## 2020-10-30 DIAGNOSIS — E11.40 TYPE 2 DIABETES MELLITUS WITH DIABETIC NEUROPATHY, WITHOUT LONG-TERM CURRENT USE OF INSULIN (HCC): ICD-10-CM

## 2020-10-30 DIAGNOSIS — J45.909 EXTRINSIC ASTHMA, UNSPECIFIED ASTHMA SEVERITY, UNSPECIFIED WHETHER COMPLICATED, UNSPECIFIED WHETHER PERSISTENT: ICD-10-CM

## 2020-10-30 DIAGNOSIS — M79.672 FOOT PAIN, BILATERAL: Primary | ICD-10-CM

## 2020-10-30 DIAGNOSIS — R09.81 SINUS CONGESTION: ICD-10-CM

## 2020-10-30 DIAGNOSIS — M79.672 FOOT PAIN, BILATERAL: ICD-10-CM

## 2020-10-30 PROCEDURE — 96372 THER/PROPH/DIAG INJ SC/IM: CPT | Performed by: INTERNAL MEDICINE

## 2020-10-30 PROCEDURE — 99215 OFFICE O/P EST HI 40 MIN: CPT | Performed by: INTERNAL MEDICINE

## 2020-10-30 PROCEDURE — 73630 X-RAY EXAM OF FOOT: CPT

## 2020-10-30 RX ORDER — METHYLPREDNISOLONE ACETATE 80 MG/ML
80 INJECTION, SUSPENSION INTRA-ARTICULAR; INTRALESIONAL; INTRAMUSCULAR; SOFT TISSUE ONCE
Status: COMPLETED | OUTPATIENT
Start: 2020-10-30 | End: 2020-10-30

## 2020-10-30 RX ORDER — PREDNISONE 20 MG/1
40 TABLET ORAL DAILY
Qty: 10 TABLET | Refills: 0 | Status: SHIPPED | OUTPATIENT
Start: 2020-10-30 | End: 2021-01-25 | Stop reason: SDUPTHER

## 2020-10-30 RX ADMIN — METHYLPREDNISOLONE ACETATE 80 MG: 80 INJECTION, SUSPENSION INTRA-ARTICULAR; INTRALESIONAL; INTRAMUSCULAR; SOFT TISSUE at 14:23

## 2020-10-30 NOTE — PROGRESS NOTES
Chief Complaint   Patient presents with   • Arthritis       HPI:  Behzad Guzman is a 70 y.o. male who presents today for bilateral foot pain.  Patient reports she dropped a couch on his right great toe and he has had chronic pain in his left foot for several years.  He is established with podiatry.  He is frustrated that the quality of shoe inserts is not the same as it was before.  He reports he did take his blood pressure medication today although he was rushing to the appointment so his blood pressure is likely elevated.  He is established with pain management for chronic low back pain due to prior injuries.  He continues to have shoulder pain.  He has not yet established with physical therapy due to cost.    ROS:  Constitutional: no fevers, night sweats or unexplained weight loss  Eyes: no vision changes  ENT: no runny nose, ear pain, sore throat  Cardio: no chest pain, palpitations  Pulm: no shortness of breath, wheezing, or cough  GI: no abdominal pain or changes in bowel movements  : no difficulty urinating  MSK: no difficulty ambulating, no joint pain  Neuro: no weakness, dizziness or headache  Psych: no trouble sleeping  Endo: no change in appetite      Past Medical History:   Diagnosis Date   • Asthma    • Chronic hip pain, left    • Chronic pain in left shoulder    • Hyperlipidemia    • Hypertension    • Leg length discrepancy    • Neck pain, chronic    • Neuropathy    • Panic attacks    • Pre-diabetes    • Prediabetes    • Spinal stenosis       Family History   Problem Relation Age of Onset   • Heart attack Father    • Other Mother         accident      Social History     Socioeconomic History   • Marital status:      Spouse name: Not on file   • Number of children: Not on file   • Years of education: Not on file   • Highest education level: Not on file   Occupational History   • Occupation: farmer   Tobacco Use   • Smoking status: Former Smoker     Quit date: 1987     Years since quitting:  33.8   • Smokeless tobacco: Never Used   Substance and Sexual Activity   • Alcohol use: No   • Drug use: No   • Sexual activity: Defer   Social History Narrative    Lives with wife on a farm      Allergies   Allergen Reactions   • Amoxicillin Rash   • Penicillins Rash      Immunization History   Administered Date(s) Administered   • Flu Vaccine Quad PF >36MO 09/30/2015   • Pneumococcal Conjugate 13-Valent (PCV13) 09/22/2016   • Pneumococcal Polysaccharide (PPSV23) 05/18/2018        PE:  Vitals:    10/30/20 1415   BP: 144/94   Pulse:    SpO2:       Body mass index is 25.77 kg/m².    Gen Appearance: NAD  HEENT: Normocephalic, PERRLA, no thyromegaly, trache midline  Heart: RRR, normal S1 and S2, no murmur  Lungs: CTA b/l, no wheezing, no crackles  Abdomen: Soft, non-tender, non-distended, no guarding and BSx4  MSK: Moves all extremities well, normal gait, no peripheral edema  Pulses: Palpable and equal b/l  Lymph nodes: No palpable lymphadenopathy   Neuro: No focal deficits      Current Outpatient Medications   Medication Sig Dispense Refill   • amLODIPine (NORVASC) 5 MG tablet Take 1 tablet by mouth Daily. 90 tablet 3   • cephalexin (KEFLEX) 500 MG capsule Take 1 capsule by mouth 3 (Three) Times a Day. 30 capsule 0   • Cholecalciferol (VITAMIN D) 125 MCG (5000 UT) capsule capsule Take 1 capsule by mouth Daily. 30 capsule 0   • cloNIDine (CATAPRES) 0.1 MG tablet clonidine HCl 0.1 mg tablet     • co-enzyme Q-10 50 MG capsule Take 1 capsule by mouth Daily. 90 capsule 3   • diazePAM (VALIUM) 5 MG tablet Take 5 mg by mouth 2 (Two) Times a Day.  0   • diclofenac (VOLTAREN) 1 % gel gel Apply 4 g topically to the appropriate area as directed 3 (Three) Times a Day As Needed (hand pain). 100 g 1   • erythromycin (ROMYCIN) 5 MG/GM ophthalmic ointment Administer  to both eyes Every 4 (Four) Hours While Awake. 3.5 g 0   • escitalopram (LEXAPRO) 10 MG tablet Take 1 tablet by mouth Daily. 30 tablet 5   • gabapentin (NEURONTIN) 400  MG capsule Take 400 mg by mouth 3 (Three) Times a Day.     • glimepiride (AMARYL) 1 MG tablet TAKE 1 TABLET BY MOUTH ONCE DAILY 90 tablet 1   • hydroCHLOROthiazide (HYDRODIURIL) 12.5 MG tablet TAKE 1 TABLET BY MOUTH ONCE DAILY 120 tablet 0   • HYDROcodone-acetaminophen (NORCO)  MG per tablet Take 1 tablet by mouth Every 6 (Six) Hours As Needed for moderate pain (4-6).     • Hydrocodone-Acetaminophen (XODOL )  MG per tablet   0   • hydrocortisone 2.5 % cream Apply  topically to the appropriate area as directed 3 (Three) Times a Day. 60 g 3   • lisinopril (PRINIVIL,ZESTRIL) 20 MG tablet TAKE 2 TABLETS BY MOUTH EVERY  tablet 3   • loratadine (CLARITIN) 10 MG tablet Take 1 tablet by mouth Daily. 90 tablet 3   • metFORMIN (GLUCOPHAGE) 500 MG tablet TAKE 1 TABLET BY MOUTH DAILY WITH BREAKFAST 90 tablet 3   • metoprolol tartrate (LOPRESSOR) 50 MG tablet TAKE 1 TABLET BY MOUTH TWICE DAILY 180 tablet 3   • Naloxegol Oxalate (MOVANTIK) 12.5 MG tablet Movantik 12.5 mg tablet     • NON FORMULARY GLIMEREX FOR PREDIABETES     • omeprazole (priLOSEC) 40 MG capsule TAKE 1 CAPSULE BY MOUTH DAILY BEFORE A MEAL 90 capsule 1   • ondansetron ODT (ZOFRAN-ODT) 4 MG disintegrating tablet Take 1 tablet by mouth 4 (Four) Times a Day As Needed for Nausea or Vomiting. 15 tablet 0   • ONETOUCH VERIO test strip Daily. for testing  0   • oxyCODONE (oxyCONTIN) 10 MG 12 hr tablet Take 10 mg by mouth Every 12 (Twelve) Hours As Needed.  0   • oxyCODONE-acetaminophen (PERCOCET) 5-325 MG per tablet Take 1 tablet by mouth Every 4 (Four) Hours As Needed for Moderate Pain  or Severe Pain . 15 tablet 0   • pravastatin (PRAVACHOL) 20 MG tablet Take 1 tablet by mouth Daily. 90 tablet 3   • predniSONE (DELTASONE) 20 MG tablet Take 2 tablets by mouth Daily. 10 tablet 0   • promethazine-dextromethorphan (PROMETHAZINE-DM) 6.25-15 MG/5ML syrup Take 5 ml QID PRN cough/congestion 180 mL 0   • saccharomyces boulardii (Florastor) 250 MG  capsule Take 1 capsule by mouth 2 (Two) Times a Day. 60 capsule 5   • Testosterone Cypionate (DEPOTESTOTERONE CYPIONATE) 200 MG/ML injection Inject 1 mL into the appropriate muscle as directed by prescriber Every 21 (Twenty-One) Days. 10 mL 0   • VENTOLIN  (90 Base) MCG/ACT inhaler Inhale 2 puffs Every 6 (Six) Hours As Needed for Wheezing or Shortness of Air. 1 inhaler 5     No current facility-administered medications for this visit.    Counseling was given to patient for the following topics: instructions for management, risk factor reductions, impressions, risks and benefits of treatment options and importance of treatment compliance . Total time of the encounter was 40 minutes and 25 minutes was spent face to face counseling.         Diagnoses and all orders for this visit:    1. Foot pain, bilateral (Primary)  XR Foot 2 View Bilateral; Future  Recommend checking x-ray due to trauma.  Patient requesting second opinion from podiatry.  Pain likely due to arthritis versus neuropathy.  2. Type 2 diabetes mellitus with diabetic neuropathy, without long-term current use of insulin (CMS/[facility])  Well-controlled A1c.  Continue Metformin.  3. Essential hypertension  Elevated today on the patient was rushing to appointment.  Continue current blood pressure medication for now.  Recheck in 3 months.  4. Sinus congestion  -     methylPREDNISolone acetate (DEPO-medrol) injection 80 mg    5. Seasonal allergies  -     predniSONE (DELTASONE) 20 MG tablet; Take 2 tablets by mouth Daily.  Dispense: 10 tablet; Refill: 0  -     methylPREDNISolone acetate (DEPO-medrol) injection 80 mg  Sinusitis versus exacerbation of allergies.  Trial steroids initially.  If no improvement in 7 days would recommend antibiotic therapy.  6. Extrinsic asthma, unspecified asthma severity, unspecified whether complicated, unspecified whether persistent  -     predniSONE (DELTASONE) 20 MG tablet; Take 2 tablets by mouth Daily.  Dispense: 10 tablet;  Refill: 0  -     methylPREDNISolone acetate (DEPO-medrol) injection 80 mg         Return in about 3 months (around 1/30/2021).     Please note that portions of this document were completed with a voice recognition program. Efforts were made to edit the dictations, but occasionally words are mis-transcribed.

## 2020-11-01 ENCOUNTER — EPISODE CHANGES (OUTPATIENT)
Dept: CASE MANAGEMENT | Facility: OTHER | Age: 70
End: 2020-11-01

## 2020-11-03 ENCOUNTER — TELEPHONE (OUTPATIENT)
Dept: FAMILY MEDICINE CLINIC | Facility: CLINIC | Age: 70
End: 2020-11-03

## 2020-11-03 NOTE — TELEPHONE ENCOUNTER
----- Message from Zach Tran DO sent at 11/2/2020  3:31 PM EST -----  Please let pt know he has a fracture of his great right toe. He will need to call Dr. Tavarez to make an apt asap.

## 2020-11-03 NOTE — TELEPHONE ENCOUNTER
Dari Cash MA   11/2/2020  3:54 PM      Attempted to contact, no answer LVM to call us regarding his Xray results.

## 2020-11-04 ENCOUNTER — TELEPHONE (OUTPATIENT)
Dept: FAMILY MEDICINE CLINIC | Facility: CLINIC | Age: 70
End: 2020-11-04

## 2020-11-04 DIAGNOSIS — S92.404A CLOSED NONDISPLACED FRACTURE OF PHALANX OF RIGHT GREAT TOE, UNSPECIFIED PHALANX, INITIAL ENCOUNTER: Primary | ICD-10-CM

## 2020-11-10 ENCOUNTER — TELEPHONE (OUTPATIENT)
Dept: FAMILY MEDICINE CLINIC | Facility: CLINIC | Age: 70
End: 2020-11-10

## 2020-11-10 NOTE — TELEPHONE ENCOUNTER
PEACE FROM  [facility] CALLED TO LET US KNOW THAT MR LOPEZ NEEDS A REFERRAL TO EITHER DR EMILY GREENBERG OR [facility].

## 2020-11-10 NOTE — TELEPHONE ENCOUNTER
It looks like he was already referred to [facility]. Does he need another referral?

## 2020-11-24 ENCOUNTER — OFFICE VISIT (OUTPATIENT)
Dept: FAMILY MEDICINE CLINIC | Facility: CLINIC | Age: 70
End: 2020-11-24

## 2020-11-24 VITALS
HEART RATE: 81 BPM | HEIGHT: 72 IN | SYSTOLIC BLOOD PRESSURE: 160 MMHG | WEIGHT: 188 LBS | OXYGEN SATURATION: 96 % | RESPIRATION RATE: 16 BRPM | DIASTOLIC BLOOD PRESSURE: 90 MMHG | BODY MASS INDEX: 25.47 KG/M2 | TEMPERATURE: 97.6 F

## 2020-11-24 DIAGNOSIS — J01.01 ACUTE RECURRENT MAXILLARY SINUSITIS: Primary | ICD-10-CM

## 2020-11-24 DIAGNOSIS — F41.1 GENERALIZED ANXIETY DISORDER: ICD-10-CM

## 2020-11-24 DIAGNOSIS — J30.2 SEASONAL ALLERGIES: ICD-10-CM

## 2020-11-24 DIAGNOSIS — I10 ESSENTIAL HYPERTENSION: ICD-10-CM

## 2020-11-24 DIAGNOSIS — S92.404D CLOSED NONDISPLACED FRACTURE OF PHALANX OF RIGHT GREAT TOE WITH ROUTINE HEALING, UNSPECIFIED PHALANX, SUBSEQUENT ENCOUNTER: ICD-10-CM

## 2020-11-24 PROCEDURE — 99215 OFFICE O/P EST HI 40 MIN: CPT | Performed by: INTERNAL MEDICINE

## 2020-11-24 RX ORDER — CEFDINIR 300 MG/1
300 CAPSULE ORAL 2 TIMES DAILY
Qty: 14 CAPSULE | Refills: 0 | Status: SHIPPED | OUTPATIENT
Start: 2020-11-24 | End: 2020-12-01

## 2020-11-24 NOTE — PROGRESS NOTES
Chief Complaint   Patient presents with   • Weight Loss       HPI:  Behzad Guzman is a 70 y.o. male who presents today for follow-up.  Patient reports he was told to follow-up with his PCP due to weight loss.  He has several complaints today.  He reports his stress levels are significantly elevated recently due to his son, allergies, and chronic pain.  He reports sinus congestion and left-sided sinus pain ongoing for the last 2 weeks.  He is established with an allergist.  He is taking antihistamine and nasal spray daily.  He reports his toe continues to hurt.  He unfortunately missed his podiatry appointment.  He would like his toe evaluated today.    ROS:  Constitutional: no fevers, night sweats or unexplained weight loss  Eyes: no vision changes  ENT: no runny nose, ear pain, sore throat  Cardio: no chest pain, palpitations  Pulm: no shortness of breath, wheezing, or cough  GI: no abdominal pain or changes in bowel movements  : no difficulty urinating  MSK: no difficulty ambulating, no joint pain  Neuro: no weakness, dizziness or headache  Psych: no trouble sleeping  Endo: no change in appetite      Past Medical History:   Diagnosis Date   • Asthma    • Chronic hip pain, left    • Chronic pain in left shoulder    • Hyperlipidemia    • Hypertension    • Leg length discrepancy    • Neck pain, chronic    • Neuropathy    • Panic attacks    • Pre-diabetes    • Prediabetes    • Spinal stenosis       Family History   Problem Relation Age of Onset   • Heart attack Father    • Other Mother         accident      Social History     Socioeconomic History   • Marital status:      Spouse name: Not on file   • Number of children: Not on file   • Years of education: Not on file   • Highest education level: Not on file   Occupational History   • Occupation: farmer   Tobacco Use   • Smoking status: Former Smoker     Quit date:      Years since quittin.9   • Smokeless tobacco: Never Used   Substance and Sexual  Activity   • Alcohol use: No   • Drug use: No   • Sexual activity: Defer   Social History Narrative    [place] with wife [place] a farm      Allergies   Allergen Reactions   • Amoxicillin Rash   • Penicillins Rash      Immunization History   Administered Date(s) Administered   • Flu Vaccine Quad PF >36MO 09/30/2015   • Pneumococcal Conjugate 13-Valent (PCV13) 09/22/2016   • Pneumococcal Polysaccharide (PPSV23) 05/18/2018        PE:  Vitals:    11/24/20 1310   BP: 160/90   Pulse: 81   Resp: 16   Temp: 97.6 °F (36.4 °C)   SpO2: 96%      Body mass index is 25.49 kg/m².    Gen Appearance: NAD  HEENT: Normocephalic, PERRLA, no thyromegaly, trache midline  Heart: RRR, normal S1 and S2, no murmur  Lungs: CTA b/l, no wheezing, no crackles  Abdomen: Soft, non-tender, non-distended, no guarding and BSx4  MSK: Atrophy left shoulder, normal gait, no peripheral edema, swelling and redness right great toe  Pulses: Palpable and equal b/l  Lymph nodes: No palpable lymphadenopathy   Neuro: No focal deficits      Current Outpatient Medications   Medication Sig Dispense Refill   • amLODIPine (NORVASC) 5 MG tablet Take 1 tablet by mouth Daily. 90 tablet 3   • Cholecalciferol (VITAMIN D) 125 MCG (5000 UT) capsule capsule Take 1 capsule by mouth Daily. 30 capsule 0   • cloNIDine (CATAPRES) 0.1 MG tablet clonidine HCl 0.1 mg tablet     • co-enzyme Q-10 50 MG capsule Take 1 capsule by mouth Daily. 90 capsule 3   • diazePAM (VALIUM) 5 MG tablet Take 5 mg by mouth 2 (Two) Times a Day.  0   • diclofenac (VOLTAREN) 1 % gel gel Apply 4 g topically to the appropriate area as directed 3 (Three) Times a Day As Needed (hand pain). 100 g 1   • erythromycin (ROMYCIN) 5 MG/GM ophthalmic ointment Administer  to both eyes Every 4 (Four) Hours While Awake. 3.5 g 0   • escitalopram (LEXAPRO) 10 MG tablet Take 1 tablet by mouth Daily. 30 tablet 5   • gabapentin (NEURONTIN) 400 MG capsule Take 400 mg by mouth 3 (Three) Times a Day.     • glimepiride (AMARYL) 1 MG  tablet TAKE 1 TABLET BY MOUTH ONCE DAILY 90 tablet 1   • hydroCHLOROthiazide (HYDRODIURIL) 12.5 MG tablet TAKE 1 TABLET BY MOUTH ONCE DAILY 120 tablet 0   • HYDROcodone-acetaminophen (NORCO)  MG per tablet Take 1 tablet by mouth Every 6 (Six) Hours As Needed for moderate pain (4-6).     • hydrocortisone 2.5 % cream Apply  topically to the appropriate area as directed 3 (Three) Times a Day. 60 g 3   • lisinopril (PRINIVIL,ZESTRIL) 20 MG tablet TAKE 2 TABLETS BY MOUTH EVERY  tablet 3   • loratadine (CLARITIN) 10 MG tablet Take 1 tablet by mouth Daily. 90 tablet 3   • metFORMIN (GLUCOPHAGE) 500 MG tablet TAKE 1 TABLET BY MOUTH DAILY WITH BREAKFAST 90 tablet 3   • metoprolol tartrate (LOPRESSOR) 50 MG tablet TAKE 1 TABLET BY MOUTH TWICE DAILY 180 tablet 3   • Naloxegol Oxalate (MOVANTIK) 12.5 MG tablet Movantik 12.5 mg tablet     • NON FORMULARY GLIMEREX FOR PREDIABETES     • omeprazole (priLOSEC) 40 MG capsule TAKE 1 CAPSULE BY MOUTH DAILY BEFORE A MEAL 90 capsule 1   • ondansetron ODT (ZOFRAN-ODT) 4 MG disintegrating tablet Take 1 tablet by mouth 4 (Four) Times a Day As Needed for Nausea or Vomiting. 15 tablet 0   • ONETOUCH VERIO test strip Daily. for testing  0   • oxyCODONE-acetaminophen (PERCOCET) 5-325 MG per tablet Take 1 tablet by mouth Every 4 (Four) Hours As Needed for Moderate Pain  or Severe Pain . 15 tablet 0   • pravastatin (PRAVACHOL) 20 MG tablet Take 1 tablet by mouth Daily. 90 tablet 3   • predniSONE (DELTASONE) 20 MG tablet Take 2 tablets by mouth Daily. 10 tablet 0   • promethazine-dextromethorphan (PROMETHAZINE-DM) 6.25-15 MG/5ML syrup Take 5 ml QID PRN cough/congestion 180 mL 0   • saccharomyces boulardii (Florastor) 250 MG capsule Take 1 capsule by mouth 2 (Two) Times a Day. 60 capsule 5   • Testosterone Cypionate (DEPOTESTOTERONE CYPIONATE) 200 MG/ML injection Inject 1 mL into the appropriate muscle as directed by prescriber Every 21 (Twenty-One) Days. 10 mL 0   • VENTOLIN   (90 Base) MCG/ACT inhaler Inhale 2 puffs Every 6 (Six) Hours As Needed for Wheezing or Shortness of Air. 1 inhaler 5   • cefdinir (OMNICEF) 300 MG capsule Take 1 capsule by mouth 2 (Two) Times a Day for 7 days. 14 capsule 0   • Hydrocodone-Acetaminophen (XODOL )  MG per tablet   0   • oxyCODONE (oxyCONTIN) 10 MG 12 hr tablet Take 10 mg by mouth Every 12 (Twelve) Hours As Needed.  0     No current facility-administered medications for this visit.     Counseling was given to patient for the following topics: instructions for management, impressions and risks and benefits of treatment options . Total time of the encounter was 40 minutes and 25 minutes was spent face to face counseling.        Diagnoses and all orders for this visit:    1. Acute recurrent maxillary sinusitis (Primary)  -     cefdinir (OMNICEF) 300 MG capsule; Take 1 capsule by mouth 2 (Two) Times a Day for 7 days.  Dispense: 14 capsule; Refill: 0  Treat with cefdinir.  He has tolerated this medicine well in the past.  Recommend follow-up with allergist for likely allergy shots.  2. Closed nondisplaced fracture of phalanx of right great toe with routine healing, unspecified phalanx, subsequent encounter  He has an appointment with podiatry next week.  3. Generalized anxiety disorder  Patient declined therapy referral today.  4. Seasonal allergies  Recommend follow-up with allergist as above  5. Essential hypertension  Elevated today likely due to pain and stress.  Follow-up 1 month for recheck.  Continue current medication for now.  Recommend checking blood pressure at home when able.       No follow-ups on file.     Please note that portions of this document were completed with a voice recognition program. Efforts were made to edit the dictations, but occasionally words are mis-transcribed.

## 2020-12-18 ENCOUNTER — TELEPHONE (OUTPATIENT)
Dept: FAMILY MEDICINE CLINIC | Facility: CLINIC | Age: 70
End: 2020-12-18

## 2020-12-18 NOTE — TELEPHONE ENCOUNTER
Caller: Behzad Guzman    Relationship: Self    Best call back number: [phone]    What medication are you requesting: muscle relaxer (patient states he has been prescribed these before)    What are your current symptoms: stiffness from prior injury    How long have you been experiencing symptoms:     Have you had these symptoms before:    [x] Yes  [] No    Have you been treated for these symptoms before:   [x] Yes  [] No    If a prescription is needed, what is your preferred pharmacy and phone number: [facility] [id number] - [place], [place] - [address] [facility] AT [facility] [address] - [phone]  - [phone]   [phone]    Additional notes:patient states he does not need these often and is only requesting 2-3 pills. Due to his previous injuries he is having trouble moving due to being stiff and the cold weather.     Patient would also like to be scheduled a steroid shot.

## 2020-12-22 RX ORDER — CYCLOBENZAPRINE HCL 10 MG
10 TABLET ORAL NIGHTLY PRN
Qty: 10 TABLET | Refills: 0 | OUTPATIENT
Start: 2020-12-22 | End: 2021-06-29

## 2021-01-07 ENCOUNTER — TELEPHONE (OUTPATIENT)
Dept: FAMILY MEDICINE CLINIC | Facility: CLINIC | Age: 71
End: 2021-01-07

## 2021-01-07 DIAGNOSIS — J45.909 EXTRINSIC ASTHMA, UNSPECIFIED ASTHMA SEVERITY, UNSPECIFIED WHETHER COMPLICATED, UNSPECIFIED WHETHER PERSISTENT: ICD-10-CM

## 2021-01-07 DIAGNOSIS — I10 ESSENTIAL HYPERTENSION: Primary | ICD-10-CM

## 2021-01-07 RX ORDER — ALBUTEROL SULFATE 90 UG/1
2 AEROSOL, METERED RESPIRATORY (INHALATION) EVERY 6 HOURS PRN
Status: CANCELLED | OUTPATIENT
Start: 2021-01-07

## 2021-01-07 RX ORDER — HYDROCHLOROTHIAZIDE 12.5 MG/1
12.5 TABLET ORAL DAILY
Qty: 120 TABLET | Refills: 0 | Status: CANCELLED | OUTPATIENT
Start: 2021-01-07

## 2021-01-07 NOTE — TELEPHONE ENCOUNTER
Patient requested a refill of his ketoprofen gabapentin ketamine lidocaine amitriptyline bacolfen butivacaine cyclobenzaprine meloxicam compound. Patient stated he is almost out of this and needs it called in as soon as possible.    [facility] - [place], [place] - [address] [place] [address] - [phone]  - [phone] FX    Patient requested a refill of hydroCHLOROthiazide (HYDRODIURIL) 12.5 MG tablet. Patient is out of this medication.    Patient requested a refill of VENTOLIN  (90 Base) MCG/ACT inhaler.    [address] - [address] [facility] AT [facility] [address] - [phone]  - [phone] FX      Patient requested a call back from Dr. Tran. Patient stated he has been having trouble with his left shoulder being painful and stiff and would like to discuss what can be done to help him.    Please call and advise. Patient call back [phone]

## 2021-01-08 RX ORDER — HYDROCHLOROTHIAZIDE 12.5 MG/1
12.5 TABLET ORAL DAILY
Qty: 90 TABLET | Refills: 3 | OUTPATIENT
Start: 2021-01-08 | End: 2021-06-29

## 2021-01-08 RX ORDER — ALBUTEROL SULFATE 90 UG/1
2 AEROSOL, METERED RESPIRATORY (INHALATION) EVERY 6 HOURS PRN
Qty: 6.7 G | Refills: 5 | Status: SHIPPED | OUTPATIENT
Start: 2021-01-08 | End: 2022-06-22

## 2021-01-25 ENCOUNTER — OFFICE VISIT (OUTPATIENT)
Dept: FAMILY MEDICINE CLINIC | Facility: CLINIC | Age: 71
End: 2021-01-25

## 2021-01-25 ENCOUNTER — LAB (OUTPATIENT)
Dept: LAB | Facility: HOSPITAL | Age: 71
End: 2021-01-25

## 2021-01-25 VITALS
OXYGEN SATURATION: 94 % | SYSTOLIC BLOOD PRESSURE: 122 MMHG | HEIGHT: 72 IN | DIASTOLIC BLOOD PRESSURE: 70 MMHG | HEART RATE: 72 BPM | BODY MASS INDEX: 26.01 KG/M2 | WEIGHT: 192 LBS

## 2021-01-25 DIAGNOSIS — J32.9 RECURRENT SINUS INFECTIONS: Primary | ICD-10-CM

## 2021-01-25 DIAGNOSIS — T78.40XS ALLERGY, SEQUELA: ICD-10-CM

## 2021-01-25 DIAGNOSIS — J30.2 SEASONAL ALLERGIES: ICD-10-CM

## 2021-01-25 DIAGNOSIS — J32.9 RECURRENT SINUS INFECTIONS: ICD-10-CM

## 2021-01-25 DIAGNOSIS — J45.909 EXTRINSIC ASTHMA, UNSPECIFIED ASTHMA SEVERITY, UNSPECIFIED WHETHER COMPLICATED, UNSPECIFIED WHETHER PERSISTENT: ICD-10-CM

## 2021-01-25 LAB
ALBUMIN SERPL-MCNC: 4.4 G/DL (ref 3.5–5.2)
ALBUMIN/GLOB SERPL: 1.7 G/DL
ALP SERPL-CCNC: 56 U/L (ref 39–117)
ALT SERPL W P-5'-P-CCNC: 25 U/L (ref 1–41)
ANION GAP SERPL CALCULATED.3IONS-SCNC: 11 MMOL/L (ref 5–15)
AST SERPL-CCNC: 22 U/L (ref 1–40)
BASOPHILS # BLD AUTO: 0.05 10*3/MM3 (ref 0–0.2)
BASOPHILS NFR BLD AUTO: 0.8 % (ref 0–1.5)
BILIRUB SERPL-MCNC: 0.3 MG/DL (ref 0–1.2)
BUN SERPL-MCNC: 7 MG/DL (ref 8–23)
BUN/CREAT SERPL: 7.1 (ref 7–25)
CALCIUM SPEC-SCNC: 9.4 MG/DL (ref 8.6–10.5)
CHLORIDE SERPL-SCNC: 96 MMOL/L (ref 98–107)
CO2 SERPL-SCNC: 29 MMOL/L (ref 22–29)
CREAT SERPL-MCNC: 0.98 MG/DL (ref 0.76–1.27)
DEPRECATED RDW RBC AUTO: 40.6 FL (ref 37–54)
EOSINOPHIL # BLD AUTO: 0.32 10*3/MM3 (ref 0–0.4)
EOSINOPHIL NFR BLD AUTO: 5.2 % (ref 0.3–6.2)
ERYTHROCYTE [DISTWIDTH] IN BLOOD BY AUTOMATED COUNT: 12.5 % (ref 12.3–15.4)
GFR SERPL CREATININE-BSD FRML MDRD: 76 ML/MIN/1.73
GLOBULIN UR ELPH-MCNC: 2.6 GM/DL
GLUCOSE SERPL-MCNC: 79 MG/DL (ref 65–99)
HCT VFR BLD AUTO: 43.2 % (ref 37.5–51)
HGB BLD-MCNC: 15.1 G/DL (ref 13–17.7)
IMM GRANULOCYTES # BLD AUTO: 0.02 10*3/MM3 (ref 0–0.05)
IMM GRANULOCYTES NFR BLD AUTO: 0.3 % (ref 0–0.5)
LYMPHOCYTES # BLD AUTO: 2.06 10*3/MM3 (ref 0.7–3.1)
LYMPHOCYTES NFR BLD AUTO: 33.6 % (ref 19.6–45.3)
MCH RBC QN AUTO: 31.2 PG (ref 26.6–33)
MCHC RBC AUTO-ENTMCNC: 35 G/DL (ref 31.5–35.7)
MCV RBC AUTO: 89.3 FL (ref 79–97)
MONOCYTES # BLD AUTO: 0.49 10*3/MM3 (ref 0.1–0.9)
MONOCYTES NFR BLD AUTO: 8 % (ref 5–12)
NEUTROPHILS NFR BLD AUTO: 3.2 10*3/MM3 (ref 1.7–7)
NEUTROPHILS NFR BLD AUTO: 52.1 % (ref 42.7–76)
NRBC BLD AUTO-RTO: 0 /100 WBC (ref 0–0.2)
PLATELET # BLD AUTO: 205 10*3/MM3 (ref 140–450)
PMV BLD AUTO: 11.3 FL (ref 6–12)
POTASSIUM SERPL-SCNC: 4.1 MMOL/L (ref 3.5–5.2)
PROT SERPL-MCNC: 7 G/DL (ref 6–8.5)
RBC # BLD AUTO: 4.84 10*6/MM3 (ref 4.14–5.8)
SODIUM SERPL-SCNC: 136 MMOL/L (ref 136–145)
WBC # BLD AUTO: 6.14 10*3/MM3 (ref 3.4–10.8)

## 2021-01-25 PROCEDURE — 80053 COMPREHEN METABOLIC PANEL: CPT

## 2021-01-25 PROCEDURE — 99214 OFFICE O/P EST MOD 30 MIN: CPT | Performed by: INTERNAL MEDICINE

## 2021-01-25 PROCEDURE — 85025 COMPLETE CBC W/AUTO DIFF WBC: CPT

## 2021-01-25 RX ORDER — LEVOCETIRIZINE DIHYDROCHLORIDE 5 MG/1
2.5 TABLET, FILM COATED ORAL EVERY EVENING
Qty: 30 TABLET | Refills: 1 | Status: SHIPPED | OUTPATIENT
Start: 2021-01-25 | End: 2021-03-26 | Stop reason: SDUPTHER

## 2021-01-25 RX ORDER — PREDNISONE 20 MG/1
40 TABLET ORAL DAILY
Qty: 10 TABLET | Refills: 0 | Status: SHIPPED | OUTPATIENT
Start: 2021-01-25 | End: 2021-03-03

## 2021-01-26 NOTE — PROGRESS NOTES
Chief Complaint   Patient presents with   • Earache     x 1 week - starts from the temple and into the left ear.        HPI:  Behzad Guzman is a 70 y.o. male who presents today for recurrent sinus infections and allergy symptoms.  Patient reports symptoms ongoing for the last several weeks.  He recently completed a round of steroids and antibiotics per urgent care.  He has had several sinus infections this year.  Currently following with allergist.  He takes Allegra and Flonase daily.  Denies any obvious COVID-19 exposure.  No fevers or chills.  Denies any shortness of breath or cough.  Does have some tingling in the back of his throat due to postnasal drip.    ROS:  Constitutional: no fevers, night sweats or unexplained weight loss  Eyes: no vision changes  ENT: + runny nose,+ ear pain,- sore throat  Cardio: no chest pain, palpitations  Pulm: no shortness of breath, wheezing, or cough  GI: no abdominal pain or changes in bowel movements  : no difficulty urinating  MSK: no difficulty ambulating, no joint pain  Neuro: no weakness, dizziness or headache  Psych: no trouble sleeping  Endo: no change in appetite      Past Medical History:   Diagnosis Date   • Asthma    • Chronic hip pain, left    • Chronic pain in left shoulder    • Hyperlipidemia    • Hypertension    • Leg length discrepancy    • Neck pain, chronic    • Neuropathy    • Panic attacks    • Pre-diabetes    • Prediabetes    • Spinal stenosis       Family History   Problem Relation Age of Onset   • Heart attack Father    • Other Mother         accident      Social History     Socioeconomic History   • Marital status:      Spouse name: Not on file   • Number of children: Not on file   • Years of education: Not on file   • Highest education level: Not on file   Occupational History   • Occupation: farmer   Tobacco Use   • Smoking status: Former Smoker     Quit date:      Years since quittin.0   • Smokeless tobacco: Never Used   Substance  and Sexual Activity   • Alcohol use: No   • Drug use: No   • Sexual activity: Defer   Social History Narrative    Lives with wife on a farm      Allergies   Allergen Reactions   • Amoxicillin Rash   • Penicillins Rash      Immunization History   Administered Date(s) Administered   • Flu Vaccine Quad PF >36MO 09/30/2015   • Pneumococcal Conjugate 13-Valent (PCV13) 09/22/2016   • Pneumococcal Polysaccharide (PPSV23) 05/18/2018        PE:  Vitals:    01/25/21 1522   BP: 122/70   Pulse: 72   SpO2: 94%      Body mass index is 26.04 kg/m².    Gen Appearance: NAD  HEENT: Normocephalic, PERRLA, no thyromegaly, trache midline, postnasal drip, fluid bilateral TM, sinus congestion and pressure  Heart: RRR, normal S1 and S2, no murmur  Lungs: CTA b/l, no wheezing, no crackles  Abdomen: Soft, non-tender, non-distended, no guarding and BSx4  MSK: Moves all extremities well, normal gait, no peripheral edema  Pulses: Palpable and equal b/l  Lymph nodes: No palpable lymphadenopathy   Neuro: No focal deficits      Current Outpatient Medications   Medication Sig Dispense Refill   • albuterol sulfate HFA (Ventolin HFA) 108 (90 Base) MCG/ACT inhaler Inhale 2 puffs Every 6 (Six) Hours As Needed for Wheezing or Shortness of Air. 6.7 g 5   • amLODIPine (NORVASC) 5 MG tablet Take 1 tablet by mouth Daily. 90 tablet 3   • azithromycin (ZITHROMAX) 250 MG tablet Take 2 tablets the first day, then 1 tablet daily for 4 days. 6 tablet 0   • Cholecalciferol (VITAMIN D) 125 MCG (5000 UT) capsule capsule Take 1 capsule by mouth Daily. 30 capsule 0   • co-enzyme Q-10 50 MG capsule Take 1 capsule by mouth Daily. 90 capsule 3   • cyclobenzaprine (FLEXERIL) 10 MG tablet Take 1 tablet by mouth At Night As Needed for Muscle Spasms. 10 tablet 0   • diazePAM (VALIUM) 5 MG tablet Take 5 mg by mouth 2 (Two) Times a Day.  0   • diclofenac (VOLTAREN) 1 % gel gel Apply 4 g topically to the appropriate area as directed 3 (Three) Times a Day As Needed (hand pain).  100 g 1   • escitalopram (LEXAPRO) 10 MG tablet Take 1 tablet by mouth Daily. 30 tablet 5   • gabapentin (NEURONTIN) 400 MG capsule Take 400 mg by mouth 3 (Three) Times a Day.     • glimepiride (AMARYL) 1 MG tablet TAKE 1 TABLET BY MOUTH ONCE DAILY 90 tablet 1   • hydroCHLOROthiazide (HYDRODIURIL) 12.5 MG tablet Take 1 tablet by mouth Daily. 90 tablet 3   • HYDROcodone-acetaminophen (NORCO)  MG per tablet Take 1 tablet by mouth Every 6 (Six) Hours As Needed for moderate pain (4-6).     • Hydrocodone-Acetaminophen (XODOL )  MG per tablet   0   • hydrocortisone 2.5 % cream Apply  topically to the appropriate area as directed 3 (Three) Times a Day. 60 g 3   • lisinopril (PRINIVIL,ZESTRIL) 20 MG tablet TAKE 2 TABLETS BY MOUTH EVERY  tablet 3   • metFORMIN (GLUCOPHAGE) 500 MG tablet TAKE 1 TABLET BY MOUTH DAILY WITH BREAKFAST 90 tablet 3   • metoprolol tartrate (LOPRESSOR) 50 MG tablet TAKE 1 TABLET BY MOUTH TWICE DAILY 180 tablet 3   • Naloxegol Oxalate (MOVANTIK) 12.5 MG tablet Movantik 12.5 mg tablet     • NON FORMULARY GLIMEREX FOR PREDIABETES     • omeprazole (priLOSEC) 40 MG capsule TAKE 1 CAPSULE BY MOUTH DAILY BEFORE A MEAL 90 capsule 1   • ondansetron ODT (ZOFRAN-ODT) 4 MG disintegrating tablet Take 1 tablet by mouth 4 (Four) Times a Day As Needed for Nausea or Vomiting. 15 tablet 0   • ONETOUCH VERIO test strip Daily. for testing  0   • oxyCODONE (oxyCONTIN) 10 MG 12 hr tablet Take 10 mg by mouth Every 12 (Twelve) Hours As Needed.  0   • oxyCODONE-acetaminophen (PERCOCET) 5-325 MG per tablet Take 1 tablet by mouth Every 4 (Four) Hours As Needed for Moderate Pain  or Severe Pain . 15 tablet 0   • pravastatin (PRAVACHOL) 20 MG tablet Take 1 tablet by mouth Daily. 90 tablet 3   • predniSONE (DELTASONE) 20 MG tablet Take 2 tablets by mouth Daily. 10 tablet 0   • promethazine-dextromethorphan (PROMETHAZINE-DM) 6.25-15 MG/5ML syrup Take 5 ml QID PRN cough/congestion 180 mL 0   • saccharomyces  boulardii (Florastor) 250 MG capsule Take 1 capsule by mouth 2 (Two) Times a Day. 60 capsule 5   • Testosterone Cypionate (DEPOTESTOTERONE CYPIONATE) 200 MG/ML injection Inject 1 mL into the appropriate muscle as directed by prescriber Every 21 (Twenty-One) Days. 10 mL 0   • levocetirizine (Xyzal) 5 MG tablet Take 0.5 tablets by mouth Every Evening. 30 tablet 1     No current facility-administered medications for this visit.       Checking CT of sinuses.  Would recommend establishing with ENT due to recurrent infections.  Follow-up with allergist as scheduled.  Switching Allegra to Xyzal, start with half tablet nightly.  Recommend repeating a round of steroids initially as I suspect this is allergy related.  If no improvement will repeat antibiotics.    Diagnoses and all orders for this visit:    1. Recurrent sinus infections (Primary)  -     CT Sinus Without Contrast; Future  -     Ambulatory Referral to ENT (Otolaryngology)  -     CBC & Differential; Future  -     Comprehensive Metabolic Panel; Future    2. Allergy, sequela  -     CBC & Differential; Future  -     Comprehensive Metabolic Panel; Future    3. Extrinsic asthma, unspecified asthma severity, unspecified whether complicated, unspecified whether persistent  -     predniSONE (DELTASONE) 20 MG tablet; Take 2 tablets by mouth Daily.  Dispense: 10 tablet; Refill: 0    4. Seasonal allergies  -     predniSONE (DELTASONE) 20 MG tablet; Take 2 tablets by mouth Daily.  Dispense: 10 tablet; Refill: 0    Other orders  -     levocetirizine (Xyzal) 5 MG tablet; Take 0.5 tablets by mouth Every Evening.  Dispense: 30 tablet; Refill: 1         No follow-ups on file.     Please note that portions of this document were completed with a voice recognition program. Efforts were made to edit the dictations, but occasionally words are mis-transcribed.

## 2021-01-27 ENCOUNTER — TELEPHONE (OUTPATIENT)
Dept: FAMILY MEDICINE CLINIC | Facility: CLINIC | Age: 71
End: 2021-01-27

## 2021-01-29 ENCOUNTER — HOSPITAL ENCOUNTER (OUTPATIENT)
Dept: CT IMAGING | Facility: HOSPITAL | Age: 71
Discharge: HOME | End: 2021-01-29
Admitting: INTERNAL MEDICINE

## 2021-01-29 DIAGNOSIS — J32.9 RECURRENT SINUS INFECTIONS: ICD-10-CM

## 2021-01-29 PROCEDURE — 70486 CT MAXILLOFACIAL W/O DYE: CPT

## 2021-02-01 ENCOUNTER — TELEPHONE (OUTPATIENT)
Dept: FAMILY MEDICINE CLINIC | Facility: CLINIC | Age: 71
End: 2021-02-01

## 2021-02-01 NOTE — TELEPHONE ENCOUNTER
Call patient to review CT scan results.  No answer, left voicemail.  Let me know if he returns call.  Would like to discuss referral and imaging results.

## 2021-02-12 RX ORDER — PRAVASTATIN SODIUM 20 MG
TABLET ORAL
Qty: 90 TABLET | Refills: 3 | Status: SHIPPED | OUTPATIENT
Start: 2021-02-12 | End: 2022-03-14

## 2021-02-12 RX ORDER — GLIMEPIRIDE 1 MG/1
TABLET ORAL
Qty: 90 TABLET | Refills: 3 | Status: SHIPPED | OUTPATIENT
Start: 2021-02-12 | End: 2022-01-03

## 2021-03-03 ENCOUNTER — LAB (OUTPATIENT)
Dept: LAB | Facility: HOSPITAL | Age: 71
End: 2021-03-03

## 2021-03-03 ENCOUNTER — HOSPITAL ENCOUNTER (OUTPATIENT)
Dept: GENERAL RADIOLOGY | Facility: HOSPITAL | Age: 71
Discharge: HOME | End: 2021-03-03

## 2021-03-03 ENCOUNTER — OFFICE VISIT (OUTPATIENT)
Dept: FAMILY MEDICINE CLINIC | Facility: CLINIC | Age: 71
End: 2021-03-03

## 2021-03-03 VITALS
SYSTOLIC BLOOD PRESSURE: 144 MMHG | HEIGHT: 72 IN | HEART RATE: 89 BPM | OXYGEN SATURATION: 98 % | DIASTOLIC BLOOD PRESSURE: 80 MMHG | BODY MASS INDEX: 26.01 KG/M2 | WEIGHT: 192 LBS

## 2021-03-03 DIAGNOSIS — I10 ESSENTIAL HYPERTENSION: ICD-10-CM

## 2021-03-03 DIAGNOSIS — J18.9 PNEUMONITIS: ICD-10-CM

## 2021-03-03 DIAGNOSIS — J18.9 PNEUMONITIS: Primary | ICD-10-CM

## 2021-03-03 DIAGNOSIS — E11.65 TYPE 2 DIABETES MELLITUS WITH HYPERGLYCEMIA, WITHOUT LONG-TERM CURRENT USE OF INSULIN (HCC): ICD-10-CM

## 2021-03-03 PROCEDURE — 71046 X-RAY EXAM CHEST 2 VIEWS: CPT

## 2021-03-03 PROCEDURE — 99214 OFFICE O/P EST MOD 30 MIN: CPT | Performed by: INTERNAL MEDICINE

## 2021-03-03 PROCEDURE — U0004 COV-19 TEST NON-CDC HGH THRU: HCPCS

## 2021-03-03 RX ORDER — LEVOFLOXACIN 500 MG/1
500 TABLET, FILM COATED ORAL DAILY
Qty: 5 TABLET | Refills: 0 | Status: SHIPPED | OUTPATIENT
Start: 2021-03-03 | End: 2021-03-08

## 2021-03-04 LAB — SARS-COV-2 RNA RESP QL NAA+PROBE: NOT DETECTED

## 2021-03-04 NOTE — PROGRESS NOTES
Chief Complaint   Patient presents with   • Nasal Congestion   • Foot Injury       HPI:  Behzad Guzman is a 70 y.o. male who presents today for cough and congestion for the past week.  Patient reports he was applying a cream to his foot and then subsequently scratched his nose and suspects he may have snorted or inhaled some of the cream.  Reports since that he has had significant congestion and cough.  He is coughing up yellow-brown sputum.    ROS:  Constitutional: no fevers, night sweats or unexplained weight loss  Eyes: no vision changes  ENT: no runny nose, ear pain, sore throat  Cardio: no chest pain, palpitations  Pulm: + shortness of breath,+ wheezing, +cough  GI: no abdominal pain or changes in bowel movements  : no difficulty urinating  MSK: no difficulty ambulating, no joint pain  Neuro: no weakness, dizziness or headache  Psych: no trouble sleeping  Endo: no change in appetite      Past Medical History:   Diagnosis Date   • Asthma    • Chronic hip pain, left    • Chronic pain in left shoulder    • Hyperlipidemia    • Hypertension    • Leg length discrepancy    • Neck pain, chronic    • Neuropathy    • Panic attacks    • Pre-diabetes    • Prediabetes    • Spinal stenosis       Family History   Problem Relation Age of Onset   • Heart attack Father    • Other Mother         accident      Social History     Socioeconomic History   • Marital status:      Spouse name: Not on file   • Number of children: Not on file   • Years of education: Not on file   • Highest education level: Not on file   Occupational History   • Occupation: farmer   Tobacco Use   • Smoking status: Former Smoker     Quit date:      Years since quittin.1   • Smokeless tobacco: Never Used   Substance and Sexual Activity   • Alcohol use: No   • Drug use: No   • Sexual activity: Defer   Social History Narrative    [place] farm      Allergies   Allergen Reactions   • Amoxicillin Rash   • Penicillins Rash       Immunization History   Administered Date(s) Administered   • Flu Vaccine Quad PF >36MO 09/30/2015   • Pneumococcal Conjugate 13-Valent (PCV13) 09/22/2016   • Pneumococcal Polysaccharide (PPSV23) 05/18/2018        PE:  Vitals:    03/03/21 1455   BP: 144/80   Pulse: 89   SpO2: 98%      Body mass index is 26.04 kg/m².    Gen Appearance: NAD  HEENT: Normocephalic, PERRLA, no thyromegaly, trache midline  Heart: RRR, normal S1 and S2, no murmur  Lungs: Lateral rhonchi and wheezing, left worse than right, no crackles  Abdomen: Soft, non-tender, non-distended, no guarding and BSx4  MSK: Moves all extremities well, normal gait, no peripheral edema  Pulses: Palpable and equal b/l  Lymph nodes: No palpable lymphadenopathy   Neuro: No focal deficits      Current Outpatient Medications   Medication Sig Dispense Refill   • albuterol sulfate HFA (Ventolin HFA) 108 (90 Base) MCG/ACT inhaler Inhale 2 puffs Every 6 (Six) Hours As Needed for Wheezing or Shortness of Air. 6.7 g 5   • amLODIPine (NORVASC) 5 MG tablet Take 1 tablet by mouth Daily. 90 tablet 3   • Cholecalciferol (VITAMIN D) 125 MCG (5000 UT) capsule capsule Take 1 capsule by mouth Daily. 30 capsule 0   • co-enzyme Q-10 50 MG capsule Take 1 capsule by mouth Daily. 90 capsule 3   • cyclobenzaprine (FLEXERIL) 10 MG tablet Take 1 tablet by mouth At Night As Needed for Muscle Spasms. 10 tablet 0   • diazePAM (VALIUM) 5 MG tablet Take 5 mg by mouth 2 (Two) Times a Day.  0   • escitalopram (LEXAPRO) 10 MG tablet Take 1 tablet by mouth Daily. 30 tablet 5   • gabapentin (NEURONTIN) 400 MG capsule Take 400 mg by mouth 3 (Three) Times a Day.     • glimepiride (AMARYL) 1 MG tablet TAKE 1 TABLET BY MOUTH EVERY DAY 90 tablet 3   • hydroCHLOROthiazide (HYDRODIURIL) 12.5 MG tablet Take 1 tablet by mouth Daily. 90 tablet 3   • HYDROcodone-acetaminophen (NORCO)  MG per tablet Take 1 tablet by mouth Every 6 (Six) Hours As Needed for moderate pain (4-6).     • hydrocortisone 2.5 %  cream Apply  topically to the appropriate area as directed 3 (Three) Times a Day. 60 g 3   • levocetirizine (Xyzal) 5 MG tablet Take 0.5 tablets by mouth Every Evening. 30 tablet 1   • lisinopril (PRINIVIL,ZESTRIL) 20 MG tablet TAKE 2 TABLETS BY MOUTH EVERY  tablet 3   • metFORMIN (GLUCOPHAGE) 500 MG tablet TAKE 1 TABLET BY MOUTH DAILY WITH BREAKFAST 90 tablet 3   • metoprolol tartrate (LOPRESSOR) 50 MG tablet TAKE 1 TABLET BY MOUTH TWICE DAILY 180 tablet 3   • omeprazole (priLOSEC) 40 MG capsule TAKE 1 CAPSULE BY MOUTH DAILY BEFORE A MEAL 90 capsule 1   • ONETOUCH VERIO test strip Daily. for testing  0   • oxyCODONE (oxyCONTIN) 10 MG 12 hr tablet Take 10 mg by mouth Every 12 (Twelve) Hours As Needed.  0   • pravastatin (PRAVACHOL) 20 MG tablet TAKE 1 TABLET BY MOUTH EVERY DAY 90 tablet 3   • Testosterone Cypionate (DEPOTESTOTERONE CYPIONATE) 200 MG/ML injection Inject 1 mL into the appropriate muscle as directed by prescriber Every 21 (Twenty-One) Days. 10 mL 0   • azithromycin (ZITHROMAX) 250 MG tablet Take 2 tablets the first day, then 1 tablet daily for 4 days. 6 tablet 0   • diclofenac (VOLTAREN) 1 % gel gel Apply 4 g topically to the appropriate area as directed 3 (Three) Times a Day As Needed (hand pain). 100 g 1   • Hydrocodone-Acetaminophen (XODOL )  MG per tablet   0   • levoFLOXacin (Levaquin) 500 MG tablet Take 1 tablet by mouth Daily for 5 days. 5 tablet 0   • Naloxegol Oxalate (MOVANTIK) 12.5 MG tablet Movantik 12.5 mg tablet     • NON FORMULARY GLIMEREX FOR PREDIABETES     • ondansetron ODT (ZOFRAN-ODT) 4 MG disintegrating tablet Take 1 tablet by mouth 4 (Four) Times a Day As Needed for Nausea or Vomiting. 15 tablet 0   • oxyCODONE-acetaminophen (PERCOCET) 5-325 MG per tablet Take 1 tablet by mouth Every 4 (Four) Hours As Needed for Moderate Pain  or Severe Pain . 15 tablet 0   • promethazine-dextromethorphan (PROMETHAZINE-DM) 6.25-15 MG/5ML syrup Take 5 ml QID PRN cough/congestion  180 mL 0   • saccharomyces boulardii (Florastor) 250 MG capsule Take 1 capsule by mouth 2 (Two) Times a Day. 60 capsule 5     No current facility-administered medications for this visit.         Diagnoses and all orders for this visit:    1. Pneumonitis (Primary)  -     XR Chest PA & Lateral; Future  -     levoFLOXacin (Levaquin) 500 MG tablet; Take 1 tablet by mouth Daily for 5 days.  Dispense: 5 tablet; Refill: 0  -     COVID-19 PCR, [facility], NP SWAB IN [place] VIRAL TRANSPORT MEDIA 24-30 HR TAT - Swab, Nasopharynx; Future  He does have quite a bit of wheezing and rhonchi on left side of lungs.  Recommend checking x-ray.  Starting on antibiotics for possible pneumonia versus pneumonitis.  Will rule out Covid due to his symptoms.  2. Essential hypertension  Elevated today.  Continue to monitor.  Continue current medications for now.  3. Type 2 diabetes mellitus with hyperglycemia, without long-term current use of insulin (CMS/[facility])  Patient requesting a form to be sent to his diabetic shoe store.  He is unsure of the name of the facility or where I need to send this.  He is following with podiatry.       No follow-ups on file.     Please note that portions of this document were completed with a voice recognition program. Efforts were made to edit the dictations, but occasionally words are mis-transcribed.

## 2021-03-12 ENCOUNTER — TELEPHONE (OUTPATIENT)
Dept: FAMILY MEDICINE CLINIC | Facility: CLINIC | Age: 71
End: 2021-03-12

## 2021-03-12 NOTE — TELEPHONE ENCOUNTER
Patient was seen on 03/03/21 for an earache and patient states it is hurting pretty bad and he would like to know what can be done.... please advise

## 2021-03-15 ENCOUNTER — OFFICE VISIT (OUTPATIENT)
Dept: FAMILY MEDICINE CLINIC | Facility: CLINIC | Age: 71
End: 2021-03-15

## 2021-03-15 VITALS
HEART RATE: 83 BPM | SYSTOLIC BLOOD PRESSURE: 144 MMHG | OXYGEN SATURATION: 98 % | WEIGHT: 195 LBS | HEIGHT: 72 IN | DIASTOLIC BLOOD PRESSURE: 84 MMHG | BODY MASS INDEX: 26.41 KG/M2

## 2021-03-15 DIAGNOSIS — L08.9 TOE INFECTION: ICD-10-CM

## 2021-03-15 DIAGNOSIS — I10 ESSENTIAL HYPERTENSION: ICD-10-CM

## 2021-03-15 DIAGNOSIS — S92.404G: ICD-10-CM

## 2021-03-15 DIAGNOSIS — H66.90 ACUTE OTITIS MEDIA, UNSPECIFIED OTITIS MEDIA TYPE: ICD-10-CM

## 2021-03-15 DIAGNOSIS — M79.674 GREAT TOE PAIN, RIGHT: Primary | ICD-10-CM

## 2021-03-15 PROCEDURE — 99214 OFFICE O/P EST MOD 30 MIN: CPT | Performed by: INTERNAL MEDICINE

## 2021-03-15 RX ORDER — CEFDINIR 300 MG/1
300 CAPSULE ORAL 2 TIMES DAILY
Qty: 14 CAPSULE | Refills: 0 | Status: SHIPPED | OUTPATIENT
Start: 2021-03-15 | End: 2021-03-22

## 2021-03-15 NOTE — TELEPHONE ENCOUNTER
Called and spoke with pt to check about ear pain. He states that it still hurts and he has made an appt for this afternoon.

## 2021-03-15 NOTE — PROGRESS NOTES
Chief Complaint   Patient presents with   • Earache     Sx started last friday    • Headache     scalp pain on each sides of the head    • Fatigue       HPI:  Behzad Guzman is a 70 y.o. male who presents today for earache for 1 week.  Patient reports pain is slowly worsening, radiating to his head.  He would like his toe evaluated.  History of right great toe fracture has worsened over the last 4 weeks.  He has not followed up with podiatry.     ROS:  Constitutional: no fevers, night sweats or unexplained weight loss  Eyes: no vision changes  ENT: no runny nose, ear pain, sore throat  Cardio: no chest pain, palpitations  Pulm: no shortness of breath, wheezing, or cough  GI: no abdominal pain or changes in bowel movements  : no difficulty urinating  MSK: no difficulty ambulating, no joint pain  Neuro: no weakness, dizziness or headache  Psych: no trouble sleeping  Endo: no change in appetite      Past Medical History:   Diagnosis Date   • Asthma    • Chronic hip pain, left    • Chronic pain in left shoulder    • Hyperlipidemia    • Hypertension    • Leg length discrepancy    • Neck pain, chronic    • Neuropathy    • Panic attacks    • Pre-diabetes    • Prediabetes    • Spinal stenosis       Family History   Problem Relation Age of Onset   • Heart attack Father    • Other Mother         accident      Social History     Socioeconomic History   • Marital status:      Spouse name: Not on file   • Number of children: Not on file   • Years of education: Not on file   • Highest education level: Not on file   Tobacco Use   • Smoking status: Former Smoker     Quit date:      Years since quittin.2   • Smokeless tobacco: Never Used   Substance and Sexual Activity   • Alcohol use: No   • Drug use: No   • Sexual activity: Defer      Allergies   Allergen Reactions   • Amoxicillin Rash   • Penicillins Rash      Immunization History   Administered Date(s) Administered   • Flu Vaccine Quad PF >36MO 2015   •  Pneumococcal Conjugate 13-Valent (PCV13) 09/22/2016   • Pneumococcal Polysaccharide (PPSV23) 05/18/2018        PE:  Vitals:    03/15/21 1530   BP: 144/84   Pulse: 83   SpO2: 98%      Body mass index is 26.45 kg/m².    Gen Appearance: NAD  HEENT: Normocephalic, PERRLA, no thyromegaly, trache midline, erythematous TM on the right  Heart: RRR, normal S1 and S2, no murmur  Lungs: CTA b/l, no wheezing, no crackles  Abdomen: Soft, non-tender, non-distended, no guarding and BSx4  MSK: Moves all extremities well, normal gait, right great toe swelling with minimal erythema and superficial skin breakdown  Pulses: Palpable and equal b/l  Lymph nodes: No palpable lymphadenopathy   Neuro: No focal deficits      Current Outpatient Medications   Medication Sig Dispense Refill   • albuterol sulfate HFA (Ventolin HFA) 108 (90 Base) MCG/ACT inhaler Inhale 2 puffs Every 6 (Six) Hours As Needed for Wheezing or Shortness of Air. 6.7 g 5   • amLODIPine (NORVASC) 5 MG tablet Take 1 tablet by mouth Daily. 90 tablet 3   • azithromycin (ZITHROMAX) 250 MG tablet Take 2 tablets the first day, then 1 tablet daily for 4 days. 6 tablet 0   • Cholecalciferol (VITAMIN D) 125 MCG (5000 UT) capsule capsule Take 1 capsule by mouth Daily. 30 capsule 0   • co-enzyme Q-10 50 MG capsule Take 1 capsule by mouth Daily. 90 capsule 3   • cyclobenzaprine (FLEXERIL) 10 MG tablet Take 1 tablet by mouth At Night As Needed for Muscle Spasms. 10 tablet 0   • diazePAM (VALIUM) 5 MG tablet Take 5 mg by mouth 2 (Two) Times a Day.  0   • diclofenac (VOLTAREN) 1 % gel gel Apply 4 g topically to the appropriate area as directed 3 (Three) Times a Day As Needed (hand pain). 100 g 1   • escitalopram (LEXAPRO) 10 MG tablet Take 1 tablet by mouth Daily. 30 tablet 5   • gabapentin (NEURONTIN) 400 MG capsule Take 400 mg by mouth 3 (Three) Times a Day.     • glimepiride (AMARYL) 1 MG tablet TAKE 1 TABLET BY MOUTH EVERY DAY 90 tablet 3   • hydroCHLOROthiazide (HYDRODIURIL) 12.5  MG tablet Take 1 tablet by mouth Daily. 90 tablet 3   • HYDROcodone-acetaminophen (NORCO)  MG per tablet Take 1 tablet by mouth Every 6 (Six) Hours As Needed for moderate pain (4-6).     • Hydrocodone-Acetaminophen (XODOL )  MG per tablet   0   • hydrocortisone 2.5 % cream Apply  topically to the appropriate area as directed 3 (Three) Times a Day. 60 g 3   • levocetirizine (Xyzal) 5 MG tablet Take 0.5 tablets by mouth Every Evening. 30 tablet 1   • lisinopril (PRINIVIL,ZESTRIL) 20 MG tablet TAKE 2 TABLETS BY MOUTH EVERY  tablet 3   • metFORMIN (GLUCOPHAGE) 500 MG tablet TAKE 1 TABLET BY MOUTH DAILY WITH BREAKFAST 90 tablet 3   • metoprolol tartrate (LOPRESSOR) 50 MG tablet TAKE 1 TABLET BY MOUTH TWICE DAILY 180 tablet 3   • Naloxegol Oxalate (MOVANTIK) 12.5 MG tablet Movantik 12.5 mg tablet     • NON FORMULARY GLIMEREX FOR PREDIABETES     • omeprazole (priLOSEC) 40 MG capsule TAKE 1 CAPSULE BY MOUTH DAILY BEFORE A MEAL 90 capsule 1   • ondansetron ODT (ZOFRAN-ODT) 4 MG disintegrating tablet Take 1 tablet by mouth 4 (Four) Times a Day As Needed for Nausea or Vomiting. 15 tablet 0   • ONETOUCH VERIO test strip Daily. for testing  0   • oxyCODONE (oxyCONTIN) 10 MG 12 hr tablet Take 10 mg by mouth Every 12 (Twelve) Hours As Needed.  0   • oxyCODONE-acetaminophen (PERCOCET) 5-325 MG per tablet Take 1 tablet by mouth Every 4 (Four) Hours As Needed for Moderate Pain  or Severe Pain . 15 tablet 0   • pravastatin (PRAVACHOL) 20 MG tablet TAKE 1 TABLET BY MOUTH EVERY DAY 90 tablet 3   • promethazine-dextromethorphan (PROMETHAZINE-DM) 6.25-15 MG/5ML syrup Take 5 ml QID PRN cough/congestion 180 mL 0   • saccharomyces boulardii (Florastor) 250 MG capsule Take 1 capsule by mouth 2 (Two) Times a Day. 60 capsule 5   • Testosterone Cypionate (DEPOTESTOTERONE CYPIONATE) 200 MG/ML injection Inject 1 mL into the appropriate muscle as directed by prescriber Every 21 (Twenty-One) Days. 10 mL 0   • cefdinir  (OMNICEF) 300 MG capsule Take 1 capsule by mouth 2 (Two) Times a Day for 7 days. 14 capsule 0     No current facility-administered medications for this visit.        Diagnoses and all orders for this visit:    1. Great toe pain, right (Primary)  We will start on antibiotics and recommend checking x-ray.  Significant swelling of right great toe.  This may just be inflammation and swelling from prior fracture although I am concerned about infection.  Recommend checking blood work as well.  He needs to follow-up with podiatry as soon as possible.  He is requesting to be seen by Dr. Jeong again, currently established at  although he is not sure which physician he sees.  2. Closed nondisplaced fracture of phalanx of right great toe with delayed healing, unspecified phalanx, subsequent encounter  -     XR Foot 3+ View Right; Future    3. Toe infection  -     cefdinir (OMNICEF) 300 MG capsule; Take 1 capsule by mouth 2 (Two) Times a Day for 7 days.  Dispense: 14 capsule; Refill: 0    4. Essential hypertension  Elevated today.  Continue current medication for now.  5. Acute otitis media, unspecified otitis media type  -     cefdinir (OMNICEF) 300 MG capsule; Take 1 capsule by mouth 2 (Two) Times a Day for 7 days.  Dispense: 14 capsule; Refill: 0  6. Insomnia  She reports difficulty sleeping at night, currently taking Valium prescribed by his neurologist.  He was asking about Restoril as he has taken this medication in the past.  I would not recommend additional benzos if you wants to continue taking Valium.  He would need sleep study before additional sleep medicine is prescribed.       No follow-ups on file.     Please note that portions of this document were completed with a voice recognition program. Efforts were made to edit the dictations, but occasionally words are mis-transcribed.

## 2021-03-16 DIAGNOSIS — M79.674 GREAT TOE PAIN, RIGHT: Primary | ICD-10-CM

## 2021-03-16 DIAGNOSIS — S92.404G: ICD-10-CM

## 2021-03-16 DIAGNOSIS — L08.9 TOE INFECTION: ICD-10-CM

## 2021-03-22 ENCOUNTER — TELEPHONE (OUTPATIENT)
Dept: FAMILY MEDICINE CLINIC | Facility: CLINIC | Age: 71
End: 2021-03-22

## 2021-03-22 NOTE — TELEPHONE ENCOUNTER
Caller: Behzad Guzman    Relationship: Self    Best call back number: [phone]    Who are you requesting to speak with (clinical staff, provider,  specific staff member): DR. BERNABE    Do you require a callback: YES.     PATIENT WANTED TO LET DR. BERNABE KNOW THAT DR. MORENO'S STAFF CONTACTED HIM ABOUT HIS TOE BUT THEY DID NOT SCHEDULE HIM FOR AN APPOINTMENT. PATIENT STATED HIS TOE IS STILL RED BUT THE BLEEDING HAS STOPPED AND HIS SKIN IS HEALING.PATIENT WOULD LIKE TO KNOW IF HE IS SUPPOSED TO BE SCHEDULED WITH DR. MORENO OR IF HE SHOULD SEE SOMEONE ELSE ABOUT HIS TOE.     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         no visible abnormalities

## 2021-03-23 ENCOUNTER — TELEPHONE (OUTPATIENT)
Dept: FAMILY MEDICINE CLINIC | Facility: CLINIC | Age: 71
End: 2021-03-23

## 2021-03-23 NOTE — TELEPHONE ENCOUNTER
03/18/2021 09:06:02 AM Interface (Outgoing)     Dalia Joseph RegSched Rep     PER DR. MORENO PATIENT HAS BEEN TREATED BY DR. RODRIGUEZ AT  AND HE SHOULD REMAIN IN DR. RODRIGUEZ'S CARE.        This is the note from the referral messages. Dr. Moreno's office is referring he patient to further be seen with Dr. Rodriguez's care.

## 2021-03-23 NOTE — TELEPHONE ENCOUNTER
PATIENT CALLED REGARDING HIS BROKEN TOE.  PATIENT STATED THIS MORNING HE CONTACTED [facility]CTOR [facility] AND STATED THAT A STAFF MEMBER FROM DOCTOR BERNABE'S OFFICE CANCELLED HIS APPOINTMENT.    PATIENT STATED DOCTOR ALLEN OFFICE TOLD PATIENT HE WAS BEING SENT BACK TO .     PATIENT STATED HE IS OK WITH GOING BACK TO  AGAIN.      PATIENT STATED HE IS HAVING PAIN, AND STIFFNESS.       PLEASE ADVISE:  [phone]      MESSAGE FROM 3/23:    Caller: Behzad Guzman     Relationship: Self     Best call back number: [phone]     Who are you requesting to speak with (clinical staff, provider,  specific staff member): DR. BERNABE     Do you require a callback: YES.      PATIENT WANTED TO LET DR. BERNABE KNOW THAT DR. MORENO'S STAFF CONTACTED HIM ABOUT HIS TOE BUT THEY DID NOT SCHEDULE HIM FOR AN APPOINTMENT. PATIENT STATED HIS TOE IS STILL RED BUT THE BLEEDING HAS STOPPED AND HIS SKIN IS HEALING.PATIENT WOULD LIKE TO KNOW IF HE IS SUPPOSED TO BE SCHEDULED WITH DR. MORENO OR IF HE SHOULD SEE SOMEONE ELSE ABOUT HIS TOE.      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

## 2021-03-26 ENCOUNTER — OFFICE VISIT (OUTPATIENT)
Dept: FAMILY MEDICINE CLINIC | Facility: CLINIC | Age: 71
End: 2021-03-26

## 2021-03-26 VITALS
BODY MASS INDEX: 24.92 KG/M2 | OXYGEN SATURATION: 98 % | HEIGHT: 72 IN | HEART RATE: 69 BPM | SYSTOLIC BLOOD PRESSURE: 134 MMHG | WEIGHT: 184 LBS | DIASTOLIC BLOOD PRESSURE: 78 MMHG

## 2021-03-26 DIAGNOSIS — E34.9 TESTOSTERONE DEFICIENCY: ICD-10-CM

## 2021-03-26 DIAGNOSIS — J01.01 ACUTE RECURRENT MAXILLARY SINUSITIS: ICD-10-CM

## 2021-03-26 DIAGNOSIS — S92.404G: ICD-10-CM

## 2021-03-26 DIAGNOSIS — I10 ESSENTIAL HYPERTENSION: ICD-10-CM

## 2021-03-26 DIAGNOSIS — J30.2 SEASONAL ALLERGIES: Primary | ICD-10-CM

## 2021-03-26 PROCEDURE — 99214 OFFICE O/P EST MOD 30 MIN: CPT | Performed by: INTERNAL MEDICINE

## 2021-03-26 RX ORDER — LEVOCETIRIZINE DIHYDROCHLORIDE 5 MG/1
2.5 TABLET, FILM COATED ORAL EVERY EVENING
Qty: 30 TABLET | Refills: 2 | Status: SHIPPED | OUTPATIENT
Start: 2021-03-26 | End: 2021-05-03 | Stop reason: SDUPTHER

## 2021-03-26 RX ORDER — TESTOSTERONE CYPIONATE 200 MG/ML
200 INJECTION, SOLUTION INTRAMUSCULAR
Qty: 10 ML | Refills: 0 | Status: SHIPPED | OUTPATIENT
Start: 2021-03-26 | End: 2021-11-05 | Stop reason: SDUPTHER

## 2021-03-26 RX ORDER — PREDNISONE 20 MG/1
40 TABLET ORAL DAILY
Qty: 10 TABLET | Refills: 0 | Status: SHIPPED | OUTPATIENT
Start: 2021-03-26 | End: 2021-03-31

## 2021-03-29 NOTE — PROGRESS NOTES
Chief Complaint   Patient presents with   • Sinusitis   • Foot Pain     broken toe        HPI:  Behzad Guzman is a 70 y.o. male who presents today for worsening allergies.  It was recommended that he receive allergy shots for his allergies.  Is a clinic of sinus congestion and  Pain, postnasal drip and ear fullness.  He has follow-up with orthopedics next week for a broken toe.  He needs a refill on his testosterone supplementation.    ROS:  Constitutional: no fevers, night sweats or unexplained weight loss  Eyes: no vision changes  ENT: no runny nose, ear pain, sore throat  Cardio: no chest pain, palpitations  Pulm: no shortness of breath, wheezing, or cough  GI: no abdominal pain or changes in bowel movements  : no difficulty urinating  MSK: no difficulty ambulating, no joint pain  Neuro: no weakness, dizziness or headache  Psych: no trouble sleeping  Endo: no change in appetite      Past Medical History:   Diagnosis Date   • Asthma    • Chronic hip pain, left    • Chronic pain in left shoulder    • Hyperlipidemia    • Hypertension    • Leg length discrepancy    • Neck pain, chronic    • Neuropathy    • Panic attacks    • Pre-diabetes    • Prediabetes    • Spinal stenosis       Family History   Problem Relation Age of Onset   • Heart attack Father    • Other Mother         accident      Social History     Socioeconomic History   • Marital status:      Spouse name: Not on file   • Number of children: Not on file   • Years of education: Not on file   • Highest education level: Not on file   Tobacco Use   • Smoking status: Former Smoker     Quit date:      Years since quittin.2   • Smokeless tobacco: Never Used   Substance and Sexual Activity   • Alcohol use: No   • Drug use: No   • Sexual activity: Defer      Allergies   Allergen Reactions   • Amoxicillin Rash   • Penicillins Rash      Immunization History   Administered Date(s) Administered   • Flu Vaccine Quad PF >36MO 2015   •  Pneumococcal Conjugate 13-Valent (PCV13) 09/22/2016   • Pneumococcal Polysaccharide (PPSV23) 05/18/2018        PE:  Vitals:    03/26/21 1502   BP: 134/78   Pulse: 69   SpO2: 98%      Body mass index is 24.95 kg/m².    Gen Appearance: NAD  HEENT: Normocephalic, PERRLA, no thyromegaly, trache midline  Heart: RRR, normal S1 and S2, no murmur  Lungs: CTA b/l, no wheezing, no crackles  Abdomen: Soft, non-tender, non-distended, no guarding and BSx4  MSK: Moves all extremities well, antalgic gait, brace on right foot, edema surrounding right great toe  Pulses: Palpable and equal b/l  Lymph nodes: No palpable lymphadenopathy   Neuro: No focal deficits      Current Outpatient Medications   Medication Sig Dispense Refill   • albuterol sulfate HFA (Ventolin HFA) 108 (90 Base) MCG/ACT inhaler Inhale 2 puffs Every 6 (Six) Hours As Needed for Wheezing or Shortness of Air. 6.7 g 5   • amLODIPine (NORVASC) 5 MG tablet Take 1 tablet by mouth Daily. 90 tablet 3   • azithromycin (ZITHROMAX) 250 MG tablet Take 2 tablets the first day, then 1 tablet daily for 4 days. 6 tablet 0   • Cholecalciferol (VITAMIN D) 125 MCG (5000 UT) capsule capsule Take 1 capsule by mouth Daily. 30 capsule 0   • co-enzyme Q-10 50 MG capsule Take 1 capsule by mouth Daily. 90 capsule 3   • cyclobenzaprine (FLEXERIL) 10 MG tablet Take 1 tablet by mouth At Night As Needed for Muscle Spasms. 10 tablet 0   • diazePAM (VALIUM) 5 MG tablet Take 5 mg by mouth 2 (Two) Times a Day.  0   • diclofenac (VOLTAREN) 1 % gel gel Apply 4 g topically to the appropriate area as directed 3 (Three) Times a Day As Needed (hand pain). 100 g 1   • escitalopram (LEXAPRO) 10 MG tablet Take 1 tablet by mouth Daily. 30 tablet 5   • gabapentin (NEURONTIN) 400 MG capsule Take 400 mg by mouth 3 (Three) Times a Day.     • glimepiride (AMARYL) 1 MG tablet TAKE 1 TABLET BY MOUTH EVERY DAY 90 tablet 3   • hydroCHLOROthiazide (HYDRODIURIL) 12.5 MG tablet Take 1 tablet by mouth Daily. 90 tablet 3    • HYDROcodone-acetaminophen (NORCO)  MG per tablet Take 1 tablet by mouth Every 6 (Six) Hours As Needed for moderate pain (4-6).     • Hydrocodone-Acetaminophen (XODOL )  MG per tablet   0   • hydrocortisone 2.5 % cream Apply  topically to the appropriate area as directed 3 (Three) Times a Day. 60 g 3   • levocetirizine (Xyzal) 5 MG tablet Take 0.5 tablets by mouth Every Evening. 30 tablet 2   • lisinopril (PRINIVIL,ZESTRIL) 20 MG tablet TAKE 2 TABLETS BY MOUTH EVERY  tablet 3   • metFORMIN (GLUCOPHAGE) 500 MG tablet TAKE 1 TABLET BY MOUTH DAILY WITH BREAKFAST 90 tablet 3   • metoprolol tartrate (LOPRESSOR) 50 MG tablet TAKE 1 TABLET BY MOUTH TWICE DAILY 180 tablet 3   • Naloxegol Oxalate (MOVANTIK) 12.5 MG tablet Movantik 12.5 mg tablet     • NON FORMULARY GLIMEREX FOR PREDIABETES     • omeprazole (priLOSEC) 40 MG capsule TAKE 1 CAPSULE BY MOUTH DAILY BEFORE A MEAL 90 capsule 1   • ondansetron ODT (ZOFRAN-ODT) 4 MG disintegrating tablet Take 1 tablet by mouth 4 (Four) Times a Day As Needed for Nausea or Vomiting. 15 tablet 0   • ONETOUCH VERIO test strip Daily. for testing  0   • oxyCODONE (oxyCONTIN) 10 MG 12 hr tablet Take 10 mg by mouth Every 12 (Twelve) Hours As Needed.  0   • oxyCODONE-acetaminophen (PERCOCET) 5-325 MG per tablet Take 1 tablet by mouth Every 4 (Four) Hours As Needed for Moderate Pain  or Severe Pain . 15 tablet 0   • pravastatin (PRAVACHOL) 20 MG tablet TAKE 1 TABLET BY MOUTH EVERY DAY 90 tablet 3   • promethazine-dextromethorphan (PROMETHAZINE-DM) 6.25-15 MG/5ML syrup Take 5 ml QID PRN cough/congestion 180 mL 0   • saccharomyces boulardii (Florastor) 250 MG capsule Take 1 capsule by mouth 2 (Two) Times a Day. 60 capsule 5   • Testosterone Cypionate (DEPOTESTOTERONE CYPIONATE) 200 MG/ML injection Inject 1 mL into the appropriate muscle as directed by prescriber Every 21 (Twenty-One) Days. 10 mL 0   • predniSONE (DELTASONE) 20 MG tablet Take 2 tablets by mouth Daily  for 5 days. 10 tablet 0     No current facility-administered medications for this visit.        Diagnoses and all orders for this visit:    1. Seasonal allergies (Primary)  -     predniSONE (DELTASONE) 20 MG tablet; Take 2 tablets by mouth Daily for 5 days.  Dispense: 10 tablet; Refill: 0  Short steroid burst.  Recommend follow-up with allergist to receive allergy shots.  2. Acute recurrent maxillary sinusitis  -     predniSONE (DELTASONE) 20 MG tablet; Take 2 tablets by mouth Daily for 5 days.  Dispense: 10 tablet; Refill: 0    3. Closed nondisplaced fracture of phalanx of right great toe with delayed healing, unspecified phalanx, subsequent encounter  Follow-up with orthopedics as scheduled.  4. Essential hypertension  Well-controlled.  Continue current medication.  5. Testosterone deficiency  -     Testosterone Cypionate (DEPOTESTOTERONE CYPIONATE) 200 MG/ML injection; Inject 1 mL into the appropriate muscle as directed by prescriber Every 21 (Twenty-One) Days.  Dispense: 10 mL; Refill: 0  Refill testosterone.  We will need to check testosterone levels midcycle at next blood draw.  Other orders  -     levocetirizine (Xyzal) 5 MG tablet; Take 0.5 tablets by mouth Every Evening.  Dispense: 30 tablet; Refill: 2         No follow-ups on file.     Please note that portions of this document were completed with a voice recognition program. Efforts were made to edit the dictations, but occasionally words are mis-transcribed.

## 2021-04-08 ENCOUNTER — TELEPHONE (OUTPATIENT)
Dept: FAMILY MEDICINE CLINIC | Facility: CLINIC | Age: 71
End: 2021-04-08

## 2021-04-08 NOTE — TELEPHONE ENCOUNTER
Contacted patient and scheduled upcoming appt 4/12, he also asked for something to help with his earache. I advised him to discuss further with PCP at his appt. He verbalized understanding and agreed

## 2021-04-08 NOTE — TELEPHONE ENCOUNTER
Attempted to call patient, no answer. [facility] with office # given.     Dr. Garcia with allergy office contacted Dr. Tran to discuss patients BP meds. The patient needs an appointment with Dr. Tran to discuss changes before Dr. Garcia can start him on allergy shots.       Hub may relay message and schedule appointment for patient.

## 2021-04-14 ENCOUNTER — TELEMEDICINE (OUTPATIENT)
Dept: FAMILY MEDICINE CLINIC | Facility: CLINIC | Age: 71
End: 2021-04-14

## 2021-04-14 DIAGNOSIS — J30.2 SEASONAL ALLERGIES: ICD-10-CM

## 2021-04-14 DIAGNOSIS — I10 ESSENTIAL HYPERTENSION: Primary | ICD-10-CM

## 2021-04-14 PROCEDURE — 99214 OFFICE O/P EST MOD 30 MIN: CPT | Performed by: INTERNAL MEDICINE

## 2021-04-14 NOTE — PROGRESS NOTES
Cc: htn  You have chosen to receive care through a telephone visit. Do you consent to use a telephone visit for your medical care today? Yes      HPI:  Behzad Guzman is a 70 y.o. male who presents today for follow-up hypertension.  Patient needs to be weaned off of beta-blocker medication so he can start his allergy injections.  No other acute concerns today.    ROS:  Constitutional: no fevers, night sweats or unexplained weight loss  Eyes: no vision changes  ENT: no runny nose, ear pain, sore throat  Cardio: no chest pain, palpitations  Pulm: no shortness of breath, wheezing, or cough  GI: no abdominal pain or changes in bowel movements  : no difficulty urinating  MSK: no difficulty ambulating, no joint pain  Neuro: no weakness, dizziness or headache  Psych: no trouble sleeping  Endo: no change in appetite      Past Medical History:   Diagnosis Date   • Asthma    • Chronic hip pain, left    • Chronic pain in left shoulder    • Hyperlipidemia    • Hypertension    • Leg length discrepancy    • Neck pain, chronic    • Neuropathy    • Panic attacks    • Pre-diabetes    • Prediabetes    • Spinal stenosis       Family History   Problem Relation Age of Onset   • Heart attack Father    • Other Mother         accident      Social History     Socioeconomic History   • Marital status:      Spouse name: Not on file   • Number of children: Not on file   • Years of education: Not on file   • Highest education level: Not on file   Tobacco Use   • Smoking status: Former Smoker     Quit date:      Years since quittin.3   • Smokeless tobacco: Never Used   Substance and Sexual Activity   • Alcohol use: No   • Drug use: No   • Sexual activity: Defer      Allergies   Allergen Reactions   • Amoxicillin Rash   • Penicillins Rash      Immunization History   Administered Date(s) Administered   • Flu Vaccine Quad PF >36MO 2015   • Pneumococcal Conjugate 13-Valent (PCV13) 2016   • Pneumococcal Polysaccharide  (PPSV23) 05/18/2018        PE:  There were no vitals filed for this visit.   There is no height or weight on file to calculate BMI.        Current Outpatient Medications   Medication Sig Dispense Refill   • albuterol sulfate HFA (Ventolin HFA) 108 (90 Base) MCG/ACT inhaler Inhale 2 puffs Every 6 (Six) Hours As Needed for Wheezing or Shortness of Air. 6.7 g 5   • amLODIPine (NORVASC) 5 MG tablet Take 1 tablet by mouth Daily. 90 tablet 3   • azithromycin (ZITHROMAX) 250 MG tablet Take 2 tablets the first day, then 1 tablet daily for 4 days. 6 tablet 0   • Cholecalciferol (VITAMIN D) 125 MCG (5000 UT) capsule capsule Take 1 capsule by mouth Daily. 30 capsule 0   • co-enzyme Q-10 50 MG capsule Take 1 capsule by mouth Daily. 90 capsule 3   • cyclobenzaprine (FLEXERIL) 10 MG tablet Take 1 tablet by mouth At Night As Needed for Muscle Spasms. 10 tablet 0   • diazePAM (VALIUM) 5 MG tablet Take 5 mg by mouth 2 (Two) Times a Day.  0   • diclofenac (VOLTAREN) 1 % gel gel Apply 4 g topically to the appropriate area as directed 3 (Three) Times a Day As Needed (hand pain). 100 g 1   • escitalopram (LEXAPRO) 10 MG tablet Take 1 tablet by mouth Daily. 30 tablet 5   • gabapentin (NEURONTIN) 400 MG capsule Take 400 mg by mouth 3 (Three) Times a Day.     • glimepiride (AMARYL) 1 MG tablet TAKE 1 TABLET BY MOUTH EVERY DAY 90 tablet 3   • hydroCHLOROthiazide (HYDRODIURIL) 12.5 MG tablet Take 1 tablet by mouth Daily. 90 tablet 3   • HYDROcodone-acetaminophen (NORCO)  MG per tablet Take 1 tablet by mouth Every 6 (Six) Hours As Needed for moderate pain (4-6).     • Hydrocodone-Acetaminophen (XODOL )  MG per tablet   0   • hydrocortisone 2.5 % cream Apply  topically to the appropriate area as directed 3 (Three) Times a Day. 60 g 3   • levocetirizine (Xyzal) 5 MG tablet Take 0.5 tablets by mouth Every Evening. 30 tablet 2   • lisinopril (PRINIVIL,ZESTRIL) 20 MG tablet TAKE 2 TABLETS BY MOUTH EVERY  tablet 3   •  metFORMIN (GLUCOPHAGE) 500 MG tablet TAKE 1 TABLET BY MOUTH DAILY WITH BREAKFAST 90 tablet 3   • metoprolol tartrate (LOPRESSOR) 50 MG tablet TAKE 1 TABLET BY MOUTH TWICE DAILY 180 tablet 3   • Naloxegol Oxalate (MOVANTIK) 12.5 MG tablet Movantik 12.5 mg tablet     • NON FORMULARY GLIMEREX FOR PREDIABETES     • omeprazole (priLOSEC) 40 MG capsule TAKE 1 CAPSULE BY MOUTH DAILY BEFORE A MEAL 90 capsule 1   • ondansetron ODT (ZOFRAN-ODT) 4 MG disintegrating tablet Take 1 tablet by mouth 4 (Four) Times a Day As Needed for Nausea or Vomiting. 15 tablet 0   • ONETOUCH VERIO test strip Daily. for testing  0   • oxyCODONE (oxyCONTIN) 10 MG 12 hr tablet Take 10 mg by mouth Every 12 (Twelve) Hours As Needed.  0   • oxyCODONE-acetaminophen (PERCOCET) 5-325 MG per tablet Take 1 tablet by mouth Every 4 (Four) Hours As Needed for Moderate Pain  or Severe Pain . 15 tablet 0   • pravastatin (PRAVACHOL) 20 MG tablet TAKE 1 TABLET BY MOUTH EVERY DAY 90 tablet 3   • promethazine-dextromethorphan (PROMETHAZINE-DM) 6.25-15 MG/5ML syrup Take 5 ml QID PRN cough/congestion 180 mL 0   • saccharomyces boulardii (Florastor) 250 MG capsule Take 1 capsule by mouth 2 (Two) Times a Day. 60 capsule 5   • Testosterone Cypionate (DEPOTESTOTERONE CYPIONATE) 200 MG/ML injection Inject 1 mL into the appropriate muscle as directed by prescriber Every 21 (Twenty-One) Days. 10 mL 0     No current facility-administered medications for this visit.   11 minutes was been discussed with patient on phone.    Diagnoses and all orders for this visit:    1. Essential hypertension (Primary)  Wean down metoprolol to 25 mg twice daily for 7 days then discontinue.  See back in office in 2 weeks for blood pressure check.  2. Seasonal allergies  Allergy shots as recommended.  3-4 sinus infections in the past year due to allergies.       Return in about 4 weeks (around 5/12/2021) for htn.     Please note that portions of this document were completed with a voice  recognition program. Efforts were made to edit the dictations, but occasionally words are mis-transcribed.

## 2021-04-15 ENCOUNTER — TELEPHONE (OUTPATIENT)
Dept: FAMILY MEDICINE CLINIC | Facility: CLINIC | Age: 71
End: 2021-04-15

## 2021-04-15 NOTE — TELEPHONE ENCOUNTER
Patient returned called. Pt states he is suppose to be get a screw in his toe at . Pt would like to talk to  for reassurance. Pt is scheduled for a phone visit with  tomorrow 4/16.  Pt did not have any additional questions at this time.

## 2021-04-15 NOTE — TELEPHONE ENCOUNTER
Attempted to contact patient, no answer. Left voicemail for patient to call the office back (office # given).

## 2021-04-15 NOTE — TELEPHONE ENCOUNTER
PATENT STATES THAT HE WAS SENT TO  BY DOCTOR SRINIVASA BECAUSE HE BROKE HIS TOE. THE PATIENT STATES THAT THE DOCTOR AT  WANTS TO PUT A SCREW IN TO HIS TOE TO CORRECT THE BREAK.PLEASE CALL PATIENT TO DISCUSS    CALL [phone]

## 2021-04-16 ENCOUNTER — OFFICE VISIT (OUTPATIENT)
Dept: FAMILY MEDICINE CLINIC | Facility: CLINIC | Age: 71
End: 2021-04-16

## 2021-04-16 DIAGNOSIS — S92.403G: Primary | ICD-10-CM

## 2021-04-16 PROCEDURE — 99213 OFFICE O/P EST LOW 20 MIN: CPT | Performed by: INTERNAL MEDICINE

## 2021-04-19 NOTE — PROGRESS NOTES
Chief Complaint   Patient presents with   • Toe Injury     Pt states has broken his toe right big toe and is wanting to get a screw put in it.   You have chosen to receive care through a telephone visit. Do you consent to use a telephone visit for your medical care today? Yes      HPI:  Behzad Guzman is a 70 y.o. male who presents today for left great toe fracture.  He is following with  podiatry.  He has several questions about upcoming surgery.  He continues to have pain in great toe and difficulty walking.    ROS:  Constitutional: no fevers, night sweats or unexplained weight loss  Eyes: no vision changes  ENT: no runny nose, ear pain, sore throat  Cardio: no chest pain, palpitations  Pulm: no shortness of breath, wheezing, or cough  GI: no abdominal pain or changes in bowel movements  : no difficulty urinating  MSK: no difficulty ambulating, + joint pain  Neuro: no weakness, dizziness or headache  Psych: no trouble sleeping  Endo: no change in appetite      Past Medical History:   Diagnosis Date   • Asthma    • Chronic hip pain, left    • Chronic pain in left shoulder    • Hyperlipidemia    • Hypertension    • Leg length discrepancy    • Neck pain, chronic    • Neuropathy    • Panic attacks    • Pre-diabetes    • Prediabetes    • Spinal stenosis       Family History   Problem Relation Age of Onset   • Heart attack Father    • Other Mother         accident      Social History     Socioeconomic History   • Marital status:      Spouse name: Not on file   • Number of children: Not on file   • Years of education: Not on file   • Highest education level: Not on file   Tobacco Use   • Smoking status: Former Smoker     Quit date:      Years since quittin.3   • Smokeless tobacco: Never Used   Substance and Sexual Activity   • Alcohol use: No   • Drug use: No   • Sexual activity: Defer      Allergies   Allergen Reactions   • Amoxicillin Rash   • Penicillins Rash      Immunization History    Administered Date(s) Administered   • Flu Vaccine Quad PF >36MO 09/30/2015   • Pneumococcal Conjugate 13-Valent (PCV13) 09/22/2016   • Pneumococcal Polysaccharide (PPSV23) 05/18/2018        PE:  There were no vitals filed for this visit.   There is no height or weight on file to calculate BMI.      Current Outpatient Medications   Medication Sig Dispense Refill   • albuterol sulfate HFA (Ventolin HFA) 108 (90 Base) MCG/ACT inhaler Inhale 2 puffs Every 6 (Six) Hours As Needed for Wheezing or Shortness of Air. 6.7 g 5   • amLODIPine (NORVASC) 5 MG tablet Take 1 tablet by mouth Daily. 90 tablet 3   • azithromycin (ZITHROMAX) 250 MG tablet Take 2 tablets the first day, then 1 tablet daily for 4 days. 6 tablet 0   • Cholecalciferol (VITAMIN D) 125 MCG (5000 UT) capsule capsule Take 1 capsule by mouth Daily. 30 capsule 0   • co-enzyme Q-10 50 MG capsule Take 1 capsule by mouth Daily. 90 capsule 3   • cyclobenzaprine (FLEXERIL) 10 MG tablet Take 1 tablet by mouth At Night As Needed for Muscle Spasms. 10 tablet 0   • diazePAM (VALIUM) 5 MG tablet Take 5 mg by mouth 2 (Two) Times a Day.  0   • diclofenac (VOLTAREN) 1 % gel gel Apply 4 g topically to the appropriate area as directed 3 (Three) Times a Day As Needed (hand pain). 100 g 1   • escitalopram (LEXAPRO) 10 MG tablet Take 1 tablet by mouth Daily. 30 tablet 5   • gabapentin (NEURONTIN) 400 MG capsule Take 400 mg by mouth 3 (Three) Times a Day.     • glimepiride (AMARYL) 1 MG tablet TAKE 1 TABLET BY MOUTH EVERY DAY 90 tablet 3   • hydroCHLOROthiazide (HYDRODIURIL) 12.5 MG tablet Take 1 tablet by mouth Daily. 90 tablet 3   • HYDROcodone-acetaminophen (NORCO)  MG per tablet Take 1 tablet by mouth Every 6 (Six) Hours As Needed for moderate pain (4-6).     • Hydrocodone-Acetaminophen (XODOL )  MG per tablet   0   • hydrocortisone 2.5 % cream Apply  topically to the appropriate area as directed 3 (Three) Times a Day. 60 g 3   • levocetirizine (Xyzal) 5 MG  tablet Take 0.5 tablets by mouth Every Evening. 30 tablet 2   • lisinopril (PRINIVIL,ZESTRIL) 20 MG tablet TAKE 2 TABLETS BY MOUTH EVERY  tablet 3   • metFORMIN (GLUCOPHAGE) 500 MG tablet TAKE 1 TABLET BY MOUTH DAILY WITH BREAKFAST 90 tablet 3   • Naloxegol Oxalate (MOVANTIK) 12.5 MG tablet Movantik 12.5 mg tablet     • NON FORMULARY GLIMEREX FOR PREDIABETES     • omeprazole (priLOSEC) 40 MG capsule TAKE 1 CAPSULE BY MOUTH DAILY BEFORE A MEAL 90 capsule 1   • ondansetron ODT (ZOFRAN-ODT) 4 MG disintegrating tablet Take 1 tablet by mouth 4 (Four) Times a Day As Needed for Nausea or Vomiting. 15 tablet 0   • ONETOUCH VERIO test strip Daily. for testing  0   • oxyCODONE (oxyCONTIN) 10 MG 12 hr tablet Take 10 mg by mouth Every 12 (Twelve) Hours As Needed.  0   • oxyCODONE-acetaminophen (PERCOCET) 5-325 MG per tablet Take 1 tablet by mouth Every 4 (Four) Hours As Needed for Moderate Pain  or Severe Pain . 15 tablet 0   • pravastatin (PRAVACHOL) 20 MG tablet TAKE 1 TABLET BY MOUTH EVERY DAY 90 tablet 3   • promethazine-dextromethorphan (PROMETHAZINE-DM) 6.25-15 MG/5ML syrup Take 5 ml QID PRN cough/congestion 180 mL 0   • saccharomyces boulardii (Florastor) 250 MG capsule Take 1 capsule by mouth 2 (Two) Times a Day. 60 capsule 5   • Testosterone Cypionate (DEPOTESTOTERONE CYPIONATE) 200 MG/ML injection Inject 1 mL into the appropriate muscle as directed by prescriber Every 21 (Twenty-One) Days. 10 mL 0     No current facility-administered medications for this visit.      11 minutes was been discussed with patient on phone.    Diagnoses and all orders for this visit:    1. Closed displaced fracture of phalanx of great toe with delayed healing, unspecified laterality, unspecified phalanx, subsequent encounter (Primary)  I reviewed his notes and imaging from [facility].  I would recommend proceeding with surgery as discussed.  Continue current medications for now.       No follow-ups on file.     Please note that  portions of this document were completed with a voice recognition program. Efforts were made to edit the dictations, but occasionally words are mis-transcribed.

## 2021-04-23 ENCOUNTER — TELEPHONE (OUTPATIENT)
Dept: FAMILY MEDICINE CLINIC | Facility: CLINIC | Age: 71
End: 2021-04-23

## 2021-04-23 NOTE — TELEPHONE ENCOUNTER
Caller: Behzad Guzamn    Relationship: Self    Best call back number: [phone]     What is the medical concern/diagnosis: TWISTED TOES/ NEUROPATHY ON BOTH FEET; SKIN ISSUES    What specialty or service is being requested: PODIATRIST AND DERMATOLOGIST    What is the provider, practice or medical service name: [facility]    What is the office location: [facility] [place]    What is the office phone number: UNKNOWN    Any additional details: [facility] SAID THEY WOULD PAY FOR IT. PLEASE RETURN CALL TO PATIENT WITH QUESTIONS. PATIENT DOESN'T HAVE A DERMATOLOGIST IN MIND.

## 2021-04-26 DIAGNOSIS — S92.403G: Primary | ICD-10-CM

## 2021-04-26 NOTE — TELEPHONE ENCOUNTER
I placed referral for second opinion podiatry referral at [facility] pod[facility].     For dermatology he likely will not need a referral. [facility] he can call and make an apt.  If he does need a referral after making an apt, just let us know and we will fax one. I just need a specific diagnosis other than skin issues.     For neuropathy of feet I would recommend starting with podiatry referral and go from there. He will likely need a neurologist for neuropathy.

## 2021-04-26 NOTE — TELEPHONE ENCOUNTER
Attempted to contact patient, no answer. Left voicemail for patient to call the office back (office # given).     Hub may relay message & document.    Zach Tran DO [facility] [place]  Clinical Pool Just now (9:58 AM)        I placed referral for second opinion podiatry referral at [facility] podiat[facility].      For dermatology he likely will not need a referral. [facility] would [facility] he can call and make an apt.  If he does need a referral after making an apt, just let us know and we will fax one. I just need a specific diagnosis other than skin issues.      For neuropathy of feet I would recommend starting with podiatry referral and go from there. He will likely need a neurologist for neuropathy.

## 2021-04-29 ENCOUNTER — TELEPHONE (OUTPATIENT)
Dept: FAMILY MEDICINE CLINIC | Facility: CLINIC | Age: 71
End: 2021-04-29

## 2021-04-29 NOTE — TELEPHONE ENCOUNTER
Attempted to call the number that was provided. It does not connect to the benefits program. Only for pre-certifications.

## 2021-04-29 NOTE — TELEPHONE ENCOUNTER
Caller: MARLENY    Relationship:     Best call back number: [phone] (OPTION 2)    What is the best time to reach you: ANY    Who are you requesting to speak with (clinical staff, provider,  specific staff member): CLINICAL    Do you know the name of the person who called: DORYS    What was the call regarding: DORYS STATES THE PATIENT MAY QUALIFY FOR A BENEFITS PROGRAM WITH [facility] FOR FREE MEALS DEPENDING ON HIS BMI. DORYS STATES IF THE PATIENTS BMI IS GREATER THAN 25 THE PATIENT WILL QUALIFY, AND IS REQUESTING A CALL BACK TO SEE IF THE PATIENT DOES QUALIFY FOR THIS PROGRAM    Do you require a callback: YES

## 2021-05-03 RX ORDER — LEVOCETIRIZINE DIHYDROCHLORIDE 5 MG/1
2.5 TABLET, FILM COATED ORAL EVERY EVENING
Qty: 90 TABLET | Refills: 3 | Status: SHIPPED | OUTPATIENT
Start: 2021-05-03 | End: 2021-05-06 | Stop reason: SDUPTHER

## 2021-05-03 NOTE — TELEPHONE ENCOUNTER
Caller: Behzad Guzman WINNIE    Relationship: Self    Best call back number: [phone]    Medication needed:   Requested Prescriptions     Pending Prescriptions Disp Refills   • levocetirizine (Xyzal) 5 MG tablet 30 tablet 2     Sig: Take 0.5 tablets by mouth Every Evening.       When do you need the refill by: TODAY    What additional details did the patient provide when requesting the medication: PATIENT STATES HIS DOSAGE HAS BEEN CHANGED TO 1.0 TABLET DAILY.  PHARMACY NEEDS NEW PRESCRIPTION.    HE IS COMPLETELY OUT OF MEDICATION.    Does the patient have less than a 3 day supply:  [x] Yes  [] No    What is the patient's preferred pharmacy:      [facility], [place] - [address] [facility] AT [facility] [address] - [phone] [facility] [phone] FX

## 2021-05-06 NOTE — TELEPHONE ENCOUNTER
Caller: Behzad Guzman    Relationship: Self    Best call back number: [phone]  Medication needed:   Requested Prescriptions     Pending Prescriptions Disp Refills   • levocetirizine (Xyzal) 5 MG tablet 90 tablet 3     Sig: Take 0.5 tablets by mouth Every Evening.       When do you need the refill by: AS SOON AS POSSIBLE    What additional details did the patient provide when requesting the medication:     PATIENT STATED HE WAS TOLD BY DOCTOR SRINIVASA TO TAKE 0.5 MG TABLET UNTIL HE GOT USED TO IT, AND THEN TAKE 1 TABLET.    PATIENT HAS BEEN OUT FOR A WEEK.      PLEASE ADVISE    Does the patient have less than a 3 day supply:  [x] Yes  [] No    What is the patient's preferred pharmacy: [facility] [address] [place] [address], [place] - [address] [facility] AT [facility] [address] - [phone] [facility] [phone] FX

## 2021-05-07 RX ORDER — LEVOCETIRIZINE DIHYDROCHLORIDE 5 MG/1
5 TABLET, FILM COATED ORAL EVERY EVENING
Qty: 90 TABLET | Refills: 3 | OUTPATIENT
Start: 2021-05-07 | End: 2021-06-29

## 2021-05-07 NOTE — TELEPHONE ENCOUNTER
Last seen 4/16/21 No future appointment     Called gian spoke with [facility] patient received #60 42 days (3/26) and insurance will not pay again until 5/13    Patient says you told him to take 0.5 until he gets used to it and then 1 tablet daily but the directions only says 0.5 tabs should he be taking 0.5 tabs or 1 tab if it is 1 tab daily the prescription will need to be sent back in with new directions

## 2021-05-12 ENCOUNTER — TELEPHONE (OUTPATIENT)
Dept: FAMILY MEDICINE CLINIC | Facility: CLINIC | Age: 71
End: 2021-05-12

## 2021-05-12 NOTE — TELEPHONE ENCOUNTER
Left detailed voicemail explaining we have not received a form but typically podiatry takes care of this. Advised patient to contact his podiatrist.   Office # given incase patient has any additional questions for our office.

## 2021-05-12 NOTE — TELEPHONE ENCOUNTER
Caller: Behzad Guzman    Relationship: Self    Best call back number: [phone]    What orders are you requesting (i.e. lab or imaging): DIABETIC SHOES ORDER FROM [facility]    In what timeframe would the patient need to come in: N/A      Additional notes:     PATIENT CALLED TO CHECK STATUS OF DIABETIC SHOES ORDER THAT WAS FAXED TO CLINIC FROM [facility].      PLEASE FOLLOW UP WITH PATIENT.  PATIENT IS CONCERNED THAT THE CORRECT DIABETIC SHOES ARE NOT GOING TO BE ORDERED.     PATIENT STATED IT HAS BEEN 3 YEARS SINCE HE'S HAD NEW DIABETIC SHOES.

## 2021-05-21 ENCOUNTER — OFFICE VISIT (OUTPATIENT)
Dept: FAMILY MEDICINE CLINIC | Facility: CLINIC | Age: 71
End: 2021-05-21

## 2021-05-21 VITALS
OXYGEN SATURATION: 98 % | HEART RATE: 80 BPM | BODY MASS INDEX: 26.14 KG/M2 | DIASTOLIC BLOOD PRESSURE: 70 MMHG | WEIGHT: 193 LBS | HEIGHT: 72 IN | SYSTOLIC BLOOD PRESSURE: 128 MMHG

## 2021-05-21 DIAGNOSIS — J01.41 ACUTE RECURRENT PANSINUSITIS: Primary | ICD-10-CM

## 2021-05-21 PROCEDURE — 99213 OFFICE O/P EST LOW 20 MIN: CPT | Performed by: NURSE PRACTITIONER

## 2021-05-21 RX ORDER — CEFDINIR 300 MG/1
300 CAPSULE ORAL 2 TIMES DAILY
Qty: 14 CAPSULE | Refills: 0 | Status: SHIPPED | OUTPATIENT
Start: 2021-05-21 | End: 2021-05-28

## 2021-06-10 ENCOUNTER — LAB (OUTPATIENT)
Dept: LAB | Facility: HOSPITAL | Age: 71
End: 2021-06-10

## 2021-06-10 DIAGNOSIS — M79.674 GREAT TOE PAIN, RIGHT: ICD-10-CM

## 2021-06-10 DIAGNOSIS — L08.9 TOE INFECTION: ICD-10-CM

## 2021-06-10 LAB
ALBUMIN SERPL-MCNC: 4.5 G/DL (ref 3.5–5.2)
ALBUMIN/GLOB SERPL: 2 G/DL
ALP SERPL-CCNC: 52 U/L (ref 39–117)
ALT SERPL W P-5'-P-CCNC: 21 U/L (ref 1–41)
ANION GAP SERPL CALCULATED.3IONS-SCNC: 11.6 MMOL/L (ref 5–15)
AST SERPL-CCNC: 25 U/L (ref 1–40)
BASOPHILS # BLD AUTO: 0.05 10*3/MM3 (ref 0–0.2)
BASOPHILS NFR BLD AUTO: 0.7 % (ref 0–1.5)
BILIRUB SERPL-MCNC: 0.3 MG/DL (ref 0–1.2)
BUN SERPL-MCNC: 9 MG/DL (ref 8–23)
BUN/CREAT SERPL: 9.3 (ref 7–25)
CALCIUM SPEC-SCNC: 9 MG/DL (ref 8.6–10.5)
CHLORIDE SERPL-SCNC: 104 MMOL/L (ref 98–107)
CO2 SERPL-SCNC: 27.4 MMOL/L (ref 22–29)
CREAT SERPL-MCNC: 0.97 MG/DL (ref 0.76–1.27)
DEPRECATED RDW RBC AUTO: 42.3 FL (ref 37–54)
EOSINOPHIL # BLD AUTO: 0.26 10*3/MM3 (ref 0–0.4)
EOSINOPHIL NFR BLD AUTO: 3.6 % (ref 0.3–6.2)
ERYTHROCYTE [DISTWIDTH] IN BLOOD BY AUTOMATED COUNT: 12.7 % (ref 12.3–15.4)
ERYTHROCYTE [SEDIMENTATION RATE] IN BLOOD: 4 MM/HR (ref 0–20)
GFR SERPL CREATININE-BSD FRML MDRD: 77 ML/MIN/1.73
GLOBULIN UR ELPH-MCNC: 2.3 GM/DL
GLUCOSE SERPL-MCNC: 120 MG/DL (ref 65–99)
HCT VFR BLD AUTO: 44.2 % (ref 37.5–51)
HGB BLD-MCNC: 15.2 G/DL (ref 13–17.7)
IMM GRANULOCYTES # BLD AUTO: 0.03 10*3/MM3 (ref 0–0.05)
IMM GRANULOCYTES NFR BLD AUTO: 0.4 % (ref 0–0.5)
LYMPHOCYTES # BLD AUTO: 2.6 10*3/MM3 (ref 0.7–3.1)
LYMPHOCYTES NFR BLD AUTO: 35.8 % (ref 19.6–45.3)
MCH RBC QN AUTO: 31.1 PG (ref 26.6–33)
MCHC RBC AUTO-ENTMCNC: 34.4 G/DL (ref 31.5–35.7)
MCV RBC AUTO: 90.6 FL (ref 79–97)
MONOCYTES # BLD AUTO: 0.49 10*3/MM3 (ref 0.1–0.9)
MONOCYTES NFR BLD AUTO: 6.7 % (ref 5–12)
NEUTROPHILS NFR BLD AUTO: 3.83 10*3/MM3 (ref 1.7–7)
NEUTROPHILS NFR BLD AUTO: 52.8 % (ref 42.7–76)
NRBC BLD AUTO-RTO: 0 /100 WBC (ref 0–0.2)
PLATELET # BLD AUTO: 173 10*3/MM3 (ref 140–450)
PMV BLD AUTO: 12.3 FL (ref 6–12)
POTASSIUM SERPL-SCNC: 4.3 MMOL/L (ref 3.5–5.2)
PROT SERPL-MCNC: 6.8 G/DL (ref 6–8.5)
RBC # BLD AUTO: 4.88 10*6/MM3 (ref 4.14–5.8)
SODIUM SERPL-SCNC: 143 MMOL/L (ref 136–145)
WBC # BLD AUTO: 7.26 10*3/MM3 (ref 3.4–10.8)

## 2021-06-10 PROCEDURE — 36415 COLL VENOUS BLD VENIPUNCTURE: CPT

## 2021-06-10 PROCEDURE — 85652 RBC SED RATE AUTOMATED: CPT

## 2021-06-10 PROCEDURE — 80053 COMPREHEN METABOLIC PANEL: CPT

## 2021-06-10 PROCEDURE — 86140 C-REACTIVE PROTEIN: CPT

## 2021-06-10 PROCEDURE — 85025 COMPLETE CBC W/AUTO DIFF WBC: CPT

## 2021-06-11 ENCOUNTER — TELEPHONE (OUTPATIENT)
Dept: FAMILY MEDICINE CLINIC | Facility: CLINIC | Age: 71
End: 2021-06-11

## 2021-06-11 LAB — CRP SERPL-MCNC: <0.3 MG/DL (ref 0–0.5)

## 2021-06-11 NOTE — TELEPHONE ENCOUNTER
Caller: Behzad Guzman    Relationship: Self    Best call back number: [phone]    What is the best time to reach you: ANYTIME     Who are you requesting to speak with (clinical staff, provider,  specific staff member): CLINICAL STAFF        What was the call regarding: PATIENT STATES THAT HE RECEIVED A PHONE CALL THAT HE THINKS IS REGARDING A HEART SCAN THE PATIENT THINKS THE CALL WAS FROM  THE PATIENT WOULD LIKE TO KNOW IF THE OFFICE KNOWS ANYTHING ABOUT THAT     Do you require a callback: YES

## 2021-06-14 ENCOUNTER — HOSPITAL ENCOUNTER (OUTPATIENT)
Dept: GENERAL RADIOLOGY | Facility: HOSPITAL | Age: 71
Discharge: HOME | End: 2021-06-14
Admitting: INTERNAL MEDICINE

## 2021-06-14 ENCOUNTER — OFFICE VISIT (OUTPATIENT)
Dept: FAMILY MEDICINE CLINIC | Facility: CLINIC | Age: 71
End: 2021-06-14

## 2021-06-14 VITALS
HEIGHT: 72 IN | SYSTOLIC BLOOD PRESSURE: 128 MMHG | DIASTOLIC BLOOD PRESSURE: 90 MMHG | HEART RATE: 74 BPM | OXYGEN SATURATION: 97 % | BODY MASS INDEX: 26.14 KG/M2 | WEIGHT: 193 LBS

## 2021-06-14 DIAGNOSIS — M79.674 PAIN OF TOE OF RIGHT FOOT: ICD-10-CM

## 2021-06-14 DIAGNOSIS — E04.9 ENLARGED THYROID: ICD-10-CM

## 2021-06-14 DIAGNOSIS — R59.9 SWOLLEN LYMPH NODES: ICD-10-CM

## 2021-06-14 DIAGNOSIS — B37.9 YEAST INFECTION: Primary | ICD-10-CM

## 2021-06-14 DIAGNOSIS — I10 ESSENTIAL HYPERTENSION: ICD-10-CM

## 2021-06-14 DIAGNOSIS — E11.65 TYPE 2 DIABETES MELLITUS WITH HYPERGLYCEMIA, WITHOUT LONG-TERM CURRENT USE OF INSULIN (HCC): ICD-10-CM

## 2021-06-14 PROCEDURE — 99215 OFFICE O/P EST HI 40 MIN: CPT | Performed by: INTERNAL MEDICINE

## 2021-06-14 PROCEDURE — 73660 X-RAY EXAM OF TOE(S): CPT

## 2021-06-14 RX ORDER — FLUCONAZOLE 100 MG/1
TABLET ORAL
Qty: 8 TABLET | Refills: 0 | OUTPATIENT
Start: 2021-06-14 | End: 2021-06-29

## 2021-06-15 NOTE — PROGRESS NOTES
Chief Complaint   Patient presents with   • Foot Injury     Discuss paperwork ; foot and leg burning sensations    • Hand Pain     burning sensation in both hands    • Neck Pain       HPI:  Behzad Guzman is a 70 y.o. male who presents today for follow-up.  Reports painful swollen lymph node under right-sided jaw.  He is having continued difficulties with foot pain.  He has not followed up with [facility] podiatry.  He has several questions and concerns about proceeding with surgery of broken right great toe.  Currently taking allergy shots as prescribed.  He has a rash on his penis for the last 2 weeks.  He reports it is itchy.    ROS:  Constitutional: no fevers, night sweats or unexplained weight loss  Eyes: no vision changes  ENT: + runny nose,+ ear pain, +sore throat  Cardio: no chest pain, palpitations  Pulm: no shortness of breath, wheezing, or cough  GI: no abdominal pain or changes in bowel movements  : no difficulty urinating  MSK: no difficulty ambulating, no joint pain  Neuro: no weakness, dizziness or headache  Psych: no trouble sleeping  Endo: no change in appetite      Past Medical History:   Diagnosis Date   • Asthma    • Chronic hip pain, left    • Chronic pain in left shoulder    • Hyperlipidemia    • Hypertension    • Leg length discrepancy    • Neck pain, chronic    • Neuropathy    • Panic attacks    • Pre-diabetes    • Prediabetes    • Spinal stenosis       Family History   Problem Relation Age of Onset   • Heart attack Father    • Other Mother         accident      Social History     Socioeconomic History   • Marital status:      Spouse name: Not on file   • Number of children: Not on file   • Years of education: Not on file   • Highest education level: Not on file   Tobacco Use   • Smoking status: Former Smoker     Quit date:      Years since quittin.4   • Smokeless tobacco: Never Used   Substance and Sexual Activity   • Alcohol use: No   • Drug use: No   • Sexual activity: Defer       Allergies   Allergen Reactions   • Amoxicillin Rash   • Penicillins Rash      Immunization History   Administered Date(s) Administered   • Flu Vaccine Quad PF >36MO 09/30/2015   • Pneumococcal Conjugate 13-Valent (PCV13) 09/22/2016   • Pneumococcal Polysaccharide (PPSV23) 05/18/2018        PE:  Vitals:    06/14/21 1526   BP: 128/90   Pulse: 74   SpO2: 97%      Body mass index is 26.17 kg/m².    Gen Appearance: NAD  HEENT: Normocephalic, PERRLA, + thyromegaly, trache midline, enlarged submandibular lymph node on the right  Heart: RRR, normal S1 and S2, no murmur  Lungs: CTA b/l, no wheezing, no crackles  Abdomen: Soft, non-tender, non-distended, no guarding and BSx4  MSK: Moves all extremities well, normal gait, no peripheral edema  Pulses: Palpable and equal b/l  Lymph nodes: No palpable lymphadenopathy   Neuro: No focal deficits      Current Outpatient Medications   Medication Sig Dispense Refill   • albuterol sulfate HFA (Ventolin HFA) 108 (90 Base) MCG/ACT inhaler Inhale 2 puffs Every 6 (Six) Hours As Needed for Wheezing or Shortness of Air. 6.7 g 5   • amLODIPine (NORVASC) 5 MG tablet Take 1 tablet by mouth Daily. 90 tablet 3   • Cholecalciferol (VITAMIN D) 125 MCG (5000 UT) capsule capsule Take 1 capsule by mouth Daily. 30 capsule 0   • cyclobenzaprine (FLEXERIL) 10 MG tablet Take 1 tablet by mouth At Night As Needed for Muscle Spasms. 10 tablet 0   • diazePAM (VALIUM) 5 MG tablet Take 5 mg by mouth 2 (Two) Times a Day.  0   • diclofenac (VOLTAREN) 1 % gel gel Apply 4 g topically to the appropriate area as directed 3 (Three) Times a Day As Needed (hand pain). 100 g 1   • escitalopram (LEXAPRO) 10 MG tablet Take 1 tablet by mouth Daily. 30 tablet 5   • gabapentin (NEURONTIN) 400 MG capsule Take 400 mg by mouth 3 (Three) Times a Day.     • glimepiride (AMARYL) 1 MG tablet TAKE 1 TABLET BY MOUTH EVERY DAY 90 tablet 3   • hydroCHLOROthiazide (HYDRODIURIL) 12.5 MG tablet Take 1 tablet by mouth Daily. 90 tablet 3    • HYDROcodone-acetaminophen (NORCO)  MG per tablet Take 1 tablet by mouth Every 6 (Six) Hours As Needed for moderate pain (4-6).     • Hydrocodone-Acetaminophen (XODOL )  MG per tablet   0   • hydrocortisone 2.5 % cream Apply  topically to the appropriate area as directed 3 (Three) Times a Day. 60 g 3   • levocetirizine (Xyzal) 5 MG tablet Take 1 tablet by mouth Every Evening. 90 tablet 3   • lisinopril (PRINIVIL,ZESTRIL) 20 MG tablet TAKE 2 TABLETS BY MOUTH EVERY  tablet 3   • metFORMIN (GLUCOPHAGE) 500 MG tablet TAKE 1 TABLET BY MOUTH DAILY WITH BREAKFAST 90 tablet 3   • Naloxegol Oxalate (MOVANTIK) 12.5 MG tablet Movantik 12.5 mg tablet     • NON FORMULARY GLIMEREX FOR PREDIABETES     • omeprazole (priLOSEC) 40 MG capsule TAKE 1 CAPSULE BY MOUTH DAILY BEFORE A MEAL 90 capsule 1   • ondansetron ODT (ZOFRAN-ODT) 4 MG disintegrating tablet Take 1 tablet by mouth 4 (Four) Times a Day As Needed for Nausea or Vomiting. 15 tablet 0   • ONETOUCH VERIO test strip Daily. for testing  0   • oxyCODONE (oxyCONTIN) 10 MG 12 hr tablet Take 10 mg by mouth Every 12 (Twelve) Hours As Needed.  0   • oxyCODONE-acetaminophen (PERCOCET) 5-325 MG per tablet Take 1 tablet by mouth Every 4 (Four) Hours As Needed for Moderate Pain  or Severe Pain . 15 tablet 0   • pravastatin (PRAVACHOL) 20 MG tablet TAKE 1 TABLET BY MOUTH EVERY DAY 90 tablet 3   • promethazine-dextromethorphan (PROMETHAZINE-DM) 6.25-15 MG/5ML syrup Take 5 ml QID PRN cough/congestion 180 mL 0   • saccharomyces boulardii (Florastor) 250 MG capsule Take 1 capsule by mouth 2 (Two) Times a Day. 60 capsule 5   • Testosterone Cypionate (DEPOTESTOTERONE CYPIONATE) 200 MG/ML injection Inject 1 mL into the appropriate muscle as directed by prescriber Every 21 (Twenty-One) Days. 10 mL 0   • fluconazole (Diflucan) 100 MG tablet Take 2 tabs on the first day followed by 1 tab daily after 8 tablet 0     No current facility-administered medications for this  visit.      Counseling was given to patient for the following topics: instructions for management, impressions and risks and benefits of treatment options . Total time of the encounter was 40 minutes and 30 minutes was spent face to face counseling.      Diagnoses and all orders for this visit:    1. Yeast infection (Primary)  -     fluconazole (Diflucan) 100 MG tablet; Take 2 tabs on the first day followed by 1 tab daily after  Dispense: 8 tablet; Refill: 0  Several erythematous lesions on the head of penis.  Treat for yeast infection.  Call return to clinic if no improvement.  He has not been sexually active for the last 6 months.  2. Type 2 diabetes mellitus with hyperglycemia, without long-term current use of insulin (CMS/[facility])  We will need updated blood work including A1c.  Continue current medication for now.  3. Essential hypertension  Stable today.  Continue current medication.  4. Enlarged thyroid  -     US Thyroid; Future  Further evaluation with thyroid ultrasound.  5. Swollen lymph nodes  Ultrasound as above.  6. Pain of toe of right foot  -     XR Toe 2+ View Right; Future  Encourage patient to follow-up with podiatry as scheduled.       No follow-ups on file.     Please note that portions of this document were completed with a voice recognition program. Efforts were made to edit the dictations, but occasionally words are mis-transcribed.

## 2021-06-21 ENCOUNTER — TELEPHONE (OUTPATIENT)
Dept: FAMILY MEDICINE CLINIC | Facility: CLINIC | Age: 71
End: 2021-06-21
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
## 2021-06-24 ENCOUNTER — HOSPITAL ENCOUNTER (OUTPATIENT)
Dept: ULTRASOUND IMAGING | Facility: HOSPITAL | Age: 71
Discharge: HOME | End: 2021-06-24
Admitting: INTERNAL MEDICINE

## 2021-06-24 DIAGNOSIS — E04.9 ENLARGED THYROID: ICD-10-CM

## 2021-06-24 PROCEDURE — 76536 US EXAM OF HEAD AND NECK: CPT

## 2021-06-28 ENCOUNTER — TELEPHONE (OUTPATIENT)
Dept: FAMILY MEDICINE CLINIC | Facility: CLINIC | Age: 71
End: 2021-06-28

## 2021-06-28 DIAGNOSIS — E04.2 MULTINODULAR GOITER: Primary | ICD-10-CM

## 2021-06-28 NOTE — TELEPHONE ENCOUNTER
TALIA WOULD LIKE SOMEONE TO EXPLAIN HIS TEST RESULTS TO HIM FROM MY CHART?    HE WOULD LIKE TO KNOW IF HE CAN XRAY OF HIS TEETH?

## 2021-06-29 ENCOUNTER — HOSPITAL ENCOUNTER (EMERGENCY)
Facility: HOSPITAL | Age: 71
Discharge: HOME | End: 2021-06-29
Attending: EMERGENCY MEDICINE | Admitting: EMERGENCY MEDICINE

## 2021-06-29 ENCOUNTER — OFFICE VISIT (OUTPATIENT)
Dept: FAMILY MEDICINE CLINIC | Facility: CLINIC | Age: 71
End: 2021-06-29

## 2021-06-29 ENCOUNTER — APPOINTMENT (OUTPATIENT)
Dept: GENERAL RADIOLOGY | Facility: HOSPITAL | Age: 71
End: 2021-06-29

## 2021-06-29 VITALS
TEMPERATURE: 98.7 F | RESPIRATION RATE: 16 BRPM | SYSTOLIC BLOOD PRESSURE: 183 MMHG | WEIGHT: 195 LBS | HEIGHT: 72 IN | BODY MASS INDEX: 26.41 KG/M2 | HEART RATE: 82 BPM | OXYGEN SATURATION: 95 % | DIASTOLIC BLOOD PRESSURE: 109 MMHG

## 2021-06-29 VITALS
DIASTOLIC BLOOD PRESSURE: 94 MMHG | OXYGEN SATURATION: 95 % | HEIGHT: 72 IN | HEART RATE: 80 BPM | SYSTOLIC BLOOD PRESSURE: 142 MMHG | BODY MASS INDEX: 26.41 KG/M2 | TEMPERATURE: 97.9 F | WEIGHT: 195 LBS

## 2021-06-29 DIAGNOSIS — G89.29 OTHER CHRONIC PAIN: ICD-10-CM

## 2021-06-29 DIAGNOSIS — Z86.79 HISTORY OF HYPERTENSION: ICD-10-CM

## 2021-06-29 DIAGNOSIS — K04.7 TOOTH ABSCESS: ICD-10-CM

## 2021-06-29 DIAGNOSIS — T78.40XA ALLERGIC REACTION, INITIAL ENCOUNTER: Primary | ICD-10-CM

## 2021-06-29 DIAGNOSIS — I10 ELEVATED BLOOD PRESSURE READING WITH DIAGNOSIS OF HYPERTENSION: ICD-10-CM

## 2021-06-29 DIAGNOSIS — K02.9 DENTAL DECAY: ICD-10-CM

## 2021-06-29 DIAGNOSIS — R22.0 FACIAL SWELLING: ICD-10-CM

## 2021-06-29 DIAGNOSIS — R07.89 CHEST WALL PAIN: ICD-10-CM

## 2021-06-29 DIAGNOSIS — I10 ESSENTIAL HYPERTENSION: ICD-10-CM

## 2021-06-29 LAB
HOLD SPECIMEN: NORMAL
QT INTERVAL: 378 MS
QTC INTERVAL: 439 MS
WHOLE BLOOD HOLD SPECIMEN: NORMAL

## 2021-06-29 PROCEDURE — 96372 THER/PROPH/DIAG INJ SC/IM: CPT | Performed by: INTERNAL MEDICINE

## 2021-06-29 PROCEDURE — 93005 ELECTROCARDIOGRAM TRACING: CPT | Performed by: PHYSICIAN ASSISTANT

## 2021-06-29 PROCEDURE — 71045 X-RAY EXAM CHEST 1 VIEW: CPT

## 2021-06-29 PROCEDURE — 99214 OFFICE O/P EST MOD 30 MIN: CPT | Performed by: INTERNAL MEDICINE

## 2021-06-29 PROCEDURE — 99283 EMERGENCY DEPT VISIT LOW MDM: CPT

## 2021-06-29 RX ORDER — METHYLPREDNISOLONE ACETATE 80 MG/ML
80 INJECTION, SUSPENSION INTRA-ARTICULAR; INTRALESIONAL; INTRAMUSCULAR; SOFT TISSUE ONCE
Status: COMPLETED | OUTPATIENT
Start: 2021-06-29 | End: 2021-06-29

## 2021-06-29 RX ORDER — SODIUM CHLORIDE 0.9 % (FLUSH) 0.9 %
10 SYRINGE (ML) INJECTION AS NEEDED
Status: DISCONTINUED | OUTPATIENT
Start: 2021-06-29 | End: 2021-06-30 | Stop reason: HOSPADM

## 2021-06-29 RX ORDER — CEFDINIR 300 MG/1
300 CAPSULE ORAL 2 TIMES DAILY
Qty: 14 CAPSULE | Refills: 0 | Status: SHIPPED | OUTPATIENT
Start: 2021-06-29 | End: 2021-07-06

## 2021-06-29 RX ORDER — PREDNISONE 20 MG/1
TABLET ORAL
Qty: 15 TABLET | Refills: 0 | Status: SHIPPED | OUTPATIENT
Start: 2021-06-29 | End: 2021-07-08

## 2021-06-29 RX ADMIN — METHYLPREDNISOLONE ACETATE 80 MG: 80 INJECTION, SUSPENSION INTRA-ARTICULAR; INTRALESIONAL; INTRAMUSCULAR; SOFT TISSUE at 15:48
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
## 2021-07-06 DIAGNOSIS — R21 PENILE RASH: Primary | ICD-10-CM

## 2021-07-08 ENCOUNTER — OFFICE VISIT (OUTPATIENT)
Dept: CARDIOLOGY | Facility: HOSPITAL | Age: 71
End: 2021-07-08

## 2021-07-08 VITALS
OXYGEN SATURATION: 96 % | HEIGHT: 72 IN | SYSTOLIC BLOOD PRESSURE: 140 MMHG | DIASTOLIC BLOOD PRESSURE: 78 MMHG | TEMPERATURE: 96.7 F | RESPIRATION RATE: 20 BRPM | WEIGHT: 193.06 LBS | HEART RATE: 88 BPM | BODY MASS INDEX: 26.15 KG/M2

## 2021-07-08 DIAGNOSIS — I10 ESSENTIAL HYPERTENSION: Primary | ICD-10-CM

## 2021-07-08 DIAGNOSIS — E78.5 HYPERLIPIDEMIA, UNSPECIFIED HYPERLIPIDEMIA TYPE: ICD-10-CM

## 2021-07-08 PROCEDURE — 99214 OFFICE O/P EST MOD 30 MIN: CPT | Performed by: NURSE PRACTITIONER

## 2021-07-08 RX ORDER — METOPROLOL TARTRATE 50 MG/1
50 TABLET, FILM COATED ORAL 2 TIMES DAILY
COMMUNITY
End: 2022-02-14

## 2021-07-08 RX ORDER — AMLODIPINE BESYLATE 5 MG/1
5 TABLET ORAL AS NEEDED
COMMUNITY
End: 2022-06-15 | Stop reason: SDUPTHER
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
## 2021-07-12 ENCOUNTER — TELEPHONE (OUTPATIENT)
Dept: FAMILY MEDICINE CLINIC | Facility: CLINIC | Age: 71
End: 2021-07-12

## 2021-07-12 RX ORDER — OMEPRAZOLE 40 MG/1
40 CAPSULE, DELAYED RELEASE ORAL DAILY
Qty: 90 CAPSULE | Refills: 1 | Status: SHIPPED | OUTPATIENT
Start: 2021-07-12 | End: 2022-01-03

## 2021-07-12 NOTE — TELEPHONE ENCOUNTER
PATIENT HAS CALLED REQUESTING IF HE CAN HAVE SOMETHING CALLED IN TO TREAT RASH THAT HAS BEEN GOING ON FOR  2 WEEKS.  PATIENT STATES MEDICATION THAT HE IS TAKING IS NOT WORKING.    PATIENT USES [facility] [address] [place] [address] [place].    CALL BACK NUMBER [phone]

## 2021-07-13 NOTE — TELEPHONE ENCOUNTER
Contacted patient & relayed PCP's message. I offered to give him the information to the dermatologist office and patient states he already has it on his voicemail and will call them back. Pt also states he will follow up with [facility]. He did not have any additional questions at this time.

## 2021-07-13 NOTE — TELEPHONE ENCOUNTER
Called and spoke with pt. Informed pt a portion of the papwork needs to be completed by a podiatrist. Informed pt there was a referral in place for him to be able to schedule an appointment. Pt verbalized understanding and has no further questions at this time. Gave him information on the office he was referred to.

## 2021-07-13 NOTE — TELEPHONE ENCOUNTER
I do have the paperwork. I filled out the first page but he needs documentation of a comprehensive foot exam which should be completed by his podiatrist or this can be done at his follow up visit here. I would recommend podiatry since he is already established. He needs to follow up with podiatry at  for his broken foot regardless.

## 2021-07-13 NOTE — TELEPHONE ENCOUNTER
He has been referred to dermatology, Dr. Calhoun. Please give pt contact information to schedule apt. Please remind him to schedule follow up with podiatry at  as well.

## 2021-07-14 ENCOUNTER — TELEPHONE (OUTPATIENT)
Dept: FAMILY MEDICINE CLINIC | Facility: CLINIC | Age: 71
End: 2021-07-14

## 2021-07-14 NOTE — TELEPHONE ENCOUNTER
Caller: Behzad Guzman    Relationship: Self    Best call back number: [phone]    What is the best time to reach you: ANYTIME     Who are you requesting to speak with (clinical staff, provider,  specific staff member): CLINICAL STAFF     What was the call regarding: PATIENT SAYS THAT HE HAS CONTACTED  [facility] AND HE WAS TOLD THAT THEY DO NOT HAVE THE PAPERWORK FOR HIM. PATIENT WAS ADVISED THAT THEY WILL NOT SEE HIM UNTIL THEY RECIEVE THAT.      Do you require a callback: YES

## 2021-07-14 NOTE — TELEPHONE ENCOUNTER
Called and informed pt [facility] [place] paperwork has been successfully faxed over.     Pt is also requesting medication for his rash that he has been having. Pt states an ointment was supposed to have been prescribed at last visit but I dont see anything that states that. Is this something that you can prescribe or do you recommend an office visit? He also stated he has been having tooth pain and is needing something to hold him over until he is able to get into the dentist.

## 2021-07-15 ENCOUNTER — TELEPHONE (OUTPATIENT)
Dept: FAMILY MEDICINE CLINIC | Facility: CLINIC | Age: 71
End: 2021-07-15

## 2021-07-15 DIAGNOSIS — R21 PENILE RASH: Primary | ICD-10-CM

## 2021-07-15 RX ORDER — PREDNISONE 20 MG/1
40 TABLET ORAL DAILY
Qty: 10 TABLET | Refills: 0 | Status: SHIPPED | OUTPATIENT
Start: 2021-07-15 | End: 2021-07-20

## 2021-07-15 NOTE — TELEPHONE ENCOUNTER
Contacted patient & relayed PCP's message. Pt verbalized understanding and states he will wait for call regarding dermatology referral and he also appreciated the prednisone. Pt did not have any additional questions at this time.

## 2021-07-15 NOTE — TELEPHONE ENCOUNTER
Caller: [facility]    Best call back number: [phone]    Who are you requesting to speak with (clinical staff, provider,  specific staff member):     What was the call regarding: [facility] STATES THAT THEY HAVE RECEIVED THE MEDICAL RECORDS  FOR THE PATIENT BUT THEY  ARE THE [place] LOCATION. THEY ARE ASSUMING THAT THE PATIENT NEEDS TO BE SEEN AT THE [facility] WHICH IS A DIFFERENT FAX NUMBER FROM [phone]. CALLER SAID THAT SHE WILL SCAN IN THE INFORMATION AND CALL THE [place] LOCATION TO LET THEM KNOW THAT IT IS IN THE SYSTEM, BUT SHE IS NOT SURE WHAT THE PATIENT IS NEEDING. CALLER SUGGESTS THAT THE OFFICE CALL  ADAN FINN [phone] AT THE [facility] TO SEE WHAT THEY ARE NEEDING FOR THE PATIENT'S REFERRAL.     Do you require a callback: NO

## 2021-07-15 NOTE — TELEPHONE ENCOUNTER
Called PT left VM with below message with office number to call back with any questions or concerns

## 2021-07-15 NOTE — TELEPHONE ENCOUNTER
He needs to follow up with Dr. Calhoun dermatology, I have tried to relay this message several times. He can try some steroids in the meantime, will send this in today.

## 2021-07-15 NOTE — TELEPHONE ENCOUNTER
Provider: BRE BERNABE  Caller: TALIA LOPEZ  Relationship to Patient: SELF  Phone Number: [phone]  Reason for Call: PATIENT STATED HIS  DENTIST HAS TAKING CARE OF THE ANANBOTIC SITUATION. HE IS STILL WAITING TO HER SOMETHING BACK ABOUT HIS RASH. PLEASE GIVE PATIENT A CALL

## 2021-07-16 ENCOUNTER — TELEPHONE (OUTPATIENT)
Dept: FAMILY MEDICINE CLINIC | Facility: CLINIC | Age: 71
End: 2021-07-16

## 2021-07-16 NOTE — TELEPHONE ENCOUNTER
Caller: Behzad Guzman    Relationship: Self    Best call back number: [phone]    What is the best time to reach you: ANYTIME     Who are you requesting to speak with (clinical staff, provider,  specific staff member): CLINICAL STAFF     What was the call regarding: PATIENT STATES THAT HE IS HAVING DIFFICULTY GETTING HIS COPY OF HIS RECENT BLOOD WORK OFF MYCHART. HE SAYS THAT HE HAS CALLED THE HELP [facility]K AND IT WAS UNSUCCESSFULL. HE WOULD LIKE TO KNOW A DIFFERENT OPTION OF GETTING THIS.     Do you require a callback: YES

## 2021-07-26 DIAGNOSIS — E55.9 VITAMIN D DEFICIENCY: ICD-10-CM

## 2021-07-26 DIAGNOSIS — T46.6X5A MYALGIA DUE TO STATIN: ICD-10-CM

## 2021-07-26 DIAGNOSIS — M79.10 MYALGIA DUE TO STATIN: ICD-10-CM

## 2021-08-02 ENCOUNTER — OFFICE VISIT (OUTPATIENT)
Dept: FAMILY MEDICINE CLINIC | Facility: CLINIC | Age: 71
End: 2021-08-02

## 2021-08-02 VITALS
OXYGEN SATURATION: 98 % | SYSTOLIC BLOOD PRESSURE: 150 MMHG | HEART RATE: 76 BPM | BODY MASS INDEX: 26.85 KG/M2 | DIASTOLIC BLOOD PRESSURE: 84 MMHG | WEIGHT: 198 LBS

## 2021-08-02 DIAGNOSIS — I10 ESSENTIAL HYPERTENSION: Primary | ICD-10-CM

## 2021-08-02 DIAGNOSIS — F41.1 GENERALIZED ANXIETY DISORDER: ICD-10-CM

## 2021-08-02 DIAGNOSIS — E11.43 TYPE 2 DIABETES MELLITUS WITH DIABETIC AUTONOMIC NEUROPATHY, WITHOUT LONG-TERM CURRENT USE OF INSULIN (HCC): ICD-10-CM

## 2021-08-02 DIAGNOSIS — M79.673 CHRONIC FOOT PAIN, UNSPECIFIED LATERALITY: ICD-10-CM

## 2021-08-02 DIAGNOSIS — R21 PENILE RASH: ICD-10-CM

## 2021-08-02 DIAGNOSIS — G89.29 CHRONIC FOOT PAIN, UNSPECIFIED LATERALITY: ICD-10-CM

## 2021-08-02 PROCEDURE — 99215 OFFICE O/P EST HI 40 MIN: CPT | Performed by: INTERNAL MEDICINE

## 2021-08-02 RX ORDER — ESCITALOPRAM OXALATE 10 MG/1
10 TABLET ORAL DAILY
Qty: 90 TABLET | Refills: 3 | Status: SHIPPED | OUTPATIENT
Start: 2021-08-02 | End: 2022-01-17

## 2021-08-02 NOTE — PROGRESS NOTES
Chief Complaint   Patient presents with   • Hypertension     follow up       HPI:  Behzad Guzman is a 70 y.o. male who presents today for follow-up hypertension.  He reports his blood pressure spikes are likely due to stress and anxiety.  He is considering going back on Lexapro.  He continues to have penile rash.  He has follow-up appointment with podiatry for chronic pain later this week.    ROS:  Constitutional: no fevers, night sweats or unexplained weight loss  Eyes: no vision changes  ENT: no runny nose, ear pain, sore throat  Cardio: no chest pain, palpitations  Pulm: no shortness of breath, wheezing, or cough  GI: no abdominal pain or changes in bowel movements  : no difficulty urinating  MSK: no difficulty ambulating, no joint pain  Neuro: no weakness, dizziness or headache  Psych: no trouble sleeping  Endo: no change in appetite      Past Medical History:   Diagnosis Date   • Asthma    • Chronic hip pain, left    • Chronic pain in left shoulder    • Hyperlipidemia    • Hypertension    • Leg length discrepancy    • Neck pain, chronic    • Neuropathy    • Panic attacks    • Pre-diabetes    • Prediabetes    • Spinal stenosis       Family History   Problem Relation Age of Onset   • Heart attack Father 65   • Other Mother         accident   • No Known Problems Maternal Grandmother    • No Known Problems Maternal Grandfather    • No Known Problems Paternal Grandmother    • Stroke Paternal Grandfather       Social History     Socioeconomic History   • Marital status:      Spouse name: Not on file   • Number of children: Not on file   • Years of education: Not on file   • Highest education level: Not on file   Tobacco Use   • Smoking status: Former Smoker     Quit date:      Years since quittin.6   • Smokeless tobacco: Never Used   Substance and Sexual Activity   • Alcohol use: No   • Drug use: No   • Sexual activity: Defer      Allergies   Allergen Reactions   • Amoxicillin Rash   • Penicillins  Rash   • Latex Rash      Immunization History   Administered Date(s) Administered   • Flu Vaccine Quad PF >36MO 09/30/2015   • Pneumococcal Conjugate 13-Valent (PCV13) 09/22/2016   • Pneumococcal Polysaccharide (PPSV23) 05/18/2018        PE:  Vitals:    08/02/21 1539   BP: 150/84   Pulse: 76   SpO2: 98%      Body mass index is 26.85 kg/m².    Gen Appearance: NAD  HEENT: Normocephalic, PERRLA, no thyromegaly, trache midline  Heart: RRR, normal S1 and S2, no murmur  Lungs: CTA b/l, no wheezing, no crackles  Abdomen: Soft, non-tender, non-distended, no guarding and BSx4  MSK: Moves all extremities well, normal gait, no peripheral edema  Pulses: Palpable and equal b/l  Lymph nodes: No palpable lymphadenopathy   Neuro: No focal deficits      Current Outpatient Medications   Medication Sig Dispense Refill   • albuterol sulfate HFA (Ventolin HFA) 108 (90 Base) MCG/ACT inhaler Inhale 2 puffs Every 6 (Six) Hours As Needed for Wheezing or Shortness of Air. 6.7 g 5   • amLODIPine (NORVASC) 5 MG tablet Take 5 mg by mouth Daily.     • Cholecalciferol (VITAMIN D) 125 MCG (5000 UT) capsule capsule Take 1 capsule by mouth Daily. 30 capsule 0   • co-enzyme Q-10 50 MG capsule TAKE 1 CAPSULE BY MOUTH DAILY 90 capsule 3   • co-enzyme Q-10 50 MG capsule TAKE 1 CAPSULE BY MOUTH DAILY 90 capsule 3   • diazePAM (VALIUM) 5 MG tablet Take 5 mg by mouth 2 (Two) Times a Day.  0   • gabapentin (NEURONTIN) 400 MG capsule Take 400 mg by mouth 2 (two) times a day.     • glimepiride (AMARYL) 1 MG tablet TAKE 1 TABLET BY MOUTH EVERY DAY 90 tablet 3   • HYDROcodone-acetaminophen (NORCO)  MG per tablet Take 1 tablet by mouth Every 6 (Six) Hours As Needed for moderate pain (4-6).     • lisinopril (PRINIVIL,ZESTRIL) 20 MG tablet TAKE 2 TABLETS BY MOUTH EVERY  tablet 3   • metFORMIN (GLUCOPHAGE) 500 MG tablet TAKE 1 TABLET BY MOUTH DAILY WITH BREAKFAST 90 tablet 3   • metoprolol tartrate (LOPRESSOR) 50 MG tablet Take 50 mg by mouth 2 (Two)  Times a Day.     • Naloxegol Oxalate (MOVANTIK) 12.5 MG tablet Movantik 12.5 mg tablet     • omeprazole (priLOSEC) 40 MG capsule TAKE 1 CAPSULE BY MOUTH DAILY BEFORE A MEAL 90 capsule 1   • ondansetron ODT (ZOFRAN-ODT) 4 MG disintegrating tablet Take 1 tablet by mouth 4 (Four) Times a Day As Needed for Nausea or Vomiting. 15 tablet 0   • ONETOUCH VERIO test strip Daily. for testing  0   • pravastatin (PRAVACHOL) 20 MG tablet TAKE 1 TABLET BY MOUTH EVERY DAY 90 tablet 3   • Testosterone Cypionate (DEPOTESTOTERONE CYPIONATE) 200 MG/ML injection Inject 1 mL into the appropriate muscle as directed by prescriber Every 21 (Twenty-One) Days. 10 mL 0     No current facility-administered medications for this visit.      Counseling was given to patient for the following topics: diagnostic results, instructions for management, risk factor reductions, impressions, risks and benefits of treatment options and importance of treatment compliance . Total time of the encounter was 40 minutes and 30 minutes was spent face to face counseling.      Diagnoses and all orders for this visit:    1. Essential hypertension (Primary)  He is not taking amlodipine routinely.  Recommend taking 2 to half milligrams daily.  He reports when he takes 5 mg daily his blood pressure drops too low.  2. Penile rash  -     Ambulatory Referral to Dermatology  -     Hepatitis C Antibody; Future  -     RPR; Future  -     HIV-1 / O / 2 Ag / Antibody 4th Generation; Future  -     Chlamydia trachomatis, Neisseria gonorrhoeae, PCR - Urine, Urine, Clean Catch; Future    3. Chronic foot pain, unspecified laterality  Follow-up with dietary as scheduled.  4. Type 2 diabetes mellitus with diabetic autonomic neuropathy, without long-term current use of insulin (CMS/[facility])  -     Hemoglobin A1c; Future  -     Microalbumin / Creatinine Urine Ratio - Urine, Clean Catch; Future    Continue current medication for now.  Will need updated A1c check.    Return in about 4 weeks  (around 8/30/2021) for htn.     Please note that portions of this document were completed with a voice recognition program. Efforts were made to edit the dictations, but occasionally words are mis-transcribed.

## 2021-08-31 ENCOUNTER — OFFICE VISIT (OUTPATIENT)
Dept: FAMILY MEDICINE CLINIC | Facility: CLINIC | Age: 71
End: 2021-08-31

## 2021-08-31 VITALS
HEART RATE: 83 BPM | SYSTOLIC BLOOD PRESSURE: 145 MMHG | OXYGEN SATURATION: 98 % | HEIGHT: 72 IN | DIASTOLIC BLOOD PRESSURE: 80 MMHG | WEIGHT: 193.6 LBS | TEMPERATURE: 96.8 F | BODY MASS INDEX: 26.22 KG/M2

## 2021-08-31 DIAGNOSIS — I10 ESSENTIAL HYPERTENSION: ICD-10-CM

## 2021-08-31 DIAGNOSIS — M79.672 PAIN IN BOTH FEET: ICD-10-CM

## 2021-08-31 DIAGNOSIS — M79.671 PAIN IN BOTH FEET: ICD-10-CM

## 2021-08-31 DIAGNOSIS — J32.9 RECURRENT SINUSITIS: ICD-10-CM

## 2021-08-31 DIAGNOSIS — R50.9 FEVER, UNSPECIFIED FEVER CAUSE: Primary | ICD-10-CM

## 2021-08-31 PROCEDURE — 99214 OFFICE O/P EST MOD 30 MIN: CPT | Performed by: INTERNAL MEDICINE

## 2021-08-31 RX ORDER — OXYCODONE HYDROCHLORIDE 10 MG/1
10 TABLET ORAL NIGHTLY PRN
COMMUNITY
Start: 2021-08-19

## 2021-08-31 RX ORDER — CEFDINIR 300 MG/1
300 CAPSULE ORAL 2 TIMES DAILY
Qty: 14 CAPSULE | Refills: 0 | Status: SHIPPED | OUTPATIENT
Start: 2021-08-31 | End: 2021-09-07

## 2021-09-01 PROCEDURE — U0004 COV-19 TEST NON-CDC HGH THRU: HCPCS | Performed by: INTERNAL MEDICINE

## 2021-09-01 NOTE — ADDENDUM NOTE
Addended by: DEONNA SALGUERO on: 9/1/2021 02:49 PM     Modules accepted: Orders     negative Regular rate & rhythm, normal S1, S2; no murmurs, gallops or rubs; no S3, S4

## 2021-09-01 NOTE — PROGRESS NOTES
Chief Complaint   Patient presents with   • Follow-up   foot pain    HPI:  Behzad Guzman is a 71 y.o. male who presents today for bug bite and chronic foot pain.  Reports fevers and chills and significant fatigue over the past few days.  He is requesting COVID-19 vaccine today.    ROS:  Constitutional: +fevers, -night sweats or unexplained weight loss  Eyes: no vision changes  ENT: no runny nose, ear pain, sore throat  Cardio: no chest pain, palpitations  Pulm: no shortness of breath, wheezing, or cough  GI: no abdominal pain or changes in bowel movements  : no difficulty urinating  MSK: no difficulty ambulating, no joint pain  Neuro: no weakness, dizziness or headache  Psych: no trouble sleeping  Endo: no change in appetite      Past Medical History:   Diagnosis Date   • Asthma    • Chronic hip pain, left    • Chronic pain in left shoulder    • Hyperlipidemia    • Hypertension    • Leg length discrepancy    • Neck pain, chronic    • Neuropathy    • Panic attacks    • Pre-diabetes    • Prediabetes    • Spinal stenosis       Family History   Problem Relation Age of Onset   • Heart attack Father 65   • Other Mother         accident   • No Known Problems Maternal Grandmother    • No Known Problems Maternal Grandfather    • No Known Problems Paternal Grandmother    • Stroke Paternal Grandfather       Social History     Socioeconomic History   • Marital status:      Spouse name: Not on file   • Number of children: Not on file   • Years of education: Not on file   • Highest education level: Not on file   Tobacco Use   • Smoking status: Former Smoker     Quit date:      Years since quittin.6   • Smokeless tobacco: Never Used   Substance and Sexual Activity   • Alcohol use: No   • Drug use: No   • Sexual activity: Defer      Allergies   Allergen Reactions   • Amoxicillin Rash   • Penicillins Rash   • Latex Rash      Immunization History   Administered Date(s) Administered   • Flu Vaccine Quad PF >36MO  09/30/2015   • Pneumococcal Conjugate 13-Valent (PCV13) 09/22/2016   • Pneumococcal Polysaccharide (PPSV23) 05/18/2018        PE:  Vitals:    08/31/21 1544   BP: 145/80   Pulse: 83   Temp: 96.8 °F (36 °C)   SpO2: 98%      Body mass index is 26.25 kg/m².    Gen Appearance: NAD  HEENT: Normocephalic, PERRLA, no thyromegaly, trache midline  Heart: RRR, normal S1 and S2, no murmur  Lungs: CTA b/l, no wheezing, no crackles  Abdomen: Soft, non-tender, non-distended, no guarding and BSx4  MSK: Moves all extremities well, normal gait, no peripheral edema  Pulses: Palpable and equal b/l  Lymph nodes: No palpable lymphadenopathy   Neuro: No focal deficits      Current Outpatient Medications   Medication Sig Dispense Refill   • albuterol sulfate HFA (Ventolin HFA) 108 (90 Base) MCG/ACT inhaler Inhale 2 puffs Every 6 (Six) Hours As Needed for Wheezing or Shortness of Air. 6.7 g 5   • amLODIPine (NORVASC) 5 MG tablet Take 5 mg by mouth Daily.     • Cholecalciferol (VITAMIN D) 125 MCG (5000 UT) capsule capsule Take 1 capsule by mouth Daily. 30 capsule 0   • co-enzyme Q-10 50 MG capsule TAKE 1 CAPSULE BY MOUTH DAILY 90 capsule 3   • co-enzyme Q-10 50 MG capsule TAKE 1 CAPSULE BY MOUTH DAILY 90 capsule 3   • diazePAM (VALIUM) 5 MG tablet Take 5 mg by mouth 2 (Two) Times a Day.  0   • escitalopram (Lexapro) 10 MG tablet Take 1 tablet by mouth Daily. 90 tablet 3   • gabapentin (NEURONTIN) 400 MG capsule Take 400 mg by mouth 2 (two) times a day.     • glimepiride (AMARYL) 1 MG tablet TAKE 1 TABLET BY MOUTH EVERY DAY 90 tablet 3   • HYDROcodone-acetaminophen (NORCO)  MG per tablet Take 1 tablet by mouth Every 6 (Six) Hours As Needed for moderate pain (4-6).     • lisinopril (PRINIVIL,ZESTRIL) 20 MG tablet TAKE 2 TABLETS BY MOUTH EVERY  tablet 3   • metFORMIN (GLUCOPHAGE) 500 MG tablet TAKE 1 TABLET BY MOUTH DAILY WITH BREAKFAST 90 tablet 3   • metoprolol tartrate (LOPRESSOR) 50 MG tablet Take 50 mg by mouth 2 (Two) Times a  Day.     • Naloxegol Oxalate (MOVANTIK) 12.5 MG tablet Movantik 12.5 mg tablet     • omeprazole (priLOSEC) 40 MG capsule TAKE 1 CAPSULE BY MOUTH DAILY BEFORE A MEAL 90 capsule 1   • ondansetron ODT (ZOFRAN-ODT) 4 MG disintegrating tablet Take 1 tablet by mouth 4 (Four) Times a Day As Needed for Nausea or Vomiting. 15 tablet 0   • ONETOUCH VERIO test strip Daily. for testing  0   • oxyCODONE (ROXICODONE) 10 MG tablet Take 10 mg by mouth 2 (two) times a day.     • pravastatin (PRAVACHOL) 20 MG tablet TAKE 1 TABLET BY MOUTH EVERY DAY 90 tablet 3   • Testosterone Cypionate (DEPOTESTOTERONE CYPIONATE) 200 MG/ML injection Inject 1 mL into the appropriate muscle as directed by prescriber Every 21 (Twenty-One) Days. 10 mL 0   • cefdinir (OMNICEF) 300 MG capsule Take 1 capsule by mouth 2 (Two) Times a Day for 7 days. 14 capsule 0     No current facility-administered medications for this visit.      Would not recommend COVID-19 vaccine today due to recent symptoms.  Rule out Covid and follow-up in 2 weeks.  He has recurrent sinusitis and significant allergies.  I highly recommended following up with allergist versus ENT.  He also needs to follow-up with podiatry.  Foot pain is always his main complaint in office.  I explained to him that he needs to seek care from a specialist for this condition.    Diagnoses and all orders for this visit:    1. Fever, unspecified fever cause (Primary)  -     cefdinir (OMNICEF) 300 MG capsule; Take 1 capsule by mouth 2 (Two) Times a Day for 7 days.  Dispense: 14 capsule; Refill: 0  -     COVID-19 PCR, [facility], NP SWAB IN [employer]ORAL SWISH 24-30 HR TAT - Swab, Nasopharynx; Future    2. Recurrent sinusitis  -     cefdinir (OMNICEF) 300 MG capsule; Take 1 capsule by mouth 2 (Two) Times a Day for 7 days.  Dispense: 14 capsule; Refill: 0    3. Pain in both feet    4. Essential hypertension         No follow-ups on file.     Please note that portions of this document were  completed with a voice recognition program. Efforts were made to edit the dictations, but occasionally words are mis-transcribed.

## 2021-09-02 LAB — SARS-COV-2 RNA NOSE QL NAA+PROBE: NOT DETECTED

## 2021-09-14 ENCOUNTER — IMMUNIZATION (OUTPATIENT)
Dept: FAMILY MEDICINE CLINIC | Facility: CLINIC | Age: 71
End: 2021-09-14

## 2021-09-14 DIAGNOSIS — Z23 IMMUNIZATION DUE: Primary | ICD-10-CM

## 2021-09-14 PROCEDURE — 91300 COVID-19 (PFIZER): CPT | Performed by: FAMILY MEDICINE

## 2021-09-14 PROCEDURE — 0001A COVID-19 (PFIZER): CPT | Performed by: FAMILY MEDICINE

## 2021-09-16 ENCOUNTER — TELEPHONE (OUTPATIENT)
Dept: FAMILY MEDICINE CLINIC | Facility: CLINIC | Age: 71
End: 2021-09-16

## 2021-09-16 NOTE — TELEPHONE ENCOUNTER
If his glucose is >400 then he likely needs IV fluids and insulin so I would recommend ER evaluation. He will need follow up with his podiatrist for evaluation of his foot but the ER will be able to take a look at this too.

## 2021-09-16 NOTE — TELEPHONE ENCOUNTER
Caller: Behzad Guzman    Relationship: Self    Best call back number: [phone]    Who are you requesting to speak with (clinical staff, provider,  specific staff member): CLINICAL STAFF    What was the call regarding: PATIENT REQUESTED A CALL BACK, STATED IT WAS ABOUT HIS FEET BUT DID NOT WANT TO PROVIDE MORE DETAILS. PATIENT ALSO STATED HE THINKS HE IS HAVING A REACTION TO THE COVID -19 VACCINE BUT WOULD NOT PROVIDE ANY DETAILS ABOUT THE REACTION HE IS HAVING.    Do you require a callback: YES

## 2021-09-16 NOTE — TELEPHONE ENCOUNTER
Pt stated that his left foot 2nd toe is discolored, red around the nail and tip of toe (stated  skin tone with a crimson/bluish around the rim of toe) States glucose was 470 yesterday and them he took his medication and went down to 428. Could this be from the COVID vaccination

## 2021-09-17 ENCOUNTER — HOSPITAL ENCOUNTER (EMERGENCY)
Facility: HOSPITAL | Age: 71
Discharge: HOME | End: 2021-09-17
Attending: EMERGENCY MEDICINE | Admitting: EMERGENCY MEDICINE

## 2021-09-17 VITALS
WEIGHT: 200 LBS | RESPIRATION RATE: 18 BRPM | TEMPERATURE: 98.1 F | DIASTOLIC BLOOD PRESSURE: 113 MMHG | BODY MASS INDEX: 27.09 KG/M2 | HEART RATE: 79 BPM | SYSTOLIC BLOOD PRESSURE: 175 MMHG | OXYGEN SATURATION: 96 % | HEIGHT: 72 IN

## 2021-09-17 DIAGNOSIS — Z86.39 HISTORY OF HYPERGLYCEMIA: ICD-10-CM

## 2021-09-17 DIAGNOSIS — L03.032 CELLULITIS OF LEFT TOE: Primary | ICD-10-CM

## 2021-09-17 LAB
ALBUMIN SERPL-MCNC: 4.6 G/DL (ref 3.5–5.2)
ALBUMIN/GLOB SERPL: 2 G/DL
ALP SERPL-CCNC: 58 U/L (ref 39–117)
ALT SERPL W P-5'-P-CCNC: 23 U/L (ref 1–41)
ANION GAP SERPL CALCULATED.3IONS-SCNC: 12 MMOL/L (ref 5–15)
AST SERPL-CCNC: 27 U/L (ref 1–40)
B-OH-BUTYR SERPL-SCNC: 0.1 MMOL/L (ref 0.02–0.27)
BASOPHILS # BLD AUTO: 0.08 10*3/MM3 (ref 0–0.2)
BASOPHILS NFR BLD AUTO: 1.2 % (ref 0–1.5)
BILIRUB SERPL-MCNC: 0.5 MG/DL (ref 0–1.2)
BILIRUB UR QL STRIP: NEGATIVE
BUN SERPL-MCNC: 9 MG/DL (ref 8–23)
BUN/CREAT SERPL: 9.4 (ref 7–25)
CALCIUM SPEC-SCNC: 9.5 MG/DL (ref 8.6–10.5)
CHLORIDE SERPL-SCNC: 103 MMOL/L (ref 98–107)
CLARITY UR: CLEAR
CO2 SERPL-SCNC: 24 MMOL/L (ref 22–29)
COLOR UR: YELLOW
CREAT SERPL-MCNC: 0.96 MG/DL (ref 0.76–1.27)
DEPRECATED RDW RBC AUTO: 43.6 FL (ref 37–54)
EOSINOPHIL # BLD AUTO: 0.32 10*3/MM3 (ref 0–0.4)
EOSINOPHIL NFR BLD AUTO: 5 % (ref 0.3–6.2)
ERYTHROCYTE [DISTWIDTH] IN BLOOD BY AUTOMATED COUNT: 13.3 % (ref 12.3–15.4)
GFR SERPL CREATININE-BSD FRML MDRD: 77 ML/MIN/1.73
GLOBULIN UR ELPH-MCNC: 2.3 GM/DL
GLUCOSE BLDC GLUCOMTR-MCNC: 126 MG/DL (ref 70–130)
GLUCOSE SERPL-MCNC: 105 MG/DL (ref 65–99)
GLUCOSE UR STRIP-MCNC: NEGATIVE MG/DL
HBA1C MFR BLD: 5.9 % (ref 4.8–5.6)
HCT VFR BLD AUTO: 44 % (ref 37.5–51)
HGB BLD-MCNC: 14.9 G/DL (ref 13–17.7)
HGB UR QL STRIP.AUTO: NEGATIVE
HOLD SPECIMEN: NORMAL
IMM GRANULOCYTES # BLD AUTO: 0.02 10*3/MM3 (ref 0–0.05)
IMM GRANULOCYTES NFR BLD AUTO: 0.3 % (ref 0–0.5)
KETONES UR QL STRIP: NEGATIVE
LEUKOCYTE ESTERASE UR QL STRIP.AUTO: NEGATIVE
LYMPHOCYTES # BLD AUTO: 2.42 10*3/MM3 (ref 0.7–3.1)
LYMPHOCYTES NFR BLD AUTO: 37.7 % (ref 19.6–45.3)
MCH RBC QN AUTO: 30 PG (ref 26.6–33)
MCHC RBC AUTO-ENTMCNC: 33.9 G/DL (ref 31.5–35.7)
MCV RBC AUTO: 88.7 FL (ref 79–97)
MONOCYTES # BLD AUTO: 0.6 10*3/MM3 (ref 0.1–0.9)
MONOCYTES NFR BLD AUTO: 9.3 % (ref 5–12)
NEUTROPHILS NFR BLD AUTO: 2.98 10*3/MM3 (ref 1.7–7)
NEUTROPHILS NFR BLD AUTO: 46.5 % (ref 42.7–76)
NITRITE UR QL STRIP: NEGATIVE
NRBC BLD AUTO-RTO: 0 /100 WBC (ref 0–0.2)
PH UR STRIP.AUTO: 5.5 [PH] (ref 5–8)
PLATELET # BLD AUTO: 161 10*3/MM3 (ref 140–450)
PMV BLD AUTO: 11.4 FL (ref 6–12)
POTASSIUM SERPL-SCNC: 4.1 MMOL/L (ref 3.5–5.2)
PROT SERPL-MCNC: 6.9 G/DL (ref 6–8.5)
PROT UR QL STRIP: NEGATIVE
RBC # BLD AUTO: 4.96 10*6/MM3 (ref 4.14–5.8)
SODIUM SERPL-SCNC: 139 MMOL/L (ref 136–145)
SP GR UR STRIP: 1.01 (ref 1–1.03)
UROBILINOGEN UR QL STRIP: NORMAL
WBC # BLD AUTO: 6.42 10*3/MM3 (ref 3.4–10.8)
WHOLE BLOOD HOLD SPECIMEN: NORMAL
WHOLE BLOOD HOLD SPECIMEN: NORMAL

## 2021-09-17 PROCEDURE — 82962 GLUCOSE BLOOD TEST: CPT

## 2021-09-17 PROCEDURE — 80053 COMPREHEN METABOLIC PANEL: CPT

## 2021-09-17 PROCEDURE — 83036 HEMOGLOBIN GLYCOSYLATED A1C: CPT | Performed by: NURSE PRACTITIONER

## 2021-09-17 PROCEDURE — 82010 KETONE BODYS QUAN: CPT

## 2021-09-17 PROCEDURE — 81003 URINALYSIS AUTO W/O SCOPE: CPT

## 2021-09-17 PROCEDURE — 99283 EMERGENCY DEPT VISIT LOW MDM: CPT

## 2021-09-17 PROCEDURE — 85025 COMPLETE CBC W/AUTO DIFF WBC: CPT

## 2021-09-17 RX ORDER — SODIUM CHLORIDE 0.9 % (FLUSH) 0.9 %
10 SYRINGE (ML) INJECTION AS NEEDED
Status: DISCONTINUED | OUTPATIENT
Start: 2021-09-17 | End: 2021-09-17 | Stop reason: HOSPADM

## 2021-09-17 RX ORDER — SULFAMETHOXAZOLE AND TRIMETHOPRIM 800; 160 MG/1; MG/1
1 TABLET ORAL ONCE
Status: DISCONTINUED | OUTPATIENT
Start: 2021-09-17 | End: 2021-09-17

## 2021-09-17 RX ORDER — SULFAMETHOXAZOLE AND TRIMETHOPRIM 800; 160 MG/1; MG/1
1 TABLET ORAL 2 TIMES DAILY
Qty: 20 TABLET | Refills: 0 | Status: SHIPPED | OUTPATIENT
Start: 2021-09-17 | End: 2021-11-11

## 2021-09-17 NOTE — DISCHARGE INSTRUCTIONS
Continue to observe your blood glucose closely.  Keep a log of your readings and see Dr. Tran next week, calling MOnday for an appointment. The new shoe insert is pushing your toe against the top of your shoe.  Consider alternatives to the insert or different shoes, especailly until healed.  Apply the prescription ointment twice daily until healed.

## 2021-09-17 NOTE — TELEPHONE ENCOUNTER
lvm for pt informing that I have sent a message via Int[employer] of Dr. Tran's response but if he would like to call back to do so.

## 2021-09-18 ENCOUNTER — TELEPHONE (OUTPATIENT)
Dept: FAMILY MEDICINE CLINIC | Facility: CLINIC | Age: 71
End: 2021-09-18

## 2021-09-18 DIAGNOSIS — E11.43 TYPE 2 DIABETES MELLITUS WITH DIABETIC AUTONOMIC NEUROPATHY, WITHOUT LONG-TERM CURRENT USE OF INSULIN (HCC): Primary | ICD-10-CM

## 2021-09-18 RX ORDER — BLOOD SUGAR DIAGNOSTIC
1 STRIP MISCELLANEOUS DAILY
Qty: 300 EACH | Refills: 2 | Status: SHIPPED | OUTPATIENT
Start: 2021-09-18 | End: 2021-09-20 | Stop reason: SDUPTHER

## 2021-09-18 NOTE — ED PROVIDER NOTES
EMERGENCY DEPARTMENT ENCOUNTER    Pt Name: Behzad Guzman  MRN: [id number]  Pt :   1950  Room Number:  Room/bed info not found  Date of encounter:  2021  PCP: Zach Tran DO  ED Provider: WILMAN Espinoza    Historian: patient      HPI:  Chief Complaint: toe pain, left 2nd toe        Context: Behzad Guzman is a 71 y.o. male who presents to the ED c/o redness discovered on the top of his left 2nd toe; onset in the last few days.  He volunteers that he started applying an insert into his boots that he wears daily.  He denies any trauma otherwise to his feet.  He goes on to say that his new glucometer for the last week shows blood glucose readings in the 300 to 400 range each morning.  Patient states he is selective about using his blood pressure medications because sometimes they make him feel tired      Review of systems is negative for fever or chills.  Negative for chest pain or shortness of breath or cough.  Negative for nausea or vomiting or diarrhea.  He denies any abdominal pain or urinary symptoms.    Patient has a past medical history of chronic pain for which she takes hydrocodone 10 mg 4 times a day with oxycodone for breakthrough pain.  He is also on Valium and Neurontin.       PAST MEDICAL HISTORY  Past Medical History:   Diagnosis Date   • Asthma    • Chronic hip pain, left    • Chronic pain in left shoulder    • Hyperlipidemia    • Hypertension    • Leg length discrepancy    • Neck pain, chronic    • Neuropathy    • Panic attacks    • Pre-diabetes    • Prediabetes    • Spinal stenosis          PAST SURGICAL HISTORY  Past Surgical History:   Procedure Laterality Date   • LEG SURGERY     • ROTATOR CUFF REPAIR Right    • SHOULDER SURGERY Left    • WRIST SURGERY Left          FAMILY HISTORY  Family History   Problem Relation Age of Onset   • Heart attack Father 65   • Other Mother         accident   • No Known Problems Maternal Grandmother    • No Known Problems Maternal  Grandfather    • No Known Problems Paternal Grandmother    • Stroke Paternal Grandfather          SOCIAL HISTORY  Social History     Socioeconomic History   • Marital status:      Spouse name: Not on file   • Number of children: Not on file   • Years of education: Not on file   • Highest education level: Not on file   Tobacco Use   • Smoking status: Former Smoker     Quit date:      Years since quittin.7   • Smokeless tobacco: Never Used   Substance and Sexual Activity   • Alcohol use: No   • Drug use: No   • Sexual activity: Defer         ALLERGIES  Amoxicillin, Penicillins, and Latex        REVIEW OF SYSTEMS  Review of Systems     All systems reviewed and negative except for those discussed in HPI.       PHYSICAL EXAM    I have reviewed the triage vital signs and nursing notes.    ED Triage Vitals [21 1613]   Temp Heart Rate Resp BP SpO2   98.1 °F (36.7 °C) 79 18 (!) 198/121 96 %      Temp src Heart Rate Source Patient Position BP Location FiO2 (%)   Oral Monitor Sitting Left arm --       Physical Exam  GENERAL:   Appears very well.  He is verbose.  He is in no acute distress.  He is hypertensive.  He is ambulatory without limitations.  HENT: Nares patent.  EYES: No scleral icterus.  CV: Regular rhythm, regular rate.  No peripheral edema.  No JVD RESPIRATORY: Normal effort.  No audible wheezes, rales or rhonchi.  ABDOMEN: Soft, nontender  MUSCULOSKELETAL: No deformities.  Right foot: Skin is in good repair.  No acute findings.  Left foot: Skin is in good repair.  The skin immediately adjacent to the nailbed is slightly erythematous but blanching.  There is no paronychia.  Capillary refill is brisk.  Skin is warm and well-perfused and in good repair  NEURO: Alert, moves all extremities, follows commands.  SKIN: Warm, dry, no rash visualized.        LAB RESULTS  Recent Results (from the past 24 hour(s))   POC Glucose Once    Collection Time: 21  4:17 PM    Specimen: Blood   Result Value  Ref Range    Glucose 126 70 - 130 mg/dL   Comprehensive Metabolic Panel    Collection Time: 09/17/21  4:47 PM    Specimen: Blood   Result Value Ref Range    Glucose 105 (H) 65 - 99 mg/dL    BUN 9 8 - 23 mg/dL    Creatinine 0.96 0.76 - 1.27 mg/dL    Sodium 139 136 - 145 mmol/L    Potassium 4.1 3.5 - 5.2 mmol/L    Chloride 103 98 - 107 mmol/L    CO2 24.0 22.0 - 29.0 mmol/L    Calcium 9.5 8.6 - 10.5 mg/dL    Total Protein 6.9 6.0 - 8.5 g/dL    Albumin 4.60 3.50 - 5.20 g/dL    ALT (SGPT) 23 1 - 41 U/L    AST (SGOT) 27 1 - 40 U/L    Alkaline Phosphatase 58 39 - 117 U/L    Total Bilirubin 0.5 0.0 - 1.2 mg/dL    eGFR Non African Amer 77 >60 mL/min/1.73    Globulin 2.3 gm/dL    A/G Ratio 2.0 g/dL    BUN/Creatinine Ratio 9.4 7.0 - 25.0    Anion Gap 12.0 5.0 - 15.0 mmol/L   Green Top (Gel)    Collection Time: 09/17/21  4:47 PM   Result Value Ref Range    Extra Tube Hold for add-ons.    Lavender Top    Collection Time: 09/17/21  4:47 PM   Result Value Ref Range    Extra Tube hold for add-on    Gold Top - SST    Collection Time: 09/17/21  4:47 PM   Result Value Ref Range    Extra Tube Hold for add-ons.    CBC Auto Differential    Collection Time: 09/17/21  4:47 PM    Specimen: Blood   Result Value Ref Range    WBC 6.42 3.40 - 10.80 10*3/mm3    RBC 4.96 4.14 - 5.80 10*6/mm3    Hemoglobin 14.9 13.0 - 17.7 g/dL    Hematocrit 44.0 37.5 - 51.0 %    MCV 88.7 79.0 - 97.0 fL    MCH 30.0 26.6 - 33.0 pg    MCHC 33.9 31.5 - 35.7 g/dL    RDW 13.3 12.3 - 15.4 %    RDW-SD 43.6 37.0 - 54.0 fl    MPV 11.4 6.0 - 12.0 fL    Platelets 161 140 - 450 10*3/mm3    Neutrophil % 46.5 42.7 - 76.0 %    Lymphocyte % 37.7 19.6 - 45.3 %    Monocyte % 9.3 5.0 - 12.0 %    Eosinophil % 5.0 0.3 - 6.2 %    Basophil % 1.2 0.0 - 1.5 %    Immature Grans % 0.3 0.0 - 0.5 %    Neutrophils, Absolute 2.98 1.70 - 7.00 10*3/mm3    Lymphocytes, Absolute 2.42 0.70 - 3.10 10*3/mm3    Monocytes, Absolute 0.60 0.10 - 0.90 10*3/mm3    Eosinophils, Absolute 0.32 0.00 - 0.40  10*3/mm3    Basophils, Absolute 0.08 0.00 - 0.20 10*3/mm3    Immature Grans, Absolute 0.02 0.00 - 0.05 10*3/mm3    nRBC 0.0 0.0 - 0.2 /100 WBC   Beta Hydroxybutyrate Quantitative    Collection Time: 09/17/21  4:47 PM    Specimen: Blood   Result Value Ref Range    Beta-Hydroxybutyrate Quant 0.097 0.020 - 0.270 mmol/L   Hemoglobin A1c    Collection Time: 09/17/21  4:47 PM    Specimen: Blood   Result Value Ref Range    Hemoglobin A1C 5.90 (H) 4.80 - 5.60 %   Urinalysis With Microscopic If Indicated (No Culture) - Urine, Clean Catch    Collection Time: 09/17/21  4:48 PM    Specimen: Urine, Clean Catch   Result Value Ref Range    Color, UA Yellow Yellow, Straw    Appearance, UA Clear Clear    pH, UA 5.5 5.0 - 8.0    Specific Gravity, UA 1.006 1.001 - 1.030    Glucose, UA Negative Negative    Ketones, UA Negative Negative    Bilirubin, UA Negative Negative    Blood, UA Negative Negative    Protein, UA Negative Negative    Leuk Esterase, UA Negative Negative    Nitrite, UA Negative Negative    Urobilinogen, UA 0.2 E.U./dL 0.2 - 1.0 E.U./dL   Gray Top    Collection Time: 09/17/21  4:48 PM   Result Value Ref Range    Extra Tube Hold for add-ons.    Light Blue Top    Collection Time: 09/17/21  4:48 PM   Result Value Ref Range    Extra Tube hold for add-on        If labs were ordered, I independently reviewed the results.        RADIOLOGY        PROCEDURES    Procedures    No orders to display       MEDICATIONS GIVEN IN ER    Medications - No data to display            ED Course as of Sep 17 2358   Fri Sep 17, 2021   2357 Patient's work-up is most remarkable for normal glucose.  His hemoglobin A1c is very good at 5.9.  He is hypertensive and we discussed the importance of his compliance with his blood pressure medication.  During this ED course, he has no JVD or peripheral edema or chest pain or hypoxia.  He will follow-up with Dr. Zhang regarding his written log of blood pressure readings, being compliant with his blood  pressure medications.  We discussed the importance of avoiding any pressure in his shoes and suggest best measures to avoid irritation.  Antibiotics have been forwarded to his personal pharmacy together with topical to help prevent infection.  Patient understands and concurs with his outpatient follow-up.    [MS]      ED Course User Index  [MS] Yumiko Hammer APRN             AS OF 23:58 EDT VITALS:    BP - (!) 175/113  HR - 79  TEMP - 98.1 °F (36.7 °C) (Oral)  O2 SATS - 96%        DIAGNOSIS  Final diagnoses:   Cellulitis of left toe   History of hyperglycemia         DISPOSITION    DISCHARGE    Patient discharged in stable condition.    Reviewed implications of results, diagnosis, meds, responsibility to follow up, warning signs and symptoms of possible worsening, potential complications and reasons to return to ER.    Patient/Family voiced understanding of above instructions.    Discussed plan for discharge, as there is no emergent indication for admission.  Pt/family is agreeable and understands need for follow up and possible repeat testing.  Pt/family is aware that discharge does not mean that nothing is wrong but that it indicates no emergency is currently present that requires admission and they must continue care with follow-up as given below or with a physician of their choice.     FOLLOW-UP  Zach Tran,   [address] [id number] [phone]    Schedule an appointment as soon as possible for a visit in 2 days  If symptoms worsen         Medication List      New Prescriptions    mupirocin 2 % ointment  Commonly known as: BACTROBAN  Apply 1 application topically to the appropriate area as directed 3 (Three) Times a Day.     sulfamethoxazole-trimethoprim 800-160 MG per tablet  Commonly known as: BACTRIM DS,SEPTRA DS  Take 1 tablet by mouth 2 (Two) Times a Day.        Stop    hydroCHLOROthiazide 12.5 MG tablet  Commonly known as: HYDRODIURIL     levocetirizine 5 MG  tablet  Commonly known as: Xyzal           Where to Get Your Medications      These medications were sent to [facility] - [place], [place] - [address] [facility] AT [facility] [address] - [phone] PH - [phone] FX  [address] [facility] [facility], [facility] [address]    Phone: [phone]   · mupirocin 2 % ointment  · sulfamethoxazole-trimethoprim 800-160 MG per tablet                  Yumiko Hammer, APRN  09/17/21 1378

## 2021-09-18 NOTE — TELEPHONE ENCOUNTER
Rx Refill Note  Requested Prescriptions      No prescriptions requested or ordered in this encounter      Last office visit with prescribing clinician: 8/31/2021      Next office visit with prescribing clinician: Visit date not found            GERDA PRUITT MA  09/18/21, 13:05 EDT     Patient called stated in was in the ER last night and used his last strip.

## 2021-09-18 NOTE — TELEPHONE ENCOUNTER
ONETOUCH VERIO TEST STRIPS. PATIENT HAS NONE LEFT, 90 DAY SUPPLY    [facility]

## 2021-09-20 ENCOUNTER — TELEPHONE (OUTPATIENT)
Dept: FAMILY MEDICINE CLINIC | Facility: CLINIC | Age: 71
End: 2021-09-20

## 2021-09-20 DIAGNOSIS — E11.65 TYPE 2 DIABETES MELLITUS WITH HYPERGLYCEMIA, WITHOUT LONG-TERM CURRENT USE OF INSULIN (HCC): Primary | ICD-10-CM

## 2021-09-20 DIAGNOSIS — E11.43 TYPE 2 DIABETES MELLITUS WITH DIABETIC AUTONOMIC NEUROPATHY, WITHOUT LONG-TERM CURRENT USE OF INSULIN (HCC): ICD-10-CM

## 2021-09-20 RX ORDER — BLOOD SUGAR DIAGNOSTIC
1 STRIP MISCELLANEOUS DAILY
Qty: 300 EACH | Refills: 2 | Status: SHIPPED | OUTPATIENT
Start: 2021-09-20 | End: 2022-06-21 | Stop reason: SDUPTHER

## 2021-09-20 NOTE — TELEPHONE ENCOUNTER
Caller: Behzad Guzman    Relationship: Self    Best call back number: [phone]    What is the best time to reach you: ASAP    Who are you requesting to speak with (clinical staff, provider,  specific staff member): NURSE    Do you know the name of the person who called: NA    What was the call regarding: PATIENT WOULD LIKE TO SPEAK WITH NURSE REGARDING HIS HEALTH AND RECENT ER VISIT. PATIENT WOULD LIKE A CALL BACK ASAP TO SPEAK WITH THE NURSE.    Do you require a callback: YES

## 2021-09-20 NOTE — TELEPHONE ENCOUNTER
Called and spoke with patient to let him know he has ER follow up tomorrow and this can be discussed at that appointment

## 2021-09-21 ENCOUNTER — OFFICE VISIT (OUTPATIENT)
Dept: FAMILY MEDICINE CLINIC | Facility: CLINIC | Age: 71
End: 2021-09-21

## 2021-09-21 VITALS
TEMPERATURE: 97.5 F | HEART RATE: 93 BPM | HEIGHT: 72 IN | BODY MASS INDEX: 25.22 KG/M2 | SYSTOLIC BLOOD PRESSURE: 150 MMHG | DIASTOLIC BLOOD PRESSURE: 100 MMHG | OXYGEN SATURATION: 97 % | WEIGHT: 186.2 LBS

## 2021-09-21 DIAGNOSIS — T36.95XA ANTIBIOTIC REACTION: ICD-10-CM

## 2021-09-21 DIAGNOSIS — E11.65 TYPE 2 DIABETES MELLITUS WITH HYPERGLYCEMIA, WITHOUT LONG-TERM CURRENT USE OF INSULIN (HCC): Primary | ICD-10-CM

## 2021-09-21 DIAGNOSIS — L03.032 CELLULITIS OF TOE OF LEFT FOOT: ICD-10-CM

## 2021-09-21 DIAGNOSIS — I10 ESSENTIAL HYPERTENSION: ICD-10-CM

## 2021-09-21 PROCEDURE — 99214 OFFICE O/P EST MOD 30 MIN: CPT | Performed by: INTERNAL MEDICINE

## 2021-09-21 RX ORDER — CEFDINIR 300 MG/1
300 CAPSULE ORAL 2 TIMES DAILY
Qty: 14 CAPSULE | Refills: 0 | Status: SHIPPED | OUTPATIENT
Start: 2021-09-21 | End: 2021-09-28

## 2021-09-22 LAB — GLUCOSE BLDC GLUCOMTR-MCNC: 105 MG/DL (ref 70–130)

## 2021-09-22 NOTE — PROGRESS NOTES
Chief Complaint   Patient presents with   • Transitional Care Management   • Cellulitis   • Hypertension     Patient forgot to take BP medicine today        HPI:  Behzad Guzman is a 71 y.o. male who presents today for follow-up ER visit.  He called in reporting blood sugars consistently in 300s and 400s.  When he was evaluated at the ER his blood sugar was 106.  He was started on antibiotics for possible cellulitis of left second toe.  He has not followed up with podiatry or orthopedics.    ROS:  Constitutional: no fevers, night sweats or unexplained weight loss  Eyes: no vision changes  ENT: no runny nose, ear pain, sore throat  Cardio: no chest pain, palpitations  Pulm: no shortness of breath, wheezing, or cough  GI: no abdominal pain or changes in bowel movements  : no difficulty urinating  MSK: no difficulty ambulating, no joint pain  Neuro: no weakness, dizziness or headache  Psych: no trouble sleeping  Endo: no change in appetite      Past Medical History:   Diagnosis Date   • Asthma    • Chronic hip pain, left    • Chronic pain in left shoulder    • Hyperlipidemia    • Hypertension    • Leg length discrepancy    • Neck pain, chronic    • Neuropathy    • Panic attacks    • Pre-diabetes    • Prediabetes    • Spinal stenosis       Family History   Problem Relation Age of Onset   • Heart attack Father 65   • Other Mother         accident   • No Known Problems Maternal Grandmother    • No Known Problems Maternal Grandfather    • No Known Problems Paternal Grandmother    • Stroke Paternal Grandfather       Social History     Socioeconomic History   • Marital status:      Spouse name: Not on file   • Number of children: Not on file   • Years of education: Not on file   • Highest education level: Not on file   Tobacco Use   • Smoking status: Former Smoker     Quit date:      Years since quittin.7   • Smokeless tobacco: Never Used   Substance and Sexual Activity   • Alcohol use: No   • Drug use: No    • Sexual activity: Defer      Allergies   Allergen Reactions   • Amoxicillin Rash   • Penicillins Rash   • Latex Rash      Immunization History   Administered Date(s) Administered   • COVID-19 (PFIZER) 09/14/2021   • Flu Vaccine Quad PF >36MO 09/30/2015   • Pneumococcal Conjugate 13-Valent (PCV13) 09/22/2016   • Pneumococcal Polysaccharide (PPSV23) 05/18/2018        PE:  Vitals:    09/21/21 1442   BP: 150/100   Pulse: 93   Temp: 97.5 °F (36.4 °C)   SpO2: 97%      Body mass index is 25.25 kg/m².    Gen Appearance: NAD  HEENT: Normocephalic, PERRLA, no thyromegaly, trache midline  Heart: RRR, normal S1 and S2, no murmur  Lungs: CTA b/l, no wheezing, no crackles  Abdomen: Soft, non-tender, non-distended, no guarding and BSx4  MSK: Moves all extremities well, normal gait, no peripheral edema  Pulses: Palpable and equal b/l  Lymph nodes: No palpable lymphadenopathy   Neuro: No focal deficits      Current Outpatient Medications   Medication Sig Dispense Refill   • albuterol sulfate HFA (Ventolin HFA) 108 (90 Base) MCG/ACT inhaler Inhale 2 puffs Every 6 (Six) Hours As Needed for Wheezing or Shortness of Air. 6.7 g 5   • amLODIPine (NORVASC) 5 MG tablet Take 5 mg by mouth Daily.     • Cholecalciferol (VITAMIN D) 125 MCG (5000 UT) capsule capsule Take 1 capsule by mouth Daily. 30 capsule 0   • co-enzyme Q-10 50 MG capsule TAKE 1 CAPSULE BY MOUTH DAILY 90 capsule 3   • co-enzyme Q-10 50 MG capsule TAKE 1 CAPSULE BY MOUTH DAILY 90 capsule 3   • diazePAM (VALIUM) 5 MG tablet Take 5 mg by mouth 2 (Two) Times a Day.  0   • escitalopram (Lexapro) 10 MG tablet Take 1 tablet by mouth Daily. 90 tablet 3   • gabapentin (NEURONTIN) 400 MG capsule Take 400 mg by mouth 2 (two) times a day.     • glimepiride (AMARYL) 1 MG tablet TAKE 1 TABLET BY MOUTH EVERY DAY 90 tablet 3   • HYDROcodone-acetaminophen (NORCO)  MG per tablet Take 1 tablet by mouth Every 6 (Six) Hours As Needed for moderate pain (4-6).     • lisinopril  (PRINIVIL,ZESTRIL) 20 MG tablet TAKE 2 TABLETS BY MOUTH EVERY  tablet 3   • metFORMIN (GLUCOPHAGE) 500 MG tablet TAKE 1 TABLET BY MOUTH DAILY WITH BREAKFAST 90 tablet 3   • metoprolol tartrate (LOPRESSOR) 50 MG tablet Take 50 mg by mouth 2 (Two) Times a Day.     • mupirocin (BACTROBAN) 2 % ointment Apply 1 application topically to the appropriate area as directed 3 (Three) Times a Day. 22 g 0   • Naloxegol Oxalate (MOVANTIK) 12.5 MG tablet Movantik 12.5 mg tablet     • omeprazole (priLOSEC) 40 MG capsule TAKE 1 CAPSULE BY MOUTH DAILY BEFORE A MEAL 90 capsule 1   • ondansetron ODT (ZOFRAN-ODT) 4 MG disintegrating tablet Take 1 tablet by mouth 4 (Four) Times a Day As Needed for Nausea or Vomiting. 15 tablet 0   • OneTouch Verio test strip 1 each by Other route Daily. for testing 300 each 2   • oxyCODONE (ROXICODONE) 10 MG tablet Take 10 mg by mouth 2 (two) times a day.     • pravastatin (PRAVACHOL) 20 MG tablet TAKE 1 TABLET BY MOUTH EVERY DAY 90 tablet 3   • sulfamethoxazole-trimethoprim (BACTRIM DS,SEPTRA DS) 800-160 MG per tablet Take 1 tablet by mouth 2 (Two) Times a Day. 20 tablet 0   • Testosterone Cypionate (DEPOTESTOTERONE CYPIONATE) 200 MG/ML injection Inject 1 mL into the appropriate muscle as directed by prescriber Every 21 (Twenty-One) Days. 10 mL 0   • cefdinir (OMNICEF) 300 MG capsule Take 1 capsule by mouth 2 (Two) Times a Day for 7 days. 14 capsule 0     No current facility-administered medications for this visit.        Diagnoses and all orders for this visit:    1. Type 2 diabetes mellitus with hyperglycemia, without long-term current use of insulin (CMS/[facility]) (Primary)  -     POCT Glucose  I suspect his glucometer is malfunctioning.  Will send new equipment.  Glucose 104 here today.  His glucometer was reading 130s.  2. Cellulitis of toe of left foot  -     cefdinir (OMNICEF) 300 MG capsule; Take 1 capsule by mouth 2 (Two) Times a Day for 7 days.  Dispense: 14 capsule; Refill: 0  Will need  follow-up with orthopedics versus podiatry.  Information given for podiatry appointment.  3. Antibiotic reaction  DC Bactrim, switching to cefdinir.  Significant provement toe erythema since starting on antibiotics although he did have muscle spasms and aches and pain since starting Bactrim.  4. Essential hypertension  Elevated today although he did not take his medication.  Follow-up 4 weeks for reassessment.  Recommend bringing blood pressure medication to visit and also taking his medicine that day.       Return in about 4 weeks (around 10/19/2021) for htn.     Please note that portions of this document were completed with a voice recognition program. Efforts were made to edit the dictations, but occasionally words are mis-transcribed.

## 2021-09-23 DIAGNOSIS — E11.65 TYPE 2 DIABETES MELLITUS WITH HYPERGLYCEMIA, WITHOUT LONG-TERM CURRENT USE OF INSULIN (HCC): Primary | ICD-10-CM

## 2021-09-23 RX ORDER — BLOOD-GLUCOSE METER
1 KIT MISCELLANEOUS DAILY
Qty: 1 EACH | Refills: 0 | Status: SHIPPED | OUTPATIENT
Start: 2021-09-23

## 2021-09-28 ENCOUNTER — TELEPHONE (OUTPATIENT)
Dept: FAMILY MEDICINE CLINIC | Facility: CLINIC | Age: 71
End: 2021-09-28

## 2021-09-28 DIAGNOSIS — E11.65 TYPE 2 DIABETES MELLITUS WITH HYPERGLYCEMIA, WITHOUT LONG-TERM CURRENT USE OF INSULIN (HCC): Primary | ICD-10-CM

## 2021-09-28 RX ORDER — LANCETS
EACH MISCELLANEOUS
Qty: 100 EACH | Refills: 12 | Status: SHIPPED | OUTPATIENT
Start: 2021-09-28

## 2021-09-28 NOTE — TELEPHONE ENCOUNTER
Caller: Behzad Guzman    Relationship: Self    Best call back number: [phone]    What medication are you requesting: LANCETS FOR ONE TOUCH VERIO    If a prescription is needed, what is your preferred pharmacy and phone number: [facility] [phone]      Additional notes:  PATIENT NEEDS LANCETS FOR HIS NEW GLUCOSE MONITOR

## 2021-10-05 ENCOUNTER — TELEPHONE (OUTPATIENT)
Dept: FAMILY MEDICINE CLINIC | Facility: CLINIC | Age: 71
End: 2021-10-05

## 2021-10-05 DIAGNOSIS — S92.416G: ICD-10-CM

## 2021-10-05 DIAGNOSIS — M79.671 PAIN IN BOTH FEET: Primary | ICD-10-CM

## 2021-10-05 DIAGNOSIS — E11.40 TYPE 2 DIABETES MELLITUS WITH DIABETIC NEUROPATHY, WITHOUT LONG-TERM CURRENT USE OF INSULIN (HCC): ICD-10-CM

## 2021-10-05 DIAGNOSIS — M79.672 PAIN IN BOTH FEET: Primary | ICD-10-CM

## 2021-10-05 NOTE — TELEPHONE ENCOUNTER
Patient is confused regarding podiatry referral, states he haD discussion  with you to go to dr. Rodriguez. No referral found. Please advise. Patient can be reached at [phone].    Thank you.    Kayla WALLER Rep

## 2021-10-07 ENCOUNTER — LAB (OUTPATIENT)
Dept: LAB | Facility: HOSPITAL | Age: 71
End: 2021-10-07

## 2021-10-07 ENCOUNTER — OFFICE VISIT (OUTPATIENT)
Dept: ENDOCRINOLOGY | Facility: CLINIC | Age: 71
End: 2021-10-07

## 2021-10-07 ENCOUNTER — TELEPHONE (OUTPATIENT)
Dept: FAMILY MEDICINE CLINIC | Facility: CLINIC | Age: 71
End: 2021-10-07

## 2021-10-07 VITALS
HEART RATE: 90 BPM | WEIGHT: 193.8 LBS | DIASTOLIC BLOOD PRESSURE: 100 MMHG | OXYGEN SATURATION: 97 % | SYSTOLIC BLOOD PRESSURE: 156 MMHG | BODY MASS INDEX: 26.25 KG/M2 | HEIGHT: 72 IN

## 2021-10-07 DIAGNOSIS — E04.2 MULTIPLE THYROID NODULES: ICD-10-CM

## 2021-10-07 DIAGNOSIS — E04.2 MULTIPLE THYROID NODULES: Primary | ICD-10-CM

## 2021-10-07 LAB — TSH SERPL DL<=0.05 MIU/L-ACNC: 2.09 UIU/ML (ref 0.27–4.2)

## 2021-10-07 PROCEDURE — 99213 OFFICE O/P EST LOW 20 MIN: CPT | Performed by: INTERNAL MEDICINE

## 2021-10-07 PROCEDURE — 84443 ASSAY THYROID STIM HORMONE: CPT

## 2021-10-07 NOTE — ASSESSMENT & PLAN NOTE
He has multiple small thyroid nodules.  Too small to cause sxs and no indication for FNA at this time.  Plan for another neck u/s in about 6 months.    Check TSH today.

## 2021-10-07 NOTE — TELEPHONE ENCOUNTER
Patient called from the parking lot requesting QUETIAPINE fumarate 50 mg to be prescribed for his depression. I advised him that his PCP has left the office and we will follow up once Dr. Tran responds

## 2021-10-07 NOTE — PROGRESS NOTES
"     Office Note      Date: 10/07/2021  Patient Name: Behzad Guzman  MRN: [id number]  : 1950    Chief Complaint   Patient presents with   • Thyroid Problem   • Goiter       History of Present Illness:   Behzad Guzman is a 71 y.o. male who presents for Thyroid Problem and Goiter    He had neck u/s done 2021.  This showed multiple thyroid nodules.  These were 1cm or less in size with no suspicious characteristics.  He hasn't noted any change in the size of his neck.  He denies any compressive sxs.  He hasn't taken any thyroid meds.  He denies any sxs of hypo- or hyperthyroidism at this time aside from fatigue.  TSH done in July of last year was normal.      Subjective      Patient was born where: [place].  Facial radiation exposure: No.  High iodine intake: No  Family hx of thyroid disease: Yes, describe: daughter.    Review of Systems:   Review of Systems   Constitutional: Positive for activity change and fatigue.   HENT: Positive for dental problem.    Eyes: Negative.    Respiratory: Positive for chest tightness.    Cardiovascular: Positive for chest pain.   Gastrointestinal: Negative.    Endocrine: Negative.    Genitourinary: Negative.    Musculoskeletal: Positive for arthralgias, back pain, gait problem, joint swelling, myalgias, neck pain and neck stiffness.   Skin: Negative.    Allergic/Immunologic: Negative.    Neurological: Positive for weakness, numbness and headaches.   Hematological: Negative.    Psychiatric/Behavioral: Negative.        The following portions of the patient's history were reviewed and updated as appropriate: allergies, current medications, past family history, past medical history, past social history, past surgical history and problem list.    Objective     Visit Vitals  /100 (BP Location: Left arm, Patient Position: Sitting, Cuff Size: Adult)   Pulse 90   Ht 182.9 cm (72.01\")   Wt 87.9 kg (193 lb 12.8 oz)   SpO2 97%   BMI 26.28 kg/m²       Physical Exam:  Physical " Exam  Constitutional:       Appearance: Normal appearance.   HENT:      Head: Normocephalic and atraumatic.   Eyes:      Extraocular Movements: Extraocular movements intact.      Conjunctiva/sclera: Conjunctivae normal.      Pupils: Pupils are equal, round, and reactive to light.   Neck:      Thyroid: No thyroid mass, thyromegaly or thyroid tenderness.   Cardiovascular:      Rate and Rhythm: Normal rate and regular rhythm.      Pulses: Normal pulses.      Heart sounds: Normal heart sounds.   Pulmonary:      Effort: Pulmonary effort is normal.      Breath sounds: Normal breath sounds.   Abdominal:      General: Bowel sounds are normal.      Palpations: Abdomen is soft.   Musculoskeletal:      Cervical back: Normal range of motion and neck supple.   Lymphadenopathy:      Cervical: No cervical adenopathy.   Skin:     General: Skin is warm and dry.   Neurological:      General: No focal deficit present.      Mental Status: He is alert.   Psychiatric:         Mood and Affect: Mood normal.         Behavior: Behavior normal.         Thought Content: Thought content normal.         Judgment: Judgment normal.         Labs:    TSH  No results found for: TSHBASE     Free T4  Free T4   Date Value Ref Range Status   07/07/2020 1.15 0.93 - 1.70 ng/dL Final     Comment:     T4 results may be falsely increased if patient taking Biotin.       T3  No results found for: R7YHOLB      TPO  No results found for: THYROIDAB    TG AB  No results found for: THGAB    TG  No results found for: THYROGLB    CBC w/DIFF  Lab Results   Component Value Date    WBC 6.42 09/17/2021    RBC 4.96 09/17/2021    HGB 14.9 09/17/2021    HCT 44.0 09/17/2021    MCV 88.7 09/17/2021    MCH 30.0 09/17/2021    MCHC 33.9 09/17/2021    RDW 13.3 09/17/2021    RDWSD 43.6 09/17/2021    MPV 11.4 09/17/2021     09/17/2021    NEUTRORELPCT 46.5 09/17/2021    LYMPHORELPCT 37.7 09/17/2021    MONORELPCT 9.3 09/17/2021    EOSRELPCT 5.0 09/17/2021    BASORELPCT 1.2  09/17/2021    AUTOIGPER 0.3 09/17/2021    NEUTROABS 2.98 09/17/2021    LYMPHSABS 2.42 09/17/2021    MONOSABS 0.60 09/17/2021    EOSABS 0.32 09/17/2021    BASOSABS 0.08 09/17/2021    AUTOIGNUM 0.02 09/17/2021    NRBC 0.0 09/17/2021           Assessment / Plan      Assessment & Plan:  Diagnoses and all orders for this visit:    1. Multiple thyroid nodules (Primary)  Assessment & Plan:  He has multiple small thyroid nodules.  Too small to cause sxs and no indication for FNA at this time.  Plan for another neck u/s in about 6 months.    Check TSH today.    Orders:  -     TSH; Future       Return in about 6 months (around 4/7/2022) for Recheck with TSH, neck u/s.    Ananth Paredes MD   10/07/2021

## 2021-10-08 ENCOUNTER — IMMUNIZATION (OUTPATIENT)
Dept: FAMILY MEDICINE CLINIC | Facility: CLINIC | Age: 71
End: 2021-10-08

## 2021-10-08 DIAGNOSIS — E04.2 MULTIPLE THYROID NODULES: Primary | ICD-10-CM

## 2021-10-08 DIAGNOSIS — G47.00 INSOMNIA, UNSPECIFIED TYPE: ICD-10-CM

## 2021-10-08 DIAGNOSIS — F33.9 EPISODE OF RECURRENT MAJOR DEPRESSIVE DISORDER, UNSPECIFIED DEPRESSION EPISODE SEVERITY (HCC): Primary | ICD-10-CM

## 2021-10-08 DIAGNOSIS — Z23 IMMUNIZATION DUE: ICD-10-CM

## 2021-10-08 PROCEDURE — 91300 COVID-19 (PFIZER): CPT | Performed by: FAMILY MEDICINE

## 2021-10-08 PROCEDURE — 0002A COVID-19 (PFIZER): CPT | Performed by: FAMILY MEDICINE

## 2021-10-08 RX ORDER — QUETIAPINE FUMARATE 50 MG/1
50 TABLET, FILM COATED ORAL NIGHTLY
Qty: 30 TABLET | Refills: 1 | Status: SHIPPED | OUTPATIENT
Start: 2021-10-08 | End: 2022-01-17

## 2021-10-22 ENCOUNTER — OFFICE VISIT (OUTPATIENT)
Dept: FAMILY MEDICINE CLINIC | Facility: CLINIC | Age: 71
End: 2021-10-22

## 2021-10-22 VITALS
WEIGHT: 194 LBS | HEART RATE: 80 BPM | OXYGEN SATURATION: 98 % | DIASTOLIC BLOOD PRESSURE: 82 MMHG | HEIGHT: 72 IN | SYSTOLIC BLOOD PRESSURE: 132 MMHG | BODY MASS INDEX: 26.28 KG/M2

## 2021-10-22 DIAGNOSIS — M54.2 NECK PAIN: ICD-10-CM

## 2021-10-22 DIAGNOSIS — I10 PRIMARY HYPERTENSION: Primary | ICD-10-CM

## 2021-10-22 DIAGNOSIS — J44.9 CHRONIC OBSTRUCTIVE PULMONARY DISEASE, UNSPECIFIED COPD TYPE (HCC): ICD-10-CM

## 2021-10-22 PROCEDURE — 99214 OFFICE O/P EST MOD 30 MIN: CPT | Performed by: INTERNAL MEDICINE

## 2021-10-22 RX ORDER — BACLOFEN 10 MG/1
10 TABLET ORAL NIGHTLY
Qty: 7 TABLET | Refills: 0 | Status: SHIPPED | OUTPATIENT
Start: 2021-10-22 | End: 2021-11-11 | Stop reason: SDUPTHER

## 2021-10-22 NOTE — PROGRESS NOTES
Chief Complaint   Patient presents with   • Hypertension     4 wk f/u    • Neck Pain     Has had muscle cramps in neck.    • Nasal Congestion     seasonal allergies from farming.        HPI:  Behzad Guzman is a 71 y.o. male who presents today for follow-up chronic medical conditions.  Reports left-sided neck pain for the last few days.  No trauma or injury.    ROS:  Constitutional: no fevers, night sweats or unexplained weight loss  Eyes: no vision changes  ENT: no runny nose, ear pain, sore throat  Cardio: no chest pain, palpitations  Pulm: no shortness of breath, wheezing, or cough  GI: no abdominal pain or changes in bowel movements  : no difficulty urinating  MSK: no difficulty ambulating, no joint pain  Neuro: no weakness, dizziness or headache  Psych: no trouble sleeping  Endo: no change in appetite      Past Medical History:   Diagnosis Date   • Asthma    • Chronic hip pain, left    • Chronic pain in left shoulder    • Hyperlipidemia    • Hypertension    • Leg length discrepancy    • Neck pain, chronic    • Neuropathy    • Panic attacks    • Pre-diabetes    • Prediabetes    • Spinal stenosis       Family History   Problem Relation Age of Onset   • Heart attack Father 65   • Other Mother         accident   • No Known Problems Maternal Grandmother    • No Known Problems Maternal Grandfather    • No Known Problems Paternal Grandmother    • Stroke Paternal Grandfather       Social History     Socioeconomic History   • Marital status:    Tobacco Use   • Smoking status: Former Smoker     Quit date:      Years since quittin.8   • Smokeless tobacco: Never Used   Substance and Sexual Activity   • Alcohol use: No   • Drug use: No   • Sexual activity: Defer      Allergies   Allergen Reactions   • Amoxicillin Rash   • Penicillins Rash   • Latex Rash      Immunization History   Administered Date(s) Administered   • COVID-19 (PFIZER) 2021, 10/08/2021   • Flu Vaccine Quad PF >36MO 2015   •  Pneumococcal Conjugate 13-Valent (PCV13) 09/22/2016   • Pneumococcal Polysaccharide (PPSV23) 05/18/2018        PE:  Vitals:    10/22/21 1312   BP: 132/82   Pulse: 80   SpO2: 98%      Body mass index is 26.3 kg/m².    Gen Appearance: NAD  HEENT: Normocephalic, PERRLA, no thyromegaly, trache midline  Heart: RRR, normal S1 and S2, no murmur  Lungs: CTA b/l, no wheezing, no crackles  Abdomen: Soft, non-tender, non-distended, no guarding and BSx4  MSK: Moves all extremities well, normal gait, no peripheral edema  Pulses: Palpable and equal b/l  Lymph nodes: No palpable lymphadenopathy   Neuro: No focal deficits      Current Outpatient Medications   Medication Sig Dispense Refill   • albuterol sulfate HFA (Ventolin HFA) 108 (90 Base) MCG/ACT inhaler Inhale 2 puffs Every 6 (Six) Hours As Needed for Wheezing or Shortness of Air. 6.7 g 5   • amLODIPine (NORVASC) 5 MG tablet Take 5 mg by mouth Daily.     • Cholecalciferol (VITAMIN D) 125 MCG (5000 UT) capsule capsule Take 1 capsule by mouth Daily. 30 capsule 0   • co-enzyme Q-10 50 MG capsule TAKE 1 CAPSULE BY MOUTH DAILY 90 capsule 3   • co-enzyme Q-10 50 MG capsule TAKE 1 CAPSULE BY MOUTH DAILY 90 capsule 3   • diazePAM (VALIUM) 5 MG tablet Take 5 mg by mouth 2 (Two) Times a Day.  0   • escitalopram (Lexapro) 10 MG tablet Take 1 tablet by mouth Daily. 90 tablet 3   • gabapentin (NEURONTIN) 400 MG capsule Take 400 mg by mouth 2 (two) times a day.     • glimepiride (AMARYL) 1 MG tablet TAKE 1 TABLET BY MOUTH EVERY DAY 90 tablet 3   • glucose monitor monitoring kit 1 each Daily. 1 each 0   • HYDROcodone-acetaminophen (NORCO)  MG per tablet Take 1 tablet by mouth Every 6 (Six) Hours As Needed for moderate pain (4-6).     • Lancets (onetouch ultrasoft) lancets Use one daily to test blood sugars 100 each 12   • lisinopril (PRINIVIL,ZESTRIL) 20 MG tablet TAKE 2 TABLETS BY MOUTH EVERY  tablet 3   • metFORMIN (GLUCOPHAGE) 500 MG tablet TAKE 1 TABLET BY MOUTH DAILY WITH  BREAKFAST 90 tablet 3   • metoprolol tartrate (LOPRESSOR) 50 MG tablet Take 50 mg by mouth 2 (Two) Times a Day.     • mupirocin (BACTROBAN) 2 % ointment Apply 1 application topically to the appropriate area as directed 3 (Three) Times a Day. 22 g 0   • Naloxegol Oxalate (MOVANTIK) 12.5 MG tablet Movantik 12.5 mg tablet     • omeprazole (priLOSEC) 40 MG capsule TAKE 1 CAPSULE BY MOUTH DAILY BEFORE A MEAL 90 capsule 1   • ondansetron ODT (ZOFRAN-ODT) 4 MG disintegrating tablet Take 1 tablet by mouth 4 (Four) Times a Day As Needed for Nausea or Vomiting. 15 tablet 0   • OneTouch Verio test strip 1 each by Other route Daily. for testing 300 each 2   • oxyCODONE (ROXICODONE) 10 MG tablet Take 10 mg by mouth 2 (two) times a day.     • pravastatin (PRAVACHOL) 20 MG tablet TAKE 1 TABLET BY MOUTH EVERY DAY 90 tablet 3   • QUEtiapine (SEROquel) 50 MG tablet Take 1 tablet by mouth Every Night. 30 tablet 1   • sulfamethoxazole-trimethoprim (BACTRIM DS,SEPTRA DS) 800-160 MG per tablet Take 1 tablet by mouth 2 (Two) Times a Day. 20 tablet 0   • Testosterone Cypionate (DEPOTESTOTERONE CYPIONATE) 200 MG/ML injection Inject 1 mL into the appropriate muscle as directed by prescriber Every 21 (Twenty-One) Days. 10 mL 0   • baclofen (LIORESAL) 10 MG tablet Take 1 tablet by mouth Every Night. 7 tablet 0     No current facility-administered medications for this visit.        Diagnoses and all orders for this visit:    1. Primary hypertension (Primary)  Stable today.  Continue current regimen.  2. Neck pain  -     baclofen (LIORESAL) 10 MG tablet; Take 1 tablet by mouth Every Night.  Dispense: 7 tablet; Refill: 0  Baclofen as needed.  3. Chronic obstructive pulmonary disease, unspecified COPD type (HCC)  Starting on Anoro daily.       Return in about 6 months (around 4/22/2022) for a1c.     Please note that portions of this document were completed with a voice recognition program. Efforts were made to edit the dictations, but occasionally  words are mis-transcribed.

## 2021-10-25 ENCOUNTER — TELEPHONE (OUTPATIENT)
Dept: FAMILY MEDICINE CLINIC | Facility: CLINIC | Age: 71
End: 2021-10-25

## 2021-11-05 DIAGNOSIS — E34.9 TESTOSTERONE DEFICIENCY: ICD-10-CM

## 2021-11-05 NOTE — TELEPHONE ENCOUNTER
Caller: Behzad Guzman    Relationship: Self      Medication requested (name and dosage): ANORO    Requested Prescriptions:   Requested Prescriptions     Pending Prescriptions Disp Refills   • Testosterone Cypionate (DEPOTESTOTERONE CYPIONATE) 200 MG/ML injection 10 mL 0     Sig: Inject 1 mL into the appropriate muscle as directed by prescriber Every 21 (Twenty-One) Days.        Pharmacy where request should be sent: [facility] [place] [id number] - [place], [place] - [address] [facility] [address] [facility] [phone] [facility] [phone]     Additional details provided by patient: THE ANORO WAS SAMPLES DR BERNABE WANTED HIM TO TRY AND IT IS WORKING WELL HE WOULD LIKE A FULL PRESCRIPTION SENT.  THE TESTOSTERONE HE IS OUT OF AND IT IS .    Best call back number: [phone]    Does the patient have less than a 3 day supply:  [x] Yes  [] No    Trace Cotton, PCT   21 15:59 EDT

## 2021-11-06 NOTE — TELEPHONE ENCOUNTER
Last fill: 3/26/21  Last office visit: 10/22/21  Next office visit: 4/22/21  CSA up-to-date? no  Date of last UDS: 2/27/19   UDS consistent: consistent

## 2021-11-09 RX ORDER — TESTOSTERONE CYPIONATE 200 MG/ML
200 INJECTION, SOLUTION INTRAMUSCULAR
Qty: 10 ML | Refills: 0 | Status: SHIPPED | OUTPATIENT
Start: 2021-11-09 | End: 2021-11-09 | Stop reason: SDUPTHER

## 2021-11-09 RX ORDER — TESTOSTERONE CYPIONATE 200 MG/ML
200 INJECTION, SOLUTION INTRAMUSCULAR
Qty: 10 ML | Refills: 0 | Status: SHIPPED | OUTPATIENT
Start: 2021-11-09 | End: 2021-11-11 | Stop reason: SDUPTHER

## 2021-11-11 ENCOUNTER — OFFICE VISIT (OUTPATIENT)
Dept: FAMILY MEDICINE CLINIC | Facility: CLINIC | Age: 71
End: 2021-11-11

## 2021-11-11 VITALS
DIASTOLIC BLOOD PRESSURE: 80 MMHG | SYSTOLIC BLOOD PRESSURE: 130 MMHG | HEIGHT: 72 IN | BODY MASS INDEX: 25.25 KG/M2 | OXYGEN SATURATION: 97 % | HEART RATE: 116 BPM | TEMPERATURE: 98.6 F | RESPIRATION RATE: 14 BRPM | WEIGHT: 186.4 LBS
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
## 2021-11-11 DIAGNOSIS — M54.2 NECK PAIN: ICD-10-CM

## 2021-11-11 DIAGNOSIS — E34.9 TESTOSTERONE DEFICIENCY: ICD-10-CM

## 2021-11-11 PROCEDURE — 99213 OFFICE O/P EST LOW 20 MIN: CPT | Performed by: INTERNAL MEDICINE

## 2021-11-11 RX ORDER — TESTOSTERONE CYPIONATE 200 MG/ML
200 INJECTION, SOLUTION INTRAMUSCULAR
Qty: 10 ML | Refills: 0 | Status: SHIPPED | OUTPATIENT
Start: 2021-11-11 | End: 2022-05-13

## 2021-11-11 RX ORDER — BACLOFEN 10 MG/1
10 TABLET ORAL NIGHTLY
Qty: 7 TABLET | Refills: 0 | Status: SHIPPED | OUTPATIENT
Start: 2021-11-11 | End: 2022-01-17

## 2021-11-11 RX ORDER — BACLOFEN 10 MG/1
10 TABLET ORAL 3 TIMES DAILY
Qty: 30 TABLET | Refills: 0 | Status: SHIPPED | OUTPATIENT
Start: 2021-11-11 | End: 2022-01-17

## 2021-11-11 RX ORDER — DOXYCYCLINE HYCLATE 100 MG/1
100 CAPSULE ORAL 2 TIMES DAILY
Qty: 20 CAPSULE | Refills: 0 | Status: SHIPPED | OUTPATIENT
Start: 2021-11-11 | End: 2022-01-17

## 2021-11-11 NOTE — PROGRESS NOTES
Behzad Guzman  1950  [id number]  Patient Care Team:  Zach Tran DO as PCP - General (Internal Medicine)  Amber Garcia MD PhD as Consulting Physician (Allergy)  Juanjo Springer MD (Orthopedic Surgery)  Marcos Sheth MD as Consulting Physician (Neurology)  Adan Calhoun MD as Consulting Physician (Dermatology)    Behzad Guzman is a 71 y.o. male here today for follow up.     This patient is accompanied by their self who contributes to the history of their care.    Chief Complaint:    Chief Complaint   Patient presents with   • Tick Removal     11/9/21, tick removed, pimple filled w/ pus, possibly infected   • Med Refill     testosterone   This gentleman [place]  2 days later he had found a tick on his right flank.  It was painful.  He is able to dislodge the head at all.  There apparently was a pustule and expressed some purulence.  There is been no rash.  No fevers or chills.  He has felt some malaise.  Additionally he is having some neck spasms.  Dr. Tran had given him some baclofen that seemed to help as needed-he was requesting a refill on this.  Neck discomfort is been less frequent.  Denies any LOC arm weakness numbness or tingling.    He also tells me he is in need of a refill on his testosterone.      History of Present Illness:  I have reviewed and/or updated the patient's past medical, past surgical, family, social history, problem list and allergies as appropriate.          Review of Systems   Constitutional: Positive for fatigue.   Eyes: Negative for pain and redness.   Cardiovascular: Negative.    Gastrointestinal: Negative.    Musculoskeletal: Positive for neck pain. Negative for arthralgias and joint swelling.   Skin: Positive for skin lesions. Negative for rash.       Vitals:    11/11/21 1455   BP: 130/80   Pulse: 116   Resp: 14   Temp: 98.6 °F (37 °C)   TempSrc: Infrared   SpO2: 97%   Weight: 84.6 kg (186 lb 6.4 oz)   Height: 182.9  "cm (72\")   PF: 116 L/min   PainSc:   7   PainLoc: Back     Body mass index is 25.28 kg/m².    Physical Exam  Vitals and nursing note reviewed.   Constitutional:       General: He is not in acute distress.     Appearance: He is well-developed. He is not diaphoretic.   HENT:      Head: Normocephalic and atraumatic.      Right Ear: External ear normal.      Left Ear: External ear normal.      Mouth/Throat:      Pharynx: No oropharyngeal exudate.   Eyes:      General: No scleral icterus.        Right eye: No discharge.      Conjunctiva/sclera: Conjunctivae normal.   Neck:      Thyroid: No thyromegaly.      Vascular: No JVD.      Trachea: No tracheal deviation.   Cardiovascular:      Rate and Rhythm: Normal rate and regular rhythm.      Heart sounds: Normal heart sounds.      Comments: PMI nondisplaced  Pulmonary:      Effort: Pulmonary effort is normal.      Breath sounds: Normal breath sounds. No wheezing or rales.   Abdominal:      General: Bowel sounds are normal.      Palpations: Abdomen is soft.      Tenderness: There is no abdominal tenderness. There is no guarding or rebound.   Musculoskeletal:      Cervical back: Normal range of motion and neck supple.      Comments: Normal gait   Lymphadenopathy:      Cervical: No cervical adenopathy.   Skin:     General: Skin is warm and dry.      Capillary Refill: Capillary refill takes less than 2 seconds.      Coloration: Skin is not pale.      Findings: No rash.      Comments: On his right posterior flank there is a small approximately half centimeter irregularly bordered eschar.  There is no fluctuance or purulence to be expressed.  No surrounding cellulitis.   Neurological:      Mental Status: He is alert and oriented to person, place, and time.      Motor: No abnormal muscle tone.      Coordination: Coordination normal.   Psychiatric:         Judgment: Judgment normal.         Procedures    Results Review:    None    Assessment/Plan:     Problem List Items Addressed " This Visit        Musculoskeletal and Injuries    Infected tick bite - Primary    Current Assessment & Plan     Continue twice daily peroxide.  Recommended doxycycline x10 days.  Return to office if symptoms worsen         Relevant Medications    mupirocin (BACTROBAN) 2 % ointment    doxycycline (VIBRAMYCIN) 100 MG capsule      Other Visit Diagnoses     Neck pain        Relevant Medications    baclofen (LIORESAL) 10 MG tablet    Testosterone deficiency        Relevant Medications    Testosterone Cypionate (DEPOTESTOTERONE CYPIONATE) 200 MG/ML injection          Plan of care reviewed with patient at the conclusion of today's visit. Education was provided regarding diagnosis and management.  Patient verbalizes understanding of and agreement with management plan.    No follow-ups on file.    Emir Powell MD    Please note that portions of this note may have been completed with a voice recognition program. Efforts were made to edit the dictations, but occasionally words are mistranscribed.

## 2021-11-11 NOTE — ASSESSMENT & PLAN NOTE
Continue twice daily peroxide.  Recommended doxycycline x10 days.  Return to office if symptoms worsen

## 2021-11-19 ENCOUNTER — TELEPHONE (OUTPATIENT)
Dept: FAMILY MEDICINE CLINIC | Facility: CLINIC | Age: 71
End: 2021-11-19

## 2021-11-19 NOTE — TELEPHONE ENCOUNTER
Caller: Behzad Guzman    Relationship: Self    Best call back number: [phone], [phone]    What orders are you requesting (i.e. lab or imaging): MRI    In what timeframe would the patient need to come in: AS SOON AS POSSIBLE    Where will you receive your lab/imaging services: NA    Additional notes: PATIENT REQUESTING TO HAVE MRI ON NECK AND RIGHT SHOULDER.    PATIENT REQUESTING CALL BACK TO DISCUSS WHAT IS GOING ON WITH HIS NECK AND RIGHT SHOULDER.    PATIENT STATED HE IS HAVING DIFFICULTY CONTROLLING THE PAIN.

## 2021-11-22 NOTE — TELEPHONE ENCOUNTER
Third attempt to contact, unable to LVM on patient's cell     Hub can schedule upcoming appt for evaluation of shoulder and back pain.

## 2021-11-22 NOTE — TELEPHONE ENCOUNTER
Attempted to contact, no answer LVM asking for return call to schedule appt     Hub can relay and schedule appt

## 2022-01-03 RX ORDER — GLIMEPIRIDE 1 MG/1
TABLET ORAL
Qty: 90 TABLET | Refills: 3 | Status: SHIPPED | OUTPATIENT
Start: 2022-01-03 | End: 2023-03-29

## 2022-01-03 RX ORDER — OMEPRAZOLE 40 MG/1
40 CAPSULE, DELAYED RELEASE ORAL DAILY
Qty: 90 CAPSULE | Refills: 1 | Status: SHIPPED | OUTPATIENT
Start: 2022-01-03 | End: 2022-07-05

## 2022-01-03 NOTE — TELEPHONE ENCOUNTER
Rx Refill Note  Requested Prescriptions     Pending Prescriptions Disp Refills   • glimepiride (AMARYL) 1 MG tablet [Pharmacy Med Name: GLIMEPIRIDE 1MG TABLETS] 90 tablet 3     Sig: TAKE 1 TABLET BY MOUTH EVERY DAY   • omeprazole (priLOSEC) 40 MG capsule [Pharmacy Med Name: OMEPRAZOLE 40MG CAPSULES] 90 capsule 1     Sig: TAKE 1 CAPSULE BY MOUTH DAILY BEFORE A MEAL      Last office visit with prescribing clinician: 10/22/2021      Next office visit with prescribing clinician: 4/22/2022            Art Grande MA  01/03/22, 10:17 EST

## 2022-01-13 ENCOUNTER — TELEPHONE (OUTPATIENT)
Dept: FAMILY MEDICINE CLINIC | Facility: CLINIC | Age: 72
End: 2022-01-13

## 2022-01-13 DIAGNOSIS — E34.9 TESTOSTERONE DEFICIENCY: ICD-10-CM

## 2022-01-13 NOTE — TELEPHONE ENCOUNTER
Caller: Behzad Guzman    Relationship: Self    Best call back number: [phone]    Requested Prescriptions: [facility] TOLD HIM TO ORDER NEEDLES TO GO WITH HIS TESTOSTERONE INJECTION    SYRINGES 22G X 1 (0.7MM X25MM) 3MM    Pharmacy where request should be sent: [id number] - [place], [place] - [address] AT [facility] [address] - [phone]  - [phone]     Additional details provided by patient:     Does the patient have less than a 3 day supply:  [x] Yes  [] No    Trace Cotton, PCT   01/13/22 16:12 EST

## 2022-01-14 RX ORDER — SYRINGE W-NEEDLE,DISPOSAB,3 ML 25GX5/8"
SYRINGE, EMPTY DISPOSABLE MISCELLANEOUS
Qty: 12 EACH | Refills: 1 | Status: SHIPPED | OUTPATIENT
Start: 2022-01-14

## 2022-01-14 NOTE — TELEPHONE ENCOUNTER
Patient called back and I relayed message about whether he is giving the testosterone injections himself and he stated yes, he has given them to himself for years. Says this is the first time he has ever needed a prescription for syringes. Also discussed future appointment with Marc about rotator cuff surgery he previously had and needing an xray and mri, I suggested he keep the appointment so he can be physically examined and discuss what can be done about residual pain. He verbalized understanding.

## 2022-01-17 ENCOUNTER — OFFICE VISIT (OUTPATIENT)
Dept: FAMILY MEDICINE CLINIC | Facility: CLINIC | Age: 72
End: 2022-01-17

## 2022-01-17 VITALS
HEIGHT: 72 IN | SYSTOLIC BLOOD PRESSURE: 138 MMHG | HEART RATE: 77 BPM | WEIGHT: 194 LBS | OXYGEN SATURATION: 97 % | BODY MASS INDEX: 26.28 KG/M2 | DIASTOLIC BLOOD PRESSURE: 86 MMHG | TEMPERATURE: 97.5 F

## 2022-01-17 DIAGNOSIS — G89.29 CHRONIC RIGHT SHOULDER PAIN: ICD-10-CM

## 2022-01-17 DIAGNOSIS — M25.511 CHRONIC RIGHT SHOULDER PAIN: ICD-10-CM

## 2022-01-17 DIAGNOSIS — E11.40 TYPE 2 DIABETES MELLITUS WITH DIABETIC NEUROPATHY, WITHOUT LONG-TERM CURRENT USE OF INSULIN: ICD-10-CM

## 2022-01-17 DIAGNOSIS — M54.2 CHRONIC NECK PAIN: Primary | ICD-10-CM

## 2022-01-17 DIAGNOSIS — M25.60 JOINT STIFFNESS: ICD-10-CM

## 2022-01-17 DIAGNOSIS — E34.9 TESTOSTERONE DEFICIENCY: ICD-10-CM

## 2022-01-17 DIAGNOSIS — E55.9 VITAMIN D DEFICIENCY: ICD-10-CM

## 2022-01-17 DIAGNOSIS — G89.29 CHRONIC NECK PAIN: Primary | ICD-10-CM

## 2022-01-17 DIAGNOSIS — M54.2 NECK PAIN: ICD-10-CM

## 2022-01-17 DIAGNOSIS — R26.89 BALANCE DISORDER: ICD-10-CM

## 2022-01-17 DIAGNOSIS — R29.6 RECURRENT FALLS: ICD-10-CM

## 2022-01-17 PROCEDURE — 99215 OFFICE O/P EST HI 40 MIN: CPT | Performed by: INTERNAL MEDICINE

## 2022-01-17 RX ORDER — PREDNISONE 20 MG/1
40 TABLET ORAL DAILY
Qty: 10 TABLET | Refills: 0 | Status: SHIPPED | OUTPATIENT
Start: 2022-01-17 | End: 2022-01-22

## 2022-01-17 RX ORDER — BACLOFEN 10 MG/1
10 TABLET ORAL NIGHTLY
Qty: 7 TABLET | Refills: 0 | Status: SHIPPED | OUTPATIENT
Start: 2022-01-17 | End: 2022-02-22 | Stop reason: SDUPTHER

## 2022-01-20 NOTE — PROGRESS NOTES
Chief Complaint   Patient presents with   • Neck Pain   • Shoulder Pain       HPI:  Behzad Guzman is a 71 y.o. male who presents today for follow-up neck and shoulder pain.  Worsening over the past 2 to 4 weeks.  He reports stiffness in multiple joints.  Denies any recent trauma or injury to shoulder or neck.  He has had difficulty with recurrent falls at home.  He is considering restarting physical therapy.    ROS:  Constitutional: no fevers, night sweats or unexplained weight loss  Eyes: no vision changes  ENT: no runny nose, ear pain, sore throat  Cardio: no chest pain, palpitations  Pulm: no shortness of breath, wheezing, or cough  GI: no abdominal pain or changes in bowel movements  : no difficulty urinating  MSK: no difficulty ambulating, + joint pain  Neuro: no weakness, dizziness or headache  Psych: no trouble sleeping  Endo: no change in appetite      Past Medical History:   Diagnosis Date   • Asthma    • Chronic hip pain, left    • Chronic pain in left shoulder    • Hyperlipidemia    • Hypertension    • Leg length discrepancy    • Neck pain, chronic    • Neuropathy    • Panic attacks    • Pre-diabetes    • Prediabetes    • Spinal stenosis       Family History   Problem Relation Age of Onset   • Heart attack Father 65   • Other Mother         accident   • No Known Problems Maternal Grandmother    • No Known Problems Maternal Grandfather    • No Known Problems Paternal Grandmother    • Stroke Paternal Grandfather       Social History     Socioeconomic History   • Marital status:    Tobacco Use   • Smoking status: Former Smoker     Quit date:      Years since quittin.0   • Smokeless tobacco: Never Used   Vaping Use   • Vaping Use: Never used   Substance and Sexual Activity   • Alcohol use: No   • Drug use: No   • Sexual activity: Defer      Allergies   Allergen Reactions   • Amoxicillin Rash   • Penicillins Rash   • Sulfa Antibiotics Myalgia   • Latex Rash      Immunization History    Administered Date(s) Administered   • COVID-19 (PFIZER) PURPLE CAP 09/14/2021, 10/08/2021   • Flu Vaccine Quad PF >36MO 09/30/2015   • Pneumococcal Conjugate 13-Valent (PCV13) 09/22/2016   • Pneumococcal Polysaccharide (PPSV23) 05/18/2018        PE:  Vitals:    01/17/22 1525   BP: 138/86   Pulse: 77   Temp: 97.5 °F (36.4 °C)   SpO2: 97%      Body mass index is 26.31 kg/m².    Gen Appearance: NAD  HEENT: Normocephalic, PERRLA, no thyromegaly, trache midline  Heart: RRR, normal S1 and S2, no murmur  Lungs: CTA b/l, no wheezing, no crackles  Abdomen: Soft, non-tender, non-distended, no guarding and BSx4  MSK: Moves all extremities well, normal gait, no peripheral edema  Pulses: Palpable and equal b/l  Lymph nodes: No palpable lymphadenopathy   Neuro: No focal deficits      Current Outpatient Medications   Medication Sig Dispense Refill   • albuterol sulfate HFA (Ventolin HFA) 108 (90 Base) MCG/ACT inhaler Inhale 2 puffs Every 6 (Six) Hours As Needed for Wheezing or Shortness of Air. 6.7 g 5   • amLODIPine (NORVASC) 5 MG tablet Take 5 mg by mouth As Needed.     • Cholecalciferol (VITAMIN D) 125 MCG (5000 UT) capsule capsule Take 1 capsule by mouth Daily. 30 capsule 0   • co-enzyme Q-10 50 MG capsule TAKE 1 CAPSULE BY MOUTH DAILY 90 capsule 3   • diazePAM (VALIUM) 5 MG tablet Take 5 mg by mouth Every 6 (Six) Hours As Needed.  0   • gabapentin (NEURONTIN) 400 MG capsule Take 400 mg by mouth 2 (two) times a day.     • glimepiride (AMARYL) 1 MG tablet TAKE 1 TABLET BY MOUTH EVERY DAY 90 tablet 3   • glucose monitor monitoring kit 1 each Daily. 1 each 0   • HYDROcodone-acetaminophen (NORCO)  MG per tablet Take 1 tablet by mouth Every 6 (Six) Hours As Needed for moderate pain (4-6).     • Lancets (onetouch ultrasoft) lancets Use one daily to test blood sugars 100 each 12   • lisinopril (PRINIVIL,ZESTRIL) 20 MG tablet TAKE 2 TABLETS BY MOUTH EVERY DAY (Patient taking differently: Take 20 mg by mouth As Needed.) 180  "tablet 3   • metFORMIN (GLUCOPHAGE) 500 MG tablet TAKE 1 TABLET BY MOUTH DAILY WITH BREAKFAST 90 tablet 3   • metoprolol tartrate (LOPRESSOR) 50 MG tablet Take 50 mg by mouth 2 (Two) Times a Day.     • mupirocin (BACTROBAN) 2 % ointment Apply 1 application topically to the appropriate area as directed 3 (Three) Times a Day. 22 g 0   • Naloxegol Oxalate (MOVANTIK) 12.5 MG tablet Movantik 12.5 mg tablet     • omeprazole (priLOSEC) 40 MG capsule TAKE 1 CAPSULE BY MOUTH DAILY BEFORE A MEAL 90 capsule 1   • ondansetron ODT (ZOFRAN-ODT) 4 MG disintegrating tablet Take 1 tablet by mouth 4 (Four) Times a Day As Needed for Nausea or Vomiting. 15 tablet 0   • OneTouch Verio test strip 1 each by Other route Daily. for testing 300 each 2   • oxyCODONE (ROXICODONE) 10 MG tablet Take 10 mg by mouth At Night As Needed.     • pravastatin (PRAVACHOL) 20 MG tablet TAKE 1 TABLET BY MOUTH EVERY DAY 90 tablet 3   • Syringe 23G X 1\" 3 ML misc Use every 21 days to take testosterone 12 each 1   • Testosterone Cypionate (DEPOTESTOTERONE CYPIONATE) 200 MG/ML injection Inject 1 mL into the appropriate muscle as directed by prescriber Every 21 (Twenty-One) Days. 10 mL 0   • baclofen (LIORESAL) 10 MG tablet Take 1 tablet by mouth Every Night. 7 tablet 0   • Needle, Disp, 22G X 1\" misc 1 each Every 14 (Fourteen) Days. 100 each 0   • predniSONE (DELTASONE) 20 MG tablet Take 2 tablets by mouth Daily for 5 days. 10 tablet 0     No current facility-administered medications for this visit.      History of multiple injuries and surgeries in the past.  Recommend updated MRI of cervical spine and shoulder.  Short course of steroids for arthritis flare.  Baclofen as needed for neck spasm at night.  Refer to physical therapy to establish care.  Recommend checking blood work today.    Counseling was given to patient for the following topics: diagnostic results, instructions for management, impressions and risks and benefits of treatment options . Total " "time of the encounter was 40 minutes and 21 minutes was spent face to face counseling.      Diagnoses and all orders for this visit:    1. Chronic neck pain (Primary)  -     XR Spine Cervical 2 or 3 View; Future  -     predniSONE (DELTASONE) 20 MG tablet; Take 2 tablets by mouth Daily for 5 days.  Dispense: 10 tablet; Refill: 0  -     Ambulatory Referral to Physical Therapy Evaluate and treat  -     MRI Cervical Spine Without Contrast; Future    2. Chronic right shoulder pain  -     predniSONE (DELTASONE) 20 MG tablet; Take 2 tablets by mouth Daily for 5 days.  Dispense: 10 tablet; Refill: 0  -     Ambulatory Referral to Physical Therapy Evaluate and treat  -     MRI Shoulder Right Without Contrast; Future    3. Joint stiffness    4. Type 2 diabetes mellitus with diabetic neuropathy, without long-term current use of insulin (HCC)  -     CBC & Differential; Future  -     Comprehensive Metabolic Panel; Future  -     Hemoglobin A1c; Future  -     Lipid Panel; Future  -     TSH+Free T4; Future  -     Urinalysis With Culture If Indicated - Urine, Clean Catch; Future    5. Vitamin D deficiency  -     Vitamin D 25 Hydroxy; Future    6. Neck pain  -     baclofen (LIORESAL) 10 MG tablet; Take 1 tablet by mouth Every Night.  Dispense: 7 tablet; Refill: 0    7. Recurrent falls  -     Ambulatory Referral to Physical Therapy Evaluate and treat    8. Balance disorder  -     Ambulatory Referral to Physical Therapy Evaluate and treat    9. Testosterone deficiency  -     Needle, Disp, 22G X 1\" misc; 1 each Every 14 (Fourteen) Days.  Dispense: 100 each; Refill: 0         No follow-ups on file.     Dictated Utilizing Dragon Dictation    Please note that portions of this note were completed with a voice recognition program.    Part of this note may be an electronic transcription/translation of spoken language to printed text using the Dragon Dictation System.  "

## 2022-02-11 ENCOUNTER — HOSPITAL ENCOUNTER (OUTPATIENT)
Dept: MRI IMAGING | Facility: HOSPITAL | Age: 72
Discharge: HOME | End: 2022-02-11

## 2022-02-11 DIAGNOSIS — M25.511 CHRONIC RIGHT SHOULDER PAIN: ICD-10-CM

## 2022-02-11 DIAGNOSIS — G89.29 CHRONIC RIGHT SHOULDER PAIN: ICD-10-CM

## 2022-02-11 DIAGNOSIS — G89.29 CHRONIC NECK PAIN: ICD-10-CM

## 2022-02-11 DIAGNOSIS — M54.2 CHRONIC NECK PAIN: ICD-10-CM

## 2022-02-11 PROCEDURE — 72141 MRI NECK SPINE W/O DYE: CPT

## 2022-02-11 PROCEDURE — 73221 MRI JOINT UPR EXTREM W/O DYE: CPT

## 2022-02-14 RX ORDER — METOPROLOL TARTRATE 50 MG/1
TABLET, FILM COATED ORAL
Qty: 180 TABLET | Refills: 0 | Status: SHIPPED | OUTPATIENT
Start: 2022-02-14 | End: 2022-06-21 | Stop reason: SDUPTHER

## 2022-02-22 ENCOUNTER — OFFICE VISIT (OUTPATIENT)
Dept: FAMILY MEDICINE CLINIC | Facility: CLINIC | Age: 72
End: 2022-02-22

## 2022-02-22 VITALS
SYSTOLIC BLOOD PRESSURE: 138 MMHG | HEART RATE: 89 BPM | DIASTOLIC BLOOD PRESSURE: 90 MMHG | HEIGHT: 72 IN | WEIGHT: 188 LBS | OXYGEN SATURATION: 98 % | BODY MASS INDEX: 25.47 KG/M2

## 2022-02-22 DIAGNOSIS — M54.2 CHRONIC NECK PAIN: ICD-10-CM

## 2022-02-22 DIAGNOSIS — M75.101 TEAR OF RIGHT ROTATOR CUFF, UNSPECIFIED TEAR EXTENT, UNSPECIFIED WHETHER TRAUMATIC: Primary | ICD-10-CM

## 2022-02-22 DIAGNOSIS — M54.2 NECK PAIN: ICD-10-CM

## 2022-02-22 DIAGNOSIS — G89.29 CHRONIC NECK PAIN: ICD-10-CM

## 2022-02-22 DIAGNOSIS — E11.42 TYPE 2 DIABETES MELLITUS WITH DIABETIC POLYNEUROPATHY, WITHOUT LONG-TERM CURRENT USE OF INSULIN: ICD-10-CM

## 2022-02-22 LAB
EXPIRATION DATE: NORMAL
HBA1C MFR BLD: 6.1 %
Lab: NORMAL

## 2022-02-22 PROCEDURE — 83036 HEMOGLOBIN GLYCOSYLATED A1C: CPT | Performed by: INTERNAL MEDICINE

## 2022-02-22 PROCEDURE — 99214 OFFICE O/P EST MOD 30 MIN: CPT | Performed by: INTERNAL MEDICINE

## 2022-02-22 PROCEDURE — 3044F HG A1C LEVEL LT 7.0%: CPT | Performed by: INTERNAL MEDICINE

## 2022-02-22 RX ORDER — BACLOFEN 10 MG/1
5 TABLET ORAL NIGHTLY
Qty: 30 TABLET | Refills: 5 | Status: SHIPPED | OUTPATIENT
Start: 2022-02-22 | End: 2023-04-04

## 2022-02-22 NOTE — PROGRESS NOTES
Chief Complaint   Patient presents with   • Hand Pain     right side   • Diabetes     discuss paperwork for shoes        HPI:  Behzad Guzman is a 71 y.o. male who presents today for follow-up shoulder and neck pain.  He would like to review recent MRIs.  He needs new paperwork filled out for diabetic shoes.    ROS:  Constitutional: no fevers, night sweats or unexplained weight loss  Eyes: no vision changes  ENT: no runny nose, ear pain, sore throat  Cardio: no chest pain, palpitations  Pulm: no shortness of breath, wheezing, or cough  GI: no abdominal pain or changes in bowel movements  : no difficulty urinating  MSK: no difficulty ambulating, no joint pain  Neuro: no weakness, dizziness or headache  Psych: no trouble sleeping  Endo: no change in appetite      Past Medical History:   Diagnosis Date   • Asthma    • Chronic hip pain, left    • Chronic pain in left shoulder    • Hyperlipidemia    • Hypertension    • Leg length discrepancy    • Neck pain, chronic    • Neuropathy    • Panic attacks    • Pre-diabetes    • Prediabetes    • Spinal stenosis       Family History   Problem Relation Age of Onset   • Heart attack Father 65   • Other Mother         accident   • No Known Problems Maternal Grandmother    • No Known Problems Maternal Grandfather    • No Known Problems Paternal Grandmother    • Stroke Paternal Grandfather       Social History     Socioeconomic History   • Marital status:    Tobacco Use   • Smoking status: Former Smoker     Quit date:      Years since quittin.1   • Smokeless tobacco: Never Used   Vaping Use   • Vaping Use: Never used   Substance and Sexual Activity   • Alcohol use: No   • Drug use: No   • Sexual activity: Defer      Allergies   Allergen Reactions   • Amoxicillin Rash   • Penicillins Rash   • Sulfa Antibiotics Myalgia   • Latex Rash      Immunization History   Administered Date(s) Administered   • COVID-19 (PFIZER) PURPLE CAP 2021, 10/08/2021   • Flu Vaccine  Quad PF >36MO 09/30/2015   • Pneumococcal Conjugate 13-Valent (PCV13) 09/22/2016   • Pneumococcal Polysaccharide (PPSV23) 05/18/2018        PE:  Vitals:    02/22/22 1543   BP: 138/90   Pulse: 89   SpO2: 98%      Body mass index is 25.5 kg/m².    Gen Appearance: NAD  HEENT: Normocephalic, PERRLA, no thyromegaly, trache midline  Heart: RRR, normal S1 and S2, no murmur  Lungs: CTA b/l, no wheezing, no crackles  Abdomen: Soft, non-tender, non-distended, no guarding and BSx4  MSK: Moves all extremities well, normal gait, no peripheral edema  Pulses: Palpable and equal b/l  Lymph nodes: No palpable lymphadenopathy   Neuro: No focal deficits      Current Outpatient Medications   Medication Sig Dispense Refill   • albuterol sulfate HFA (Ventolin HFA) 108 (90 Base) MCG/ACT inhaler Inhale 2 puffs Every 6 (Six) Hours As Needed for Wheezing or Shortness of Air. 6.7 g 5   • amLODIPine (NORVASC) 5 MG tablet Take 5 mg by mouth As Needed.     • baclofen (LIORESAL) 10 MG tablet Take 0.5 tablets by mouth Every Night. 30 tablet 5   • Cholecalciferol (VITAMIN D) 125 MCG (5000 UT) capsule capsule Take 1 capsule by mouth Daily. 30 capsule 0   • co-enzyme Q-10 50 MG capsule TAKE 1 CAPSULE BY MOUTH DAILY 90 capsule 3   • diazePAM (VALIUM) 5 MG tablet Take 5 mg by mouth Every 6 (Six) Hours As Needed.  0   • gabapentin (NEURONTIN) 400 MG capsule Take 400 mg by mouth 2 (two) times a day.     • glimepiride (AMARYL) 1 MG tablet TAKE 1 TABLET BY MOUTH EVERY DAY 90 tablet 3   • glucose monitor monitoring kit 1 each Daily. 1 each 0   • HYDROcodone-acetaminophen (NORCO)  MG per tablet Take 1 tablet by mouth Every 6 (Six) Hours As Needed for moderate pain (4-6).     • Lancets (onetouch ultrasoft) lancets Use one daily to test blood sugars 100 each 12   • lisinopril (PRINIVIL,ZESTRIL) 20 MG tablet TAKE 2 TABLETS BY MOUTH EVERY DAY (Patient taking differently: Take 20 mg by mouth As Needed.) 180 tablet 3   • metFORMIN (GLUCOPHAGE) 500 MG tablet  "TAKE 1 TABLET BY MOUTH DAILY WITH BREAKFAST 90 tablet 3   • metoprolol tartrate (LOPRESSOR) 50 MG tablet TAKE 1 TABLET BY MOUTH TWICE DAILY 180 tablet 0   • mupirocin (BACTROBAN) 2 % ointment Apply 1 application topically to the appropriate area as directed 3 (Three) Times a Day. 22 g 0   • Naloxegol Oxalate (MOVANTIK) 12.5 MG tablet Movantik 12.5 mg tablet     • Needle, Disp, 22G X 1\" misc 1 each Every 14 (Fourteen) Days. 100 each 0   • omeprazole (priLOSEC) 40 MG capsule TAKE 1 CAPSULE BY MOUTH DAILY BEFORE A MEAL 90 capsule 1   • ondansetron ODT (ZOFRAN-ODT) 4 MG disintegrating tablet Take 1 tablet by mouth 4 (Four) Times a Day As Needed for Nausea or Vomiting. 15 tablet 0   • OneTouch Verio test strip 1 each by Other route Daily. for testing 300 each 2   • oxyCODONE (ROXICODONE) 10 MG tablet Take 10 mg by mouth At Night As Needed.     • pravastatin (PRAVACHOL) 20 MG tablet TAKE 1 TABLET BY MOUTH EVERY DAY 90 tablet 3   • Syringe 23G X 1\" 3 ML misc Use every 21 days to take testosterone 12 each 1   • Testosterone Cypionate (DEPOTESTOTERONE CYPIONATE) 200 MG/ML injection Inject 1 mL into the appropriate muscle as directed by prescriber Every 21 (Twenty-One) Days. 10 mL 0     No current facility-administered medications for this visit.        Diagnoses and all orders for this visit:    1. Tear of right rotator cuff, unspecified tear extent, unspecified whether traumatic (Primary)  Referring to orthopedics to establish care.  Patient reports his previous surgeon is no longer working in [place].  2. Chronic neck pain  -     Ambulatory Referral to Physical Therapy Evaluate and treat  Recommend starting with PT.  He declined neurosurgery referral.  3. Type 2 diabetes mellitus with diabetic polyneuropathy, without long-term current use of insulin (HCC)  -     POC Glycosylated Hemoglobin (Hb A1C)  Well-controlled A1c, continue current medication.  Several areas of neuropathy on monofilament monofilament foot exam.  " Patient would benefit from custom diabetic shoes.  4. Neck pain  -     baclofen (LIORESAL) 10 MG tablet; Take 0.5 tablets by mouth Every Night.  Dispense: 30 tablet; Refill: 5         Return in about 3 months (around 5/22/2022).     Dictated Utilizing Dragon Dictation    Please note that portions of this note were completed with a voice recognition program.    Part of this note may be an electronic transcription/translation of spoken language to printed text using the Dragon Dictation System.

## 2022-02-28 ENCOUNTER — OFFICE VISIT (OUTPATIENT)
Dept: ORTHOPEDIC SURGERY | Facility: CLINIC | Age: 72
End: 2022-02-28

## 2022-02-28 VITALS
BODY MASS INDEX: 25.47 KG/M2 | WEIGHT: 188.05 LBS | DIASTOLIC BLOOD PRESSURE: 84 MMHG | SYSTOLIC BLOOD PRESSURE: 142 MMHG | HEIGHT: 72 IN

## 2022-02-28 DIAGNOSIS — S46.011A RUPTURE OF RIGHT SUPRASPINATUS TENDON, INITIAL ENCOUNTER: ICD-10-CM

## 2022-02-28 DIAGNOSIS — M25.511 RIGHT SHOULDER PAIN, UNSPECIFIED CHRONICITY: Primary | ICD-10-CM

## 2022-02-28 PROCEDURE — 99213 OFFICE O/P EST LOW 20 MIN: CPT | Performed by: ORTHOPAEDIC SURGERY
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
## 2022-03-11 ENCOUNTER — LAB (OUTPATIENT)
Dept: LAB | Facility: HOSPITAL | Age: 72
End: 2022-03-11

## 2022-03-11 DIAGNOSIS — E55.9 VITAMIN D DEFICIENCY: ICD-10-CM

## 2022-03-11 DIAGNOSIS — T78.40XA ALLERGIC REACTION, INITIAL ENCOUNTER: ICD-10-CM

## 2022-03-11 DIAGNOSIS — K04.7 TOOTH ABSCESS: ICD-10-CM

## 2022-03-11 DIAGNOSIS — R21 PENILE RASH: ICD-10-CM

## 2022-03-11 DIAGNOSIS — E11.40 TYPE 2 DIABETES MELLITUS WITH DIABETIC NEUROPATHY, WITHOUT LONG-TERM CURRENT USE OF INSULIN: ICD-10-CM

## 2022-03-11 DIAGNOSIS — E11.43 TYPE 2 DIABETES MELLITUS WITH DIABETIC AUTONOMIC NEUROPATHY, WITHOUT LONG-TERM CURRENT USE OF INSULIN: ICD-10-CM

## 2022-03-11 LAB
BASOPHILS # BLD AUTO: 0.05 10*3/MM3 (ref 0–0.2)
BASOPHILS NFR BLD AUTO: 0.7 % (ref 0–1.5)
BILIRUB UR QL STRIP: NEGATIVE
CLARITY UR: CLEAR
COLOR UR: YELLOW
DEPRECATED RDW RBC AUTO: 41.1 FL (ref 37–54)
EOSINOPHIL # BLD AUTO: 0.23 10*3/MM3 (ref 0–0.4)
EOSINOPHIL NFR BLD AUTO: 3.3 % (ref 0.3–6.2)
ERYTHROCYTE [DISTWIDTH] IN BLOOD BY AUTOMATED COUNT: 13.1 % (ref 12.3–15.4)
GLUCOSE UR STRIP-MCNC: NEGATIVE MG/DL
HBA1C MFR BLD: 6.1 % (ref 4.8–5.6)
HCT VFR BLD AUTO: 44 % (ref 37.5–51)
HGB BLD-MCNC: 15.1 G/DL (ref 13–17.7)
HGB UR QL STRIP.AUTO: NEGATIVE
IMM GRANULOCYTES # BLD AUTO: 0.03 10*3/MM3 (ref 0–0.05)
IMM GRANULOCYTES NFR BLD AUTO: 0.4 % (ref 0–0.5)
KETONES UR QL STRIP: NEGATIVE
LEUKOCYTE ESTERASE UR QL STRIP.AUTO: NEGATIVE
LYMPHOCYTES # BLD AUTO: 2.51 10*3/MM3 (ref 0.7–3.1)
LYMPHOCYTES NFR BLD AUTO: 36.4 % (ref 19.6–45.3)
MCH RBC QN AUTO: 29.8 PG (ref 26.6–33)
MCHC RBC AUTO-ENTMCNC: 34.3 G/DL (ref 31.5–35.7)
MCV RBC AUTO: 86.8 FL (ref 79–97)
MONOCYTES # BLD AUTO: 0.58 10*3/MM3 (ref 0.1–0.9)
MONOCYTES NFR BLD AUTO: 8.4 % (ref 5–12)
NEUTROPHILS NFR BLD AUTO: 3.49 10*3/MM3 (ref 1.7–7)
NEUTROPHILS NFR BLD AUTO: 50.8 % (ref 42.7–76)
NITRITE UR QL STRIP: NEGATIVE
NRBC BLD AUTO-RTO: 0 /100 WBC (ref 0–0.2)
PH UR STRIP.AUTO: 7 [PH] (ref 5–8)
PLATELET # BLD AUTO: 197 10*3/MM3 (ref 140–450)
PMV BLD AUTO: 12 FL (ref 6–12)
PROT UR QL STRIP: NEGATIVE
RBC # BLD AUTO: 5.07 10*6/MM3 (ref 4.14–5.8)
SP GR UR STRIP: 1.01 (ref 1–1.03)
UROBILINOGEN UR QL STRIP: NORMAL
WBC NRBC COR # BLD: 6.89 10*3/MM3 (ref 3.4–10.8)

## 2022-03-11 PROCEDURE — 87491 CHLMYD TRACH DNA AMP PROBE: CPT

## 2022-03-11 PROCEDURE — 84439 ASSAY OF FREE THYROXINE: CPT

## 2022-03-11 PROCEDURE — 82306 VITAMIN D 25 HYDROXY: CPT

## 2022-03-11 PROCEDURE — 80061 LIPID PANEL: CPT

## 2022-03-11 PROCEDURE — G0432 EIA HIV-1/HIV-2 SCREEN: HCPCS

## 2022-03-11 PROCEDURE — 82570 ASSAY OF URINE CREATININE: CPT

## 2022-03-11 PROCEDURE — 86592 SYPHILIS TEST NON-TREP QUAL: CPT

## 2022-03-11 PROCEDURE — 84443 ASSAY THYROID STIM HORMONE: CPT

## 2022-03-11 PROCEDURE — 82043 UR ALBUMIN QUANTITATIVE: CPT

## 2022-03-11 PROCEDURE — 85025 COMPLETE CBC W/AUTO DIFF WBC: CPT

## 2022-03-11 PROCEDURE — 80053 COMPREHEN METABOLIC PANEL: CPT

## 2022-03-11 PROCEDURE — 86803 HEPATITIS C AB TEST: CPT

## 2022-03-11 PROCEDURE — 83036 HEMOGLOBIN GLYCOSYLATED A1C: CPT

## 2022-03-11 PROCEDURE — 81003 URINALYSIS AUTO W/O SCOPE: CPT

## 2022-03-11 PROCEDURE — 87591 N.GONORRHOEAE DNA AMP PROB: CPT

## 2022-03-12 LAB
25(OH)D3 SERPL-MCNC: 19.1 NG/ML (ref 30–100)
ALBUMIN SERPL-MCNC: 4.4 G/DL (ref 3.5–5.2)
ALBUMIN UR-MCNC: <1.2 MG/DL
ALBUMIN/GLOB SERPL: 1.8 G/DL
ALP SERPL-CCNC: 58 U/L (ref 39–117)
ALT SERPL W P-5'-P-CCNC: 19 U/L (ref 1–41)
ANION GAP SERPL CALCULATED.3IONS-SCNC: 11.4 MMOL/L (ref 5–15)
AST SERPL-CCNC: 20 U/L (ref 1–40)
BILIRUB SERPL-MCNC: 0.6 MG/DL (ref 0–1.2)
BUN SERPL-MCNC: 7 MG/DL (ref 8–23)
BUN/CREAT SERPL: 6.7 (ref 7–25)
CALCIUM SPEC-SCNC: 9 MG/DL (ref 8.6–10.5)
CHLORIDE SERPL-SCNC: 97 MMOL/L (ref 98–107)
CHOLEST SERPL-MCNC: 200 MG/DL (ref 0–200)
CO2 SERPL-SCNC: 27.6 MMOL/L (ref 22–29)
CREAT SERPL-MCNC: 1.04 MG/DL (ref 0.76–1.27)
CREAT UR-MCNC: 51.2 MG/DL
EGFRCR SERPLBLD CKD-EPI 2021: 77.2 ML/MIN/1.73
GLOBULIN UR ELPH-MCNC: 2.4 GM/DL
GLUCOSE SERPL-MCNC: 86 MG/DL (ref 65–99)
HCV AB SER DONR QL: NORMAL
HDLC SERPL-MCNC: 43 MG/DL (ref 40–60)
HIV1+2 AB SER QL: NORMAL
LDLC SERPL CALC-MCNC: 117 MG/DL (ref 0–100)
LDLC/HDLC SERPL: 2.59 {RATIO}
MICROALBUMIN/CREAT UR: NORMAL MG/G{CREAT}
POTASSIUM SERPL-SCNC: 4.2 MMOL/L (ref 3.5–5.2)
PROT SERPL-MCNC: 6.8 G/DL (ref 6–8.5)
RPR SER QL: NORMAL
SODIUM SERPL-SCNC: 136 MMOL/L (ref 136–145)
T4 FREE SERPL-MCNC: 1.05 NG/DL (ref 0.93–1.7)
TRIGL SERPL-MCNC: 229 MG/DL (ref 0–150)
TSH SERPL DL<=0.05 MIU/L-ACNC: 1.94 UIU/ML (ref 0.27–4.2)
VLDLC SERPL-MCNC: 40 MG/DL (ref 5–40)

## 2022-03-14 RX ORDER — PRAVASTATIN SODIUM 20 MG
TABLET ORAL
Qty: 90 TABLET | Refills: 1 | Status: SHIPPED | OUTPATIENT
Start: 2022-03-14 | End: 2023-03-28

## 2022-03-14 NOTE — TELEPHONE ENCOUNTER
Rx Refill Note  Requested Prescriptions     Pending Prescriptions Disp Refills   • pravastatin (PRAVACHOL) 20 MG tablet [Pharmacy Med Name: PRAVASTATIN 20MG TABLETS] 90 tablet 3     Sig: TAKE 1 TABLET BY MOUTH EVERY DAY      Last office visit with prescribing clinician: 2/22/2022      Next office visit with prescribing clinician: 4/22/2022     Megan Russ MA  03/14/22, 12:51 EDT     Last fill: 02/12/2021

## 2022-03-15 LAB
C TRACH RRNA SPEC QL NAA+PROBE: NEGATIVE
N GONORRHOEA RRNA SPEC QL NAA+PROBE: NEGATIVE

## 2022-03-22 ENCOUNTER — OFFICE VISIT (OUTPATIENT)
Dept: FAMILY MEDICINE CLINIC | Facility: CLINIC | Age: 72
End: 2022-03-22

## 2022-03-22 VITALS
WEIGHT: 191 LBS | OXYGEN SATURATION: 94 % | HEIGHT: 72 IN | SYSTOLIC BLOOD PRESSURE: 130 MMHG | DIASTOLIC BLOOD PRESSURE: 92 MMHG | BODY MASS INDEX: 25.87 KG/M2 | HEART RATE: 82 BPM

## 2022-03-22 DIAGNOSIS — M25.511 CHRONIC RIGHT SHOULDER PAIN: ICD-10-CM

## 2022-03-22 DIAGNOSIS — K08.9 POOR DENTITION: ICD-10-CM

## 2022-03-22 DIAGNOSIS — G47.00 INSOMNIA, UNSPECIFIED TYPE: ICD-10-CM

## 2022-03-22 DIAGNOSIS — G89.29 CHRONIC RIGHT SHOULDER PAIN: ICD-10-CM

## 2022-03-22 DIAGNOSIS — R50.9 FEVER AND CHILLS: Primary | ICD-10-CM

## 2022-03-22 DIAGNOSIS — J32.9 SINUSITIS, UNSPECIFIED CHRONICITY, UNSPECIFIED LOCATION: ICD-10-CM

## 2022-03-22 DIAGNOSIS — I10 PRIMARY HYPERTENSION: ICD-10-CM

## 2022-03-22 LAB
EXPIRATION DATE: NORMAL
FLUAV AG NPH QL: NEGATIVE
FLUBV AG NPH QL: NEGATIVE
INTERNAL CONTROL: NORMAL
Lab: NORMAL

## 2022-03-22 PROCEDURE — U0004 COV-19 TEST NON-CDC HGH THRU: HCPCS | Performed by: INTERNAL MEDICINE

## 2022-03-22 PROCEDURE — 87804 INFLUENZA ASSAY W/OPTIC: CPT | Performed by: INTERNAL MEDICINE

## 2022-03-22 PROCEDURE — 99214 OFFICE O/P EST MOD 30 MIN: CPT | Performed by: INTERNAL MEDICINE

## 2022-03-23 LAB — SARS-COV-2 RNA NOSE QL NAA+PROBE: NOT DETECTED

## 2022-03-23 RX ORDER — QUETIAPINE FUMARATE 50 MG/1
50 TABLET, FILM COATED ORAL NIGHTLY
Qty: 30 TABLET | Refills: 1 | Status: SHIPPED | OUTPATIENT
Start: 2022-03-23 | End: 2022-04-22 | Stop reason: SINTOL

## 2022-03-23 NOTE — PROGRESS NOTES
Please let pt know that Covid test and flu are both negative.  Blood work is still pending, we will forward results on [facility] when available.  I would hold off on any antibiotics until we have lab results.      I did send in a sleeping medication- he was asking about insomnia meds yesterday.

## 2022-03-23 NOTE — PROGRESS NOTES
"Chief Complaint   Patient presents with   • Shoulder Pain     Shoulder and neck pain ; feels shoulder is falling to elbow     • Chills     Legs felt \"chilled but hot\" - feet and legs burning        HPI:  Behzad Guzman is a 71 y.o. male who presents today for chills over the past few days.  No documented fever.  He suspects he may have an infection he does not know where.  He states it is likely his teeth due to poor dentition.  Continues to have shoulder pain.  He would like to discuss further management.  He would like a medication to help with sleep as well.    ROS:  Constitutional: no fevers, night sweats or unexplained weight loss  Eyes: no vision changes  ENT: no runny nose, ear pain, sore throat  Cardio: no chest pain, palpitations  Pulm: no shortness of breath, wheezing, or cough  GI: no abdominal pain or changes in bowel movements  : no difficulty urinating  MSK: no difficulty ambulating, + joint pain  Neuro: no weakness, dizziness or headache  Psych: + trouble sleeping  Endo: no change in appetite      Past Medical History:   Diagnosis Date   • Asthma    • Chronic hip pain, left    • Chronic pain in left shoulder    • Hyperlipidemia    • Hypertension    • Leg length discrepancy    • Neck pain, chronic    • Neuropathy    • Panic attacks    • Pre-diabetes    • Prediabetes    • Spinal stenosis       Family History   Problem Relation Age of Onset   • Heart attack Father 65   • Other Mother         accident   • No Known Problems Maternal Grandmother    • No Known Problems Maternal Grandfather    • No Known Problems Paternal Grandmother    • Stroke Paternal Grandfather       Social History     Socioeconomic History   • Marital status:    Tobacco Use   • Smoking status: Former Smoker     Packs/day: 0.50     Years: 10.00     Pack years: 5.00     Quit date:      Years since quittin.2   • Smokeless tobacco: Never Used   Vaping Use   • Vaping Use: Never used   Substance and Sexual Activity   • " Alcohol use: No   • Drug use: No   • Sexual activity: Defer      Allergies   Allergen Reactions   • Peanut Butter Flavor Anaphylaxis   • Shellfish Allergy Anaphylaxis   • Amoxicillin Rash   • Penicillins Rash   • Sulfa Antibiotics Myalgia   • Latex Rash      Immunization History   Administered Date(s) Administered   • COVID-19 (PFIZER) PURPLE CAP 09/14/2021, 10/08/2021   • Flu Vaccine Quad PF >36MO 09/30/2015   • Pneumococcal Conjugate 13-Valent (PCV13) 09/22/2016   • Pneumococcal Polysaccharide (PPSV23) 05/18/2018        PE:  Vitals:    03/22/22 1507   BP: 130/92   Pulse: 82   SpO2: 94%      Body mass index is 25.9 kg/m².    Gen Appearance: NAD  HEENT: Normocephalic, PERRLA, no thyromegaly, trache midline  Heart: RRR, normal S1 and S2, no murmur  Lungs: CTA b/l, no wheezing, no crackles  Abdomen: Soft, non-tender, non-distended, no guarding and BSx4  MSK: Moves all extremities well, normal gait, no peripheral edema  Pulses: Palpable and equal b/l  Lymph nodes: No palpable lymphadenopathy   Neuro: No focal deficits      Current Outpatient Medications   Medication Sig Dispense Refill   • albuterol sulfate HFA (Ventolin HFA) 108 (90 Base) MCG/ACT inhaler Inhale 2 puffs Every 6 (Six) Hours As Needed for Wheezing or Shortness of Air. 6.7 g 5   • amLODIPine (NORVASC) 5 MG tablet Take 5 mg by mouth As Needed.     • baclofen (LIORESAL) 10 MG tablet Take 0.5 tablets by mouth Every Night. 30 tablet 5   • Cholecalciferol (VITAMIN D) 125 MCG (5000 UT) capsule capsule Take 1 capsule by mouth Daily. 30 capsule 0   • co-enzyme Q-10 50 MG capsule TAKE 1 CAPSULE BY MOUTH DAILY 90 capsule 3   • diazePAM (VALIUM) 5 MG tablet Take 5 mg by mouth Every 6 (Six) Hours As Needed.  0   • gabapentin (NEURONTIN) 400 MG capsule Take 400 mg by mouth 2 (two) times a day.     • glimepiride (AMARYL) 1 MG tablet TAKE 1 TABLET BY MOUTH EVERY DAY 90 tablet 3   • glucose monitor monitoring kit 1 each Daily. 1 each 0   • HYDROcodone-acetaminophen  "(NORCO)  MG per tablet Take 1 tablet by mouth Every 6 (Six) Hours As Needed for moderate pain (4-6).     • Lancets (onetouch ultrasoft) lancets Use one daily to test blood sugars 100 each 12   • lisinopril (PRINIVIL,ZESTRIL) 20 MG tablet TAKE 2 TABLETS BY MOUTH EVERY DAY (Patient taking differently: Take 20 mg by mouth As Needed.) 180 tablet 3   • metFORMIN (GLUCOPHAGE) 500 MG tablet TAKE 1 TABLET BY MOUTH DAILY WITH BREAKFAST 90 tablet 3   • metoprolol tartrate (LOPRESSOR) 50 MG tablet TAKE 1 TABLET BY MOUTH TWICE DAILY 180 tablet 0   • mupirocin (BACTROBAN) 2 % ointment Apply 1 application topically to the appropriate area as directed 3 (Three) Times a Day. 22 g 0   • Naloxegol Oxalate (MOVANTIK) 12.5 MG tablet Movantik 12.5 mg tablet     • Needle, Disp, 22G X 1\" misc 1 each Every 14 (Fourteen) Days. 100 each 0   • omeprazole (priLOSEC) 40 MG capsule TAKE 1 CAPSULE BY MOUTH DAILY BEFORE A MEAL 90 capsule 1   • ondansetron ODT (ZOFRAN-ODT) 4 MG disintegrating tablet Take 1 tablet by mouth 4 (Four) Times a Day As Needed for Nausea or Vomiting. 15 tablet 0   • OneTouch Verio test strip 1 each by Other route Daily. for testing 300 each 2   • oxyCODONE (ROXICODONE) 10 MG tablet Take 10 mg by mouth At Night As Needed.     • pravastatin (PRAVACHOL) 20 MG tablet TAKE 1 TABLET BY MOUTH EVERY DAY 90 tablet 1   • Syringe 23G X 1\" 3 ML misc Use every 21 days to take testosterone 12 each 1   • Testosterone Cypionate (DEPOTESTOTERONE CYPIONATE) 200 MG/ML injection Inject 1 mL into the appropriate muscle as directed by prescriber Every 21 (Twenty-One) Days. 10 mL 0   • QUEtiapine (SEROquel) 50 MG tablet Take 1 tablet by mouth Every Night. 30 tablet 1     No current facility-administered medications for this visit.      Rule out flu and Covid.  Checking blood work and inflammatory markers.  He is describing chills as numbness and tingling sensation in lower extremities as well as fatigue.  He may be experiencing neuropathy " of lower extremities rather than chills.  Discussed shoulder pain with patient.  He has follow-up with Ortho as well as physical therapy next week.  Agree with current plan.  Highly recommend establishing care with a dentist due to poor dentition.  Trial Seroquel for insomnia.    Diagnoses and all orders for this visit:    1. Fever and chills (Primary)  -     POCT Influenza A/B  -     COVID-19 PCR, [facility], NP SWAB IN [facility]SP[facility]IA/ORAL SWISH 24-30 HR TAT - Swab, Nasopharynx; Future  -     CBC & Differential; Future  -     Comprehensive Metabolic Panel; Future  -     C-reactive protein; Future  -     COVID-19 PCR, [facility], NP SWAB IN [facility]SP[facility] MEDIA/ORAL SWISH 24-30 HR TAT - Swab, Nasopharynx    2. Poor dentition  -     CBC & Differential; Future  -     Comprehensive Metabolic Panel; Future  -     C-reactive protein; Future    3. Primary hypertension  -     CBC & Differential; Future  -     Comprehensive Metabolic Panel; Future  -     C-reactive protein; Future    4. Sinusitis, unspecified chronicity, unspecified location    5. Chronic right shoulder pain    6. Insomnia, unspecified type  -     QUEtiapine (SEROquel) 50 MG tablet; Take 1 tablet by mouth Every Night.  Dispense: 30 tablet; Refill: 1         No follow-ups on file.     Dictated Utilizing Dragon Dictation    Please note that portions of this note were completed with a voice recognition program.    Part of this note may be an electronic transcription/translation of spoken language to printed text using the Dragon Dictation System.

## 2022-03-29 ENCOUNTER — TELEPHONE (OUTPATIENT)
Dept: ORTHOPEDIC SURGERY | Facility: CLINIC | Age: 72
End: 2022-03-29

## 2022-03-29 NOTE — TELEPHONE ENCOUNTER
I spoke with the patient in regards to his concerns about lifting anything with his right shoulder to feed his horses. I advised that I cannot give him a definite answer until tomorrow morning. He understood and mentioned he has an appointment tomorrow morning with Dr. Rader and will discuss matters then.     LOUISE Mendoza

## 2022-03-29 NOTE — TELEPHONE ENCOUNTER
Provider: DR WILSON    Caller: TALIA LOPEZ    Relationship to Patient: SELF    Phone Number: [phone]    Reason for Call: WANTS TO KNOW HOW MUCH WOULD BE A SAFE AMOUNT FOR HIM TO LIFT/ WITH THE RIGHT SHOULDER, SINCE HE HAS A TORN ROTATOR CUFF. PT NEEDS TO KNOW TODAY IF AT ALL POSSIBLE SO HE CAN FEED HIS HORSES.

## 2022-03-30 ENCOUNTER — LAB (OUTPATIENT)
Dept: LAB | Facility: HOSPITAL | Age: 72
End: 2022-03-30

## 2022-03-30 ENCOUNTER — OFFICE VISIT (OUTPATIENT)
Dept: ORTHOPEDIC SURGERY | Facility: CLINIC | Age: 72
End: 2022-03-30

## 2022-03-30 VITALS
HEIGHT: 72 IN | DIASTOLIC BLOOD PRESSURE: 82 MMHG | SYSTOLIC BLOOD PRESSURE: 136 MMHG | WEIGHT: 191 LBS | BODY MASS INDEX: 25.87 KG/M2

## 2022-03-30 DIAGNOSIS — R50.9 FEVER AND CHILLS: ICD-10-CM

## 2022-03-30 DIAGNOSIS — K08.9 POOR DENTITION: ICD-10-CM

## 2022-03-30 DIAGNOSIS — M54.2 NECK PAIN, CHRONIC: ICD-10-CM

## 2022-03-30 DIAGNOSIS — I10 PRIMARY HYPERTENSION: ICD-10-CM

## 2022-03-30 DIAGNOSIS — S46.011A RUPTURE OF RIGHT SUPRASPINATUS TENDON, INITIAL ENCOUNTER: ICD-10-CM

## 2022-03-30 DIAGNOSIS — G89.29 NECK PAIN, CHRONIC: ICD-10-CM

## 2022-03-30 DIAGNOSIS — M25.511 RIGHT SHOULDER PAIN, UNSPECIFIED CHRONICITY: Primary | ICD-10-CM

## 2022-03-30 LAB
ALBUMIN SERPL-MCNC: 4.5 G/DL (ref 3.5–5.2)
ALBUMIN/GLOB SERPL: 1.7 G/DL
ALP SERPL-CCNC: 57 U/L (ref 39–117)
ALT SERPL W P-5'-P-CCNC: 18 U/L (ref 1–41)
ANION GAP SERPL CALCULATED.3IONS-SCNC: 12.5 MMOL/L (ref 5–15)
AST SERPL-CCNC: 22 U/L (ref 1–40)
BASOPHILS # BLD AUTO: 0.05 10*3/MM3 (ref 0–0.2)
BASOPHILS NFR BLD AUTO: 0.6 % (ref 0–1.5)
BILIRUB SERPL-MCNC: 0.3 MG/DL (ref 0–1.2)
BUN SERPL-MCNC: 8 MG/DL (ref 8–23)
BUN/CREAT SERPL: 8.5 (ref 7–25)
CALCIUM SPEC-SCNC: 9.5 MG/DL (ref 8.6–10.5)
CHLORIDE SERPL-SCNC: 104 MMOL/L (ref 98–107)
CO2 SERPL-SCNC: 26.5 MMOL/L (ref 22–29)
CREAT SERPL-MCNC: 0.94 MG/DL (ref 0.76–1.27)
DEPRECATED RDW RBC AUTO: 41.5 FL (ref 37–54)
EGFRCR SERPLBLD CKD-EPI 2021: 87.2 ML/MIN/1.73
EOSINOPHIL # BLD AUTO: 0.31 10*3/MM3 (ref 0–0.4)
EOSINOPHIL NFR BLD AUTO: 4 % (ref 0.3–6.2)
ERYTHROCYTE [DISTWIDTH] IN BLOOD BY AUTOMATED COUNT: 13 % (ref 12.3–15.4)
GLOBULIN UR ELPH-MCNC: 2.6 GM/DL
GLUCOSE SERPL-MCNC: 96 MG/DL (ref 65–99)
HCT VFR BLD AUTO: 43.2 % (ref 37.5–51)
HGB BLD-MCNC: 15.2 G/DL (ref 13–17.7)
IMM GRANULOCYTES # BLD AUTO: 0.02 10*3/MM3 (ref 0–0.05)
IMM GRANULOCYTES NFR BLD AUTO: 0.3 % (ref 0–0.5)
LYMPHOCYTES # BLD AUTO: 2.4 10*3/MM3 (ref 0.7–3.1)
LYMPHOCYTES NFR BLD AUTO: 30.8 % (ref 19.6–45.3)
MCH RBC QN AUTO: 30.9 PG (ref 26.6–33)
MCHC RBC AUTO-ENTMCNC: 35.2 G/DL (ref 31.5–35.7)
MCV RBC AUTO: 87.8 FL (ref 79–97)
MONOCYTES # BLD AUTO: 0.66 10*3/MM3 (ref 0.1–0.9)
MONOCYTES NFR BLD AUTO: 8.5 % (ref 5–12)
NEUTROPHILS NFR BLD AUTO: 4.34 10*3/MM3 (ref 1.7–7)
NEUTROPHILS NFR BLD AUTO: 55.8 % (ref 42.7–76)
NRBC BLD AUTO-RTO: 0 /100 WBC (ref 0–0.2)
PLATELET # BLD AUTO: 227 10*3/MM3 (ref 140–450)
PMV BLD AUTO: 11.3 FL (ref 6–12)
POTASSIUM SERPL-SCNC: 4.2 MMOL/L (ref 3.5–5.2)
PROT SERPL-MCNC: 7.1 G/DL (ref 6–8.5)
RBC # BLD AUTO: 4.92 10*6/MM3 (ref 4.14–5.8)
SODIUM SERPL-SCNC: 143 MMOL/L (ref 136–145)
WBC NRBC COR # BLD: 7.78 10*3/MM3 (ref 3.4–10.8)

## 2022-03-30 PROCEDURE — 86140 C-REACTIVE PROTEIN: CPT

## 2022-03-30 PROCEDURE — 80053 COMPREHEN METABOLIC PANEL: CPT

## 2022-03-30 PROCEDURE — 36415 COLL VENOUS BLD VENIPUNCTURE: CPT

## 2022-03-30 PROCEDURE — 85025 COMPLETE CBC W/AUTO DIFF WBC: CPT

## 2022-03-30 PROCEDURE — 99214 OFFICE O/P EST MOD 30 MIN: CPT | Performed by: ORTHOPAEDIC SURGERY
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
## 2022-03-31 LAB — CRP SERPL-MCNC: <0.3 MG/DL (ref 0–0.5)

## 2022-04-07 ENCOUNTER — TELEPHONE (OUTPATIENT)
Dept: ORTHOPEDIC SURGERY | Facility: CLINIC | Age: 72
End: 2022-04-07

## 2022-04-07 NOTE — TELEPHONE ENCOUNTER
Called Mr Guzman regarding his concerns from his last office visit.  I left him a voice message and asked that he contact me back directly so that I may address those concerns.    -Nena

## 2022-04-08 ENCOUNTER — TELEPHONE (OUTPATIENT)
Dept: ORTHOPEDIC SURGERY | Facility: CLINIC | Age: 72
End: 2022-04-08
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
## 2022-04-22 ENCOUNTER — OFFICE VISIT (OUTPATIENT)
Dept: FAMILY MEDICINE CLINIC | Facility: CLINIC | Age: 72
End: 2022-04-22

## 2022-04-22 VITALS
BODY MASS INDEX: 25.47 KG/M2 | DIASTOLIC BLOOD PRESSURE: 102 MMHG | SYSTOLIC BLOOD PRESSURE: 144 MMHG | OXYGEN SATURATION: 98 % | WEIGHT: 188 LBS | HEART RATE: 90 BPM | HEIGHT: 72 IN

## 2022-04-22 DIAGNOSIS — E11.9 TYPE 2 DIABETES MELLITUS WITHOUT COMPLICATION, WITHOUT LONG-TERM CURRENT USE OF INSULIN: ICD-10-CM

## 2022-04-22 DIAGNOSIS — M79.641 RIGHT HAND PAIN: ICD-10-CM

## 2022-04-22 DIAGNOSIS — I10 PRIMARY HYPERTENSION: Primary | ICD-10-CM

## 2022-04-22 DIAGNOSIS — Z23 IMMUNIZATION DUE: ICD-10-CM

## 2022-04-22 PROCEDURE — 90471 IMMUNIZATION ADMIN: CPT | Performed by: INTERNAL MEDICINE

## 2022-04-22 PROCEDURE — 90715 TDAP VACCINE 7 YRS/> IM: CPT | Performed by: INTERNAL MEDICINE

## 2022-04-22 PROCEDURE — 99214 OFFICE O/P EST MOD 30 MIN: CPT | Performed by: INTERNAL MEDICINE

## 2022-04-22 NOTE — PROGRESS NOTES
"Chief Complaint   Patient presents with   • Hand Pain     Has injections in hands and would like to discuss    • Medication Reaction     Side effects from seroquel - \"nervous legs\" pt d/c       HPI:  Behzad Guzman is a 71 y.o. male who presents today for follow-up diabetes and hypertension.  He had side effects to Seroquel so this was discontinued.      ROS:  Constitutional: no fevers, night sweats or unexplained weight loss  Eyes: no vision changes  ENT: no runny nose, ear pain, sore throat  Cardio: no chest pain, palpitations  Pulm: no shortness of breath, wheezing, or cough  GI: no abdominal pain or changes in bowel movements  : no difficulty urinating  MSK: no difficulty ambulating, no joint pain  Neuro: no weakness, dizziness or headache  Psych: no trouble sleeping  Endo: no change in appetite      Past Medical History:   Diagnosis Date   • Asthma    • Chronic hip pain, left    • Chronic pain in left shoulder    • Hyperlipidemia    • Hypertension    • Leg length discrepancy    • Neck pain, chronic    • Neuropathy    • Panic attacks    • Pre-diabetes    • Prediabetes    • Spinal stenosis       Family History   Problem Relation Age of Onset   • Heart attack Father 65   • Other Mother         accident   • No Known Problems Maternal Grandmother    • No Known Problems Maternal Grandfather    • No Known Problems Paternal Grandmother    • Stroke Paternal Grandfather       Social History     Socioeconomic History   • Marital status:    Tobacco Use   • Smoking status: Former Smoker     Packs/day: 0.50     Years: 10.00     Pack years: 5.00     Quit date:      Years since quittin.3   • Smokeless tobacco: Never Used   Vaping Use   • Vaping Use: Never used   Substance and Sexual Activity   • Alcohol use: No   • Drug use: No   • Sexual activity: Defer      Allergies   Allergen Reactions   • Peanut Butter Flavor Anaphylaxis   • Shellfish Allergy Anaphylaxis   • Amoxicillin Rash   • Penicillins Rash   • " Sulfa Antibiotics Myalgia   • Latex Rash      Immunization History   Administered Date(s) Administered   • COVID-19 (PFIZER) PURPLE CAP 09/14/2021, 10/08/2021   • Flu Vaccine Quad PF >36MO 09/30/2015   • Pneumococcal Conjugate 13-Valent (PCV13) 09/22/2016   • Pneumococcal Polysaccharide (PPSV23) 05/18/2018   • Tdap 04/22/2022        PE:  Vitals:    04/22/22 1516   BP: (!) 144/102   Pulse: 90   SpO2: 98%      Body mass index is 25.49 kg/m².    Gen Appearance: NAD  HEENT: Normocephalic, PERRLA, no thyromegaly, trache midline  Heart: RRR, normal S1 and S2, no murmur  Lungs: CTA b/l, no wheezing, no crackles  Abdomen: Soft, non-tender, non-distended, no guarding and BSx4  MSK: Moves all extremities well, normal gait, no peripheral edema  Pulses: Palpable and equal b/l  Lymph nodes: No palpable lymphadenopathy   Neuro: No focal deficits      Current Outpatient Medications   Medication Sig Dispense Refill   • albuterol sulfate HFA (Ventolin HFA) 108 (90 Base) MCG/ACT inhaler Inhale 2 puffs Every 6 (Six) Hours As Needed for Wheezing or Shortness of Air. 6.7 g 5   • amLODIPine (NORVASC) 5 MG tablet Take 5 mg by mouth As Needed.     • baclofen (LIORESAL) 10 MG tablet Take 0.5 tablets by mouth Every Night. 30 tablet 5   • Cholecalciferol (VITAMIN D) 125 MCG (5000 UT) capsule capsule Take 1 capsule by mouth Daily. 30 capsule 0   • co-enzyme Q-10 50 MG capsule TAKE 1 CAPSULE BY MOUTH DAILY 90 capsule 3   • diazePAM (VALIUM) 5 MG tablet Take 5 mg by mouth Every 6 (Six) Hours As Needed.  0   • gabapentin (NEURONTIN) 400 MG capsule Take 400 mg by mouth 2 (two) times a day.     • glimepiride (AMARYL) 1 MG tablet TAKE 1 TABLET BY MOUTH EVERY DAY 90 tablet 3   • glucose monitor monitoring kit 1 each Daily. 1 each 0   • HYDROcodone-acetaminophen (NORCO)  MG per tablet Take 1 tablet by mouth Every 6 (Six) Hours As Needed for moderate pain (4-6).     • Lancets (onetouch ultrasoft) lancets Use one daily to test blood sugars 100  "each 12   • lisinopril (PRINIVIL,ZESTRIL) 20 MG tablet TAKE 2 TABLETS BY MOUTH EVERY DAY (Patient taking differently: Take 20 mg by mouth As Needed.) 180 tablet 3   • metFORMIN (GLUCOPHAGE) 500 MG tablet TAKE 1 TABLET BY MOUTH DAILY WITH BREAKFAST 90 tablet 3   • metoprolol tartrate (LOPRESSOR) 50 MG tablet TAKE 1 TABLET BY MOUTH TWICE DAILY 180 tablet 0   • mupirocin (BACTROBAN) 2 % ointment Apply 1 application topically to the appropriate area as directed 3 (Three) Times a Day. 22 g 0   • Naloxegol Oxalate (MOVANTIK) 12.5 MG tablet Movantik 12.5 mg tablet     • Needle, Disp, 22G X 1\" misc 1 each Every 14 (Fourteen) Days. 100 each 0   • omeprazole (priLOSEC) 40 MG capsule TAKE 1 CAPSULE BY MOUTH DAILY BEFORE A MEAL 90 capsule 1   • ondansetron ODT (ZOFRAN-ODT) 4 MG disintegrating tablet Take 1 tablet by mouth 4 (Four) Times a Day As Needed for Nausea or Vomiting. 15 tablet 0   • OneTouch Verio test strip 1 each by Other route Daily. for testing 300 each 2   • oxyCODONE (ROXICODONE) 10 MG tablet Take 10 mg by mouth At Night As Needed.     • pravastatin (PRAVACHOL) 20 MG tablet TAKE 1 TABLET BY MOUTH EVERY DAY 90 tablet 1   • Syringe 23G X 1\" 3 ML misc Use every 21 days to take testosterone 12 each 1   • Testosterone Cypionate (DEPOTESTOTERONE CYPIONATE) 200 MG/ML injection Inject 1 mL into the appropriate muscle as directed by prescriber Every 21 (Twenty-One) Days. 10 mL 0     No current facility-administered medications for this visit.      He did not take blood pressure medicine today.  He had a stressful ride over to the office as well.  No change in meds, recommend rechecking blood pressure in 3 months.  No change in diabetic medicine.  A1c recently well controlled.  Recommend updating Tdap today.  Follow-up with orthopedics as scheduled.    Diagnoses and all orders for this visit:    1. Primary hypertension (Primary)    2. Type 2 diabetes mellitus without complication, without long-term current use of insulin " (HCC)    3. Immunization due    4. Right hand pain  -     Tdap Vaccine Greater Than or Equal To 6yo IM         Return in about 3 months (around 7/22/2022) for a1c.     Dictated Utilizing Dragon Dictation    Please note that portions of this note were completed with a voice recognition program.    Part of this note may be an electronic transcription/translation of spoken language to printed text using the Dragon Dictation System.

## 2022-05-13 ENCOUNTER — TELEPHONE (OUTPATIENT)
Dept: FAMILY MEDICINE CLINIC | Facility: CLINIC | Age: 72
End: 2022-05-13

## 2022-05-13 DIAGNOSIS — E34.9 TESTOSTERONE DEFICIENCY: ICD-10-CM

## 2022-05-13 RX ORDER — TESTOSTERONE CYPIONATE 200 MG/ML
INJECTION, SOLUTION INTRAMUSCULAR
Qty: 1 ML | Refills: 2 | Status: SHIPPED | OUTPATIENT
Start: 2022-05-13 | End: 2022-05-20 | Stop reason: SDUPTHER

## 2022-05-13 NOTE — TELEPHONE ENCOUNTER
Rx Refill Note  Requested Prescriptions     Pending Prescriptions Disp Refills   • Testosterone Cypionate (DEPOTESTOTERONE CYPIONATE) 200 MG/ML injection [Pharmacy Med Name: TESTOSTERONE  MG/ML SDV] 1 mL      Sig: INJECT 1ML INTO THE APPROPRIATE MUSCLE AS DIRECTED BY PRESCRIBER EVERY 21 DAYS      Last office visit with prescribing clinician: 4/22/2022      Next office visit with prescribing clinician: 7/26/2022            Diann Rader MA  05/13/22, 08:15 EDT

## 2022-05-20 DIAGNOSIS — E34.9 TESTOSTERONE DEFICIENCY: ICD-10-CM

## 2022-05-20 RX ORDER — TESTOSTERONE CYPIONATE 200 MG/ML
200 INJECTION, SOLUTION INTRAMUSCULAR
Qty: 10 ML | Refills: 0 | Status: SHIPPED | OUTPATIENT
Start: 2022-05-20 | End: 2022-06-23 | Stop reason: SDUPTHER

## 2022-06-15 ENCOUNTER — TELEPHONE (OUTPATIENT)
Dept: FAMILY MEDICINE CLINIC | Facility: CLINIC | Age: 72
End: 2022-06-15

## 2022-06-15 DIAGNOSIS — I10 PRIMARY HYPERTENSION: Primary | ICD-10-CM

## 2022-06-15 RX ORDER — AMLODIPINE BESYLATE 5 MG/1
5 TABLET ORAL DAILY
Qty: 90 TABLET | Refills: 3 | Status: SHIPPED | OUTPATIENT
Start: 2022-06-15 | End: 2023-04-04

## 2022-06-15 RX ORDER — LISINOPRIL 10 MG/1
10 TABLET ORAL DAILY
Qty: 90 TABLET | Refills: 3 | Status: SHIPPED | OUTPATIENT
Start: 2022-06-15 | End: 2022-06-21

## 2022-06-15 NOTE — TELEPHONE ENCOUNTER
"Attempted to contact, no answer.     \"\"I sent in new prescriptions for lisinopril and amlodipine. Take new dose/script once daily. Neither should be as needed.\"  Hub can relay and document.   "

## 2022-06-15 NOTE — TELEPHONE ENCOUNTER
"Third attempt to contact, no answer.     \"\"I sent in new prescriptions for lisinopril and amlodipine. Take new dose/script once daily. Neither should be as needed.\"  Hub can relay and document.   "

## 2022-06-15 NOTE — TELEPHONE ENCOUNTER
"Attempted to contact,no answer left detailed voicemail relaying PCP's message     \"I sent in new prescriptions for lisinopril and amlodipine. Take new dose/script once daily. Neither should be as needed.\"  Hub can relay and document.     "

## 2022-06-15 NOTE — TELEPHONE ENCOUNTER
"Patient called in with his blood pressure running high for the last week.  He has swelling in his hands and reports \"not feeling well\"  He reports numbers of 191/137 as of a few minutes ago it was 158/115.  He is unsure if it is a medication issue or if related to something else.  I was able to contact Jenelle and transferred the call.  "

## 2022-06-15 NOTE — TELEPHONE ENCOUNTER
Patient transferred. Stated he is concerned about his blood pressure and unsure when he should be taking his lisinopril. Just bought new  blood pressure cuff in the last month. Unsure if it is working correctly. But last week his b/p got up to 191/112 pulse 91. This morning when he woke up he took b/p prior to taking his metoprolol. Blood pressure was 158/115. Has been having episodes during the night where he wakes up in excruciating pain, off and on over the last 4 years unsure if that would make b/p high.  Blood pressure while on the phone approx 30min after metoprolol and diazepam was 146/94 pulse 68  Stated his is also getting occasional headaches to Left front side of head at times that comes and goes. Patient has follow up appointment on 7/26/22 in office. Noted lisinopril is as needed. Unsure when he should be taking this, Patient also stated he had amlodipine as well that he was supposed to take as needed but cannot find this medication and doesn't know parameters for taking either of these medications for hypertension. Please advise, thanks

## 2022-06-15 NOTE — TELEPHONE ENCOUNTER
I sent in new prescriptions for lisinopril and amlodipine. Take new dose/script once daily. Neither should be as needed.

## 2022-06-20 ENCOUNTER — TELEPHONE (OUTPATIENT)
Dept: FAMILY MEDICINE CLINIC | Facility: CLINIC | Age: 72
End: 2022-06-20

## 2022-06-20 NOTE — TELEPHONE ENCOUNTER
Patient called with concerns of blood pressure reading of 151/101, stated that he took blood pressure medication twice last night. Is requesting to be contacted back.

## 2022-06-20 NOTE — TELEPHONE ENCOUNTER
Contacted patient to discuss further, he reports that his BP has remained elevated after receiving an steroid injection at his allergists' office    He reports that he has been taking all 3 of his HTN meds regularly and no improvement. He is scheduled with PCP tomorrow. He also mentioned chest tightness for the past several weeks.

## 2022-06-21 ENCOUNTER — TELEPHONE (OUTPATIENT)
Dept: FAMILY MEDICINE CLINIC | Facility: CLINIC | Age: 72
End: 2022-06-21

## 2022-06-21 ENCOUNTER — OFFICE VISIT (OUTPATIENT)
Dept: FAMILY MEDICINE CLINIC | Facility: CLINIC | Age: 72
End: 2022-06-21

## 2022-06-21 VITALS
HEART RATE: 90 BPM | HEIGHT: 72 IN | SYSTOLIC BLOOD PRESSURE: 132 MMHG | BODY MASS INDEX: 25.33 KG/M2 | OXYGEN SATURATION: 96 % | DIASTOLIC BLOOD PRESSURE: 94 MMHG | WEIGHT: 187 LBS

## 2022-06-21 DIAGNOSIS — J45.909 EXTRINSIC ASTHMA, UNSPECIFIED ASTHMA SEVERITY, UNSPECIFIED WHETHER COMPLICATED, UNSPECIFIED WHETHER PERSISTENT: ICD-10-CM

## 2022-06-21 DIAGNOSIS — E11.9 TYPE 2 DIABETES MELLITUS WITHOUT COMPLICATION, WITHOUT LONG-TERM CURRENT USE OF INSULIN: ICD-10-CM

## 2022-06-21 DIAGNOSIS — R07.9 CHEST PAIN, UNSPECIFIED TYPE: ICD-10-CM

## 2022-06-21 DIAGNOSIS — E55.9 VITAMIN D DEFICIENCY: ICD-10-CM

## 2022-06-21 DIAGNOSIS — M79.10 MYALGIA DUE TO STATIN: ICD-10-CM

## 2022-06-21 DIAGNOSIS — T46.6X5A MYALGIA DUE TO STATIN: ICD-10-CM

## 2022-06-21 DIAGNOSIS — J45.909 ASTHMA, UNSPECIFIED ASTHMA SEVERITY, UNSPECIFIED WHETHER COMPLICATED, UNSPECIFIED WHETHER PERSISTENT: ICD-10-CM

## 2022-06-21 DIAGNOSIS — E11.43 TYPE 2 DIABETES MELLITUS WITH DIABETIC AUTONOMIC NEUROPATHY, WITHOUT LONG-TERM CURRENT USE OF INSULIN: ICD-10-CM

## 2022-06-21 DIAGNOSIS — I10 PRIMARY HYPERTENSION: Primary | ICD-10-CM

## 2022-06-21 LAB
EXPIRATION DATE: NORMAL
HBA1C MFR BLD: 6.3 %
Lab: NORMAL

## 2022-06-21 PROCEDURE — 99214 OFFICE O/P EST MOD 30 MIN: CPT | Performed by: INTERNAL MEDICINE

## 2022-06-21 PROCEDURE — 3044F HG A1C LEVEL LT 7.0%: CPT | Performed by: INTERNAL MEDICINE

## 2022-06-21 PROCEDURE — 83036 HEMOGLOBIN GLYCOSYLATED A1C: CPT | Performed by: INTERNAL MEDICINE

## 2022-06-21 RX ORDER — BLOOD SUGAR DIAGNOSTIC
1 STRIP MISCELLANEOUS DAILY
Qty: 300 EACH | Refills: 2 | Status: SHIPPED | OUTPATIENT
Start: 2022-06-21

## 2022-06-21 RX ORDER — LOSARTAN POTASSIUM AND HYDROCHLOROTHIAZIDE 12.5; 5 MG/1; MG/1
1 TABLET ORAL DAILY
Qty: 30 TABLET | Refills: 1 | Status: SHIPPED | OUTPATIENT
Start: 2022-06-21 | End: 2022-09-06

## 2022-06-21 RX ORDER — ALBUTEROL SULFATE 0.63 MG/3ML
1 SOLUTION RESPIRATORY (INHALATION) EVERY 6 HOURS PRN
Qty: 3 ML | Refills: 12 | Status: SHIPPED | OUTPATIENT
Start: 2022-06-21

## 2022-06-21 RX ORDER — METOPROLOL TARTRATE 50 MG/1
50 TABLET, FILM COATED ORAL 2 TIMES DAILY
Qty: 180 TABLET | Refills: 3 | Status: SHIPPED | OUTPATIENT
Start: 2022-06-21

## 2022-06-22 RX ORDER — ALBUTEROL SULFATE 90 UG/1
AEROSOL, METERED RESPIRATORY (INHALATION)
Qty: 6.7 G | Refills: 1 | Status: SHIPPED | OUTPATIENT
Start: 2022-06-22

## 2022-06-22 NOTE — TELEPHONE ENCOUNTER
Medication requested (name and dose):   MIRTAZAPINE 15MG  LEVOCETIRIZINE 5MG  CO ENZYME Q-10 50MG    Pharmacy where request should be sent:   [facility] ([facility]    Additional details provided by patient:  The patient is also asking about the Testosterone prescription. Patient states that his testosterone is sent via paper prescription to [facility] [address] in [place]. Patient is upset that there has been a confusion at the pharmacy with this medication and would like for us to call to get it straightened out.    Best call back number:  [phone]    Does the patient have less than a 3 day supply:  [x] Yes  [] No    Kayla Malin Rep  06/22/22, 15:46 EDT

## 2022-06-23 DIAGNOSIS — E34.9 TESTOSTERONE DEFICIENCY: ICD-10-CM

## 2022-06-23 RX ORDER — TESTOSTERONE CYPIONATE 200 MG/ML
200 INJECTION, SOLUTION INTRAMUSCULAR
Qty: 10 ML | Refills: 0 | Status: SHIPPED | OUTPATIENT
Start: 2022-06-23 | End: 2023-01-20

## 2022-06-23 RX ORDER — TESTOSTERONE CYPIONATE 200 MG/ML
200 INJECTION, SOLUTION INTRAMUSCULAR
Qty: 10 ML | Refills: 0 | Status: SHIPPED | OUTPATIENT
Start: 2022-06-23 | End: 2022-06-23 | Stop reason: SDUPTHER

## 2022-06-23 RX ORDER — MIRTAZAPINE 15 MG/1
15 TABLET, FILM COATED ORAL NIGHTLY
Qty: 90 TABLET | Refills: 3 | Status: SHIPPED | OUTPATIENT
Start: 2022-06-23

## 2022-06-23 RX ORDER — LEVOCETIRIZINE DIHYDROCHLORIDE 5 MG/1
5 TABLET, FILM COATED ORAL EVERY EVENING
Qty: 90 TABLET | Refills: 3 | Status: SHIPPED | OUTPATIENT
Start: 2022-06-23 | End: 2023-04-04

## 2022-06-23 NOTE — TELEPHONE ENCOUNTER
Caller: Behzad Guzman    Relationship: Self    Best call back number: [phone]      Requested Prescriptions:    Requested Prescriptions     Pending Prescriptions Disp Refills   • Testosterone Cypionate (DEPOTESTOTERONE CYPIONATE) 200 MG/ML injection 10 mL 0     Sig: Inject 1 mL into the appropriate muscle as directed by prescriber Every 14 (Fourteen) Days.        Pharmacy where request should be sent: [facility]/PHARMACY #[id number] - [facility] [address] [phone] [facility] [phone] FX     Additional details provided by patient: PATIENT STATES THE [facility] PH[facility]Y CAN FILL THE MEDICATION TODAY FOR HIM WITH NO PROBLEM   KAREN WAS REQUIRING A WRITTEN PRESCRIPTION   MEDICATION IS DUE TO BE FILLED ON 6-31-22 AND THE PATIENT IS TRYING TO GET IT READY SO THERE WILL BE NO PROBLEM WITH THE PHARMACY     Does the patient have less than a 3 day supply:  [] Yes  [x] No

## 2022-06-23 NOTE — TELEPHONE ENCOUNTER
Rx Refill Note  Requested Prescriptions     Pending Prescriptions Disp Refills   • Testosterone Cypionate (DEPOTESTOTERONE CYPIONATE) 200 MG/ML injection 10 mL 0     Sig: Inject 1 mL into the appropriate muscle as directed by prescriber Every 14 (Fourteen) Days.      Last office visit with prescribing clinician: 6/21/2022      Next office visit with prescribing clinician: 7/20/2022 3}  Megan Russ MA  06/23/22, 09:26 EDT     Last fill: 05/20/2022     Androgens Protocol Failed 06/23/2022 09:23 AM   Protocol Details  Testosterone level done in past 12 months    PSA done in past 12 months

## 2022-06-28 ENCOUNTER — NURSE TRIAGE (OUTPATIENT)
Dept: CALL CENTER | Facility: HOSPITAL | Age: 72
End: 2022-06-28

## 2022-06-28 NOTE — TELEPHONE ENCOUNTER
He feel down and peeled the left arm of skin.  This occurred last night. He cleaned it last night. Follow care advice.     Reason for Disposition  • [1] Minor cut, scratch, scrape, or scab AND [2] from self-injury (e.g., cutting, picking; self-harm) AND [3] stable (i.e., not suicidal, not out of control)    Additional Information  • Negative: [1] Major bleeding (e.g., actively dripping or spurting) AND [2] can't be stopped  • Negative: Amputation  • Negative: Shock suspected (e.g., cold/pale/clammy skin, too weak to stand, low BP, rapid pulse)  • Negative: [1] Knife wound (or other possibly deep cut) AND [2] to chest, abdomen, back, neck, or head  • Negative: Sounds like a life-threatening emergency to the triager  • Negative: Animal bite  • Negative: Human bite  • Negative: Puncture wound  • Negative: Foreign body in skin (e.g., splinter, sliver)  • Negative: Bruises not from an injury  • Negative: Wound infection suspected (cut or other wound now looks infected)  • Negative: Electrical burn  • Negative: Chemical burn  • Negative: Thermal burn  • Negative: [1] Bleeding AND [2] won't stop after 10 minutes of direct pressure (using correct technique)  • Negative: Skin is split open or gaping (or length > 1/2 inch or 12 mm on the skin, 1/4 inch or 6 mm on the face)  • Negative: [1] Deep cut AND [2] can see bone or tendons  • Negative: Skin loss goes very deep (e.g., can see bones or tendons)  • Negative: Skin loss involves more than 10% of body surface area (BSA)  • Negative: [1] Dirt in the wound AND [2] not removed with 15 minutes of scrubbing  • Negative: High pressure injection injury (e.g., from grease gun or paint gun, usually work-related)  • Negative: Wound causes numbness (i.e., loss of sensation)  • Negative: Wound causes weakness (i.e., decreased ability to move hand, finger, toe)  • Negative: Sounds like a serious injury to the triager  • Negative: [1] SEVERE pain AND [2] not improved 2 hours after pain  "medicine/ice packs  • Negative: [1] Looks infected AND [2] large red area or streak (>2 inches or 5 cm)  • Negative: [1] Fever AND [2] spreading red area or streak  • Negative: Suspicious history for the injury  • Negative: [1] Raised bruise AND [2] size > 2 inches (5 cm) AND [3] expanding  • Negative: [1] Looks infected (spreading redness, pus) AND [2] no fever  • Negative: No prior tetanus shots (or is not fully vaccinated)  • Negative: [1] HIV positive or severe immunodeficiency (severely weak immune system) AND [2] DIRTY cut or scrape  • Negative: [1] Last tetanus shot > 5 years ago AND [2] DIRTY cut or scrape  • Negative: [1] Last tetanus shot > 10 years ago AND [2] CLEAN cut or scrape (e.g., object AND skin were clean)  • Negative: [1] After 14 days AND [2] wound isn't healed  • Negative: [1] Minor skin injury (e,g,. minor cut, scratch, scrape) on foot AND [2] diabetes (diabetes mellitus)    Answer Assessment - Initial Assessment Questions  1. APPEARANCE of INJURY: \"What does the injury look like?\"       He has a skin tear   2. SIZE: \"How large is the cut?\"       Size of a police patch   3. BLEEDING: \"Is it bleeding now?\" If Yes, ask: \"Is it difficult to stop?\"       no  4. LOCATION: \"Where is the injury located?\"       Arm to the left   5. ONSET: \"How long ago did the injury occur?\"       Yesterday   6. MECHANISM: \"Tell me how it happened.\"       Fell   7. TETANUS: \"When was the last tetanus booster?\"      n  8. PREGNANCY: \"Is there any chance you are pregnant?\" \"When was your last menstrual period?\"      n    Protocols used: SKIN INJURY-ADULT-AH      "

## 2022-07-05 RX ORDER — OMEPRAZOLE 40 MG/1
40 CAPSULE, DELAYED RELEASE ORAL DAILY
Qty: 90 CAPSULE | Refills: 1 | Status: SHIPPED | OUTPATIENT
Start: 2022-07-05

## 2022-07-05 NOTE — TELEPHONE ENCOUNTER
Rx Refill Note  Requested Prescriptions     Pending Prescriptions Disp Refills   • omeprazole (priLOSEC) 40 MG capsule [Pharmacy Med Name: OMEPRAZOLE 40MG CAPSULES] 90 capsule 1     Sig: TAKE 1 CAPSULE BY MOUTH DAILY BEFORE A MEAL      Last office visit with prescribing clinician: 6/21/2022      Next office visit with prescribing clinician: 7/20/2022            iDann Rader MA  07/05/22, 09:02 EDT

## 2022-07-06 NOTE — PROGRESS NOTES
"[facility]  Heart [facility] Valve [facility]    Chief Complaint  Hypertension and Follow-up    Subjective    History of Present Illness {CC  Problem List  Visit  Diagnosis   Encounters  Notes  Medications  Labs  Result Review Imaging  Media :23}     Behzad Guzman is a 71 y.o. male with hypertension, hyperlipidemia, asthma who presents today for evaluation of chest pain at the request of . He reports that all of a sudden that his blood pressure \"went crazy\" which it does from time to time. He admits he went without his medication for some time because it was controlled. He reports that when his blood pressure was elevated he had some associated chest tightness. Since resuming lisinopril and metoprolol he says his SBP was 120. At his last office visit his lisinopril was changed to losartan/HCTZ but he doesn't recall this and says his blood pressure is fine on the lisinopril.  He currently denies chest pain. Denies exertional dyspnea or chest pain. He is physically limited by musculoskeletal ailments.  Patient is not exactly sure what medications he is currently taking         Cardiac risks: HTN, hyperlipidemia, gender, age    Objective     Vital Signs:   Vitals:    07/07/22 1518   BP: 166/83   BP Location: Left arm   Patient Position: Sitting   Cuff Size: Adult   Pulse: 80   Resp: 18   Temp: 97.8 °F (36.6 °C)   TempSrc: Temporal   SpO2: 97%   Weight: 86.9 kg (191 lb 8 oz)   Height: 182.9 cm (72\")     Body mass index is 25.97 kg/m².  Physical Exam  Vitals reviewed.   Constitutional:       Appearance: Normal appearance.   HENT:      Head: Normocephalic.   Neck:      Vascular: No carotid bruit.   Cardiovascular:      Rate and Rhythm: Normal rate and regular rhythm.      Pulses: Normal pulses.      Heart sounds: S1 normal and S2 normal. Murmur heard.    Systolic murmur is present with a grade of 2/6.  Pulmonary:      Effort: Pulmonary effort is normal. No respiratory distress.      Breath " sounds: Normal breath sounds.   Chest:      Chest wall: No tenderness.   Abdominal:      General: Abdomen is flat.      Palpations: Abdomen is soft.   Musculoskeletal:      Cervical back: Neck supple.      Right lower leg: No edema.      Left lower leg: No edema.   Skin:     General: Skin is warm and dry.   Neurological:      General: No focal deficit present.      Mental Status: He is alert and oriented to person, place, and time. Mental status is at baseline.   Psychiatric:         Mood and Affect: Mood normal.         Behavior: Behavior normal.         Thought Content: Thought content normal.              Result Review  Data Reviewed:{ Labs  Result Review  Imaging  Med Tab  Media :23}   POC Glycosylated Hemoglobin (Hb A1C) (06/21/2022 15:58)  C-reactive protein (03/30/2022 15:45)  Comprehensive Metabolic Panel (03/30/2022 15:45)  CBC & Differential (03/30/2022 15:45)  ECG 12 Lead (06/29/2021 21:49)    Consultant notes Dr. Tran            Assessment and Plan {CC Problem List  Visit Diagnosis  ROS  Review (Popup)  Health Maintenance  Quality  BestPractice  Medications  SmartSets  SnapShot Encounters  Media :23}   1. Chest pain, unspecified type  Symptoms seem to be primarily with accelerated hypertension, he does note that he has had some intermittent episodes of chest pain in the past.  Due to multiple ASCVD risk we will proceed with stress test.  Patient unable to walk on treadmill due to musculoskeletal ailments  - ECG 12 Lead; Future  - Adult Transthoracic Echo Complete W/ Cont if Necessary Per Protocol; Future  - Stress Test With Myocardial Perfusion (1 Day); Future    2. Essential hypertension  Elevated today but he tells me he did not take his lisinopril.  I reviewed a list from his pharmacy and appears he picked up the losartan/HCTZ, lisinopril and amlodipine.  He is not sure if he is taking all 3.  I advised him to stop lisinopril.  Continue losartan/HCTZ, amlodipine and metoprolol  - Adult  Transthoracic Echo Complete W/ Cont if Necessary Per Protocol; Future  - Stress Test With Myocardial Perfusion (1 Day); Future    3. Heart murmur    - Adult Transthoracic Echo Complete W/ Cont if Necessary Per Protocol; Future  - Stress Test With Myocardial Perfusion (1 Day); Future    Provided him a list of his medications.  Advised him to call if blood pressures are consistently elevated.  Keep scheduled follow-up with Dr.Stai Roni Clayton {Instructions Charge Capture  Follow-up Communications :23}   No follow-ups on file.    Patient was given instructions and counseling regarding his condition or for health maintenance advice. Please see specific information pulled into the AVS if appropriate.  Advised to call the Heart and Valve Center with any questions, concerns, or worsening symptoms.

## 2022-07-07 ENCOUNTER — HOSPITAL ENCOUNTER (OUTPATIENT)
Dept: CARDIOLOGY | Facility: HOSPITAL | Age: 72
Discharge: HOME | End: 2022-07-07

## 2022-07-07 ENCOUNTER — OFFICE VISIT (OUTPATIENT)
Dept: CARDIOLOGY | Facility: HOSPITAL | Age: 72
End: 2022-07-07

## 2022-07-07 VITALS
BODY MASS INDEX: 25.94 KG/M2 | DIASTOLIC BLOOD PRESSURE: 83 MMHG | TEMPERATURE: 97.8 F | WEIGHT: 191.5 LBS | HEART RATE: 80 BPM | HEIGHT: 72 IN | RESPIRATION RATE: 18 BRPM | OXYGEN SATURATION: 97 % | SYSTOLIC BLOOD PRESSURE: 166 MMHG

## 2022-07-07 DIAGNOSIS — R01.1 HEART MURMUR: ICD-10-CM

## 2022-07-07 DIAGNOSIS — R07.9 CHEST PAIN, UNSPECIFIED TYPE: Primary | ICD-10-CM

## 2022-07-07 DIAGNOSIS — I10 ESSENTIAL HYPERTENSION: ICD-10-CM

## 2022-07-07 DIAGNOSIS — R07.9 CHEST PAIN, UNSPECIFIED TYPE: ICD-10-CM

## 2022-07-07 LAB
QT INTERVAL: 354 MS
QTC INTERVAL: 430 MS

## 2022-07-07 PROCEDURE — 93010 ELECTROCARDIOGRAM REPORT: CPT | Performed by: STUDENT IN AN ORGANIZED HEALTH CARE EDUCATION/TRAINING PROGRAM

## 2022-07-07 PROCEDURE — 93005 ELECTROCARDIOGRAM TRACING: CPT | Performed by: NURSE PRACTITIONER

## 2022-07-07 PROCEDURE — 99214 OFFICE O/P EST MOD 30 MIN: CPT | Performed by: NURSE PRACTITIONER

## 2022-07-07 RX ORDER — LISINOPRIL 10 MG/1
10 TABLET ORAL DAILY
COMMUNITY
End: 2022-11-07

## 2022-07-08 ENCOUNTER — TELEPHONE (OUTPATIENT)
Dept: CARDIOLOGY | Facility: HOSPITAL | Age: 72
End: 2022-07-08

## 2022-07-08 NOTE — TELEPHONE ENCOUNTER
----- Message from Kimi Del Rosario MA sent at 7/7/2022  4:43 PM EDT -----  Pt called and left message stating he wanted to give another medication he forgot he was taking. Did not specify medication.

## 2022-08-15 ENCOUNTER — TELEPHONE (OUTPATIENT)
Dept: FAMILY MEDICINE CLINIC | Facility: CLINIC | Age: 72
End: 2022-08-15

## 2022-08-15 NOTE — TELEPHONE ENCOUNTER
Contacted staff at [facility] to discuss further. I was advised that patient had received diabetic shoes earlier this year. He is requesting a specific brand which the clinician can arrange. He is scheduled to follow up with them 8/29    They requested that I fax recent OV notes to their office.     [phone] fax

## 2022-08-15 NOTE — TELEPHONE ENCOUNTER
Caller: Behzad Guzman WINNIE    Relationship: Self    Best call back number: [phone]    What orders are you requesting (i.e. 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  [facility] PHONE NUMBER:[phone]

## 2022-08-17 ENCOUNTER — HOSPITAL ENCOUNTER (OUTPATIENT)
Dept: CARDIOLOGY | Facility: HOSPITAL | Age: 72
Discharge: HOME | End: 2022-08-17
Admitting: NURSE PRACTITIONER

## 2022-08-17 ENCOUNTER — HOSPITAL ENCOUNTER (OUTPATIENT)
Dept: CARDIOLOGY | Facility: HOSPITAL | Age: 72
Discharge: HOME | End: 2022-08-17

## 2022-08-17 VITALS — HEIGHT: 72 IN | BODY MASS INDEX: 25.87 KG/M2 | WEIGHT: 191 LBS

## 2022-08-17 DIAGNOSIS — I10 ESSENTIAL HYPERTENSION: ICD-10-CM

## 2022-08-17 DIAGNOSIS — R07.9 CHEST PAIN, UNSPECIFIED TYPE: ICD-10-CM

## 2022-08-17 DIAGNOSIS — R01.1 HEART MURMUR: ICD-10-CM

## 2022-08-17 LAB
ASCENDING AORTA: 3.5 CM
BH CV ECHO LEFT VENTRICLE BASAL CAVITARY GRADIENT: 27 MMHG
BH CV ECHO MEAS - AO MAX PG: 13.6 MMHG
BH CV ECHO MEAS - AO MEAN PG: 8.2 MMHG
BH CV ECHO MEAS - AO ROOT DIAM: 2.9 CM
BH CV ECHO MEAS - AO V2 MAX: 184.6 CM/SEC
BH CV ECHO MEAS - AO V2 VTI: 34 CM
BH CV ECHO MEAS - AVA(I,D): 3.1 CM2
BH CV ECHO MEAS - EDV(CUBED): 102.5 ML
BH CV ECHO MEAS - EDV(MOD-SP2): 52.8 ML
BH CV ECHO MEAS - EDV(MOD-SP4): 79.1 ML
BH CV ECHO MEAS - EF(MOD-BP): 54.2 %
BH CV ECHO MEAS - EF(MOD-SP2): 49.4 %
BH CV ECHO MEAS - EF(MOD-SP4): 61.7 %
BH CV ECHO MEAS - ESV(CUBED): 23.3 ML
BH CV ECHO MEAS - ESV(MOD-SP2): 26.7 ML
BH CV ECHO MEAS - ESV(MOD-SP4): 30.3 ML
BH CV ECHO MEAS - FS: 38.9 %
BH CV ECHO MEAS - IVS/LVPW: 0.73 CM
BH CV ECHO MEAS - IVSD: 0.92 CM
BH CV ECHO MEAS - LA DIMENSION: 4.2 CM
BH CV ECHO MEAS - LAT PEAK E' VEL: 7.6 CM/SEC
BH CV ECHO MEAS - LV DIASTOLIC VOL/BSA (35-75): 38 CM2
BH CV ECHO MEAS - LV MASS(C)D: 182.6 GRAMS
BH CV ECHO MEAS - LV MAX PG: 11.4 MMHG
BH CV ECHO MEAS - LV MEAN PG: 6.3 MMHG
BH CV ECHO MEAS - LV SYSTOLIC VOL/BSA (12-30): 14.6 CM2
BH CV ECHO MEAS - LV V1 MAX: 168.9 CM/SEC
BH CV ECHO MEAS - LV V1 VTI: 22.6 CM
BH CV ECHO MEAS - LVIDD: 4.7 CM
BH CV ECHO MEAS - LVIDS: 2.9 CM
BH CV ECHO MEAS - LVOT AREA: 4.6 CM2
BH CV ECHO MEAS - LVOT DIAM: 2.42 CM
BH CV ECHO MEAS - LVPWD: 1.25 CM
BH CV ECHO MEAS - MED PEAK E' VEL: 6.4 CM/SEC
BH CV ECHO MEAS - MV A MAX VEL: 108.2 CM/SEC
BH CV ECHO MEAS - MV DEC SLOPE: 515.7 CM/SEC2
BH CV ECHO MEAS - MV DEC TIME: 0.12 MSEC
BH CV ECHO MEAS - MV E MAX VEL: 64.5 CM/SEC
BH CV ECHO MEAS - MV E/A: 0.6
BH CV ECHO MEAS - PA ACC TIME: 0.11 SEC
BH CV ECHO MEAS - PA PR(ACCEL): 29.1 MMHG
BH CV ECHO MEAS - PA V2 MAX: 118.9 CM/SEC
BH CV ECHO MEAS - SI(MOD-SP2): 12.5 ML/M2
BH CV ECHO MEAS - SI(MOD-SP4): 23.4 ML/M2
BH CV ECHO MEAS - SV(LVOT): 104.4 ML
BH CV ECHO MEAS - SV(MOD-SP2): 26.1 ML
BH CV ECHO MEAS - SV(MOD-SP4): 48.8 ML
BH CV ECHO MEAS - TAPSE (>1.6): 1.82 CM
BH CV ECHO MEASUREMENTS AVERAGE E/E' RATIO: 9.21
BH CV VAS BP LEFT ARM: NORMAL MMHG
BH CV XLRA - RV BASE: 3.4 CM
BH CV XLRA - RV LENGTH: 7.5 CM
BH CV XLRA - RV MID: 2.35 CM
BH CV XLRA - TDI S': 12.8 CM/SEC
IVRT: 120 MSEC
LEFT ATRIUM VOLUME INDEX: 18.7 ML/M2
MAXIMAL PREDICTED HEART RATE: 150 BPM
MAXIMAL PREDICTED HEART RATE: 150 BPM
STRESS TARGET HR: 128 BPM
STRESS TARGET HR: 128 BPM

## 2022-08-17 PROCEDURE — 93306 TTE W/DOPPLER COMPLETE: CPT

## 2022-08-17 PROCEDURE — 93306 TTE W/DOPPLER COMPLETE: CPT | Performed by: INTERNAL MEDICINE

## 2022-08-17 NOTE — PROGRESS NOTES
Your heart squeezes normal and there is no significant valve abnormalities. Your heart muscle is thick, which likely is from high blood pressure.  Is very important that your blood pressure remain well controlled with most blood pressures running 130/80 or less.  Please call if blood pressure consistently elevated.

## 2022-08-18 DIAGNOSIS — I10 PRIMARY HYPERTENSION: ICD-10-CM

## 2022-08-18 NOTE — TELEPHONE ENCOUNTER
Rx Refill Note  Requested Prescriptions     Pending Prescriptions Disp Refills   • losartan-hydrochlorothiazide (HYZAAR) 50-12.5 MG per tablet [Pharmacy Med Name: LOSARTAN-HCTZ 50-12.5 MG TAB] 30 tablet 1     Sig: TAKE ONE TABLET BY MOUTH DAILY      Last office visit with prescribing clinician: 6/21/2022      Next office visit with prescribing clinician: Visit date not found            Paula Delgado MA  08/18/22, 08:42 EDT     Pt will call back to schedule appt    He is due for a f/u on htn     HUB CAN RELY AND SCHEDULE

## 2022-08-19 RX ORDER — LOSARTAN POTASSIUM AND HYDROCHLOROTHIAZIDE 12.5; 5 MG/1; MG/1
TABLET ORAL
Qty: 30 TABLET | Refills: 1 | OUTPATIENT
Start: 2022-08-19

## 2022-08-19 NOTE — TELEPHONE ENCOUNTER
Rx Refill Note  Requested Prescriptions     Pending Prescriptions Disp Refills   • losartan-hydrochlorothiazide (HYZAAR) 50-12.5 MG per tablet [Pharmacy Med Name: LOSARTAN-HCTZ 50-12.5 MG TAB] 30 tablet 1     Sig: TAKE ONE TABLET BY MOUTH DAILY      Last office visit with prescribing clinician: 6/21/2022      Next office visit with prescribing clinician: Visit date not found            Mona Brown MA  08/19/22, 15:47 EDT     Patient was to follow up in 4 weeks for HTN called and spoke with patient he said he doesn't have a car right now and will call back to schedule apt and does have some medication

## 2022-08-25 ENCOUNTER — HOSPITAL ENCOUNTER (OUTPATIENT)
Dept: CARDIOLOGY | Facility: HOSPITAL | Age: 72
End: 2022-08-25

## 2022-09-02 DIAGNOSIS — I10 PRIMARY HYPERTENSION: ICD-10-CM

## 2022-09-06 RX ORDER — LOSARTAN POTASSIUM AND HYDROCHLOROTHIAZIDE 12.5; 5 MG/1; MG/1
TABLET ORAL
Qty: 30 TABLET | Refills: 1 | Status: SHIPPED | OUTPATIENT
Start: 2022-09-06 | End: 2022-11-07

## 2022-09-06 NOTE — TELEPHONE ENCOUNTER
Rx Refill Note  Requested Prescriptions     Pending Prescriptions Disp Refills   • losartan-hydrochlorothiazide (HYZAAR) 50-12.5 MG per tablet [Pharmacy Med Name: LOSARTAN-HCTZ 50-12.5 MG TAB] 30 tablet 1     Sig: TAKE ONE TABLET BY MOUTH DAILY      Last office visit with prescribing clinician: 6/21/2022      Next office visit with prescribing clinician: 9/9/2022            Art Grande MA  09/06/22, 08:35 EDT

## 2022-09-09 ENCOUNTER — OFFICE VISIT (OUTPATIENT)
Dept: FAMILY MEDICINE CLINIC | Facility: CLINIC | Age: 72
End: 2022-09-09

## 2022-09-09 VITALS
SYSTOLIC BLOOD PRESSURE: 136 MMHG | WEIGHT: 196 LBS | HEIGHT: 72 IN | BODY MASS INDEX: 26.55 KG/M2 | HEART RATE: 84 BPM | DIASTOLIC BLOOD PRESSURE: 88 MMHG | OXYGEN SATURATION: 97 %

## 2022-09-09 DIAGNOSIS — I10 PRIMARY HYPERTENSION: ICD-10-CM

## 2022-09-09 DIAGNOSIS — L57.0 ACTINIC KERATOSIS: Primary | ICD-10-CM

## 2022-09-09 DIAGNOSIS — G89.29 CHRONIC SCAPULAR PAIN: ICD-10-CM

## 2022-09-09 DIAGNOSIS — M89.8X1 CHRONIC SCAPULAR PAIN: ICD-10-CM

## 2022-09-09 PROCEDURE — 99214 OFFICE O/P EST MOD 30 MIN: CPT | Performed by: INTERNAL MEDICINE

## 2022-09-09 RX ORDER — FLUOROURACIL 50 MG/G
1 CREAM TOPICAL 2 TIMES DAILY
Qty: 28 G | Refills: 0 | Status: SHIPPED | OUTPATIENT
Start: 2022-09-09 | End: 2022-09-23 | Stop reason: SDUPTHER

## 2022-09-12 ENCOUNTER — HOSPITAL ENCOUNTER (OUTPATIENT)
Dept: GENERAL RADIOLOGY | Facility: HOSPITAL | Age: 72
Discharge: HOME | End: 2022-09-12
Admitting: INTERNAL MEDICINE

## 2022-09-12 DIAGNOSIS — M54.2 CHRONIC NECK PAIN: ICD-10-CM

## 2022-09-12 DIAGNOSIS — M89.8X1 CHRONIC SCAPULAR PAIN: ICD-10-CM

## 2022-09-12 DIAGNOSIS — G89.29 CHRONIC NECK PAIN: ICD-10-CM

## 2022-09-12 DIAGNOSIS — G89.29 CHRONIC SCAPULAR PAIN: ICD-10-CM

## 2022-09-12 PROCEDURE — 72040 X-RAY EXAM NECK SPINE 2-3 VW: CPT

## 2022-09-12 PROCEDURE — 73010 X-RAY EXAM OF SHOULDER BLADE: CPT

## 2022-09-12 NOTE — PROGRESS NOTES
Chief Complaint   Patient presents with   • Hypertension     F/u        HPI:  Behzad Guzman is a 72 y.o. male who presents today for follow-up hypertension.  He reports ongoing right-sided shoulder and scapular pain.  He has a rash on his arm he like evaluated as well.    ROS:  Constitutional: no fevers, night sweats or unexplained weight loss  Eyes: no vision changes  ENT: no runny nose, ear pain, sore throat  Cardio: no chest pain, palpitations  Pulm: no shortness of breath, wheezing, or cough  GI: no abdominal pain or changes in bowel movements  : no difficulty urinating  MSK: no difficulty ambulating, no joint pain  Neuro: no weakness, dizziness or headache  Psych: no trouble sleeping  Endo: no change in appetite      Past Medical History:   Diagnosis Date   • Asthma    • Chronic hip pain, left    • Chronic pain in left shoulder    • Hyperlipidemia    • Hypertension    • Leg length discrepancy    • Neck pain, chronic    • Neuropathy    • Panic attacks    • Pre-diabetes    • Prediabetes    • Spinal stenosis       Family History   Problem Relation Age of Onset   • Heart attack Father 65   • Other Mother         accident   • No Known Problems Maternal Grandmother    • No Known Problems Maternal Grandfather    • No Known Problems Paternal Grandmother    • Stroke Paternal Grandfather       Social History     Socioeconomic History   • Marital status:    Tobacco Use   • Smoking status: Former Smoker     Packs/day: 0.50     Years: 10.00     Pack years: 5.00     Quit date:      Years since quittin.7   • Smokeless tobacco: Never Used   Vaping Use   • Vaping Use: Never used   Substance and Sexual Activity   • Alcohol use: No   • Drug use: No   • Sexual activity: Defer      Allergies   Allergen Reactions   • Peanut Butter Flavor Anaphylaxis   • Shellfish Allergy Anaphylaxis   • Amoxicillin Rash   • Penicillins Rash   • Sulfa Antibiotics Myalgia   • Latex Rash      Immunization History    Administered Date(s) Administered   • COVID-19 (PFIZER) PURPLE CAP 09/14/2021, 10/08/2021   • Flu Vaccine Quad PF >36MO 09/30/2015   • Pneumococcal Conjugate 13-Valent (PCV13) 09/22/2016   • Pneumococcal Polysaccharide (PPSV23) 05/18/2018   • Tdap 04/22/2022        PE:  Vitals:    09/09/22 1516   BP: 136/88   Pulse: 84   SpO2: 97%      Body mass index is 26.57 kg/m².    Gen Appearance: NAD  HEENT: Normocephalic, PERRLA, no thyromegaly, trache midline  Heart: RRR, normal S1 and S2, no murmur  Lungs: CTA b/l, no wheezing, no crackles  Abdomen: Soft, non-tender, non-distended, no guarding and BSx4  MSK: Moves all extremities well, normal gait, no peripheral edema  Pulses: Palpable and equal b/l  Lymph nodes: No palpable lymphadenopathy   Neuro: No focal deficits      Current Outpatient Medications   Medication Sig Dispense Refill   • albuterol (ACCUNEB) 0.63 MG/3ML nebulizer solution Take 3 mL by nebulization Every 6 (Six) Hours As Needed for Wheezing. 3 mL 12   • albuterol sulfate  (90 Base) MCG/ACT inhaler INHALE TWO PUFFS BY MOUTH EVERY 6 HOURS AS NEEDED FOR WHEEZING OR SHORTNESS OF AIR 6.7 g 1   • amLODIPine (NORVASC) 5 MG tablet Take 1 tablet by mouth Daily. 90 tablet 3   • baclofen (LIORESAL) 10 MG tablet Take 0.5 tablets by mouth Every Night. 30 tablet 5   • Cholecalciferol (VITAMIN D) 125 MCG (5000 UT) capsule capsule Take 1 capsule by mouth Daily. 30 capsule 0   • co-enzyme Q-10 50 MG capsule Take 1 capsule by mouth Daily. 90 capsule 3   • diazePAM (VALIUM) 5 MG tablet Take 5 mg by mouth Every 6 (Six) Hours As Needed.  0   • gabapentin (NEURONTIN) 400 MG capsule Take 400 mg by mouth 2 (two) times a day.     • glimepiride (AMARYL) 1 MG tablet TAKE 1 TABLET BY MOUTH EVERY DAY 90 tablet 3   • glucose monitor monitoring kit 1 each Daily. 1 each 0   • HYDROcodone-acetaminophen (NORCO)  MG per tablet Take 1 tablet by mouth Every 6 (Six) Hours As Needed for moderate pain (4-6).     • Lancets (onetouch  "ultrasoft) lancets Use one daily to test blood sugars 100 each 12   • levocetirizine (XYZAL) 5 MG tablet Take 1 tablet by mouth Every Evening. 90 tablet 3   • lisinopril (PRINIVIL,ZESTRIL) 10 MG tablet Take 10 mg by mouth Daily.     • losartan-hydrochlorothiazide (HYZAAR) 50-12.5 MG per tablet TAKE ONE TABLET BY MOUTH DAILY 30 tablet 1   • metFORMIN (GLUCOPHAGE) 500 MG tablet TAKE 1 TABLET BY MOUTH DAILY WITH BREAKFAST 90 tablet 3   • metoprolol tartrate (LOPRESSOR) 50 MG tablet Take 1 tablet by mouth 2 (Two) Times a Day. 180 tablet 3   • mirtazapine (Remeron) 15 MG tablet Take 1 tablet by mouth Every Night. 90 tablet 3   • mupirocin (BACTROBAN) 2 % ointment Apply 1 application topically to the appropriate area as directed 3 (Three) Times a Day. 22 g 0   • Naloxegol Oxalate (MOVANTIK) 12.5 MG tablet Movantik 12.5 mg tablet     • Needle, Disp, 22G X 1\" misc 1 each Every 14 (Fourteen) Days. 100 each 0   • omeprazole (priLOSEC) 40 MG capsule TAKE 1 CAPSULE BY MOUTH DAILY BEFORE A MEAL 90 capsule 1   • ondansetron ODT (ZOFRAN-ODT) 4 MG disintegrating tablet Take 1 tablet by mouth 4 (Four) Times a Day As Needed for Nausea or Vomiting. 15 tablet 0   • OneTouch Verio test strip 1 each by Other route Daily. for testing 300 each 2   • oxyCODONE (ROXICODONE) 10 MG tablet Take 10 mg by mouth At Night As Needed.     • pravastatin (PRAVACHOL) 20 MG tablet TAKE 1 TABLET BY MOUTH EVERY DAY 90 tablet 1   • Syringe 23G X 1\" 3 ML misc Use every 21 days to take testosterone 12 each 1   • Testosterone Cypionate (DEPOTESTOTERONE CYPIONATE) 200 MG/ML injection Inject 1 mL into the appropriate muscle as directed by prescriber Every 14 (Fourteen) Days. 10 mL 0   • fluorouracil (EFUDEX) 5 % cream Apply 1 application topically to the appropriate area as directed 2 (Two) Times a Day for 14 days. 28 g 0     No current facility-administered medications for this visit.        Diagnoses and all orders for this visit:    1. Actinic keratosis " (Primary)  Arm lesions appear to be actinic keratosis.  Significantly worse after sun exposure.  Follow-up with dermatology as scheduled, trial topical fluorouracil.  2. Primary hypertension  Controlled today, no change in medication.  3. Chronic scapular pain  -     XR Scapula Right; Future  Recommend x-ray for further evaluation.  Other orders  -     fluorouracil (EFUDEX) 5 % cream; Apply 1 application topically to the appropriate area as directed 2 (Two) Times a Day for 14 days.  Dispense: 28 g; Refill: 0         Return in about 3 months (around 12/9/2022) for a1c, Medicare Wellness.     Dictated Utilizing Dragon Dictation    Please note that portions of this note were completed with a voice recognition program.    Part of this note may be an electronic transcription/translation of spoken language to printed text using the Dragon Dictation System.

## 2022-09-22 DIAGNOSIS — T46.6X5A MYALGIA DUE TO STATIN: ICD-10-CM

## 2022-09-22 DIAGNOSIS — E55.9 VITAMIN D DEFICIENCY: ICD-10-CM

## 2022-09-22 DIAGNOSIS — M79.10 MYALGIA DUE TO STATIN: ICD-10-CM

## 2022-09-22 RX ORDER — AMPICILLIN TRIHYDRATE 250 MG
CAPSULE ORAL
OUTPATIENT
Start: 2022-09-22

## 2022-09-23 RX ORDER — FLUOROURACIL 50 MG/G
1 CREAM TOPICAL 2 TIMES DAILY
Qty: 28 G | Refills: 0 | Status: SHIPPED | OUTPATIENT
Start: 2022-09-23 | End: 2022-09-26

## 2022-09-23 NOTE — TELEPHONE ENCOUNTER
Patient called stating the spot on his arm was no better. Requested a refill for fluorouracil. Patient is scheduled for 9/27/2022

## 2022-09-26 RX ORDER — FLUOROURACIL 50 MG/G
CREAM TOPICAL
Qty: 40 G | Refills: 1 | Status: SHIPPED | OUTPATIENT
Start: 2022-09-26 | End: 2023-04-04

## 2022-09-26 NOTE — TELEPHONE ENCOUNTER
Rx Refill Note  Requested Prescriptions     Pending Prescriptions Disp Refills   • fluorouracil (EFUDEX) 5 % cream [Pharmacy Med Name: FLUOROURACIL 5% CREAM 40GM] 40 g      Sig: APPLY TO THE AFFECTED AREA TWICE DAILY FOR 14 DAYS      Last office visit with prescribing clinician: 9/9/2022      Next office visit with prescribing clinician: 9/27/2022            Diann Rader MA  09/26/22, 08:09 EDT

## 2022-09-27 ENCOUNTER — OFFICE VISIT (OUTPATIENT)
Dept: FAMILY MEDICINE CLINIC | Facility: CLINIC | Age: 72
End: 2022-09-27

## 2022-09-27 ENCOUNTER — LAB (OUTPATIENT)
Dept: LAB | Facility: HOSPITAL | Age: 72
End: 2022-09-27

## 2022-09-27 VITALS
HEIGHT: 72 IN | HEART RATE: 102 BPM | DIASTOLIC BLOOD PRESSURE: 116 MMHG | WEIGHT: 198 LBS | OXYGEN SATURATION: 98 % | BODY MASS INDEX: 26.82 KG/M2 | SYSTOLIC BLOOD PRESSURE: 184 MMHG

## 2022-09-27 DIAGNOSIS — E55.9 VITAMIN D DEFICIENCY: ICD-10-CM

## 2022-09-27 DIAGNOSIS — Z12.5 ENCOUNTER FOR PROSTATE CANCER SCREENING: ICD-10-CM

## 2022-09-27 DIAGNOSIS — I10 BENIGN ESSENTIAL HYPERTENSION: ICD-10-CM

## 2022-09-27 DIAGNOSIS — M79.604 RIGHT LEG PAIN: ICD-10-CM

## 2022-09-27 DIAGNOSIS — T14.8XXA HEMATOMA: ICD-10-CM

## 2022-09-27 DIAGNOSIS — E11.43 TYPE 2 DIABETES MELLITUS WITH DIABETIC AUTONOMIC NEUROPATHY, WITHOUT LONG-TERM CURRENT USE OF INSULIN: ICD-10-CM

## 2022-09-27 DIAGNOSIS — M79.673 CHRONIC FOOT PAIN, UNSPECIFIED LATERALITY: ICD-10-CM

## 2022-09-27 DIAGNOSIS — L30.9 DERMATITIS: ICD-10-CM

## 2022-09-27 DIAGNOSIS — I10 BENIGN ESSENTIAL HYPERTENSION: Primary | ICD-10-CM

## 2022-09-27 DIAGNOSIS — G89.29 CHRONIC FOOT PAIN, UNSPECIFIED LATERALITY: ICD-10-CM

## 2022-09-27 LAB
BASOPHILS # BLD AUTO: 0.08 10*3/MM3 (ref 0–0.2)
BASOPHILS NFR BLD AUTO: 0.8 % (ref 0–1.5)
DEPRECATED RDW RBC AUTO: 43.2 FL (ref 37–54)
EOSINOPHIL # BLD AUTO: 0.15 10*3/MM3 (ref 0–0.4)
EOSINOPHIL NFR BLD AUTO: 1.5 % (ref 0.3–6.2)
ERYTHROCYTE [DISTWIDTH] IN BLOOD BY AUTOMATED COUNT: 13.5 % (ref 12.3–15.4)
HBA1C MFR BLD: 7.1 % (ref 4.8–5.6)
HCT VFR BLD AUTO: 45.5 % (ref 37.5–51)
HGB BLD-MCNC: 15.9 G/DL (ref 13–17.7)
IMM GRANULOCYTES # BLD AUTO: 0.04 10*3/MM3 (ref 0–0.05)
IMM GRANULOCYTES NFR BLD AUTO: 0.4 % (ref 0–0.5)
LYMPHOCYTES # BLD AUTO: 2.1 10*3/MM3 (ref 0.7–3.1)
LYMPHOCYTES NFR BLD AUTO: 20.6 % (ref 19.6–45.3)
MCH RBC QN AUTO: 30.6 PG (ref 26.6–33)
MCHC RBC AUTO-ENTMCNC: 34.9 G/DL (ref 31.5–35.7)
MCV RBC AUTO: 87.5 FL (ref 79–97)
MONOCYTES # BLD AUTO: 0.76 10*3/MM3 (ref 0.1–0.9)
MONOCYTES NFR BLD AUTO: 7.5 % (ref 5–12)
NEUTROPHILS NFR BLD AUTO: 69.2 % (ref 42.7–76)
NEUTROPHILS NFR BLD AUTO: 7.05 10*3/MM3 (ref 1.7–7)
NRBC BLD AUTO-RTO: 0 /100 WBC (ref 0–0.2)
PLATELET # BLD AUTO: 205 10*3/MM3 (ref 140–450)
PMV BLD AUTO: 12 FL (ref 6–12)
RBC # BLD AUTO: 5.2 10*6/MM3 (ref 4.14–5.8)
WBC NRBC COR # BLD: 10.18 10*3/MM3 (ref 3.4–10.8)

## 2022-09-27 PROCEDURE — 84443 ASSAY THYROID STIM HORMONE: CPT

## 2022-09-27 PROCEDURE — G0103 PSA SCREENING: HCPCS

## 2022-09-27 PROCEDURE — 99215 OFFICE O/P EST HI 40 MIN: CPT | Performed by: INTERNAL MEDICINE

## 2022-09-27 PROCEDURE — 84439 ASSAY OF FREE THYROXINE: CPT

## 2022-09-27 PROCEDURE — 85025 COMPLETE CBC W/AUTO DIFF WBC: CPT

## 2022-09-27 PROCEDURE — 80053 COMPREHEN METABOLIC PANEL: CPT

## 2022-09-27 PROCEDURE — 83036 HEMOGLOBIN GLYCOSYLATED A1C: CPT

## 2022-09-27 PROCEDURE — 82306 VITAMIN D 25 HYDROXY: CPT

## 2022-09-28 LAB
25(OH)D3 SERPL-MCNC: 26 NG/ML (ref 30–100)
ALBUMIN SERPL-MCNC: 4.7 G/DL (ref 3.5–5.2)
ALBUMIN/GLOB SERPL: 2 G/DL
ALP SERPL-CCNC: 56 U/L (ref 39–117)
ALT SERPL W P-5'-P-CCNC: 30 U/L (ref 1–41)
ANION GAP SERPL CALCULATED.3IONS-SCNC: 11.6 MMOL/L (ref 5–15)
AST SERPL-CCNC: 25 U/L (ref 1–40)
BILIRUB SERPL-MCNC: 0.4 MG/DL (ref 0–1.2)
BUN SERPL-MCNC: 6 MG/DL (ref 8–23)
BUN/CREAT SERPL: 6.3 (ref 7–25)
CALCIUM SPEC-SCNC: 9.4 MG/DL (ref 8.6–10.5)
CHLORIDE SERPL-SCNC: 100 MMOL/L (ref 98–107)
CO2 SERPL-SCNC: 24.4 MMOL/L (ref 22–29)
CREAT SERPL-MCNC: 0.96 MG/DL (ref 0.76–1.27)
EGFRCR SERPLBLD CKD-EPI 2021: 84.5 ML/MIN/1.73
GLOBULIN UR ELPH-MCNC: 2.3 GM/DL
GLUCOSE SERPL-MCNC: 171 MG/DL (ref 65–99)
POTASSIUM SERPL-SCNC: 4.1 MMOL/L (ref 3.5–5.2)
PROT SERPL-MCNC: 7 G/DL (ref 6–8.5)
PSA SERPL-MCNC: 0.75 NG/ML (ref 0–4)
SODIUM SERPL-SCNC: 136 MMOL/L (ref 136–145)
T4 FREE SERPL-MCNC: 0.97 NG/DL (ref 0.93–1.7)
TSH SERPL DL<=0.05 MIU/L-ACNC: 2.28 UIU/ML (ref 0.27–4.2)

## 2022-09-28 NOTE — PROGRESS NOTES
Chief Complaint   Patient presents with   • Laceration     On left arm - rash possibly reacting to medicine.   • Rash     Seen dermatology yesterday.        HPI:  Behzad Guzman is a 72 y.o. male who presents today for follow-up hypertension and chronic foot pain.  He did not take his blood pressure medicine today.    ROS:  Constitutional: no fevers, night sweats or unexplained weight loss  Eyes: no vision changes  ENT: no runny nose, ear pain, sore throat  Cardio: no chest pain, palpitations  Pulm: no shortness of breath, wheezing, or cough  GI: no abdominal pain or changes in bowel movements  : no difficulty urinating  MSK: no difficulty ambulating, no joint pain  Neuro: no weakness, dizziness or headache  Psych: no trouble sleeping  Endo: no change in appetite      Past Medical History:   Diagnosis Date   • Asthma    • Chronic hip pain, left    • Chronic pain in left shoulder    • Hyperlipidemia    • Hypertension    • Leg length discrepancy    • Neck pain, chronic    • Neuropathy    • Panic attacks    • Pre-diabetes    • Prediabetes    • Spinal stenosis       Family History   Problem Relation Age of Onset   • Heart attack Father 65   • Other Mother         accident   • No Known Problems Maternal Grandmother    • No Known Problems Maternal Grandfather    • No Known Problems Paternal Grandmother    • Stroke Paternal Grandfather       Social History     Socioeconomic History   • Marital status:    Tobacco Use   • Smoking status: Former Smoker     Packs/day: 0.50     Years: 10.00     Pack years: 5.00     Quit date:      Years since quittin.7   • Smokeless tobacco: Never Used   Vaping Use   • Vaping Use: Never used   Substance and Sexual Activity   • Alcohol use: No   • Drug use: No   • Sexual activity: Defer      Allergies   Allergen Reactions   • Peanut Butter Flavor Anaphylaxis   • Shellfish Allergy Anaphylaxis   • Amoxicillin Rash   • Penicillins Rash   • Sulfa Antibiotics Myalgia   •  Latex Rash      Immunization History   Administered Date(s) Administered   • COVID-19 (PFIZER) PURPLE CAP 09/14/2021, 10/08/2021   • Flu Vaccine Quad PF >36MO 09/30/2015   • Pneumococcal Conjugate 13-Valent (PCV13) 09/22/2016   • Pneumococcal Polysaccharide (PPSV23) 05/18/2018   • Tdap 04/22/2022        PE:  Vitals:    09/27/22 1507   BP: (!) 184/116   Pulse: 102   SpO2: 98%      Body mass index is 26.85 kg/m².    Gen Appearance: NAD  HEENT: Normocephalic, PERRLA, no thyromegaly, trache midline  Heart: RRR, normal S1 and S2, no murmur  Lungs: CTA b/l, no wheezing, no crackles  Abdomen: Soft, non-tender, non-distended, no guarding and BSx4  MSK: Moves all extremities well, normal gait, no peripheral edema  Pulses: Palpable and equal b/l  Lymph nodes: No palpable lymphadenopathy   Neuro: No focal deficits      Current Outpatient Medications   Medication Sig Dispense Refill   • albuterol (ACCUNEB) 0.63 MG/3ML nebulizer solution Take 3 mL by nebulization Every 6 (Six) Hours As Needed for Wheezing. 3 mL 12   • albuterol sulfate  (90 Base) MCG/ACT inhaler INHALE TWO PUFFS BY MOUTH EVERY 6 HOURS AS NEEDED FOR WHEEZING OR SHORTNESS OF AIR 6.7 g 1   • amLODIPine (NORVASC) 5 MG tablet Take 1 tablet by mouth Daily. 90 tablet 3   • baclofen (LIORESAL) 10 MG tablet Take 0.5 tablets by mouth Every Night. 30 tablet 5   • Cholecalciferol (VITAMIN D) 125 MCG (5000 UT) capsule capsule Take 1 capsule by mouth Daily. 30 capsule 0   • co-enzyme Q-10 50 MG capsule Take 1 capsule by mouth Daily. 90 capsule 3   • diazePAM (VALIUM) 5 MG tablet Take 5 mg by mouth Every 6 (Six) Hours As Needed.  0   • fluorouracil (EFUDEX) 5 % cream APPLY TO THE AFFECTED AREA TWICE DAILY FOR 14 DAYS 40 g 1   • gabapentin (NEURONTIN) 400 MG capsule Take 400 mg by mouth 2 (two) times a day.     • glimepiride (AMARYL) 1 MG tablet TAKE 1 TABLET BY MOUTH EVERY DAY 90 tablet 3   • glucose monitor monitoring kit 1 each Daily. 1 each 0   •  "HYDROcodone-acetaminophen (NORCO)  MG per tablet Take 1 tablet by mouth Every 6 (Six) Hours As Needed for moderate pain (4-6).     • Lancets (onetouch ultrasoft) lancets Use one daily to test blood sugars 100 each 12   • levocetirizine (XYZAL) 5 MG tablet Take 1 tablet by mouth Every Evening. 90 tablet 3   • lisinopril (PRINIVIL,ZESTRIL) 10 MG tablet Take 10 mg by mouth Daily.     • losartan-hydrochlorothiazide (HYZAAR) 50-12.5 MG per tablet TAKE ONE TABLET BY MOUTH DAILY 30 tablet 1   • metFORMIN (GLUCOPHAGE) 500 MG tablet TAKE 1 TABLET BY MOUTH DAILY WITH BREAKFAST 90 tablet 3   • metoprolol tartrate (LOPRESSOR) 50 MG tablet Take 1 tablet by mouth 2 (Two) Times a Day. 180 tablet 3   • mirtazapine (Remeron) 15 MG tablet Take 1 tablet by mouth Every Night. 90 tablet 3   • mupirocin (BACTROBAN) 2 % ointment Apply 1 application topically to the appropriate area as directed 3 (Three) Times a Day. 22 g 0   • Naloxegol Oxalate (MOVANTIK) 12.5 MG tablet Movantik 12.5 mg tablet     • Needle, Disp, 22G X 1\" misc 1 each Every 14 (Fourteen) Days. 100 each 0   • omeprazole (priLOSEC) 40 MG capsule TAKE 1 CAPSULE BY MOUTH DAILY BEFORE A MEAL 90 capsule 1   • ondansetron ODT (ZOFRAN-ODT) 4 MG disintegrating tablet Take 1 tablet by mouth 4 (Four) Times a Day As Needed for Nausea or Vomiting. 15 tablet 0   • OneTouch Verio test strip 1 each by Other route Daily. for testing 300 each 2   • oxyCODONE (ROXICODONE) 10 MG tablet Take 10 mg by mouth At Night As Needed.     • pravastatin (PRAVACHOL) 20 MG tablet TAKE 1 TABLET BY MOUTH EVERY DAY 90 tablet 1   • Syringe 23G X 1\" 3 ML misc Use every 21 days to take testosterone 12 each 1   • Testosterone Cypionate (DEPOTESTOTERONE CYPIONATE) 200 MG/ML injection Inject 1 mL into the appropriate muscle as directed by prescriber Every 14 (Fourteen) Days. 10 mL 0   • Diclofenac Sodium (VOLTAREN) 1 % gel gel Apply 4 g topically to the appropriate area as directed 3 (Three) Times a Day. 100 " g 0     No current facility-administered medications for this visit.      Counseling was given to patient for the following topics: instructions for management, impressions and risks and benefits of treatment options . Total time of the encounter was 40 minutes and 25 minutes was spent face to face counseling.    Diagnoses and all orders for this visit:    1. Benign essential hypertension (Primary)  -     CBC & Differential; Future  -     Comprehensive Metabolic Panel; Future  -     Hemoglobin A1c; Future  -     TSH+Free T4; Future  -     PSA Screen; Future  -     Vitamin D 25 Hydroxy; Future  Uncontrolled but noncompliant with medication.  See back in 3 months, encourage compliance.  2. Chronic foot pain, unspecified laterality  -     Diclofenac Sodium (VOLTAREN) 1 % gel gel; Apply 4 g topically to the appropriate area as directed 3 (Three) Times a Day.  Dispense: 100 g; Refill: 0    3. Type 2 diabetes mellitus with diabetic autonomic neuropathy, without long-term current use of insulin (HCC)  -     CBC & Differential; Future  -     Comprehensive Metabolic Panel; Future  -     Hemoglobin A1c; Future  -     TSH+Free T4; Future  -     PSA Screen; Future  -     Vitamin D 25 Hydroxy; Future    4. Dermatitis  Follow-up with dermatology as scheduled.  5. Right leg pain  -     CBC & Differential; Future  -     Comprehensive Metabolic Panel; Future  -     Hemoglobin A1c; Future  -     TSH+Free T4; Future  -     PSA Screen; Future  -     Vitamin D 25 Hydroxy; Future    6. Hematoma  -     CBC & Differential; Future  -     Comprehensive Metabolic Panel; Future  -     Hemoglobin A1c; Future  -     TSH+Free T4; Future  -     PSA Screen; Future  -     Vitamin D 25 Hydroxy; Future    7. Encounter for prostate cancer screening  -     CBC & Differential; Future  -     Comprehensive Metabolic Panel; Future  -     Hemoglobin A1c; Future  -     TSH+Free T4; Future  -     PSA Screen; Future  -     Vitamin D 25 Hydroxy; Future    8.  Vitamin D deficiency  -     CBC & Differential; Future  -     Comprehensive Metabolic Panel; Future  -     Hemoglobin A1c; Future  -     TSH+Free T4; Future  -     PSA Screen; Future  -     Vitamin D 25 Hydroxy; Future         Return in about 3 months (around 12/27/2022) for htn, Medicare Wellness.     Dictated Utilizing Dragon Dictation    Please note that portions of this note were completed with a voice recognition program.    Part of this note may be an electronic transcription/translation of spoken language to printed text using the Dragon Dictation System.

## 2022-10-27 ENCOUNTER — TELEPHONE (OUTPATIENT)
Dept: FAMILY MEDICINE CLINIC | Facility: CLINIC | Age: 72
End: 2022-10-27

## 2022-10-27 NOTE — TELEPHONE ENCOUNTER
Patient slipped and is having a flare of pain from his leg to his hip. He is requesting an MRI because of his hx of musculoskeletal injuries in the past.     I advised him that we cannot order an MRI withouth first evaluating him further. He has requested an appt tomorrow. Dr. Tran has limited availability and he has been scheduled with an alternate provider to discuss further.

## 2022-10-27 NOTE — TELEPHONE ENCOUNTER
Mr. Guzman is calling concerned that the slip and fall he's had today was messed up up the pins and windy in his leg, He's wanting to speak with you when you have a moment. He thinks he may need and MRI but he's wanting to know what your thoughts or concerns are.    Please advise   Behzad Wanmonik  [phone]

## 2022-10-28 ENCOUNTER — TELEPHONE (OUTPATIENT)
Dept: FAMILY MEDICINE CLINIC | Facility: CLINIC | Age: 72
End: 2022-10-28

## 2022-11-04 DIAGNOSIS — I10 PRIMARY HYPERTENSION: ICD-10-CM

## 2022-11-04 NOTE — TELEPHONE ENCOUNTER
Attempted to call patient to verify his medications. NO answer. LVM with office given.     Patient has 2 blood pressure medications on his list. Lisinopril and losartan. He should only be taking one. Need to verify which one he takes.     Hub may relay message and document.

## 2022-11-04 NOTE — TELEPHONE ENCOUNTER
Caller: Behzad Guzman    Relationship: Self    Best call back number:   [phone]    HUB TO READ MESSAGE RELAYED.    HE IS TAKING THE LOSARTAN BUT DOES NOT THINK HE IS TAKING LISINOPRIL.

## 2022-11-07 RX ORDER — LOSARTAN POTASSIUM AND HYDROCHLOROTHIAZIDE 12.5; 5 MG/1; MG/1
TABLET ORAL
Qty: 30 TABLET | Refills: 1 | Status: SHIPPED | OUTPATIENT
Start: 2022-11-07 | End: 2023-01-04

## 2022-11-07 NOTE — TELEPHONE ENCOUNTER
Rx Refill Note  Requested Prescriptions     Pending Prescriptions Disp Refills   • losartan-hydrochlorothiazide (HYZAAR) 50-12.5 MG per tablet [Pharmacy Med Name: LOSARTAN-HCTZ 50-12.5 MG TAB] 30 tablet 1     Sig: TAKE ONE TABLET BY MOUTH DAILY      Last office visit with prescribing clinician: 9/27/2022      Next office visit with prescribing clinician: 12/12/2022            Art Grande MA  11/07/22, 08:04 EST

## 2022-11-08 ENCOUNTER — HOSPITAL ENCOUNTER (OUTPATIENT)
Dept: GENERAL RADIOLOGY | Facility: HOSPITAL | Age: 72
Discharge: HOME | End: 2022-11-08
Admitting: INTERNAL MEDICINE

## 2022-11-08 ENCOUNTER — TELEPHONE (OUTPATIENT)
Dept: FAMILY MEDICINE CLINIC | Facility: CLINIC | Age: 72
End: 2022-11-08

## 2022-11-08 ENCOUNTER — OFFICE VISIT (OUTPATIENT)
Dept: FAMILY MEDICINE CLINIC | Facility: CLINIC | Age: 72
End: 2022-11-08

## 2022-11-08 VITALS
DIASTOLIC BLOOD PRESSURE: 70 MMHG | OXYGEN SATURATION: 97 % | BODY MASS INDEX: 25.73 KG/M2 | HEIGHT: 72 IN | HEART RATE: 87 BPM | WEIGHT: 190 LBS | SYSTOLIC BLOOD PRESSURE: 120 MMHG

## 2022-11-08 DIAGNOSIS — G89.29 CHRONIC FOOT PAIN, UNSPECIFIED LATERALITY: ICD-10-CM

## 2022-11-08 DIAGNOSIS — I10 PRIMARY HYPERTENSION: ICD-10-CM

## 2022-11-08 DIAGNOSIS — M25.552 LEFT HIP PAIN: Primary | ICD-10-CM

## 2022-11-08 DIAGNOSIS — M25.552 LEFT HIP PAIN: ICD-10-CM

## 2022-11-08 DIAGNOSIS — M79.673 CHRONIC FOOT PAIN, UNSPECIFIED LATERALITY: ICD-10-CM

## 2022-11-08 PROCEDURE — 73502 X-RAY EXAM HIP UNI 2-3 VIEWS: CPT

## 2022-11-08 PROCEDURE — 72220 X-RAY EXAM SACRUM TAILBONE: CPT

## 2022-11-08 PROCEDURE — 99214 OFFICE O/P EST MOD 30 MIN: CPT | Performed by: INTERNAL MEDICINE

## 2022-11-08 NOTE — TELEPHONE ENCOUNTER
"Patient called stating that he just left the office and forgot to ask Dr. Tran for a \"compound cream for his left second toe.\" Patient requests a call back. [place] advise.  "

## 2022-11-10 NOTE — TELEPHONE ENCOUNTER
Spoke with patient to see where he would like to script. He wants it to go the [facility] pharmacy. Faxed it over.

## 2022-11-10 NOTE — PROGRESS NOTES
Chief Complaint   Patient presents with   • Fall     Fell off a tractor    • Hip Pain       HPI:  Behzad Guzman is a 72 y.o. male who presents today for left hip and back pain after falling off his tractor.  Ongoing for the last several weeks.    ROS:  Constitutional: no fevers, night sweats or unexplained weight loss  Eyes: no vision changes  ENT: no runny nose, ear pain, sore throat  Cardio: no chest pain, palpitations  Pulm: no shortness of breath, wheezing, or cough  GI: no abdominal pain or changes in bowel movements  : no difficulty urinating  MSK: no difficulty ambulating, + joint pain  Neuro: no weakness, dizziness or headache  Psych: no trouble sleeping  Endo: no change in appetite      Past Medical History:   Diagnosis Date   • Asthma    • Chronic hip pain, left    • Chronic pain in left shoulder    • Hyperlipidemia    • Hypertension    • Leg length discrepancy    • Neck pain, chronic    • Neuropathy    • Panic attacks    • Pre-diabetes    • Prediabetes    • Spinal stenosis       Family History   Problem Relation Age of Onset   • Heart attack Father 65   • Other Mother         accident   • No Known Problems Maternal Grandmother    • No Known Problems Maternal Grandfather    • No Known Problems Paternal Grandmother    • Stroke Paternal Grandfather       Social History     Socioeconomic History   • Marital status:    Tobacco Use   • Smoking status: Former     Packs/day: 0.50     Years: 10.00     Pack years: 5.00     Types: Cigarettes     Quit date:      Years since quittin.8   • Smokeless tobacco: Never   Vaping Use   • Vaping Use: Never used   Substance and Sexual Activity   • Alcohol use: No   • Drug use: No   • Sexual activity: Defer      Allergies   Allergen Reactions   • Peanut Butter Flavor Anaphylaxis   • Shellfish Allergy Anaphylaxis   • Amoxicillin Rash   • Penicillins Rash   • Sulfa Antibiotics Myalgia   • Latex Rash      Immunization History   Administered Date(s)  Administered   • COVID-19 (PFIZER) PURPLE CAP 09/14/2021, 10/08/2021   • Flu Vaccine Quad PF >36MO 09/30/2015   • Pneumococcal Conjugate 13-Valent (PCV13) 09/22/2016   • Pneumococcal Polysaccharide (PPSV23) 05/18/2018   • Tdap 04/22/2022        PE:  Vitals:    11/08/22 1514   BP: 120/70   Pulse: 87   SpO2: 97%      Body mass index is 25.76 kg/m².    Gen Appearance: NAD  HEENT: Normocephalic, PERRLA, no thyromegaly, trache midline  Heart: RRR, normal S1 and S2, no murmur  Lungs: CTA b/l, no wheezing, no crackles  Abdomen: Soft, non-tender, non-distended, no guarding and BSx4  MSK: Moves all extremities well, normal gait, no peripheral edema  Pulses: Palpable and equal b/l  Lymph nodes: No palpable lymphadenopathy   Neuro: No focal deficits      Current Outpatient Medications   Medication Sig Dispense Refill   • albuterol (ACCUNEB) 0.63 MG/3ML nebulizer solution Take 3 mL by nebulization Every 6 (Six) Hours As Needed for Wheezing. 3 mL 12   • albuterol sulfate  (90 Base) MCG/ACT inhaler INHALE TWO PUFFS BY MOUTH EVERY 6 HOURS AS NEEDED FOR WHEEZING OR SHORTNESS OF AIR 6.7 g 1   • amLODIPine (NORVASC) 5 MG tablet Take 1 tablet by mouth Daily. 90 tablet 3   • baclofen (LIORESAL) 10 MG tablet Take 0.5 tablets by mouth Every Night. 30 tablet 5   • Cholecalciferol (VITAMIN D) 125 MCG (5000 UT) capsule capsule Take 1 capsule by mouth Daily. 30 capsule 0   • co-enzyme Q-10 50 MG capsule Take 1 capsule by mouth Daily. 90 capsule 3   • diazePAM (VALIUM) 5 MG tablet Take 5 mg by mouth Every 6 (Six) Hours As Needed.  0   • Diclofenac Sodium (VOLTAREN) 1 % gel gel Apply 4 g topically to the appropriate area as directed 3 (Three) Times a Day. 100 g 0   • fluorouracil (EFUDEX) 5 % cream APPLY TO THE AFFECTED AREA TWICE DAILY FOR 14 DAYS 40 g 1   • gabapentin (NEURONTIN) 400 MG capsule Take 400 mg by mouth 2 (two) times a day.     • glimepiride (AMARYL) 1 MG tablet TAKE 1 TABLET BY MOUTH EVERY DAY 90 tablet 3   • glucose  "monitor monitoring kit 1 each Daily. 1 each 0   • HYDROcodone-acetaminophen (NORCO)  MG per tablet Take 1 tablet by mouth Every 6 (Six) Hours As Needed for moderate pain (4-6).     • Lancets (onetouch ultrasoft) lancets Use one daily to test blood sugars 100 each 12   • levocetirizine (XYZAL) 5 MG tablet Take 1 tablet by mouth Every Evening. 90 tablet 3   • losartan-hydrochlorothiazide (HYZAAR) 50-12.5 MG per tablet TAKE ONE TABLET BY MOUTH DAILY 30 tablet 1   • metFORMIN (GLUCOPHAGE) 500 MG tablet TAKE 1 TABLET BY MOUTH DAILY WITH BREAKFAST 90 tablet 3   • metoprolol tartrate (LOPRESSOR) 50 MG tablet Take 1 tablet by mouth 2 (Two) Times a Day. 180 tablet 3   • mirtazapine (Remeron) 15 MG tablet Take 1 tablet by mouth Every Night. 90 tablet 3   • mupirocin (BACTROBAN) 2 % ointment Apply 1 application topically to the appropriate area as directed 3 (Three) Times a Day. 22 g 0   • Naloxegol Oxalate (MOVANTIK) 12.5 MG tablet Movantik 12.5 mg tablet     • Needle, Disp, 22G X 1\" misc 1 each Every 14 (Fourteen) Days. 100 each 0   • omeprazole (priLOSEC) 40 MG capsule TAKE 1 CAPSULE BY MOUTH DAILY BEFORE A MEAL 90 capsule 1   • ondansetron ODT (ZOFRAN-ODT) 4 MG disintegrating tablet Take 1 tablet by mouth 4 (Four) Times a Day As Needed for Nausea or Vomiting. 15 tablet 0   • OneTouch Verio test strip 1 each by Other route Daily. for testing 300 each 2   • oxyCODONE (ROXICODONE) 10 MG tablet Take 10 mg by mouth At Night As Needed.     • pravastatin (PRAVACHOL) 20 MG tablet TAKE 1 TABLET BY MOUTH EVERY DAY 90 tablet 1   • Syringe 23G X 1\" 3 ML misc Use every 21 days to take testosterone 12 each 1   • Testosterone Cypionate (DEPOTESTOTERONE CYPIONATE) 200 MG/ML injection Inject 1 mL into the appropriate muscle as directed by prescriber Every 14 (Fourteen) Days. 10 mL 0     No current facility-administered medications for this visit.      Counseling was given to patient for the following topics: instructions for " management, impressions and risks and benefits of treatment options . Total time of the encounter was 30 minutes and 15 minutes was spent face to face counseling.    Recommend checking x-ray of sacrum and hip.  Refer to orthopedics to establish care.  Blood pressure well controlled, continue current prescription medication.    Diagnoses and all orders for this visit:    1. Left hip pain (Primary)  -     XR Hip With or Without Pelvis 2 - 3 View Left; Future  -     XR Sacrum & Coccyx; Future  -     Cancel: Ambulatory Referral to Orthopedic Surgery  -     Ambulatory Referral to Orthopedic Surgery    2. Primary hypertension    3. Chronic foot pain, unspecified laterality         No follow-ups on file.     Dictated Utilizing Dragon Dictation    Please note that portions of this note were completed with a voice recognition program.    Part of this note may be an electronic transcription/translation of spoken language to printed text using the Dragon Dictation System.

## 2022-11-15 ENCOUNTER — TELEPHONE (OUTPATIENT)
Dept: FAMILY MEDICINE CLINIC | Facility: CLINIC | Age: 72
End: 2022-11-15

## 2022-11-15 DIAGNOSIS — E11.43 TYPE 2 DIABETES MELLITUS WITH DIABETIC AUTONOMIC NEUROPATHY, WITHOUT LONG-TERM CURRENT USE OF INSULIN: Primary | ICD-10-CM

## 2022-11-15 DIAGNOSIS — M79.673 PAIN OF FOOT, UNSPECIFIED LATERALITY: ICD-10-CM

## 2022-11-15 NOTE — TELEPHONE ENCOUNTER
He will need to see a podiatrist for proper fitting of diabetic shoes. I can refer him in town if he wants, let me know if he has a preference in [place].

## 2022-11-15 NOTE — TELEPHONE ENCOUNTER
Contacted patient to notify. Verbalized understanding    He requested a new order for diabetic shoes. I referred him to [facility], podiatry who was managing this previously. He declined stating the clinic was too far away. He would prefer Dr. Tran to complete diabetic foot exam so he can qualify for custom diabetic shoes.   He has scheduled upcoming appt tomorrow

## 2022-11-21 ENCOUNTER — TELEPHONE (OUTPATIENT)
Dept: FAMILY MEDICINE CLINIC | Facility: CLINIC | Age: 72
End: 2022-11-21

## 2022-11-21 NOTE — TELEPHONE ENCOUNTER
Contacted pt & informed him that he would need an appt to be evaluated and he can discuss the need for MRI  at his appt tomrorow. Pt verbalized understanding and did not have any additional questions at this time.

## 2022-11-21 NOTE — TELEPHONE ENCOUNTER
Caller: Wanmonik Behzad Bryant    Relationship: Self    Best call back number: [phone]    What orders are you requesting (i.e. lab or imaging): MRI    In what timeframe would the patient need to come in: 11/21/22     Where will you receive your lab/imaging services: [facility]     Additional notes: PATIENT STATES HE IS HAVING PAIN IN HIS LEFT LEG WHERE THEY HAVE  RE-ATTACHED HIS LEG. PATIENT STATES HIS LEG IS IN SEVERE PAIN AND CANNOT SIT ON A FLAT SURFACE SUCH AS A TOILET SEAT. PATIENT STATES HIS INSURANCE COMPANY HAS SENT HIM A FAX CONCERNING HIS FEET 11/21/22.    CALLBACK: DR. BERNABE ONLY

## 2022-11-22 ENCOUNTER — OFFICE VISIT (OUTPATIENT)
Dept: FAMILY MEDICINE CLINIC | Facility: CLINIC | Age: 72
End: 2022-11-22

## 2022-11-22 ENCOUNTER — LAB (OUTPATIENT)
Dept: LAB | Facility: HOSPITAL | Age: 72
End: 2022-11-22

## 2022-11-22 ENCOUNTER — HOSPITAL ENCOUNTER (OUTPATIENT)
Dept: GENERAL RADIOLOGY | Facility: HOSPITAL | Age: 72
Discharge: HOME | End: 2022-11-22

## 2022-11-22 VITALS
WEIGHT: 190.6 LBS | TEMPERATURE: 98.4 F | OXYGEN SATURATION: 98 % | DIASTOLIC BLOOD PRESSURE: 88 MMHG | SYSTOLIC BLOOD PRESSURE: 128 MMHG | HEART RATE: 88 BPM | HEIGHT: 72 IN | BODY MASS INDEX: 25.81 KG/M2

## 2022-11-22 DIAGNOSIS — R50.9 FEVER, UNSPECIFIED FEVER CAUSE: ICD-10-CM
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
## 2022-11-22 DIAGNOSIS — R10.32 LEFT GROIN PAIN: ICD-10-CM

## 2022-11-22 DIAGNOSIS — M54.16 LUMBAR RADICULOPATHY: ICD-10-CM

## 2022-11-22 DIAGNOSIS — M25.552 LEFT HIP PAIN: ICD-10-CM

## 2022-11-22 DIAGNOSIS — Z23 IMMUNIZATION DUE: ICD-10-CM

## 2022-11-22 DIAGNOSIS — R73.03 PRE-DIABETES: ICD-10-CM

## 2022-11-22 LAB
DEPRECATED RDW RBC AUTO: 44.8 FL (ref 37–54)
ERYTHROCYTE [DISTWIDTH] IN BLOOD BY AUTOMATED COUNT: 13.8 % (ref 12.3–15.4)
HCT VFR BLD AUTO: 45.1 % (ref 37.5–51)
HGB BLD-MCNC: 15.3 G/DL (ref 13–17.7)
MCH RBC QN AUTO: 29.9 PG (ref 26.6–33)
MCHC RBC AUTO-ENTMCNC: 33.9 G/DL (ref 31.5–35.7)
MCV RBC AUTO: 88.3 FL (ref 79–97)
PLATELET # BLD AUTO: 184 10*3/MM3 (ref 140–450)
PMV BLD AUTO: 11.6 FL (ref 6–12)
RBC # BLD AUTO: 5.11 10*6/MM3 (ref 4.14–5.8)
WBC NRBC COR # BLD: 7.62 10*3/MM3 (ref 3.4–10.8)

## 2022-11-22 PROCEDURE — 86140 C-REACTIVE PROTEIN: CPT

## 2022-11-22 PROCEDURE — G0008 ADMIN INFLUENZA VIRUS VAC: HCPCS | Performed by: PHYSICIAN ASSISTANT

## 2022-11-22 PROCEDURE — 99214 OFFICE O/P EST MOD 30 MIN: CPT | Performed by: PHYSICIAN ASSISTANT

## 2022-11-22 PROCEDURE — 83036 HEMOGLOBIN GLYCOSYLATED A1C: CPT

## 2022-11-22 PROCEDURE — 80053 COMPREHEN METABOLIC PANEL: CPT

## 2022-11-22 PROCEDURE — 36415 COLL VENOUS BLD VENIPUNCTURE: CPT

## 2022-11-22 PROCEDURE — 90662 IIV NO PRSV INCREASED AG IM: CPT | Performed by: PHYSICIAN ASSISTANT

## 2022-11-22 PROCEDURE — 85027 COMPLETE CBC AUTOMATED: CPT

## 2022-11-22 PROCEDURE — 72110 X-RAY EXAM L-2 SPINE 4/>VWS: CPT

## 2022-11-22 RX ORDER — FLUOXETINE HYDROCHLORIDE 20 MG/1
CAPSULE ORAL
COMMUNITY
End: 2023-04-04

## 2022-11-22 RX ORDER — LISINOPRIL 10 MG/1
TABLET ORAL
COMMUNITY
End: 2023-01-04

## 2022-11-22 NOTE — PROGRESS NOTES
Chief Complaint   Patient presents with   • Leg Pain     Pt. States he thinks his Left Leg has Infection    • Hip Pain     Left Side   • Tailbone Pain         Behzad Guzman is a 72 y.o. male who presents for Leg Pain (Pt. States he thinks his Left Leg has Infection ), Hip Pain (Left Side), and Tailbone Pain    Patient reports ongoing acute left buttock pain with radiation into groin and posterior into lower extremity and foot.  Pain started after falling off of his tractor about 3 weeks ago.  Pain has not improved since onset.  X-ray left hip, pelvis, sacrum was unremarkable for fracture.  Has appointment with orthopedic surgeon at , Dr. Estrada, on .  He presents today requesting for additional imaging.  He is followed by pain management.  He reports episodic fevers and states that he is concerned for infection in his hip.  He had previous hip replacement.  He is afebrile today.    Past Medical History:   Diagnosis Date   • Asthma    • Chronic hip pain, left    • Chronic pain in left shoulder    • Hyperlipidemia    • Hypertension    • Leg length discrepancy    • Neck pain, chronic    • Neuropathy    • Panic attacks    • Pre-diabetes    • Prediabetes    • Spinal stenosis        Past Surgical History:   Procedure Laterality Date   • LEG SURGERY     • ROTATOR CUFF REPAIR Right    • SHOULDER SURGERY Left    • WRIST SURGERY Left        Family History   Problem Relation Age of Onset   • Heart attack Father 65   • Other Mother         accident   • No Known Problems Maternal Grandmother    • No Known Problems Maternal Grandfather    • No Known Problems Paternal Grandmother    • Stroke Paternal Grandfather        Social History     Socioeconomic History   • Marital status:    Tobacco Use   • Smoking status: Former     Packs/day: 0.50     Years: 10.00     Pack years: 5.00     Types: Cigarettes     Quit date:      Years since quittin.9   • Smokeless tobacco: Never   Vaping Use   • Vaping Use:  "Never used   Substance and Sexual Activity   • Alcohol use: No   • Drug use: No   • Sexual activity: Defer       Allergies   Allergen Reactions   • Peanut Butter Flavor Anaphylaxis   • Shellfish Allergy Anaphylaxis   • Amoxicillin Rash   • Penicillins Rash   • Sulfa Antibiotics Myalgia   • Latex Rash       ROS    Review of Systems   Constitutional: Positive for fever. Negative for chills.   Musculoskeletal: Positive for arthralgias, gait problem and myalgias.   Neurological: Positive for weakness and numbness. Negative for dizziness and headache.       Vitals:    11/22/22 1320   BP: 128/88   BP Location: Left arm   Patient Position: Sitting   Cuff Size: Adult   Pulse: 88   Temp: 98.4 °F (36.9 °C)   TempSrc: Temporal   SpO2: 98%   Weight: 86.5 kg (190 lb 9.6 oz)   Height: 182.9 cm (72.01\")   PainSc:   8   PainLoc: Leg     Body mass index is 25.84 kg/m².    Current Outpatient Medications on File Prior to Visit   Medication Sig Dispense Refill   • albuterol (ACCUNEB) 0.63 MG/3ML nebulizer solution Take 3 mL by nebulization Every 6 (Six) Hours As Needed for Wheezing. 3 mL 12   • albuterol sulfate  (90 Base) MCG/ACT inhaler INHALE TWO PUFFS BY MOUTH EVERY 6 HOURS AS NEEDED FOR WHEEZING OR SHORTNESS OF AIR 6.7 g 1   • amLODIPine (NORVASC) 5 MG tablet Take 1 tablet by mouth Daily. 90 tablet 3   • baclofen (LIORESAL) 10 MG tablet Take 0.5 tablets by mouth Every Night. 30 tablet 5   • Cholecalciferol (VITAMIN D) 125 MCG (5000 UT) capsule capsule Take 1 capsule by mouth Daily. 30 capsule 0   • co-enzyme Q-10 50 MG capsule Take 1 capsule by mouth Daily. 90 capsule 3   • diazePAM (VALIUM) 5 MG tablet Take 5 mg by mouth Every 6 (Six) Hours As Needed.  0   • Diclofenac Sodium (VOLTAREN) 1 % gel gel Apply 4 g topically to the appropriate area as directed 3 (Three) Times a Day. 100 g 0   • fluorouracil (EFUDEX) 5 % cream APPLY TO THE AFFECTED AREA TWICE DAILY FOR 14 DAYS 40 g 1   • FLUoxetine (PROzac) 20 MG capsule " "fluoxetine 20 mg capsule     • gabapentin (NEURONTIN) 400 MG capsule Take 400 mg by mouth 2 (two) times a day.     • glimepiride (AMARYL) 1 MG tablet TAKE 1 TABLET BY MOUTH EVERY DAY 90 tablet 3   • glucose monitor monitoring kit 1 each Daily. 1 each 0   • HYDROcodone-acetaminophen (NORCO)  MG per tablet Take 1 tablet by mouth Every 6 (Six) Hours As Needed for moderate pain (4-6).     • Lancets (onetouch ultrasoft) lancets Use one daily to test blood sugars 100 each 12   • levocetirizine (XYZAL) 5 MG tablet Take 1 tablet by mouth Every Evening. 90 tablet 3   • lisinopril (PRINIVIL,ZESTRIL) 10 MG tablet lisinopril 10 mg tablet     • losartan-hydrochlorothiazide (HYZAAR) 50-12.5 MG per tablet TAKE ONE TABLET BY MOUTH DAILY 30 tablet 1   • metFORMIN (GLUCOPHAGE) 500 MG tablet TAKE 1 TABLET BY MOUTH DAILY WITH BREAKFAST 90 tablet 3   • metoprolol tartrate (LOPRESSOR) 50 MG tablet Take 1 tablet by mouth 2 (Two) Times a Day. 180 tablet 3   • mirtazapine (Remeron) 15 MG tablet Take 1 tablet by mouth Every Night. 90 tablet 3   • mupirocin (BACTROBAN) 2 % ointment Apply 1 application topically to the appropriate area as directed 3 (Three) Times a Day. 22 g 0   • Naloxegol Oxalate (MOVANTIK) 12.5 MG tablet Movantik 12.5 mg tablet     • Needle, Disp, 22G X 1\" misc 1 each Every 14 (Fourteen) Days. 100 each 0   • omeprazole (priLOSEC) 40 MG capsule TAKE 1 CAPSULE BY MOUTH DAILY BEFORE A MEAL 90 capsule 1   • ondansetron ODT (ZOFRAN-ODT) 4 MG disintegrating tablet Take 1 tablet by mouth 4 (Four) Times a Day As Needed for Nausea or Vomiting. 15 tablet 0   • OneTouch Verio test strip 1 each by Other route Daily. for testing 300 each 2   • oxyCODONE (ROXICODONE) 10 MG tablet Take 10 mg by mouth At Night As Needed.     • pravastatin (PRAVACHOL) 20 MG tablet TAKE 1 TABLET BY MOUTH EVERY DAY 90 tablet 1   • Syringe 23G X 1\" 3 ML misc Use every 21 days to take testosterone 12 each 1   • Testosterone Cypionate (DEPOTESTOTERONE " CYPIONATE) 200 MG/ML injection Inject 1 mL into the appropriate muscle as directed by prescriber Every 14 (Fourteen) Days. 10 mL 0   • [DISCONTINUED] hydroCHLOROthiazide (HYDRODIURIL) 12.5 MG tablet Take 1 tablet by mouth Daily. 90 tablet 3     No current facility-administered medications on file prior to visit.       Results for orders placed or performed in visit on 09/27/22   Comprehensive Metabolic Panel    Specimen: Blood   Result Value Ref Range    Glucose 171 (H) 65 - 99 mg/dL    BUN 6 (L) 8 - 23 mg/dL    Creatinine 0.96 0.76 - 1.27 mg/dL    Sodium 136 136 - 145 mmol/L    Potassium 4.1 3.5 - 5.2 mmol/L    Chloride 100 98 - 107 mmol/L    CO2 24.4 22.0 - 29.0 mmol/L    Calcium 9.4 8.6 - 10.5 mg/dL    Total Protein 7.0 6.0 - 8.5 g/dL    Albumin 4.70 3.50 - 5.20 g/dL    ALT (SGPT) 30 1 - 41 U/L    AST (SGOT) 25 1 - 40 U/L    Alkaline Phosphatase 56 39 - 117 U/L    Total Bilirubin 0.4 0.0 - 1.2 mg/dL    Globulin 2.3 gm/dL    A/G Ratio 2.0 g/dL    BUN/Creatinine Ratio 6.3 (L) 7.0 - 25.0    Anion Gap 11.6 5.0 - 15.0 mmol/L    eGFR 84.5 >60.0 mL/min/1.73   Hemoglobin A1c    Specimen: Blood   Result Value Ref Range    Hemoglobin A1C 7.10 (H) 4.80 - 5.60 %   TSH+Free T4    Specimen: Blood   Result Value Ref Range    TSH 2.280 0.270 - 4.200 uIU/mL    Free T4 0.97 0.93 - 1.70 ng/dL   PSA Screen    Specimen: Blood   Result Value Ref Range    PSA 0.753 0.000 - 4.000 ng/mL   Vitamin D 25 Hydroxy    Specimen: Blood   Result Value Ref Range    25 Hydroxy, Vitamin D 26.0 (L) 30.0 - 100.0 ng/ml   CBC Auto Differential    Specimen: Blood   Result Value Ref Range    WBC 10.18 3.40 - 10.80 10*3/mm3    RBC 5.20 4.14 - 5.80 10*6/mm3    Hemoglobin 15.9 13.0 - 17.7 g/dL    Hematocrit 45.5 37.5 - 51.0 %    MCV 87.5 79.0 - 97.0 fL    MCH 30.6 26.6 - 33.0 pg    MCHC 34.9 31.5 - 35.7 g/dL    RDW 13.5 12.3 - 15.4 %    RDW-SD 43.2 37.0 - 54.0 fl    MPV 12.0 6.0 - 12.0 fL    Platelets 205 140 - 450 10*3/mm3    Neutrophil % 69.2 42.7 - 76.0 %     Lymphocyte % 20.6 19.6 - 45.3 %    Monocyte % 7.5 5.0 - 12.0 %    Eosinophil % 1.5 0.3 - 6.2 %    Basophil % 0.8 0.0 - 1.5 %    Immature Grans % 0.4 0.0 - 0.5 %    Neutrophils, Absolute 7.05 (H) 1.70 - 7.00 10*3/mm3    Lymphocytes, Absolute 2.10 0.70 - 3.10 10*3/mm3    Monocytes, Absolute 0.76 0.10 - 0.90 10*3/mm3    Eosinophils, Absolute 0.15 0.00 - 0.40 10*3/mm3    Basophils, Absolute 0.08 0.00 - 0.20 10*3/mm3    Immature Grans, Absolute 0.04 0.00 - 0.05 10*3/mm3    nRBC 0.0 0.0 - 0.2 /100 WBC       PE    Physical Exam  Vitals reviewed.   Constitutional:       General: He is not in acute distress.     Appearance: Normal appearance. He is well-developed. He is not ill-appearing or diaphoretic.   HENT:      Head: Normocephalic and atraumatic.   Eyes:      Extraocular Movements: Extraocular movements intact.      Conjunctiva/sclera: Conjunctivae normal.   Pulmonary:      Effort: No respiratory distress.   Musculoskeletal:         General: Normal range of motion.      Cervical back: Normal range of motion.        Legs:    Neurological:      General: No focal deficit present.      Mental Status: He is alert.   Psychiatric:         Attention and Perception: He is attentive.         Mood and Affect: Mood normal. Affect is angry.         Speech: Speech is slurred.         Behavior: Behavior is agitated. Behavior is cooperative.         Thought Content: Thought content normal.         Cognition and Memory: Cognition is impaired.         Judgment: Judgment normal.          A/P    Diagnoses and all orders for this visit:    1. Fall, subsequent encounter (Primary)  -     MRI Hip Left Without Contrast; Future    2. Lumbar radiculopathy  -     XR Spine Lumbar Complete 4+VW; Future    3. Left hip pain  -     MRI Hip Left Without Contrast; Future    4. Left groin pain  -     MRI Hip Left Without Contrast; Future    5. Fever, unspecified fever cause  -     C-reactive Protein; Future  -     CBC (No Diff); Future  -      Comprehensive Metabolic Panel; Future    6. Pre-diabetes  -     Hemoglobin A1c; Future    Reviewed x-ray of left hip, pelvix and sacrum which are unremarkable.  Patient reports ongoing pain.  Will order MRI left hip, x-ray lumbar spine and MRI lumbar spine.  Pain managed at pain management.  Will order labs since he reports fever, although afebrile today.  If symptoms worsen or change, go to ER.     Plan of care reviewed with patient at the conclusion of today's visit. Education was provided regarding diagnosis, management and any prescribed or recommended OTC medications.  Patient verbalizes understanding of and agreement with management plan.    Dictated Utilizing Dragon Dictation     Please note that portions of this note were completed with a voice recognition program.     Part of this note may be an electronic transcription/translation of spoken language to printed text using the Dragon Dictation System.    No follow-ups on file.     Jesica Rasheed PA-C

## 2022-11-23 LAB
ALBUMIN SERPL-MCNC: 4.5 G/DL (ref 3.5–5.2)
ALBUMIN/GLOB SERPL: 1.7 G/DL
ALP SERPL-CCNC: 57 U/L (ref 39–117)
ALT SERPL W P-5'-P-CCNC: 13 U/L (ref 1–41)
ANION GAP SERPL CALCULATED.3IONS-SCNC: 9.5 MMOL/L (ref 5–15)
AST SERPL-CCNC: 21 U/L (ref 1–40)
BILIRUB SERPL-MCNC: 0.6 MG/DL (ref 0–1.2)
BUN SERPL-MCNC: 6 MG/DL (ref 8–23)
BUN/CREAT SERPL: 6.5 (ref 7–25)
CALCIUM SPEC-SCNC: 9 MG/DL (ref 8.6–10.5)
CHLORIDE SERPL-SCNC: 102 MMOL/L (ref 98–107)
CO2 SERPL-SCNC: 26.5 MMOL/L (ref 22–29)
CREAT SERPL-MCNC: 0.93 MG/DL (ref 0.76–1.27)
CRP SERPL-MCNC: 0.32 MG/DL (ref 0–0.5)
EGFRCR SERPLBLD CKD-EPI 2021: 87.8 ML/MIN/1.73
GLOBULIN UR ELPH-MCNC: 2.6 GM/DL
GLUCOSE SERPL-MCNC: 130 MG/DL (ref 65–99)
HBA1C MFR BLD: 6.7 % (ref 4.8–5.6)
POTASSIUM SERPL-SCNC: 4.1 MMOL/L (ref 3.5–5.2)
PROT SERPL-MCNC: 7.1 G/DL (ref 6–8.5)
SODIUM SERPL-SCNC: 138 MMOL/L (ref 136–145)

## 2022-11-30 ENCOUNTER — TELEPHONE (OUTPATIENT)
Dept: FAMILY MEDICINE CLINIC | Facility: CLINIC | Age: 72
End: 2022-11-30

## 2022-11-30 NOTE — TELEPHONE ENCOUNTER
Caller: Wanmonik Behzadcharlene Hurtado    Relationship: Self    Best call back number: [phone]    What medication are you requesting: STEROID PACK     What are your current symptoms: INFLAMMATION OF THE RIGHT LEG CAUSING SEVERE PAIN     How long have you been experiencing symptoms:     Have you had these symptoms before:    [] Yes  [x] No    Have you been treated for these symptoms before:   [] Yes  [x] No    If a prescription is needed, what is your preferred pharmacy and phone number: [facility] [id number] - [place], [place] - [address] AT [facility] [address] [facility] - [phone] [facility] [phone] FX     Additional notes: PATIENT STATES HE WAS SEEN BY HIS PAIN CLINIC PROVIDER AND WAS TOLD TO ASK THAT HIS PCP PRESCRIBE A STEROID PACK FOR THE INFLAMMATION IN HIS LEG. PATIENT IS REQUESTING A CALLBACK TO DISCUSS SYMPTOMS PREFERABLY DR. BERNABE.

## 2022-12-02 ENCOUNTER — TELEMEDICINE (OUTPATIENT)
Dept: FAMILY MEDICINE CLINIC | Facility: CLINIC | Age: 72
End: 2022-12-02

## 2022-12-02 DIAGNOSIS — M25.559 HIP PAIN: Primary | ICD-10-CM

## 2022-12-02 DIAGNOSIS — M25.552 HIP PAIN, LEFT: Primary | ICD-10-CM

## 2022-12-02 PROCEDURE — 99213 OFFICE O/P EST LOW 20 MIN: CPT | Performed by: INTERNAL MEDICINE

## 2022-12-02 RX ORDER — PREDNISONE 20 MG/1
40 TABLET ORAL DAILY
Qty: 10 TABLET | Refills: 0 | Status: SHIPPED | OUTPATIENT
Start: 2022-12-02 | End: 2022-12-07

## 2022-12-03 NOTE — PROGRESS NOTES
Cc: hip pain  You have chosen to receive care through a telehealth visit.  Do you consent to use a video/audio connection for your medical care today? Yes  Patient located at home.  Provider located at office.    HPI:  Behzad Guzman is a 72 y.o. male who presents today for hip pain.  Unable to get into orthopedics until early next month.  He is requesting a steroid prescription.    ROS:  Constitutional: no fevers, night sweats or unexplained weight loss  Eyes: no vision changes  ENT: no runny nose, ear pain, sore throat  Cardio: no chest pain, palpitations  Pulm: no shortness of breath, wheezing, or cough  GI: no abdominal pain or changes in bowel movements  : no difficulty urinating  MSK: no difficulty ambulating, + joint pain  Neuro: no weakness, dizziness or headache  Psych: no trouble sleeping  Endo: no change in appetite      Past Medical History:   Diagnosis Date   • Asthma    • Chronic hip pain, left    • Chronic pain in left shoulder    • Hyperlipidemia    • Hypertension    • Leg length discrepancy    • Neck pain, chronic    • Neuropathy    • Panic attacks    • Pre-diabetes    • Prediabetes    • Spinal stenosis       Family History   Problem Relation Age of Onset   • Heart attack Father 65   • Other Mother         accident   • No Known Problems Maternal Grandmother    • No Known Problems Maternal Grandfather    • No Known Problems Paternal Grandmother    • Stroke Paternal Grandfather       Social History     Socioeconomic History   • Marital status:    Tobacco Use   • Smoking status: Former     Packs/day: 0.50     Years: 10.00     Pack years: 5.00     Types: Cigarettes     Quit date:      Years since quittin.9   • Smokeless tobacco: Never   Vaping Use   • Vaping Use: Never used   Substance and Sexual Activity   • Alcohol use: No   • Drug use: No   • Sexual activity: Defer      Allergies   Allergen Reactions   • Peanut Butter Flavor Anaphylaxis   • Shellfish Allergy Anaphylaxis   •  Amoxicillin Rash   • Penicillins Rash   • Sulfa Antibiotics Myalgia   • Latex Rash      Immunization History   Administered Date(s) Administered   • COVID-19 (PFIZER) PURPLE CAP 09/14/2021, 10/08/2021   • Flu Vaccine Quad PF >36MO 09/30/2015   • Fluzone High-Dose 65+yrs 11/22/2022   • Pneumococcal Conjugate 13-Valent (PCV13) 09/22/2016   • Pneumococcal Polysaccharide (PPSV23) 05/18/2018   • Tdap 04/22/2022        PE:  There were no vitals filed for this visit.   There is no height or weight on file to calculate BMI.    Gen Appearance: NAD    Current Outpatient Medications   Medication Sig Dispense Refill   • albuterol (ACCUNEB) 0.63 MG/3ML nebulizer solution Take 3 mL by nebulization Every 6 (Six) Hours As Needed for Wheezing. 3 mL 12   • albuterol sulfate  (90 Base) MCG/ACT inhaler INHALE TWO PUFFS BY MOUTH EVERY 6 HOURS AS NEEDED FOR WHEEZING OR SHORTNESS OF AIR 6.7 g 1   • amLODIPine (NORVASC) 5 MG tablet Take 1 tablet by mouth Daily. 90 tablet 3   • baclofen (LIORESAL) 10 MG tablet Take 0.5 tablets by mouth Every Night. 30 tablet 5   • Cholecalciferol (VITAMIN D) 125 MCG (5000 UT) capsule capsule Take 1 capsule by mouth Daily. 30 capsule 0   • co-enzyme Q-10 50 MG capsule Take 1 capsule by mouth Daily. 90 capsule 3   • diazePAM (VALIUM) 5 MG tablet Take 5 mg by mouth Every 6 (Six) Hours As Needed.  0   • Diclofenac Sodium (VOLTAREN) 1 % gel gel Apply 4 g topically to the appropriate area as directed 3 (Three) Times a Day. 100 g 0   • fluorouracil (EFUDEX) 5 % cream APPLY TO THE AFFECTED AREA TWICE DAILY FOR 14 DAYS 40 g 1   • FLUoxetine (PROzac) 20 MG capsule fluoxetine 20 mg capsule     • gabapentin (NEURONTIN) 400 MG capsule Take 400 mg by mouth 2 (two) times a day.     • glimepiride (AMARYL) 1 MG tablet TAKE 1 TABLET BY MOUTH EVERY DAY 90 tablet 3   • glucose monitor monitoring kit 1 each Daily. 1 each 0   • HYDROcodone-acetaminophen (NORCO)  MG per tablet Take 1 tablet by mouth Every 6 (Six)  "Hours As Needed for moderate pain (4-6).     • Lancets (onetouch ultrasoft) lancets Use one daily to test blood sugars 100 each 12   • levocetirizine (XYZAL) 5 MG tablet Take 1 tablet by mouth Every Evening. 90 tablet 3   • lisinopril (PRINIVIL,ZESTRIL) 10 MG tablet lisinopril 10 mg tablet     • losartan-hydrochlorothiazide (HYZAAR) 50-12.5 MG per tablet TAKE ONE TABLET BY MOUTH DAILY 30 tablet 1   • metFORMIN (GLUCOPHAGE) 500 MG tablet TAKE 1 TABLET BY MOUTH DAILY WITH BREAKFAST 90 tablet 3   • metoprolol tartrate (LOPRESSOR) 50 MG tablet Take 1 tablet by mouth 2 (Two) Times a Day. 180 tablet 3   • mirtazapine (Remeron) 15 MG tablet Take 1 tablet by mouth Every Night. 90 tablet 3   • mupirocin (BACTROBAN) 2 % ointment Apply 1 application topically to the appropriate area as directed 3 (Three) Times a Day. 22 g 0   • Naloxegol Oxalate (MOVANTIK) 12.5 MG tablet Movantik 12.5 mg tablet     • Needle, Disp, 22G X 1\" misc 1 each Every 14 (Fourteen) Days. 100 each 0   • omeprazole (priLOSEC) 40 MG capsule TAKE 1 CAPSULE BY MOUTH DAILY BEFORE A MEAL 90 capsule 1   • ondansetron ODT (ZOFRAN-ODT) 4 MG disintegrating tablet Take 1 tablet by mouth 4 (Four) Times a Day As Needed for Nausea or Vomiting. 15 tablet 0   • OneTouch Verio test strip 1 each by Other route Daily. for testing 300 each 2   • oxyCODONE (ROXICODONE) 10 MG tablet Take 10 mg by mouth At Night As Needed.     • pravastatin (PRAVACHOL) 20 MG tablet TAKE 1 TABLET BY MOUTH EVERY DAY 90 tablet 1   • predniSONE (DELTASONE) 20 MG tablet Take 2 tablets by mouth Daily for 5 days. 10 tablet 0   • Syringe 23G X 1\" 3 ML misc Use every 21 days to take testosterone 12 each 1   • Testosterone Cypionate (DEPOTESTOTERONE CYPIONATE) 200 MG/ML injection Inject 1 mL into the appropriate muscle as directed by prescriber Every 14 (Fourteen) Days. 10 mL 0     No current facility-administered medications for this visit.        Diagnoses and all orders for this visit:    1. Hip pain, " left (Primary)  Prednisone burst per patient request.  Recommend follow-up with orthopedics as scheduled.  Current pain medication as needed.       No follow-ups on file.     Dictated Utilizing Dragon Dictation    Please note that portions of this note were completed with a voice recognition program.    Part of this note may be an electronic transcription/translation of spoken language to printed text using the Dragon Dictation System.

## 2022-12-20 ENCOUNTER — HOSPITAL ENCOUNTER (EMERGENCY)
Facility: HOSPITAL | Age: 72
Discharge: LEFT BEFORE DISCHARGE | End: 2022-12-20
Attending: EMERGENCY MEDICINE | Admitting: EMERGENCY MEDICINE
Payer: MEDICARE

## 2022-12-20 VITALS
RESPIRATION RATE: 18 BRPM | BODY MASS INDEX: 25.73 KG/M2 | HEART RATE: 76 BPM | DIASTOLIC BLOOD PRESSURE: 79 MMHG | OXYGEN SATURATION: 94 % | WEIGHT: 190 LBS | SYSTOLIC BLOOD PRESSURE: 126 MMHG | HEIGHT: 72 IN | TEMPERATURE: 97.9 F

## 2022-12-20 DIAGNOSIS — G89.29 CHRONIC HIP PAIN, UNSPECIFIED LATERALITY: Primary | ICD-10-CM

## 2022-12-20 DIAGNOSIS — M25.559 CHRONIC HIP PAIN, UNSPECIFIED LATERALITY: Primary | ICD-10-CM

## 2022-12-20 PROCEDURE — 99281 EMR DPT VST MAYX REQ PHY/QHP: CPT

## 2022-12-21 ENCOUNTER — TELEPHONE (OUTPATIENT)
Dept: FAMILY MEDICINE CLINIC | Facility: CLINIC | Age: 72
End: 2022-12-21

## 2022-12-21 DIAGNOSIS — M25.559 HIP PAIN: Primary | ICD-10-CM

## 2022-12-21 RX ORDER — METHYLPREDNISOLONE 4 MG/1
TABLET ORAL
Qty: 21 TABLET | Refills: 0 | Status: SHIPPED | OUTPATIENT
Start: 2022-12-21 | End: 2023-03-15

## 2022-12-21 NOTE — DISCHARGE INSTRUCTIONS
Symptomatic care is recommended. Take all medications as prescribed and instructed. Follow up with primary care as directed or return to Emergency Department with worsening of symptoms.

## 2022-12-21 NOTE — TELEPHONE ENCOUNTER
Caller: Behzad Guzman    Relationship: Self    Best call back number: [phone]    What is the best time to reach you: ANY TIME, ASAP    Who are you requesting to speak with (clinical staff, provider,  specific staff member): DR BERNABE OR HIS NURSE       What was the call regarding: PATIENT CALLED STATING HIS LEFT HIP PAIN IS GETTING WORSE. HE WENT TO THE ER AND WANTED A MRI BUT THEY TOLD HIM IT WAS NOT AN EMERGENCY AND A MRI WAS NOT NEEDED. PATIENT IS NOT EVERY HAPPY AS SRINIVASA TOLD HIM IF IT GOT BAD TO GO TO THE ER AND THEY WERE NOT HELPFUL. HIS LEG IS SEVERE AND HE NEEDS SOME RELIVE AS HE FALLS DOWN DUE TO THE ISSUES WITH HIS HIP/LEG     Do you require a callback: YES

## 2022-12-30 ENCOUNTER — TELEPHONE (OUTPATIENT)
Dept: FAMILY MEDICINE CLINIC | Facility: CLINIC | Age: 72
End: 2022-12-30

## 2022-12-30 NOTE — TELEPHONE ENCOUNTER
Provider: CHEYENNE BERNABE DO    Caller: TALIA LOPEZ    Phone Number: [phone]    Reason for Call: PATIENT CALLED TO INFORM DR BERNABE That [facility] EMERGENCY DEPARTMENT WOULD NOT TREAT HIM WHEN HE ARRIVED FOR PAIN. PATIENT HAD TO GO TO [facility] AND RECEIVED CARE THERE.

## 2023-01-03 DIAGNOSIS — I10 PRIMARY HYPERTENSION: ICD-10-CM
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
## 2023-01-04 RX ORDER — LOSARTAN POTASSIUM AND HYDROCHLOROTHIAZIDE 12.5; 5 MG/1; MG/1
TABLET ORAL
Qty: 90 TABLET | Refills: 3 | Status: SHIPPED | OUTPATIENT
Start: 2023-01-04

## 2023-01-06 ENCOUNTER — APPOINTMENT (OUTPATIENT)
Dept: MRI IMAGING | Facility: HOSPITAL | Age: 73
End: 2023-01-06

## 2023-01-18 ENCOUNTER — TELEPHONE (OUTPATIENT)
Dept: FAMILY MEDICINE CLINIC | Facility: CLINIC | Age: 73
End: 2023-01-18
Payer: MEDICARE

## 2023-01-18 DIAGNOSIS — M79.673 PAIN OF FOOT, UNSPECIFIED LATERALITY: Primary | ICD-10-CM

## 2023-01-18 NOTE — TELEPHONE ENCOUNTER
Caller: Behzad Guzman    Relationship: Self    Best call back number:[phone]    What is the best time to reach you: ANYTIME    Who are you requesting to speak with (clinical staff, provider,  specific staff member): DR BERNABE'S NURSE    What was the call regarding: WOULD LIKE TO DISCUSS GETTING BLOOD WORK TO MAKE SURE THAT HE IS NOT SEPTIC.    Do you require a callback: YES

## 2023-01-18 NOTE — TELEPHONE ENCOUNTER
Contacted pt to discuss further. Pt states he had toe surgery and has had some issues with his toe. He states his toe has been leaking since last Friday. Pt states he did follow up with the foot clinic on Friday but now has blisters and is wanting labs done. I informed pt that since he has blisters, swelling, and leakage that it would be best to follow up with the foot clinic or surgeons office. Pt states he was advised to follow up with PCP. I informed him that he would need an appt, pt declined and states he does not want to come in and only wants labs.

## 2023-01-18 NOTE — TELEPHONE ENCOUNTER
Contacted pt & relayed PCP's message. Pt verbalized understanding and is scheduled with foot doctor tomorrow. Pt did not have any additional questions at this time.       Zach Tran, DO  to [address] [facility]        10:40 AM   Blood work ordered. I would recommend follow up with foot surgeon

## 2023-01-18 NOTE — TELEPHONE ENCOUNTER
Spoke with patient who was returning a call from Megan. Advised patient that Dr. Tran has ordered labs for him and recommends that he follow up with his foot surgeon. Patient states that he goes to [facility] and that he will call us back with the name of the provider he sees there to make sure that it is a foot surgeon.

## 2023-01-18 NOTE — TELEPHONE ENCOUNTER
Contacted patient to discuss further, I advised him that Dr. Downey is a podiatrist.     He reports that his foot is improving and will follow up as needed.

## 2023-01-18 NOTE — TELEPHONE ENCOUNTER
PATIENT HAS CALLED REQUESTING A CALL BACK FROM THE NURSE. PATIENT STATES HE NEEDS TO KNOW IF DR. JONES IS A SURGEON.     CALL BACK NUMBER -[phone]

## 2023-01-19 DIAGNOSIS — E34.9 TESTOSTERONE DEFICIENCY: ICD-10-CM

## 2023-01-19 NOTE — TELEPHONE ENCOUNTER
Patient called stating he did not want to travel for getting his labs at [facility], he stated he wanted them ordered to [facility] in [place]. Please advise.

## 2023-01-19 NOTE — TELEPHONE ENCOUNTER
Contacted pt and informed him that we would need the fax number to [facility]. Pt got upset stating he was told yesterday he didn't need orders. Pt was advised that if he was going to [facility] in [place] he would not need orders but an outside facility we would need their information. Pt states he didn't know about the [facility] lab, I attempted to give pt the address however pt stated he was doing something and didn't have time. Pt hung up the phone.

## 2023-01-20 ENCOUNTER — TELEPHONE (OUTPATIENT)
Dept: FAMILY MEDICINE CLINIC | Facility: CLINIC | Age: 73
End: 2023-01-20
Payer: MEDICARE

## 2023-01-20 RX ORDER — TESTOSTERONE CYPIONATE 200 MG/ML
INJECTION, SOLUTION INTRAMUSCULAR
Qty: 10 ML | Refills: 0 | Status: SHIPPED | OUTPATIENT
Start: 2023-01-20

## 2023-01-20 NOTE — TELEPHONE ENCOUNTER
(Key: [id number]) - [id number]  Testosterone Cypionate 200MG/ML intramuscular solution  Status: sent to plan  Created: January 20th, 2023 [phone]  Sent: January 20th, 2023      Approved today  PA Case: [id number], Status: Approved, Coverage Starts on: 10/21/2022 12:00:00 AM, Coverage Ends on: 1/20/2024 12:00:00 AM

## 2023-01-20 NOTE — TELEPHONE ENCOUNTER
Alfredo from [facility] called stating patient is at the lab and is needing a lab order.    Fax is [phone]    Cb is [phone]

## 2023-01-23 NOTE — TELEPHONE ENCOUNTER
Contacted patient to follow up if he completed recent labs. He has not been back to [facility]. He will follow up as needed      Resent labs  [phone] fax

## 2023-01-26 ENCOUNTER — PRIOR AUTHORIZATION (OUTPATIENT)
Dept: FAMILY MEDICINE CLINIC | Facility: CLINIC | Age: 73
End: 2023-01-26
Payer: MEDICARE

## 2023-02-10 ENCOUNTER — TELEPHONE (OUTPATIENT)
Dept: FAMILY MEDICINE CLINIC | Facility: CLINIC | Age: 73
End: 2023-02-10
Payer: MEDICARE
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
## 2023-02-10 NOTE — TELEPHONE ENCOUNTER
LVM for patient, he is scheduled for a video visit today at 3:45pm. On the notes it states he was exposed to COVID and experiencing sx. Due to this he will need an office visit to be tested to confirm a dx.   Being last minute and end of day it may be best he go to a near walk-in urgent care facility which are typically open later in the evening so he can be tested and treated correctly.     Hub may relay message and reschedule or cancel appt.

## 2023-02-14 ENCOUNTER — TELEPHONE (OUTPATIENT)
Dept: FAMILY MEDICINE CLINIC | Facility: CLINIC | Age: 73
End: 2023-02-14

## 2023-02-14 NOTE — TELEPHONE ENCOUNTER
Patient reports that his symptoms onset on Thursday 2/9, he is experiencing chills, fever, body aches, and severe headache.     He received positive covid test yesterday. He reports his symptoms have improved slightly. Requesting medical advice.

## 2023-02-14 NOTE — TELEPHONE ENCOUNTER
Caller: Behzad Guzman    Relationship: Self    Best call back number: [phone]    What is the best time to reach you:ANYTIME    Who are you requesting to speak with (clinical staff, provider,  specific staff member):CLINICAL    Do you know the name of the person who called: REINA    What was the call regarding: PATIENT TESTED FOR COVID ON 2/10/23. HIS WIFE HAD IT ALSO AND TOOK PAXLOVID. SHE IS BETTER NOW.   PATIENT REQUESTS FOR THAT TO BE CALLED IN FOR HIM TO THE [facility]  [facility]. HE'S VERY ILL AND CANNOT MAKE IT IN FOR AN OFFICE VISIT IN TO [place].    Do you require a callback: YES

## 2023-02-15 ENCOUNTER — PATIENT MESSAGE (OUTPATIENT)
Dept: FAMILY MEDICINE CLINIC | Facility: CLINIC | Age: 73
End: 2023-02-15
Payer: MEDICARE

## 2023-02-15 DIAGNOSIS — U07.1 COVID-19 VIRUS INFECTION: Primary | ICD-10-CM

## 2023-02-15 RX ORDER — NIRMATRELVIR AND RITONAVIR 300-100 MG
3 KIT ORAL 2 TIMES DAILY
Qty: 30 TABLET | Refills: 0 | Status: SHIPPED | OUTPATIENT
Start: 2023-02-15 | End: 2023-02-20

## 2023-02-27 ENCOUNTER — PATIENT MESSAGE (OUTPATIENT)
Dept: FAMILY MEDICINE CLINIC | Facility: CLINIC | Age: 73
End: 2023-02-27
Payer: MEDICARE

## 2023-03-15 ENCOUNTER — LAB (OUTPATIENT)
Dept: LAB | Facility: HOSPITAL | Age: 73
End: 2023-03-15
Payer: MEDICARE

## 2023-03-15 DIAGNOSIS — M79.673 PAIN OF FOOT, UNSPECIFIED LATERALITY: ICD-10-CM

## 2023-03-15 LAB
BASOPHILS # BLD AUTO: 0.07 10*3/MM3 (ref 0–0.2)
BASOPHILS NFR BLD AUTO: 0.9 % (ref 0–1.5)
DEPRECATED RDW RBC AUTO: 42.5 FL (ref 37–54)
EOSINOPHIL # BLD AUTO: 0.35 10*3/MM3 (ref 0–0.4)
EOSINOPHIL NFR BLD AUTO: 4.3 % (ref 0.3–6.2)
ERYTHROCYTE [DISTWIDTH] IN BLOOD BY AUTOMATED COUNT: 13.1 % (ref 12.3–15.4)
HCT VFR BLD AUTO: 44.8 % (ref 37.5–51)
HGB BLD-MCNC: 15.3 G/DL (ref 13–17.7)
IMM GRANULOCYTES # BLD AUTO: 0.03 10*3/MM3 (ref 0–0.05)
IMM GRANULOCYTES NFR BLD AUTO: 0.4 % (ref 0–0.5)
LYMPHOCYTES # BLD AUTO: 2.67 10*3/MM3 (ref 0.7–3.1)
LYMPHOCYTES NFR BLD AUTO: 32.6 % (ref 19.6–45.3)
MCH RBC QN AUTO: 30.3 PG (ref 26.6–33)
MCHC RBC AUTO-ENTMCNC: 34.2 G/DL (ref 31.5–35.7)
MCV RBC AUTO: 88.7 FL (ref 79–97)
MONOCYTES # BLD AUTO: 0.72 10*3/MM3 (ref 0.1–0.9)
MONOCYTES NFR BLD AUTO: 8.8 % (ref 5–12)
NEUTROPHILS NFR BLD AUTO: 4.35 10*3/MM3 (ref 1.7–7)
NEUTROPHILS NFR BLD AUTO: 53 % (ref 42.7–76)
NRBC BLD AUTO-RTO: 0 /100 WBC (ref 0–0.2)
PLATELET # BLD AUTO: 233 10*3/MM3 (ref 140–450)
PMV BLD AUTO: 11.6 FL (ref 6–12)
RBC # BLD AUTO: 5.05 10*6/MM3 (ref 4.14–5.8)
WBC NRBC COR # BLD: 8.19 10*3/MM3 (ref 3.4–10.8)

## 2023-03-15 PROCEDURE — 80053 COMPREHEN METABOLIC PANEL: CPT

## 2023-03-15 PROCEDURE — 85025 COMPLETE CBC W/AUTO DIFF WBC: CPT

## 2023-03-16 LAB
ALBUMIN SERPL-MCNC: 4.4 G/DL (ref 3.5–5.2)
ALBUMIN/GLOB SERPL: 1.5 G/DL
ALP SERPL-CCNC: 68 U/L (ref 39–117)
ALT SERPL W P-5'-P-CCNC: 19 U/L (ref 1–41)
ANION GAP SERPL CALCULATED.3IONS-SCNC: 11 MMOL/L (ref 5–15)
AST SERPL-CCNC: 19 U/L (ref 1–40)
BILIRUB SERPL-MCNC: 0.4 MG/DL (ref 0–1.2)
BUN SERPL-MCNC: 5 MG/DL (ref 8–23)
BUN/CREAT SERPL: 5.2 (ref 7–25)
CALCIUM SPEC-SCNC: 10.3 MG/DL (ref 8.6–10.5)
CHLORIDE SERPL-SCNC: 98 MMOL/L (ref 98–107)
CO2 SERPL-SCNC: 29 MMOL/L (ref 22–29)
CREAT SERPL-MCNC: 0.96 MG/DL (ref 0.76–1.27)
EGFRCR SERPLBLD CKD-EPI 2021: 84.5 ML/MIN/1.73
GLOBULIN UR ELPH-MCNC: 3 GM/DL
GLUCOSE SERPL-MCNC: 184 MG/DL (ref 65–99)
POTASSIUM SERPL-SCNC: 4.7 MMOL/L (ref 3.5–5.2)
PROT SERPL-MCNC: 7.4 G/DL (ref 6–8.5)
SODIUM SERPL-SCNC: 138 MMOL/L (ref 136–145)

## 2023-03-20 ENCOUNTER — TELEPHONE (OUTPATIENT)
Dept: URGENT CARE | Facility: CLINIC | Age: 73
End: 2023-03-20

## 2023-03-28 RX ORDER — PRAVASTATIN SODIUM 20 MG
TABLET ORAL
Qty: 90 TABLET | Refills: 1 | Status: SHIPPED | OUTPATIENT
Start: 2023-03-28

## 2023-03-29 RX ORDER — GLIMEPIRIDE 1 MG/1
TABLET ORAL
Qty: 30 TABLET | Refills: 0 | Status: SHIPPED | OUTPATIENT
Start: 2023-03-29

## 2023-04-04 ENCOUNTER — OFFICE VISIT (OUTPATIENT)
Dept: FAMILY MEDICINE CLINIC | Facility: CLINIC | Age: 73
End: 2023-04-04
Payer: MEDICARE

## 2023-04-04 VITALS
BODY MASS INDEX: 25.57 KG/M2 | WEIGHT: 188.8 LBS | OXYGEN SATURATION: 96 % | HEIGHT: 72 IN | SYSTOLIC BLOOD PRESSURE: 120 MMHG | HEART RATE: 108 BPM | DIASTOLIC BLOOD PRESSURE: 72 MMHG

## 2023-04-04 DIAGNOSIS — J30.2 SEASONAL ALLERGIES: ICD-10-CM

## 2023-04-04 DIAGNOSIS — J32.4 PANSINUSITIS, UNSPECIFIED CHRONICITY: Primary | ICD-10-CM

## 2023-04-04 DIAGNOSIS — E11.42 TYPE 2 DIABETES MELLITUS WITH DIABETIC POLYNEUROPATHY, WITHOUT LONG-TERM CURRENT USE OF INSULIN: ICD-10-CM

## 2023-04-04 RX ORDER — CEFDINIR 300 MG/1
300 CAPSULE ORAL 2 TIMES DAILY
Qty: 14 CAPSULE | Refills: 0 | Status: SHIPPED | OUTPATIENT
Start: 2023-04-04 | End: 2023-04-11

## 2023-04-05 NOTE — PROGRESS NOTES
Chief Complaint   Patient presents with   • Sinusitis       HPI:  Behzad Guzman is a 72 y.o. male who presents today for follow-up sinus congestion, sinus pain, sore throat, fatigue.    ROS:  Constitutional: no fevers, night sweats or unexplained weight loss  Eyes: no vision changes  ENT: no runny nose, ear pain, +sore throat  Cardio: no chest pain, palpitations  Pulm: no shortness of breath, wheezing, + cough  GI: no abdominal pain or changes in bowel movements  : no difficulty urinating  MSK: no difficulty ambulating, no joint pain  Neuro: no weakness, dizziness or headache  Psych: no trouble sleeping  Endo: no change in appetite      Past Medical History:   Diagnosis Date   • Asthma    • Chronic hip pain, left    • Chronic pain in left shoulder    • Hyperlipidemia    • Hypertension    • Leg length discrepancy    • Neck pain, chronic    • Neuropathy    • Panic attacks    • Pre-diabetes    • Prediabetes    • Spinal stenosis       Family History   Problem Relation Age of Onset   • Heart attack Father 65   • Other Mother         accident   • No Known Problems Maternal Grandmother    • No Known Problems Maternal Grandfather    • No Known Problems Paternal Grandmother    • Stroke Paternal Grandfather       Social History     Socioeconomic History   • Marital status:    Tobacco Use   • Smoking status: Former     Packs/day: 0.50     Years: 10.00     Pack years: 5.00     Types: Cigarettes     Quit date:      Years since quittin.2   • Smokeless tobacco: Never   Vaping Use   • Vaping Use: Never used   Substance and Sexual Activity   • Alcohol use: No   • Drug use: No   • Sexual activity: Defer      Allergies   Allergen Reactions   • Peanut Butter Flavor Anaphylaxis   • Shellfish Allergy Anaphylaxis   • Amoxicillin Rash   • Penicillins Rash   • Sulfa Antibiotics Myalgia   • Latex Rash      Immunization History   Administered Date(s) Administered   • COVID-19 (PFIZER) PURPLE CAP 2021,  10/08/2021   • Flu Vaccine Quad PF >36MO 09/30/2015   • Fluzone High-Dose 65+yrs 11/22/2022   • Pneumococcal Conjugate 13-Valent (PCV13) 09/22/2016   • Pneumococcal Polysaccharide (PPSV23) 05/18/2018   • Tdap 04/22/2022        PE:  Vitals:    04/04/23 1438   BP: 120/72   Pulse: 108   SpO2: 96%      Body mass index is 25.6 kg/m².    Gen Appearance: NAD  HEENT: Normocephalic, PERRLA, no thyromegaly, trache midline  Heart: RRR, normal S1 and S2, no murmur  Lungs: CTA b/l, no wheezing, no crackles  Abdomen: Soft, non-tender, non-distended, no guarding and BSx4  MSK: Moves all extremities well, normal gait, no peripheral edema  Pulses: Palpable and equal b/l  Lymph nodes: No palpable lymphadenopathy   Neuro: No focal deficits      Current Outpatient Medications   Medication Sig Dispense Refill   • albuterol (ACCUNEB) 0.63 MG/3ML nebulizer solution Take 3 mL by nebulization Every 6 (Six) Hours As Needed for Wheezing. 3 mL 12   • albuterol sulfate  (90 Base) MCG/ACT inhaler INHALE TWO PUFFS BY MOUTH EVERY 6 HOURS AS NEEDED FOR WHEEZING OR SHORTNESS OF AIR 6.7 g 1   • Cholecalciferol (VITAMIN D) 125 MCG (5000 UT) capsule capsule Take 1 capsule by mouth Daily. 30 capsule 0   • co-enzyme Q-10 50 MG capsule Take 1 capsule by mouth Daily. 90 capsule 3   • diazePAM (VALIUM) 5 MG tablet Take 1 tablet by mouth Every 6 (Six) Hours As Needed.  0   • Diclofenac Sodium (VOLTAREN) 1 % gel gel Apply 4 g topically to the appropriate area as directed 3 (Three) Times a Day. (Patient taking differently: Apply 4 g topically to the appropriate area as directed As Needed.) 100 g 0   • fluticasone (Flonase) 50 MCG/ACT nasal spray 2 sprays into the nostril(s) as directed by provider Daily. 2 puffs each nostril 18.2 mL 0   • gabapentin (NEURONTIN) 400 MG capsule Take 1 capsule by mouth 2 (two) times a day.     • glimepiride (AMARYL) 1 MG tablet TAKE 1 TABLET BY MOUTH EVERY DAY 30 tablet 0   • glucose monitor monitoring kit 1 each Daily. 1  "each 0   • guaifenesin-dextromethorphan (MUCINEX DM)  MG tablet sustained-release 12 hour tablet Take 1 tablet by mouth 2 (Two) Times a Day As Needed (Congestion). 20 tablet 0   • HYDROcodone-acetaminophen (NORCO)  MG per tablet Take 1 tablet by mouth Every 6 (Six) Hours As Needed for Moderate Pain.     • Lancets (onetouch ultrasoft) lancets Use one daily to test blood sugars 100 each 12   • losartan-hydrochlorothiazide (HYZAAR) 50-12.5 MG per tablet TAKE ONE TABLET BY MOUTH DAILY 90 tablet 3   • metFORMIN (GLUCOPHAGE) 500 MG tablet TAKE 1 TABLET BY MOUTH DAILY WITH BREAKFAST 90 tablet 3   • metoprolol tartrate (LOPRESSOR) 50 MG tablet Take 1 tablet by mouth 2 (Two) Times a Day. 180 tablet 3   • Needle, Disp, 22G X 1\" misc 1 each Every 14 (Fourteen) Days. 100 each 0   • omeprazole (priLOSEC) 40 MG capsule TAKE 1 CAPSULE BY MOUTH DAILY BEFORE A MEAL 90 capsule 1   • ondansetron ODT (ZOFRAN-ODT) 4 MG disintegrating tablet Take 1 tablet by mouth 4 (Four) Times a Day As Needed for Nausea or Vomiting. 15 tablet 0   • OneTouch Verio test strip 1 each by Other route Daily. for testing 300 each 2   • oxyCODONE (ROXICODONE) 15 MG immediate release tablet TAKE 1/2 A TABLET BY MOUTH AT BEDTIME AND 1/2 TABLET IN MIDDLE OF NIGHT AS NEEDED AS DIRECTED     • pravastatin (PRAVACHOL) 20 MG tablet TAKE 1 TABLET BY MOUTH EVERY DAY 90 tablet 1   • Syringe 23G X 1\" 3 ML misc Use every 21 days to take testosterone 12 each 1   • Testosterone Cypionate (DEPOTESTOTERONE CYPIONATE) 200 MG/ML injection INJECT 1 ML INTO THE MUSCLE EVERY 14 DAYS AS DIRECTED BY PRESCRIBER 10 mL 0   • cefdinir (OMNICEF) 300 MG capsule Take 1 capsule by mouth 2 (Two) Times a Day for 7 days. 14 capsule 0   • mirtazapine (Remeron) 15 MG tablet Take 1 tablet by mouth Every Night. 90 tablet 3   • Naloxegol Oxalate (MOVANTIK) 12.5 MG tablet Movantik 12.5 mg tablet (Patient not taking: Reported on 4/4/2023)     • naloxone (NARCAN) 4 MG/0.1ML nasal spray 1 " spray into the nostril(s) as directed by provider. (Patient not taking: Reported on 4/4/2023)     • oxyCODONE (ROXICODONE) 10 MG tablet Take 10 mg by mouth At Night As Needed. (Patient not taking: Reported on 4/4/2023)     • traZODone (DESYREL) 50 MG tablet  (Patient not taking: Reported on 4/4/2023)       No current facility-administered medications for this visit.      Counseling was given to patient for the following topics: diagnostic results, instructions for management, impressions and risks and benefits of treatment options . Total time of the encounter was 40 minutes and 20 minutes was spent face to face counseling.    Diagnoses and all orders for this visit:    1. Pansinusitis, unspecified chronicity (Primary)  -     cefdinir (OMNICEF) 300 MG capsule; Take 1 capsule by mouth 2 (Two) Times a Day for 7 days.  Dispense: 14 capsule; Refill: 0    2. Type 2 diabetes mellitus with diabetic polyneuropathy, without long-term current use of insulin    3. Seasonal allergies         No follow-ups on file.     Dictated Utilizing Dragon Dictation    Please note that portions of this note were completed with a voice recognition program.    Part of this note may be an electronic transcription/translation of spoken language to printed text using the Dragon Dictation System.

## 2023-04-07 ENCOUNTER — TELEPHONE (OUTPATIENT)
Dept: FAMILY MEDICINE CLINIC | Facility: CLINIC | Age: 73
End: 2023-04-07
Payer: MEDICARE

## 2023-04-07 ENCOUNTER — APPOINTMENT (OUTPATIENT)
Dept: CT IMAGING | Facility: HOSPITAL | Age: 73
End: 2023-04-07
Payer: MEDICARE

## 2023-04-07 ENCOUNTER — HOSPITAL ENCOUNTER (EMERGENCY)
Facility: HOSPITAL | Age: 73
Discharge: HOME | End: 2023-04-07
Attending: EMERGENCY MEDICINE
Payer: MEDICARE

## 2023-04-07 VITALS
TEMPERATURE: 98 F | WEIGHT: 189 LBS | HEIGHT: 72 IN | DIASTOLIC BLOOD PRESSURE: 93 MMHG | SYSTOLIC BLOOD PRESSURE: 137 MMHG | OXYGEN SATURATION: 97 % | HEART RATE: 82 BPM | RESPIRATION RATE: 17 BRPM | BODY MASS INDEX: 25.6 KG/M2

## 2023-04-07 DIAGNOSIS — K92.1 BLACK TARRY STOOLS: Primary | ICD-10-CM

## 2023-04-07 DIAGNOSIS — Z87.898 HISTORY OF ULCER DISEASE: ICD-10-CM

## 2023-04-07 DIAGNOSIS — K92.0 HEMATEMESIS WITHOUT NAUSEA: ICD-10-CM

## 2023-04-07 LAB
ABO GROUP BLD: NORMAL
ALBUMIN SERPL-MCNC: 4.1 G/DL (ref 3.5–5.2)
ALBUMIN/GLOB SERPL: 1.8 G/DL
ALP SERPL-CCNC: 53 U/L (ref 39–117)
ALT SERPL W P-5'-P-CCNC: 21 U/L (ref 1–41)
ANION GAP SERPL CALCULATED.3IONS-SCNC: 11 MMOL/L (ref 5–15)
AST SERPL-CCNC: 25 U/L (ref 1–40)
BASOPHILS # BLD AUTO: 0.07 10*3/MM3 (ref 0–0.2)
BASOPHILS NFR BLD AUTO: 0.9 % (ref 0–1.5)
BILIRUB SERPL-MCNC: 0.4 MG/DL (ref 0–1.2)
BLD GP AB SCN SERPL QL: NEGATIVE
BUN SERPL-MCNC: 3 MG/DL (ref 8–23)
BUN/CREAT SERPL: 3.6 (ref 7–25)
CALCIUM SPEC-SCNC: 8.6 MG/DL (ref 8.6–10.5)
CHLORIDE SERPL-SCNC: 102 MMOL/L (ref 98–107)
CO2 SERPL-SCNC: 27 MMOL/L (ref 22–29)
CREAT SERPL-MCNC: 0.83 MG/DL (ref 0.76–1.27)
D-LACTATE SERPL-SCNC: 2.3 MMOL/L (ref 0.5–2)
DEPRECATED RDW RBC AUTO: 44 FL (ref 37–54)
EGFRCR SERPLBLD CKD-EPI 2021: 93.6 ML/MIN/1.73
EOSINOPHIL # BLD AUTO: 0.37 10*3/MM3 (ref 0–0.4)
EOSINOPHIL NFR BLD AUTO: 4.9 % (ref 0.3–6.2)
ERYTHROCYTE [DISTWIDTH] IN BLOOD BY AUTOMATED COUNT: 13.2 % (ref 12.3–15.4)
GLOBULIN UR ELPH-MCNC: 2.3 GM/DL
GLUCOSE SERPL-MCNC: 173 MG/DL (ref 65–99)
HCT VFR BLD AUTO: 40.8 % (ref 37.5–51)
HGB BLD-MCNC: 13.8 G/DL (ref 13–17.7)
HOLD SPECIMEN: NORMAL
IMM GRANULOCYTES # BLD AUTO: 0.02 10*3/MM3 (ref 0–0.05)
IMM GRANULOCYTES NFR BLD AUTO: 0.3 % (ref 0–0.5)
LYMPHOCYTES # BLD AUTO: 2.76 10*3/MM3 (ref 0.7–3.1)
LYMPHOCYTES NFR BLD AUTO: 36.4 % (ref 19.6–45.3)
MCH RBC QN AUTO: 30.7 PG (ref 26.6–33)
MCHC RBC AUTO-ENTMCNC: 33.8 G/DL (ref 31.5–35.7)
MCV RBC AUTO: 90.9 FL (ref 79–97)
MONOCYTES # BLD AUTO: 0.64 10*3/MM3 (ref 0.1–0.9)
MONOCYTES NFR BLD AUTO: 8.4 % (ref 5–12)
NEUTROPHILS NFR BLD AUTO: 3.73 10*3/MM3 (ref 1.7–7)
NEUTROPHILS NFR BLD AUTO: 49.1 % (ref 42.7–76)
NRBC BLD AUTO-RTO: 0 /100 WBC (ref 0–0.2)
PLATELET # BLD AUTO: 184 10*3/MM3 (ref 140–450)
PMV BLD AUTO: 10.9 FL (ref 6–12)
POTASSIUM SERPL-SCNC: 3.8 MMOL/L (ref 3.5–5.2)
PROT SERPL-MCNC: 6.4 G/DL (ref 6–8.5)
RBC # BLD AUTO: 4.49 10*6/MM3 (ref 4.14–5.8)
RH BLD: POSITIVE
SODIUM SERPL-SCNC: 140 MMOL/L (ref 136–145)
T&S EXPIRATION DATE: NORMAL
WBC NRBC COR # BLD: 7.59 10*3/MM3 (ref 3.4–10.8)
WHOLE BLOOD HOLD COAG: NORMAL
WHOLE BLOOD HOLD SPECIMEN: NORMAL

## 2023-04-07 PROCEDURE — 96360 HYDRATION IV INFUSION INIT: CPT

## 2023-04-07 PROCEDURE — 85025 COMPLETE CBC W/AUTO DIFF WBC: CPT | Performed by: EMERGENCY MEDICINE

## 2023-04-07 PROCEDURE — 86900 BLOOD TYPING SEROLOGIC ABO: CPT

## 2023-04-07 PROCEDURE — 25510000001 IOPAMIDOL 61 % SOLUTION: Performed by: EMERGENCY MEDICINE

## 2023-04-07 PROCEDURE — 83605 ASSAY OF LACTIC ACID: CPT | Performed by: EMERGENCY MEDICINE

## 2023-04-07 PROCEDURE — 80053 COMPREHEN METABOLIC PANEL: CPT | Performed by: EMERGENCY MEDICINE

## 2023-04-07 PROCEDURE — 99282 EMERGENCY DEPT VISIT SF MDM: CPT

## 2023-04-07 PROCEDURE — 86901 BLOOD TYPING SEROLOGIC RH(D): CPT | Performed by: EMERGENCY MEDICINE

## 2023-04-07 PROCEDURE — 74177 CT ABD & PELVIS W/CONTRAST: CPT

## 2023-04-07 PROCEDURE — 36415 COLL VENOUS BLD VENIPUNCTURE: CPT

## 2023-04-07 PROCEDURE — 86900 BLOOD TYPING SEROLOGIC ABO: CPT | Performed by: EMERGENCY MEDICINE

## 2023-04-07 PROCEDURE — 86850 RBC ANTIBODY SCREEN: CPT | Performed by: EMERGENCY MEDICINE

## 2023-04-07 PROCEDURE — 86901 BLOOD TYPING SEROLOGIC RH(D): CPT

## 2023-04-07 RX ORDER — PANTOPRAZOLE SODIUM 40 MG/1
40 TABLET, DELAYED RELEASE ORAL
Qty: 30 TABLET | Refills: 0 | Status: SHIPPED | OUTPATIENT
Start: 2023-04-07 | End: 2023-05-07

## 2023-04-07 RX ORDER — SUCRALFATE 1 G/1
TABLET ORAL
Qty: 60 TABLET | Refills: 1 | Status: SHIPPED | OUTPATIENT
Start: 2023-04-07

## 2023-04-07 RX ORDER — SODIUM CHLORIDE 0.9 % (FLUSH) 0.9 %
10 SYRINGE (ML) INJECTION AS NEEDED
Status: DISCONTINUED | OUTPATIENT
Start: 2023-04-07 | End: 2023-04-07 | Stop reason: HOSPADM

## 2023-04-07 RX ADMIN — SODIUM CHLORIDE, POTASSIUM CHLORIDE, SODIUM LACTATE AND CALCIUM CHLORIDE 1000 ML: 600; 310; 30; 20 INJECTION, SOLUTION INTRAVENOUS at 17:34

## 2023-04-07 RX ADMIN — IOPAMIDOL 90 ML: 612 INJECTION, SOLUTION INTRAVENOUS at 17:10

## 2023-04-07 NOTE — ED PROVIDER NOTES
EMERGENCY DEPARTMENT ENCOUNTER    Pt Name: Behzad Guzman  MRN: [id number]  Pt :   1950  Room Number:    Date of encounter:  2023  PCP: Zach Tran DO  ED Provider: Mango Simeon PA-C    Historian:patient      HPI:  Chief Complaint: vomiting blood 1 wk ago, now with black stool x 2 wks.        Context: Behzad Guzman is a 72 y.o. male who presents to the ED c/o 2-week history of black stool after being placed on doxycycline for a sinus infection.  1 week prior to that, patient states he was vomiting blood.  Patient states he has a history of peptic ulcer disease.  He currently complains of no abdominal pain.  He denies alcohol use or history of esophageal varices.  He denies fevers chills or sweats.  He has a history of non-insulin-dependent diabetes, hypertension, hyperlipidemia, chronic pain.  He was seen by his PCP on  for follow-up sinus congestion sore throat and fatigue.  No mention was made at that time of vomiting blood or blood in stool over  the preceding weeks.      PAST MEDICAL HISTORY  Past Medical History:   Diagnosis Date   • Asthma    • Chronic hip pain, left    • Chronic pain in left shoulder    • Hyperlipidemia    • Hypertension    • Leg length discrepancy    • Neck pain, chronic    • Neuropathy    • Panic attacks    • Pre-diabetes    • Prediabetes    • Spinal stenosis          PAST SURGICAL HISTORY  Past Surgical History:   Procedure Laterality Date   • LEG SURGERY     • ROTATOR CUFF REPAIR Right    • SHOULDER SURGERY Left    • WRIST SURGERY Left          FAMILY HISTORY  Family History   Problem Relation Age of Onset   • Heart attack Father 65   • Other Mother         accident   • No Known Problems Maternal Grandmother    • No Known Problems Maternal Grandfather    • No Known Problems Paternal Grandmother    • Stroke Paternal Grandfather          SOCIAL HISTORY  Social History     Socioeconomic History   • Marital status:    Tobacco Use    • Smoking status: Former     Packs/day: 0.50     Years: 10.00     Pack years: 5.00     Types: Cigarettes     Quit date:      Years since quittin.2   • Smokeless tobacco: Never   Vaping Use   • Vaping Use: Never used   Substance and Sexual Activity   • Alcohol use: No   • Drug use: No   • Sexual activity: Defer         ALLERGIES  Peanut butter flavor, Shellfish allergy, Amoxicillin, Penicillins, Sulfa antibiotics, and Latex        REVIEW OF SYSTEMS  Review of Systems   Constitutional: Positive for activity change. Negative for appetite change, chills, fatigue and fever.   HENT: Negative for congestion, rhinorrhea and sore throat.    Respiratory: Negative for cough, shortness of breath and wheezing.    Cardiovascular: Negative for chest pain and palpitations.   Gastrointestinal: Positive for blood in stool. Negative for abdominal pain, nausea and vomiting.   Genitourinary: Negative for dysuria, flank pain and hematuria.   Neurological: Negative for dizziness, seizures, syncope and headaches.   All other systems reviewed and are negative.         All systems reviewed and negative except for those discussed in HPI.       PHYSICAL EXAM    I have reviewed the triage vital signs and nursing notes.    ED Triage Vitals [23 1441]   Temp Heart Rate Resp BP SpO2   97.6 °F (36.4 °C) 83 18 130/82 96 %      Temp src Heart Rate Source Patient Position BP Location FiO2 (%)   Oral Monitor Sitting -- --       Physical Exam  GENERAL:   Appears in no acute distress.  Pleasant awake alert and oriented nontoxic-appearing afebrile hemodynamically stable, not in acute distress.  HENT: Nares patent.  EYES: No scleral icterus.  CV: Regular rhythm, regular rate.  RESPIRATORY: Normal effort.  No audible wheezes, rales or rhonchi.  ABDOMEN: Soft, nontender, no guarding or rebound tenderness.  Bowel sounds are normal.  No palpable organomegaly or pulsatile masses  MUSCULOSKELETAL: No deformities.   NEURO: Alert, moves all  extremities, follows commands.  SKIN: Warm, dry, no rash visualized.      LAB RESULTS  Recent Results (from the past 24 hour(s))   Comprehensive Metabolic Panel    Collection Time: 04/07/23  3:01 PM    Specimen: Blood   Result Value Ref Range    Glucose 173 (H) 65 - 99 mg/dL    BUN 3 (L) 8 - 23 mg/dL    Creatinine 0.83 0.76 - 1.27 mg/dL    Sodium 140 136 - 145 mmol/L    Potassium 3.8 3.5 - 5.2 mmol/L    Chloride 102 98 - 107 mmol/L    CO2 27.0 22.0 - 29.0 mmol/L    Calcium 8.6 8.6 - 10.5 mg/dL    Total Protein 6.4 6.0 - 8.5 g/dL    Albumin 4.1 3.5 - 5.2 g/dL    ALT (SGPT) 21 1 - 41 U/L    AST (SGOT) 25 1 - 40 U/L    Alkaline Phosphatase 53 39 - 117 U/L    Total Bilirubin 0.4 0.0 - 1.2 mg/dL    Globulin 2.3 gm/dL    A/G Ratio 1.8 g/dL    BUN/Creatinine Ratio 3.6 (L) 7.0 - 25.0    Anion Gap 11.0 5.0 - 15.0 mmol/L    eGFR 93.6 >60.0 mL/min/1.73   Type & Screen    Collection Time: 04/07/23  3:01 PM    Specimen: Blood   Result Value Ref Range    ABO Type A     RH type Positive     Antibody Screen Negative     T&S Expiration Date 4/8/2023 11:59:00 PM    Lactic Acid, Plasma    Collection Time: 04/07/23  3:01 PM    Specimen: Blood   Result Value Ref Range    Lactate 2.3 (C) 0.5 - 2.0 mmol/L   Green Top (Gel)    Collection Time: 04/07/23  3:01 PM   Result Value Ref Range    Extra Tube Hold for add-ons.    Lavender Top    Collection Time: 04/07/23  3:01 PM   Result Value Ref Range    Extra Tube hold for add-on    Gold Top - SST    Collection Time: 04/07/23  3:01 PM   Result Value Ref Range    Extra Tube Hold for add-ons.    Gray Top    Collection Time: 04/07/23  3:01 PM   Result Value Ref Range    Extra Tube Hold for add-ons.    Light Blue Top    Collection Time: 04/07/23  3:01 PM   Result Value Ref Range    Extra Tube Hold for add-ons.    CBC Auto Differential    Collection Time: 04/07/23  3:01 PM    Specimen: Blood   Result Value Ref Range    WBC 7.59 3.40 - 10.80 10*3/mm3    RBC 4.49 4.14 - 5.80 10*6/mm3    Hemoglobin 13.8  "13.0 - 17.7 g/dL    Hematocrit 40.8 37.5 - 51.0 %    MCV 90.9 79.0 - 97.0 fL    MCH 30.7 26.6 - 33.0 pg    MCHC 33.8 31.5 - 35.7 g/dL    RDW 13.2 12.3 - 15.4 %    RDW-SD 44.0 37.0 - 54.0 fl    MPV 10.9 6.0 - 12.0 fL    Platelets 184 140 - 450 10*3/mm3    Neutrophil % 49.1 42.7 - 76.0 %    Lymphocyte % 36.4 19.6 - 45.3 %    Monocyte % 8.4 5.0 - 12.0 %    Eosinophil % 4.9 0.3 - 6.2 %    Basophil % 0.9 0.0 - 1.5 %    Immature Grans % 0.3 0.0 - 0.5 %    Neutrophils, Absolute 3.73 1.70 - 7.00 10*3/mm3    Lymphocytes, Absolute 2.76 0.70 - 3.10 10*3/mm3    Monocytes, Absolute 0.64 0.10 - 0.90 10*3/mm3    Eosinophils, Absolute 0.37 0.00 - 0.40 10*3/mm3    Basophils, Absolute 0.07 0.00 - 0.20 10*3/mm3    Immature Grans, Absolute 0.02 0.00 - 0.05 10*3/mm3    nRBC 0.0 0.0 - 0.2 /100 WBC       If labs were ordered, I independently reviewed the results and considered them in treating the patient.        RADIOLOGY  CT Abdomen Pelvis With Contrast    Result Date: 4/7/2023  CT ABDOMEN PELVIS W CONTRAST Date of Exam: 4/7/2023 5:08 PM EDT Indication: 2 weeks of \"vomiting blood\", 1 week of black stool. Comparison: None available. Technique: Axial CT images were obtained of the abdomen and pelvis following the uneventful intravenous administration of 90 cc Isovue-300. Reconstructed coronal and sagittal images were also obtained. Automated exposure control and iterative construction methods were used. 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 Discoid atelectasis in the left lower lobe Organs: No focal liver lesion. Patent portal veins. Mildly enlarged spleen, 14 cm length. Tiny left lower pole renal calculus. Right kidney, adrenal glands, pancreas, gallbladder unremarkable Gastrointestinal: No gastrointestinal abnormality demonstrated. Normal appendix Pelvis: No mass or abnormal fluid collection. Urinary bladder unremarkable Peritoneum/Retroperitoneum: No ascites or pneumoperitoneum. Normal caliber " aorta Bones/Soft Tissues: No acute bony abnormality     1. No findings to correlate with hematemesis and GI bleeding. No acute process demonstrated 2. Mild splenomegaly 3. Left nephrolithiasis 4. 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      See radiologist's dictation for official interpretation.        PROCEDURES    Procedures    No orders to display       MEDICATIONS GIVEN IN ER    Medications   lactated ringers bolus 1,000 mL (0 mL Intravenous Stopped 4/7/23 1837)   iopamidol (ISOVUE-300) 61 % injection 100 mL (90 mL Intravenous Given 4/7/23 1710)         MEDICAL DECISION MAKING, PROGRESS, and CONSULTS    All labs have been independently reviewed by me.  All radiology studies have been reviewed by me and the radiologist dictating the report.  All EKG's have been independently viewed and interpreted by me.      Discussion below represents my analysis of pertinent findings related to patient's condition, differential diagnosis, treatment plan and final disposition.      Differential diagnosis:    Esophageal varices, upper GI bleed, gastritis      Additional sources:    - Discussed/ obtained information from independent historians: Patient provided the history    - External (non-ED) record review: Reviewed office notes from 4/4 and telephone calls to the office today    - Chronic or social conditions impacting care: Prior history of ulcer disease, hypertensive, diabetic    - Shared decision making: Patient is agreeable with discharge plan      Orders placed during this visit:  Orders Placed This Encounter   Procedures   • CT Abdomen Pelvis With Contrast   • [place] Draw   • Comprehensive Metabolic Panel   • Lactic Acid, Plasma   • CBC Auto Differential   • Undress & Gown   • Measure Blood Pressure   • Vital Signs   • Type & Screen   • CBC &  Differential   • Green Top (Gel)   • Lavender Top   • Gold Top - SST   • Gray Top   • Light Blue Top         Additional orders considered but not ordered:      ED Course:  Patient's initial lactic acid was 2.3, he was given a liter of LR during his course of stay.  CT the abdomen pelvis with IV contrast was unremarkable.  H&H is 13.8 and 8.8 respectively.  We will discharge to home with Protonix 40 mg every morning before breakfast, he is currently taking omeprazole 40 mg daily, I encouraged him to continue taking this as well.  Carafate 30 minutes before meals and at bedtime.  Refer to GI for further evaluation and eventual endoscopy and colonoscopy.  Patient verbalizes understanding and is agreeable to plan.  Consultants:      ED Course as of 04/07/23 2223 Fri Apr 07, 2023   1615 Lactate(!!): 2.3 [TG]   1641 Hemoglobin: 13.8 [TG]   1641 Hematocrit: 40.8 [TG]   1641 Platelets: 184 [TG]      ED Course User Index  [TG] Mango Simeon PA-C                  AS OF 22:23 EDT VITALS:    BP - 137/93  HR - 82  TEMP - 98 °F (36.7 °C)  O2 SATS - 97%                  DIAGNOSIS  Final diagnoses:   Black tarry stools   Hematemesis without nausea   History of ulcer disease         DISPOSITION  DISCHARGE    Patient discharged in stable condition.    Reviewed implications of results, diagnosis, meds, responsibility to follow up, warning signs and symptoms of possible worsening, potential complications and reasons to return to ER.    Patient/Family voiced understanding of above instructions.    Discussed plan for discharge, as there is no emergent indication for admission.  Pt/family is agreeable and understands need for follow up and possible repeat testing.  Pt/family is aware that discharge does not mean that nothing is wrong but that it indicates no emergency is currently present that requires admission and they must continue care with follow-up as given below or with a physician of their choice.     FOLLOW-UP  Florentino  Ismael ZHOU MD  [address] [id number]  [phone]    Schedule an appointment as soon as possible for a visit   For GI consult         Medication List      New Prescriptions    pantoprazole 40 MG EC tablet  Commonly known as: PROTONIX  Take 1 tablet by mouth Every Morning Before Breakfast for 30 days.     sucralfate 1 g tablet  Commonly known as: CARAFATE  Take 1 tablet 30 minutes before each meal and at bedtime        Changed    Diclofenac Sodium 1 % gel gel  Commonly known as: VOLTAREN  Apply 4 g topically to the appropriate area as directed 3 (Three) Times a Day.  What changed:   · when to take this  · reasons to take this           Where to Get Your Medications      These medications were sent to [facility] [id number] - [place], [place] - [address] AT [facility] [address] - [phone] PH - [phone] FX  [address] [facility] [address]    Phone: [phone]   · pantoprazole 40 MG EC tablet  · sucralfate 1 g tablet             Please note that portions of this document were completed with voice recognition software.      Mango Simeon PA-C  04/07/23 8603

## 2023-04-07 NOTE — DISCHARGE INSTRUCTIONS
Follow the peptic ulcer diet given in your discharge paperwork.  Call Dr. Good's office to schedule gastroenterology consult.  Return to the emergency department immediately if any change or worsening of symptoms.
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
## 2023-04-10 NOTE — TELEPHONE ENCOUNTER
Contacted patient to follow up, he reports he is well. He is scheduled for a GI procedure today and will follow up afterwards.

## 2023-04-13 ENCOUNTER — TELEPHONE (OUTPATIENT)
Dept: FAMILY MEDICINE CLINIC | Facility: CLINIC | Age: 73
End: 2023-04-13
Payer: MEDICARE

## 2023-04-13 NOTE — TELEPHONE ENCOUNTER
Patient called in stating that from his last visit, 4/4/2023 he thought Dr. Tran was going to send him a steroid pack. He completed the antibiotics.   We explained to the patient that its Dr. Tran half day and has left already and it was not mentioned in the note other than the abx. We would have to send a message back and contact him tomorrow. He verbalized understanding and did not have any additional questions at this time

## 2023-04-14 NOTE — TELEPHONE ENCOUNTER
Contacted patient to discuss further, he reports no further issues with abdominal pain. He states he has worsening allergy symptoms    He reports swollen hands, and eyes and fatigue. He reports that he follow with allergist and will make an upcoming appt with specialist.

## 2023-04-15 ENCOUNTER — TELEPHONE (OUTPATIENT)
Dept: FAMILY MEDICINE CLINIC | Facility: CLINIC | Age: 73
End: 2023-04-15
Payer: MEDICARE

## 2023-04-15 NOTE — TELEPHONE ENCOUNTER
After hours call from patient with complaint of not feeling well and glucose 230. States he took his metformin this morning, but not his glimepiride. States his feet are swelling and is having back and left shoulder pain. These are chronic issues. Advised patient to take his glimepiride with food, protein, no sugars or sweets; call office Monday to schedule a follow up with his PCP. If he has worsening symptoms, he should be evaluated in the UC or the ED. Patient verbalized understanding.    (Patient did call answering service twice, I was sent text messages with incorrect spelling of patient first and last name so unable to find him in the computer)

## 2023-04-25 ENCOUNTER — TELEPHONE (OUTPATIENT)
Dept: FAMILY MEDICINE CLINIC | Facility: CLINIC | Age: 73
End: 2023-04-25
Payer: MEDICARE

## 2023-04-25 NOTE — TELEPHONE ENCOUNTER
Caller: Behzad Guzman    Relationship: Self    Best call back number:[phone]    What form or medical record are you requesting: PAPERWORK FOR THERAPEUTIC SHOES AND INSERTS    Who is requesting this form or medical record from you: [employer]    How would you like to receive the form or medical records (pick-up, mail, fax):   If fax, what is the fax number: [phone]    Timeframe paperwork needed: ASAP TODAY    Additional notes: PATIENT HAS AN APPOINTMENT WITH THE FOOT DOCTOR TODAY HOWEVER THEY STATE THEY NEVER RECEIVED THE PAPERWORK FOR THE SHOES. PLEASE FAX THIS PAPERWORK ASAP AND CALL PATIENT WHEN THIS HAS BEEN FAXED AGAIN.

## 2023-04-28 ENCOUNTER — TELEPHONE (OUTPATIENT)
Dept: FAMILY MEDICINE CLINIC | Facility: CLINIC | Age: 73
End: 2023-04-28
Payer: MEDICARE

## 2023-04-28 NOTE — TELEPHONE ENCOUNTER
Diana from [employer] called regarding medication non-adherence. She states the patient is confused about medications as he was in the hospital last week and some medications were changed. I advised her that we have not seen the patient recently but will reach out to pt to schedule an appt to discuss his concerns with PCP.

## 2023-05-03 ENCOUNTER — OFFICE VISIT (OUTPATIENT)
Dept: FAMILY MEDICINE CLINIC | Facility: CLINIC | Age: 73
End: 2023-05-03
Payer: MEDICARE

## 2023-05-03 VITALS
SYSTOLIC BLOOD PRESSURE: 128 MMHG | WEIGHT: 198 LBS | OXYGEN SATURATION: 98 % | HEART RATE: 85 BPM | BODY MASS INDEX: 26.82 KG/M2 | HEIGHT: 72 IN | DIASTOLIC BLOOD PRESSURE: 76 MMHG

## 2023-05-03 DIAGNOSIS — M54.2 CHRONIC NECK PAIN: ICD-10-CM

## 2023-05-03 DIAGNOSIS — G89.29 CHRONIC NECK PAIN: ICD-10-CM

## 2023-05-03 DIAGNOSIS — J45.20 MILD INTERMITTENT ASTHMA WITHOUT COMPLICATION: ICD-10-CM

## 2023-05-03 DIAGNOSIS — M54.16 LUMBAR RADICULOPATHY: ICD-10-CM

## 2023-05-03 DIAGNOSIS — G89.29 CHRONIC FOOT PAIN, UNSPECIFIED LATERALITY: ICD-10-CM

## 2023-05-03 DIAGNOSIS — F32.A ANXIETY AND DEPRESSION: ICD-10-CM

## 2023-05-03 DIAGNOSIS — I10 PRIMARY HYPERTENSION: Primary | ICD-10-CM

## 2023-05-03 DIAGNOSIS — E34.9 TESTOSTERONE DEFICIENCY: ICD-10-CM

## 2023-05-03 DIAGNOSIS — M79.673 CHRONIC FOOT PAIN, UNSPECIFIED LATERALITY: ICD-10-CM

## 2023-05-03 DIAGNOSIS — E11.42 TYPE 2 DIABETES MELLITUS WITH DIABETIC POLYNEUROPATHY, WITHOUT LONG-TERM CURRENT USE OF INSULIN: ICD-10-CM

## 2023-05-03 DIAGNOSIS — F41.9 ANXIETY AND DEPRESSION: ICD-10-CM

## 2023-05-03 DIAGNOSIS — J30.2 SEASONAL ALLERGIES: ICD-10-CM

## 2023-05-03 DIAGNOSIS — E78.5 HYPERLIPIDEMIA, UNSPECIFIED HYPERLIPIDEMIA TYPE: ICD-10-CM

## 2023-05-03 DIAGNOSIS — K21.9 GASTROESOPHAGEAL REFLUX DISEASE, UNSPECIFIED WHETHER ESOPHAGITIS PRESENT: ICD-10-CM

## 2023-05-03 RX ORDER — METHYLPREDNISOLONE 4 MG/1
TABLET ORAL
Qty: 21 TABLET | Refills: 0 | Status: SHIPPED | OUTPATIENT
Start: 2023-05-03
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
## 2023-05-04 ENCOUNTER — TELEPHONE (OUTPATIENT)
Dept: FAMILY MEDICINE CLINIC | Facility: CLINIC | Age: 73
End: 2023-05-04
Payer: MEDICARE

## 2023-05-04 NOTE — TELEPHONE ENCOUNTER
Left voicemail to notify patient that steroid has been sent in to pharmacy and to callback with any concerns or questions.

## 2023-05-04 NOTE — TELEPHONE ENCOUNTER
Caller: Behzad Guzman    Relationship: Self    Best call back number: [phone]    What medication are you requesting: Z-PAC TYPE STEROID    Have you had these symptoms before:    [x] Yes  [] No    Have you been treated for these symptoms before:   [x] Yes  [] No    If a prescription is needed, what is your preferred pharmacy and phone number: [facility] [id number] - [place], [place] - [address] [facility] [address] - [phone] [facility] [phone] FX     Additional notes: PATIENT WAS SEEN IN OFFICE AND WAS TOLD THAT ALONG WITH THE MEDICATION THAT WAS SENT IN, THE DOCTOR WOULD SEND IN A STEROID FOR HIM.

## 2023-05-15 RX ORDER — GLIMEPIRIDE 1 MG/1
TABLET ORAL
Qty: 30 TABLET | Refills: 0 | OUTPATIENT
Start: 2023-05-15

## 2023-05-19 ENCOUNTER — TELEPHONE (OUTPATIENT)
Dept: FAMILY MEDICINE CLINIC | Facility: CLINIC | Age: 73
End: 2023-05-19
Payer: MEDICARE

## 2023-05-19 NOTE — TELEPHONE ENCOUNTER
Contacted patient to discuss current medications and reviewed recent office notes. He verbalized understanding and agreed to follow medical advice

## 2023-05-19 NOTE — TELEPHONE ENCOUNTER
Caller: Behzad Guzman    Relationship: Self    Best call back number: [phone]    What medications are you currently taking:   Current Outpatient Medications on File Prior to Visit   Medication Sig Dispense Refill   • albuterol (ACCUNEB) 0.63 MG/3ML nebulizer solution Take 3 mL by nebulization Every 6 (Six) Hours As Needed for Wheezing. 3 mL 12   • albuterol sulfate  (90 Base) MCG/ACT inhaler INHALE TWO PUFFS BY MOUTH EVERY 6 HOURS AS NEEDED FOR WHEEZING OR SHORTNESS OF AIR 6.7 g 1   • Cholecalciferol (VITAMIN D) 125 MCG (5000 UT) capsule capsule Take 1 capsule by mouth Daily. 30 capsule 0   • co-enzyme Q-10 50 MG capsule Take 1 capsule by mouth Daily. 90 capsule 3   • diazePAM (VALIUM) 5 MG tablet Take 1 tablet by mouth Every 6 (Six) Hours As Needed.  0   • Diclofenac Sodium (VOLTAREN) 1 % gel gel Apply 4 g topically to the appropriate area as directed 3 (Three) Times a Day. (Patient taking differently: Apply 4 g topically to the appropriate area as directed As Needed.) 100 g 0   • fluticasone (Flonase) 50 MCG/ACT nasal spray 2 sprays into the nostril(s) as directed by provider Daily. 2 puffs each nostril 18.2 mL 0   • gabapentin (NEURONTIN) 400 MG capsule Take 1 capsule by mouth 2 (two) times a day.     • glucose monitor monitoring kit 1 each Daily. 1 each 0   • HYDROcodone-acetaminophen (NORCO)  MG per tablet Take 1 tablet by mouth Every 6 (Six) Hours As Needed for Moderate Pain.     • Lancets (onetouch ultrasoft) lancets Use one daily to test blood sugars 100 each 12   • losartan-hydrochlorothiazide (HYZAAR) 50-12.5 MG per tablet TAKE ONE TABLET BY MOUTH DAILY 90 tablet 3   • metFORMIN (GLUCOPHAGE) 500 MG tablet TAKE 1 TABLET BY MOUTH DAILY WITH BREAKFAST 90 tablet 3   • methylPREDNISolone (MEDROL) 4 MG dose pack Take as directed on package instructions. 21 tablet 0   • metoprolol tartrate (LOPRESSOR) 50 MG tablet Take 1 tablet by mouth 2 (Two) Times a Day. 180 tablet 3   • mirtazapine  "(Remeron) 15 MG tablet Take 1 tablet by mouth Every Night. 90 tablet 3   • Naloxegol Oxalate (MOVANTIK) 12.5 MG tablet      • naloxone (NARCAN) 4 MG/0.1ML nasal spray 1 spray into the nostril(s) as directed by provider.     • Needle, Disp, 22G X 1\" misc 1 each Every 14 (Fourteen) Days. 100 each 0   • omeprazole (priLOSEC) 40 MG capsule TAKE 1 CAPSULE BY MOUTH DAILY BEFORE A MEAL 90 capsule 1   • ondansetron ODT (ZOFRAN-ODT) 4 MG disintegrating tablet Take 1 tablet by mouth 4 (Four) Times a Day As Needed for Nausea or Vomiting. 15 tablet 0   • OneTouch Verio test strip 1 each by Other route Daily. for testing 300 each 2   • oxyCODONE (ROXICODONE) 10 MG tablet Take 1 tablet by mouth At Night As Needed.     • oxyCODONE (ROXICODONE) 15 MG immediate release tablet TAKE 1/2 A TABLET BY MOUTH AT BEDTIME AND 1/2 TABLET IN MIDDLE OF NIGHT AS NEEDED AS DIRECTED     • pravastatin (PRAVACHOL) 20 MG tablet TAKE 1 TABLET BY MOUTH EVERY DAY 90 tablet 1   • sucralfate (CARAFATE) 1 g tablet Take 1 tablet 30 minutes before each meal and at bedtime 60 tablet 1   • Syringe 23G X 1\" 3 ML misc Use every 21 days to take testosterone 12 each 1   • Testosterone Cypionate (DEPOTESTOTERONE CYPIONATE) 200 MG/ML injection INJECT 1 ML INTO THE MUSCLE EVERY 14 DAYS AS DIRECTED BY PRESCRIBER 10 mL 0   • traZODone (DESYREL) 50 MG tablet      • [DISCONTINUED] hydroCHLOROthiazide (HYDRODIURIL) 12.5 MG tablet Take 1 tablet by mouth Daily. 90 tablet 3     No current facility-administered medications on file prior to visit.      When did you start taking these medications: HAS TAKEN FOR A WHILE    Which medication are you concerned about: SEVERAL INCLUDING GLIMEPIRIDE AND CHOLESTEROL MEDICATION    Who prescribed you this medication: DR BERNABE     What are your concerns: PATIENT HAS SEVERAL QUESTIONS REGARDING HIS MEDICATIONS AND IS NEEDING CLARIFICATION WITH DR BERNABE. PATIENT STATES THAT HIS GLIMEPIRIDE 1MG REFILL WAS DENIED AND HE IS ALSO NEEDING " CLARIFICATION ON WHICH CHOLESTEROL MEDICATIONS HE SHOULD BE TAKING    PLEASE ADVISE.     How long have you had these concerns: 5/19/23

## 2023-05-24 RX ORDER — GLIMEPIRIDE 1 MG/1
TABLET ORAL
Qty: 30 TABLET | Refills: 0 | OUTPATIENT
Start: 2023-05-24

## 2023-06-09 ENCOUNTER — TELEPHONE (OUTPATIENT)
Dept: FAMILY MEDICINE CLINIC | Facility: CLINIC | Age: 73
End: 2023-06-09
Payer: MEDICARE

## 2023-06-09 NOTE — TELEPHONE ENCOUNTER
Called patient to discuss further. Insect bites/burrow occurred 3 weeks ago but he is still experiencing itching and notice some still appear to be burrowed into skin. Our office is closed for the weekend. I recommend urgent care evaluation and he is agreeable.

## 2023-06-09 NOTE — TELEPHONE ENCOUNTER
PATIENT HAD ABOUT 40 OR MORE BUGS BURROW INTO HIS SKIN. HE BELIEVES THEY WERE TICKS. HE'S REMOVED MOST OF THEM. BUT HAS BEEN HAVING NIGHT SWEATS AND CHILLS. HE WOULD LIKE TO HAVE SOME BLOOD WORK ORDERED TO COME IN MONDAY AND HAVE SOMEONE CALL HIM.    PHONE: [phone]

## 2023-06-10 NOTE — TELEPHONE ENCOUNTER
Spoke with patient and informed that directions was changed and he will be able to  prescription today.    no

## 2023-06-30 PROBLEM — M65.30 TRIGGER FINGER, RIGHT: Status: ACTIVE | Noted: 2021-05-07

## 2023-06-30 PROBLEM — M65.339 TRIGGER MIDDLE FINGER: Status: ACTIVE | Noted: 2021-05-07

## 2023-06-30 PROBLEM — G56.03 BILATERAL CARPAL TUNNEL SYNDROME: Status: ACTIVE | Noted: 2022-05-18

## 2023-07-12 ENCOUNTER — TELEPHONE (OUTPATIENT)
Dept: FAMILY MEDICINE CLINIC | Facility: CLINIC | Age: 73
End: 2023-07-12
Payer: MEDICARE

## 2023-07-12 NOTE — TELEPHONE ENCOUNTER
[facility] IS CALLING STATING THAT THE PATIENT IS A DIABETIC BUT IS NOT ON A STATIN. WOULD LIKE TO SPEAK WITH THE PROVIDER TO SEE IF THE PATIENT SHOULD BE ON A STATIN.    PHONE: [phone]     REF#: [id number]

## 2023-07-20 ENCOUNTER — LAB (OUTPATIENT)
Dept: LAB | Facility: HOSPITAL | Age: 73
End: 2023-07-20
Payer: MEDICARE

## 2023-07-20 DIAGNOSIS — E55.9 VITAMIN D DEFICIENCY: ICD-10-CM

## 2023-07-20 DIAGNOSIS — R53.83 FATIGUE, UNSPECIFIED TYPE: ICD-10-CM

## 2023-07-20 PROCEDURE — 82306 VITAMIN D 25 HYDROXY: CPT

## 2023-07-20 PROCEDURE — 85027 COMPLETE CBC AUTOMATED: CPT

## 2023-07-20 PROCEDURE — 80053 COMPREHEN METABOLIC PANEL: CPT

## 2023-07-21 LAB
25(OH)D3 SERPL-MCNC: 18.5 NG/ML (ref 30–100)
ALBUMIN SERPL-MCNC: 4.6 G/DL (ref 3.5–5.2)
ALBUMIN/GLOB SERPL: 2.1 G/DL
ALP SERPL-CCNC: 55 U/L (ref 39–117)
ALT SERPL W P-5'-P-CCNC: 22 U/L (ref 1–41)
ANION GAP SERPL CALCULATED.3IONS-SCNC: 12.6 MMOL/L (ref 5–15)
AST SERPL-CCNC: 21 U/L (ref 1–40)
BILIRUB SERPL-MCNC: 0.6 MG/DL (ref 0–1.2)
BUN SERPL-MCNC: 6 MG/DL (ref 8–23)
BUN/CREAT SERPL: 6.5 (ref 7–25)
CALCIUM SPEC-SCNC: 9.7 MG/DL (ref 8.6–10.5)
CHLORIDE SERPL-SCNC: 100 MMOL/L (ref 98–107)
CO2 SERPL-SCNC: 25.4 MMOL/L (ref 22–29)
CREAT SERPL-MCNC: 0.93 MG/DL (ref 0.76–1.27)
DEPRECATED RDW RBC AUTO: 44.5 FL (ref 37–54)
EGFRCR SERPLBLD CKD-EPI 2021: 87.8 ML/MIN/1.73
ERYTHROCYTE [DISTWIDTH] IN BLOOD BY AUTOMATED COUNT: 13.8 % (ref 12.3–15.4)
GLOBULIN UR ELPH-MCNC: 2.2 GM/DL
GLUCOSE SERPL-MCNC: 205 MG/DL (ref 65–99)
HCT VFR BLD AUTO: 45.4 % (ref 37.5–51)
HGB BLD-MCNC: 15.7 G/DL (ref 13–17.7)
MCH RBC QN AUTO: 30.4 PG (ref 26.6–33)
MCHC RBC AUTO-ENTMCNC: 34.6 G/DL (ref 31.5–35.7)
MCV RBC AUTO: 88 FL (ref 79–97)
PLATELET # BLD AUTO: 201 10*3/MM3 (ref 140–450)
PMV BLD AUTO: 12.8 FL (ref 6–12)
POTASSIUM SERPL-SCNC: 3.9 MMOL/L (ref 3.5–5.2)
PROT SERPL-MCNC: 6.8 G/DL (ref 6–8.5)
RBC # BLD AUTO: 5.16 10*6/MM3 (ref 4.14–5.8)
SODIUM SERPL-SCNC: 138 MMOL/L (ref 136–145)
WBC NRBC COR # BLD: 7.44 10*3/MM3 (ref 3.4–10.8)

## 2023-07-27 ENCOUNTER — TELEPHONE (OUTPATIENT)
Dept: FAMILY MEDICINE CLINIC | Facility: CLINIC | Age: 73
End: 2023-07-27
Payer: MEDICARE

## 2023-07-27 NOTE — TELEPHONE ENCOUNTER
[facility] CALLED TO INFORM THE PCP THAT THE PATIENT HAS NOT FILLES HIS PRESCRIPTION OF PRAVASTATIN SINCE 2022

## 2023-07-27 NOTE — TELEPHONE ENCOUNTER
Contacted pharmacy to verify last dispense date pharmacists states it was last dispensed on 12/2022. They did receive a new script however pt never picked it up so it has been on hold.

## 2023-08-07 RX ORDER — OMEPRAZOLE 40 MG/1
40 CAPSULE, DELAYED RELEASE ORAL DAILY
Qty: 90 CAPSULE | Refills: 1 | Status: SHIPPED | OUTPATIENT
Start: 2023-08-07

## 2023-08-16 ENCOUNTER — OFFICE VISIT (OUTPATIENT)
Dept: FAMILY MEDICINE CLINIC | Facility: CLINIC | Age: 73
End: 2023-08-16
Payer: MEDICARE

## 2023-08-16 VITALS
OXYGEN SATURATION: 98 % | TEMPERATURE: 98.1 F | DIASTOLIC BLOOD PRESSURE: 80 MMHG | SYSTOLIC BLOOD PRESSURE: 130 MMHG | RESPIRATION RATE: 16 BRPM | HEIGHT: 72 IN | WEIGHT: 187.8 LBS | HEART RATE: 65 BPM | BODY MASS INDEX: 25.44 KG/M2

## 2023-08-16 DIAGNOSIS — T14.8XXA BITE WOUND: Primary | ICD-10-CM

## 2023-08-16 DIAGNOSIS — L98.9 SKIN LESION: ICD-10-CM

## 2023-08-16 RX ORDER — CLINDAMYCIN HYDROCHLORIDE 150 MG/1
CAPSULE ORAL
COMMUNITY
Start: 2023-07-07

## 2023-08-16 RX ORDER — OLOPATADINE HYDROCHLORIDE 1 MG/ML
SOLUTION/ DROPS OPHTHALMIC
COMMUNITY
Start: 2023-07-18

## 2023-08-16 RX ORDER — CITALOPRAM 20 MG/1
TABLET ORAL
COMMUNITY
Start: 2023-08-11

## 2023-08-16 NOTE — PROGRESS NOTES
Chief Complaint   Patient presents with    Insect Bite       HPI:  Behzad Guzman is a 72 y.o. male who presents today for recurring skin lesion and bug bites for the last several months.  Has an appointment with dermatology in the morning and has been seen a few times in ER as well as previous dermatology visits.    ROS:  Constitutional: no fevers, night sweats or unexplained weight loss  Eyes: no vision changes  ENT: no runny nose, ear pain, sore throat  Cardio: no chest pain, palpitations  Pulm: no shortness of breath, wheezing, or cough  GI: no abdominal pain or changes in bowel movements  : no difficulty urinating  MSK: no difficulty ambulating, no joint pain  Neuro: no weakness, dizziness or headache  Psych: no trouble sleeping  Endo: no change in appetite      Past Medical History:   Diagnosis Date    Asthma     Chronic hip pain, left     Chronic pain in left shoulder     Hyperlipidemia     Hypertension     Leg length discrepancy     Neck pain, chronic     Neuropathy     Panic attacks     Pre-diabetes     Prediabetes     Spinal stenosis       Family History   Problem Relation Age of Onset    Heart attack Father 65    Other Mother         accident    No Known Problems Maternal Grandmother     No Known Problems Maternal Grandfather     No Known Problems Paternal Grandmother     Stroke Paternal Grandfather       Social History     Socioeconomic History    Marital status:    Tobacco Use    Smoking status: Former     Packs/day: 0.50     Years: 10.00     Pack years: 5.00     Types: Cigarettes     Quit date:      Years since quittin.6    Smokeless tobacco: Never   Vaping Use    Vaping Use: Never used   Substance and Sexual Activity    Alcohol use: No    Drug use: No    Sexual activity: Defer      Allergies   Allergen Reactions    Peanut Butter Flavor Anaphylaxis    Shellfish Allergy Anaphylaxis    Amoxicillin Rash and Other (See Comments)    Penicillins Rash    Sulfa Antibiotics Myalgia     Doxycycline GI Bleeding    Latex Rash    Pregabalin Other (See Comments)      Immunization History   Administered Date(s) Administered    COVID-19 (PFIZER) Purple Cap Monovalent 09/14/2021, 10/08/2021    Flu Vaccine Quad PF >36MO 09/30/2015    Fluzone High-Dose 65+yrs 11/22/2022    Pneumococcal Conjugate 13-Valent (PCV13) 09/22/2016    Pneumococcal Polysaccharide (PPSV23) 05/18/2018    Tdap 04/22/2022        PE:  Vitals:    08/16/23 1524   BP: 130/80   Pulse: 65   Resp: 16   Temp: 98.1 øF (36.7 øC)   SpO2: 98%      Body mass index is 25.46 kg/mý.    Gen Appearance: NAD  HEENT: Normocephalic, PERRLA, no thyromegaly, trache midline  Heart: RRR, normal S1 and S2, no murmur  Lungs: CTA b/l, no wheezing, no crackles  Abdomen: Soft, non-tender, non-distended, no guarding and BSx4  MSK: Moves all extremities well, normal gait, no peripheral edema  Pulses: Palpable and equal b/l  Lymph nodes: No palpable lymphadenopathy   Neuro: No focal deficits      Current Outpatient Medications   Medication Sig Dispense Refill    albuterol (ACCUNEB) 0.63 MG/3ML nebulizer solution Take 3 mL by nebulization Every 6 (Six) Hours As Needed for Wheezing. 3 mL 12    albuterol sulfate  (90 Base) MCG/ACT inhaler INHALE TWO PUFFS BY MOUTH EVERY 6 HOURS AS NEEDED FOR WHEEZING OR SHORTNESS OF AIR 6.7 g 1    azithromycin (Zithromax Z-Rell) 250 MG tablet Take 2 tablets by mouth on day 1, then 1 tablet daily on days 2-5 6 tablet 0    Cholecalciferol (VITAMIN D) 125 MCG (5000 UT) capsule capsule Take 1 capsule by mouth Daily. 30 capsule 0    citalopram (CeleXA) 20 MG tablet       clindamycin (CLEOCIN) 150 MG capsule       clomiPHENE (CLOMID) 50 MG tablet Take 1 tablet by mouth Daily.      cloNIDine (CATAPRES) 0.1 MG tablet Take 1 tablet by mouth Every 8 (Eight) Hours.      diazePAM (VALIUM) 5 MG tablet Take 1 tablet by mouth Every 6 (Six) Hours As Needed.  0    Diclofenac Sodium (VOLTAREN) 1 % gel gel Apply 4 g topically to the appropriate area  "as directed 3 (Three) Times a Day. (Patient taking differently: Apply 4 g topically to the appropriate area as directed As Needed.) 100 g 0    fluticasone (Flonase) 50 MCG/ACT nasal spray 2 sprays into the nostril(s) as directed by provider Daily. 2 puffs each nostril 18.2 mL 0    gabapentin (NEURONTIN) 400 MG capsule Take 1 capsule by mouth 2 (two) times a day.      glimepiride (AMARYL) 1 MG tablet Take 1 tablet by mouth Daily.      glucose monitor monitoring kit 1 each Daily. 1 each 0    hydroCHLOROthiazide (MICROZIDE) 12.5 MG capsule Take 1 capsule by mouth Daily.      HYDROcodone-acetaminophen (NORCO)  MG per tablet Take 1 tablet by mouth Every 6 (Six) Hours As Needed for Moderate Pain.      Lancets (onetouch ultrasoft) lancets Use one daily to test blood sugars 100 each 12    losartan-hydrochlorothiazide (HYZAAR) 50-12.5 MG per tablet TAKE ONE TABLET BY MOUTH DAILY 90 tablet 3    metFORMIN (GLUCOPHAGE) 500 MG tablet TAKE 1 TABLET BY MOUTH DAILY WITH BREAKFAST 90 tablet 3    metoprolol tartrate (LOPRESSOR) 50 MG tablet Take 1 tablet by mouth 2 (Two) Times a Day. 180 tablet 3    mirtazapine (Remeron) 15 MG tablet Take 1 tablet by mouth Every Night. 90 tablet 3    mupirocin (BACTROBAN) 2 % ointment APPLY A THIN LAYER TO OPEN AREAS ON LOWER LEGS TWICE DAILY UNTIL HEALED      Naloxegol Oxalate (MOVANTIK) 12.5 MG tablet       naloxone (NARCAN) 4 MG/0.1ML nasal spray 1 spray into the nostril(s) as directed by provider.      Needle, Disp, 22G X 1\" misc 1 each Every 14 (Fourteen) Days. 100 each 0    olopatadine (PATANOL) 0.1 % ophthalmic solution       omeprazole (priLOSEC) 40 MG capsule TAKE 1 CAPSULE BY MOUTH DAILY BEFORE A MEAL 90 capsule 1    OneTouch Verio test strip 1 each by Other route Daily. for testing 300 each 2    oxyCODONE (ROXICODONE) 10 MG tablet Take 1 tablet by mouth At Night As Needed.      oxyCODONE (ROXICODONE) 15 MG immediate release tablet TAKE 1/2 A TABLET BY MOUTH AT BEDTIME AND 1/2 TABLET " "IN MIDDLE OF NIGHT AS NEEDED AS DIRECTED      permethrin (ELIMITE) 5 % cream Apply 1 application  topically to the appropriate area as directed 1 (One) Time.      pravastatin (PRAVACHOL) 20 MG tablet Take 1 tablet by mouth Daily. 90 tablet 1    sucralfate (CARAFATE) 1 g tablet Take 1 tablet 30 minutes before each meal and at bedtime 60 tablet 1    Syringe 23G X 1\" 3 ML misc Use every 21 days to take testosterone 12 each 1    Testosterone Cypionate (DEPOTESTOTERONE CYPIONATE) 200 MG/ML injection INJECT 1 ML INTO THE MUSCLE EVERY 14 DAYS AS DIRECTED BY PRESCRIBER 10 mL 0    traZODone (DESYREL) 50 MG tablet       vitamin D (ERGOCALCIFEROL) 1.25 MG (31878 UT) capsule capsule Take 1 capsule by mouth 1 (One) Time Per Week for 12 doses. 12 capsule 0     No current facility-administered medications for this visit.      Patient reports using permethrin in the past which was successful.  Recommend follow-up with dermatology tomorrow morning and continue using bacitracin on right arm wound.  Recommend calling an  for his house as well.    Counseling was given to patient for the following topics: impressions, risks and benefits of treatment options, and importance of treatment compliance . Total time of the encounter was 30 minutes and 15 minutes was spent face to face counseling.    Diagnoses and all orders for this visit:    1. Bite wound (Primary)    2. Skin lesion         No follow-ups on file.     Dictated Utilizing Dragon Dictation    Please note that portions of this note were completed with a voice recognition program.    Part of this note may be an electronic transcription/translation of spoken language to printed text using the Dragon Dictation System.  "

## 2023-08-25 ENCOUNTER — TELEPHONE (OUTPATIENT)
Dept: FAMILY MEDICINE CLINIC | Facility: CLINIC | Age: 73
End: 2023-08-25
Payer: MEDICARE

## 2023-08-25 NOTE — TELEPHONE ENCOUNTER
Caller: Wanmonik Behzadcharlene Hurtado    Relationship: Self    Best call back number: [phone]    What is the best time to reach you: ANY    Who are you requesting to speak with (clinical staff, provider,  specific staff member): CLINICAL STAFF    What was the call regarding: PATIENT WANTED TO SPEAK TO SOMEONE TO VERIFY WHAT HE IS TAKING FOR HIS MEDICATIONS. THE PATIENT SAID THAT HE HAS BEEN ISSUES WITH HIS PHARMACEUTICAL PROVIDER. HE TAKES TWO BLOOD PRESSURE MEDICATIONS. HE IS BEING SENT THINGS HE DOS NOT NEED. AMLODIPINE IS ONE OF THE MEDICATIONS THEY SENT HIM. HE ALSO HAS METOPROLOL WHICH HE KNOWS ABOUT AND LOSTARTAN. HE IS NEEDING SOMEONE ASA TO CALL HIM TO LET HIM KNOW.     HE DOES NOT KNOW HOW TO USE THE FLIP PHONE SO HE MAY NOT GET VOICEMAIL. PATIENT SAID HE WILL TRY TO REMEMBER TO CALL BACK AS WELL.     Is it okay if the provider responds through MyChart: NO

## 2023-08-25 NOTE — TELEPHONE ENCOUNTER
TRIED TO CONTACT PT ABOUT HIS MEDICATIONS , NO ANSWER AND HIS MAILBOX WAS FULL SO UNABLE TO LVM.    OK FOR HUB TO DOCUMENT WHAT PT IS CONCERNED ABOUT.

## 2023-08-28 NOTE — TELEPHONE ENCOUNTER
2nd attempt  TRIED TO CONTACT PT ABOUT HIS MEDICATIONS , NO ANSWER AND HIS MAILBOX WAS FULL SO UNABLE TO LVM.     OK FOR HUB TO DOCUMENT WHAT PT IS CONCERNED ABOUT.

## 2023-08-29 NOTE — TELEPHONE ENCOUNTER
3rd attempt  TRIED TO CONTACT PT ABOUT HIS MEDICATIONS , NO ANSWER AND HIS MAILBOX WAS FULL SO UNABLE TO LVM.     OK FOR HUB TO DOCUMENT WHAT PT IS CONCERNED ABOUT.

## 2023-08-29 NOTE — TELEPHONE ENCOUNTER
3rd attempt. Closing out encounter.    TRIED TO CONTACT PT ABOUT HIS MEDICATIONS , NO ANSWER AND HIS MAILBOX WAS FULL SO UNABLE TO LVM.     OK FOR HUB TO DOCUMENT WHAT PT IS CONCERNED ABOUT.

## 2023-09-21 NOTE — ADDENDUM NOTE
Addended by: JOE CUEVAS on: 3/30/2022 03:09 PM     Modules accepted: Orders    
Detail Level: Generalized
Detail Level: Zone
Detail Level: Simple

## 2023-10-04 ENCOUNTER — OFFICE VISIT (OUTPATIENT)
Dept: FAMILY MEDICINE CLINIC | Facility: CLINIC | Age: 73
End: 2023-10-04
Payer: MEDICARE

## 2023-10-04 ENCOUNTER — LAB (OUTPATIENT)
Dept: LAB | Facility: HOSPITAL | Age: 73
End: 2023-10-04
Payer: MEDICARE

## 2023-10-04 VITALS
WEIGHT: 189 LBS | OXYGEN SATURATION: 97 % | BODY MASS INDEX: 25.6 KG/M2 | HEIGHT: 72 IN | SYSTOLIC BLOOD PRESSURE: 128 MMHG | HEART RATE: 82 BPM | DIASTOLIC BLOOD PRESSURE: 88 MMHG

## 2023-10-04 DIAGNOSIS — R79.89 LOW TESTOSTERONE: ICD-10-CM

## 2023-10-04 DIAGNOSIS — R53.83 OTHER FATIGUE: ICD-10-CM

## 2023-10-04 DIAGNOSIS — Z12.5 ENCOUNTER FOR PROSTATE CANCER SCREENING: ICD-10-CM

## 2023-10-04 DIAGNOSIS — I10 PRIMARY HYPERTENSION: ICD-10-CM

## 2023-10-04 DIAGNOSIS — E55.9 VITAMIN D DEFICIENCY: ICD-10-CM

## 2023-10-04 DIAGNOSIS — F41.9 ANXIETY AND DEPRESSION: ICD-10-CM

## 2023-10-04 DIAGNOSIS — R82.90 FOUL SMELLING URINE: ICD-10-CM

## 2023-10-04 DIAGNOSIS — F32.A ANXIETY AND DEPRESSION: ICD-10-CM

## 2023-10-04 DIAGNOSIS — E11.42 TYPE 2 DIABETES MELLITUS WITH DIABETIC POLYNEUROPATHY, WITHOUT LONG-TERM CURRENT USE OF INSULIN: Primary | ICD-10-CM

## 2023-10-04 LAB
25(OH)D3 SERPL-MCNC: 37.6 NG/ML (ref 30–100)
ALBUMIN SERPL-MCNC: 4.7 G/DL (ref 3.5–5.2)
ALBUMIN/GLOB SERPL: 1.8 G/DL
ALP SERPL-CCNC: 56 U/L (ref 39–117)
ALT SERPL W P-5'-P-CCNC: 17 U/L (ref 1–41)
ANION GAP SERPL CALCULATED.3IONS-SCNC: 13 MMOL/L (ref 5–15)
AST SERPL-CCNC: 24 U/L (ref 1–40)
BASOPHILS # BLD AUTO: 0.07 10*3/MM3 (ref 0–0.2)
BASOPHILS NFR BLD AUTO: 0.8 % (ref 0–1.5)
BILIRUB BLD-MCNC: NEGATIVE MG/DL
BILIRUB SERPL-MCNC: 0.6 MG/DL (ref 0–1.2)
BILIRUB UR QL STRIP: NEGATIVE
BUN SERPL-MCNC: 7 MG/DL (ref 8–23)
BUN/CREAT SERPL: 7 (ref 7–25)
CALCIUM SPEC-SCNC: 10 MG/DL (ref 8.6–10.5)
CHLORIDE SERPL-SCNC: 96 MMOL/L (ref 98–107)
CHOLEST SERPL-MCNC: 188 MG/DL (ref 0–200)
CLARITY UR: CLEAR
CLARITY, POC: CLEAR
CO2 SERPL-SCNC: 27 MMOL/L (ref 22–29)
COLOR UR: YELLOW
COLOR UR: YELLOW
CREAT SERPL-MCNC: 1 MG/DL (ref 0.76–1.27)
DEPRECATED RDW RBC AUTO: 39 FL (ref 37–54)
EGFRCR SERPLBLD CKD-EPI 2021: 80 ML/MIN/1.73
EOSINOPHIL # BLD AUTO: 0.29 10*3/MM3 (ref 0–0.4)
EOSINOPHIL NFR BLD AUTO: 3.3 % (ref 0.3–6.2)
ERYTHROCYTE [DISTWIDTH] IN BLOOD BY AUTOMATED COUNT: 12 % (ref 12.3–15.4)
EXPIRATION DATE: NORMAL
GLOBULIN UR ELPH-MCNC: 2.6 GM/DL
GLUCOSE SERPL-MCNC: 143 MG/DL (ref 65–99)
GLUCOSE UR STRIP-MCNC: NEGATIVE MG/DL
GLUCOSE UR STRIP-MCNC: NEGATIVE MG/DL
HBA1C MFR BLD: 8.2 %
HCT VFR BLD AUTO: 48.7 % (ref 37.5–51)
HDLC SERPL-MCNC: 43 MG/DL (ref 40–60)
HGB BLD-MCNC: 16.9 G/DL (ref 13–17.7)
HGB UR QL STRIP.AUTO: NEGATIVE
HOLD SPECIMEN: NORMAL
IMM GRANULOCYTES # BLD AUTO: 0.01 10*3/MM3 (ref 0–0.05)
IMM GRANULOCYTES NFR BLD AUTO: 0.1 % (ref 0–0.5)
KETONES UR QL STRIP: NEGATIVE
KETONES UR QL: NEGATIVE
LDLC SERPL CALC-MCNC: 104 MG/DL (ref 0–100)
LDLC/HDLC SERPL: 2.26 {RATIO}
LEUKOCYTE EST, POC: NEGATIVE
LEUKOCYTE ESTERASE UR QL STRIP.AUTO: NEGATIVE
LYMPHOCYTES # BLD AUTO: 2.4 10*3/MM3 (ref 0.7–3.1)
LYMPHOCYTES NFR BLD AUTO: 27.4 % (ref 19.6–45.3)
Lab: NORMAL
MCH RBC QN AUTO: 30.7 PG (ref 26.6–33)
MCHC RBC AUTO-ENTMCNC: 34.7 G/DL (ref 31.5–35.7)
MCV RBC AUTO: 88.5 FL (ref 79–97)
MONOCYTES # BLD AUTO: 0.67 10*3/MM3 (ref 0.1–0.9)
MONOCYTES NFR BLD AUTO: 7.6 % (ref 5–12)
NEUTROPHILS NFR BLD AUTO: 5.32 10*3/MM3 (ref 1.7–7)
NEUTROPHILS NFR BLD AUTO: 60.8 % (ref 42.7–76)
NITRITE UR QL STRIP: NEGATIVE
NITRITE UR-MCNC: NEGATIVE MG/ML
NRBC BLD AUTO-RTO: 0 /100 WBC (ref 0–0.2)
PH UR STRIP.AUTO: 8 [PH] (ref 5–8)
PH UR: 7 [PH] (ref 5–8)
PLATELET # BLD AUTO: 186 10*3/MM3 (ref 140–450)
PMV BLD AUTO: 12.5 FL (ref 6–12)
POC CREATININE URINE: 50
POC MICROALBUMIN URINE: 10
POTASSIUM SERPL-SCNC: 3.6 MMOL/L (ref 3.5–5.2)
PROT SERPL-MCNC: 7.3 G/DL (ref 6–8.5)
PROT UR QL STRIP: NEGATIVE
PROT UR STRIP-MCNC: NEGATIVE MG/DL
PSA SERPL-MCNC: 0.73 NG/ML (ref 0–4)
RBC # BLD AUTO: 5.5 10*6/MM3 (ref 4.14–5.8)
RBC # UR STRIP: NEGATIVE /UL
SODIUM SERPL-SCNC: 136 MMOL/L (ref 136–145)
SP GR UR STRIP: 1.01 (ref 1–1.03)
SP GR UR: 1 (ref 1–1.03)
T4 FREE SERPL-MCNC: 1.14 NG/DL (ref 0.93–1.7)
TRIGL SERPL-MCNC: 240 MG/DL (ref 0–150)
TSH SERPL DL<=0.05 MIU/L-ACNC: 2.1 UIU/ML (ref 0.27–4.2)
UROBILINOGEN UR QL STRIP: NORMAL
UROBILINOGEN UR QL: NORMAL
VLDLC SERPL-MCNC: 41 MG/DL (ref 5–40)
WBC NRBC COR # BLD: 8.76 10*3/MM3 (ref 3.4–10.8)

## 2023-10-04 PROCEDURE — 82306 VITAMIN D 25 HYDROXY: CPT | Performed by: INTERNAL MEDICINE

## 2023-10-04 PROCEDURE — 84443 ASSAY THYROID STIM HORMONE: CPT | Performed by: INTERNAL MEDICINE

## 2023-10-04 PROCEDURE — 84402 ASSAY OF FREE TESTOSTERONE: CPT | Performed by: INTERNAL MEDICINE

## 2023-10-04 PROCEDURE — G0103 PSA SCREENING: HCPCS | Performed by: INTERNAL MEDICINE

## 2023-10-04 PROCEDURE — 85025 COMPLETE CBC W/AUTO DIFF WBC: CPT | Performed by: INTERNAL MEDICINE

## 2023-10-04 PROCEDURE — 81003 URINALYSIS AUTO W/O SCOPE: CPT | Performed by: INTERNAL MEDICINE

## 2023-10-04 PROCEDURE — 80061 LIPID PANEL: CPT | Performed by: INTERNAL MEDICINE

## 2023-10-04 PROCEDURE — 84403 ASSAY OF TOTAL TESTOSTERONE: CPT | Performed by: INTERNAL MEDICINE

## 2023-10-04 PROCEDURE — 80053 COMPREHEN METABOLIC PANEL: CPT | Performed by: INTERNAL MEDICINE

## 2023-10-04 PROCEDURE — 84439 ASSAY OF FREE THYROXINE: CPT | Performed by: INTERNAL MEDICINE

## 2023-10-04 NOTE — PROGRESS NOTES
Chief Complaint   Patient presents with    Fatigue    Diabetes       HPI:  Behzad Guzman is a 73 y.o. male who presents today for follow-up diabetes.  Patient reports more fatigue after eating.    ROS:  Constitutional: no fevers, night sweats or unexplained weight loss  Eyes: no vision changes  ENT: no runny nose, ear pain, sore throat  Cardio: no chest pain, palpitations  Pulm: no shortness of breath, wheezing, or cough  GI: no abdominal pain or changes in bowel movements  : no difficulty urinating  MSK: no difficulty ambulating, no joint pain  Neuro: no weakness, dizziness or headache  Psych: no trouble sleeping  Endo: no change in appetite      Past Medical History:   Diagnosis Date    Asthma     Chronic hip pain, left     Chronic pain in left shoulder     Hyperlipidemia     Hypertension     Leg length discrepancy     Neck pain, chronic     Neuropathy     Panic attacks     Pre-diabetes     Prediabetes     Spinal stenosis       Family History   Problem Relation Age of Onset    Heart attack Father 65    Other Mother         accident    No Known Problems Maternal Grandmother     No Known Problems Maternal Grandfather     No Known Problems Paternal Grandmother     Stroke Paternal Grandfather       Social History     Socioeconomic History    Marital status:    Tobacco Use    Smoking status: Former     Packs/day: 0.50     Years: 10.00     Pack years: 5.00     Types: Cigarettes     Quit date:      Years since quittin.7    Smokeless tobacco: Never   Vaping Use    Vaping Use: Never used   Substance and Sexual Activity    Alcohol use: No    Drug use: No    Sexual activity: Defer      Allergies   Allergen Reactions    Peanut Butter Flavor Anaphylaxis    Shellfish Allergy Anaphylaxis    Amoxicillin Rash and Other (See Comments)    Penicillins Rash    Sulfa Antibiotics Myalgia    Doxycycline GI Bleeding    Latex Rash    Pregabalin Other (See Comments)      Immunization History   Administered Date(s)  Administered    COVID-19 (PFIZER) Purple Cap Monovalent 09/14/2021, 10/08/2021    Flu Vaccine Quad PF >36MO 09/30/2015    Fluzone High-Dose 65+yrs 11/22/2022    Pneumococcal Conjugate 13-Valent (PCV13) 09/22/2016    Pneumococcal Polysaccharide (PPSV23) 05/18/2018    Tdap 04/22/2022        PE:  Vitals:    10/04/23 0908   BP: 128/88   Pulse: 82   SpO2: 97%      Body mass index is 25.63 kg/m².    Gen Appearance: NAD  HEENT: Normocephalic, PERRLA, no thyromegaly, trache midline  Heart: RRR, normal S1 and S2, no murmur  Lungs: CTA b/l, no wheezing, no crackles  Abdomen: Soft, non-tender, non-distended, no guarding and BSx4  MSK: Moves all extremities well, normal gait, no peripheral edema  Pulses: Palpable and equal b/l  Lymph nodes: No palpable lymphadenopathy   Neuro: No focal deficits      Current Outpatient Medications   Medication Sig Dispense Refill    albuterol (ACCUNEB) 0.63 MG/3ML nebulizer solution Take 3 mL by nebulization Every 6 (Six) Hours As Needed for Wheezing. 3 mL 12    albuterol sulfate  (90 Base) MCG/ACT inhaler INHALE TWO PUFFS BY MOUTH EVERY 6 HOURS AS NEEDED FOR WHEEZING OR SHORTNESS OF AIR 6.7 g 1    Cholecalciferol (VITAMIN D) 125 MCG (5000 UT) capsule capsule Take 1 capsule by mouth Daily. 30 capsule 0    citalopram (CeleXA) 20 MG tablet       cloNIDine (CATAPRES) 0.1 MG tablet Take 1 tablet by mouth Every 8 (Eight) Hours.      diazePAM (VALIUM) 5 MG tablet Take 1 tablet by mouth Every 6 (Six) Hours As Needed.  0    Diclofenac Sodium (VOLTAREN) 1 % gel gel Apply 4 g topically to the appropriate area as directed 3 (Three) Times a Day. (Patient taking differently: Apply 4 g topically to the appropriate area as directed As Needed.) 100 g 0    fluticasone (Flonase) 50 MCG/ACT nasal spray 2 sprays into the nostril(s) as directed by provider Daily. 2 puffs each nostril 18.2 mL 0    gabapentin (NEURONTIN) 400 MG capsule Take 1 capsule by mouth 2 (two) times a day.      glucose monitor monitoring  "kit 1 each Daily. 1 each 0    HYDROcodone-acetaminophen (NORCO)  MG per tablet Take 1 tablet by mouth Every 6 (Six) Hours As Needed for Moderate Pain.      Lancets (onetouch ultrasoft) lancets Use one daily to test blood sugars 100 each 12    losartan-hydrochlorothiazide (HYZAAR) 50-12.5 MG per tablet TAKE ONE TABLET BY MOUTH DAILY 90 tablet 3    metFORMIN (GLUCOPHAGE) 500 MG tablet Take 1 tablet by mouth 2 (Two) Times a Day With Meals. 180 tablet 3    metoprolol tartrate (LOPRESSOR) 50 MG tablet Take 1 tablet by mouth 2 (Two) Times a Day. 180 tablet 3    mirtazapine (Remeron) 15 MG tablet Take 1 tablet by mouth Every Night. 90 tablet 3    mupirocin (BACTROBAN) 2 % ointment APPLY A THIN LAYER TO OPEN AREAS ON LOWER LEGS TWICE DAILY UNTIL HEALED      Naloxegol Oxalate (MOVANTIK) 12.5 MG tablet       naloxone (NARCAN) 4 MG/0.1ML nasal spray 1 spray into the nostril(s) as directed by provider.      Needle, Disp, 22G X 1\" misc 1 each Every 14 (Fourteen) Days. 100 each 0    olopatadine (PATANOL) 0.1 % ophthalmic solution       omeprazole (priLOSEC) 40 MG capsule TAKE 1 CAPSULE BY MOUTH DAILY BEFORE A MEAL 90 capsule 1    OneTouch Verio test strip 1 each by Other route Daily. for testing 300 each 2    oxyCODONE (ROXICODONE) 15 MG immediate release tablet TAKE 1/2 A TABLET BY MOUTH AT BEDTIME AND 1/2 TABLET IN MIDDLE OF NIGHT AS NEEDED AS DIRECTED      permethrin (ELIMITE) 5 % cream Apply 1 application  topically to the appropriate area as directed 1 (One) Time.      pravastatin (PRAVACHOL) 20 MG tablet Take 1 tablet by mouth Daily. 90 tablet 1    sucralfate (CARAFATE) 1 g tablet Take 1 tablet 30 minutes before each meal and at bedtime 60 tablet 1    Syringe 23G X 1\" 3 ML misc Use every 21 days to take testosterone 12 each 1    Testosterone Cypionate (DEPOTESTOTERONE CYPIONATE) 200 MG/ML injection INJECT 1 ML INTO THE MUSCLE EVERY 14 DAYS AS DIRECTED BY PRESCRIBER 10 mL 0    traZODone (DESYREL) 50 MG tablet       " vitamin D (ERGOCALCIFEROL) 1.25 MG (11841 UT) capsule capsule Take 1 capsule by mouth 1 (One) Time Per Week for 12 doses. 12 capsule 0     No current facility-administered medications for this visit.      I suspect depression and anxiety are contributing to some of his symptoms.  He reports a lot of ongoing financial and family stress.  A1c uncontrolled at 8.2%, increase metformin to twice daily.  Recheck A1c 3 months.    Diagnoses and all orders for this visit:    1. Type 2 diabetes mellitus with diabetic polyneuropathy, without long-term current use of insulin (Primary)  -     POCT microalbumin  -     POC Glycosylated Hemoglobin (Hb A1C)  -     CBC & Differential; Future  -     Comprehensive Metabolic Panel; Future  -     Lipid Panel; Future  -     PSA Screen; Future  -     TSH+Free T4; Future  -     Urinalysis With Culture If Indicated -; Future  -     Vitamin D,25-Hydroxy; Future  -     metFORMIN (GLUCOPHAGE) 500 MG tablet; Take 1 tablet by mouth 2 (Two) Times a Day With Meals.  Dispense: 180 tablet; Refill: 3  -     CBC & Differential  -     Comprehensive Metabolic Panel  -     Lipid Panel  -     PSA Screen  -     TSH+Free T4  -     Urinalysis With Culture If Indicated - Urine, Clean Catch  -     Vitamin D,25-Hydroxy    2. Anxiety and depression  -     CBC & Differential; Future  -     Comprehensive Metabolic Panel; Future  -     Lipid Panel; Future  -     PSA Screen; Future  -     TSH+Free T4; Future  -     Urinalysis With Culture If Indicated -; Future  -     Vitamin D,25-Hydroxy; Future  -     CBC & Differential  -     Comprehensive Metabolic Panel  -     Lipid Panel  -     PSA Screen  -     TSH+Free T4  -     Urinalysis With Culture If Indicated - Urine, Clean Catch  -     Vitamin D,25-Hydroxy    3. Other fatigue  -     CBC & Differential; Future  -     Comprehensive Metabolic Panel; Future  -     Lipid Panel; Future  -     PSA Screen; Future  -     TSH+Free T4; Future  -     Urinalysis With Culture If  Indicated -; Future  -     Vitamin D,25-Hydroxy; Future  -     CBC & Differential  -     Comprehensive Metabolic Panel  -     Lipid Panel  -     PSA Screen  -     TSH+Free T4  -     Urinalysis With Culture If Indicated - Urine, Clean Catch  -     Vitamin D,25-Hydroxy    4. Vitamin D deficiency  -     CBC & Differential; Future  -     Comprehensive Metabolic Panel; Future  -     Lipid Panel; Future  -     PSA Screen; Future  -     TSH+Free T4; Future  -     Urinalysis With Culture If Indicated -; Future  -     Vitamin D,25-Hydroxy; Future  -     CBC & Differential  -     Comprehensive Metabolic Panel  -     Lipid Panel  -     PSA Screen  -     TSH+Free T4  -     Urinalysis With Culture If Indicated - Urine, Clean Catch  -     Vitamin D,25-Hydroxy    5. Primary hypertension  -     CBC & Differential; Future  -     Comprehensive Metabolic Panel; Future  -     Lipid Panel; Future  -     PSA Screen; Future  -     TSH+Free T4; Future  -     Urinalysis With Culture If Indicated -; Future  -     Vitamin D,25-Hydroxy; Future  -     CBC & Differential  -     Comprehensive Metabolic Panel  -     Lipid Panel  -     PSA Screen  -     TSH+Free T4  -     Urinalysis With Culture If Indicated - Urine, Clean Catch  -     Vitamin D,25-Hydroxy    6. Encounter for prostate cancer screening  -     CBC & Differential; Future  -     Comprehensive Metabolic Panel; Future  -     Lipid Panel; Future  -     PSA Screen; Future  -     TSH+Free T4; Future  -     Urinalysis With Culture If Indicated -; Future  -     Vitamin D,25-Hydroxy; Future  -     CBC & Differential  -     Comprehensive Metabolic Panel  -     Lipid Panel  -     PSA Screen  -     TSH+Free T4  -     Urinalysis With Culture If Indicated - Urine, Clean Catch  -     Vitamin D,25-Hydroxy    7. Low testosterone  -     Testosterone, Free, Total; Future  -     Testosterone, Free, Total    8. Foul smelling urine  -     POC Urinalysis Dipstick, Automated         Return in about 3 months  (around 1/4/2024) for a1c.     Dictated Utilizing Dragon Dictation    Please note that portions of this note were completed with a voice recognition program.    Part of this note may be an electronic transcription/translation of spoken language to printed text using the Dragon Dictation System.

## 2023-10-09 ENCOUNTER — TELEPHONE (OUTPATIENT)
Dept: FAMILY MEDICINE CLINIC | Facility: CLINIC | Age: 73
End: 2023-10-09

## 2023-10-09 NOTE — TELEPHONE ENCOUNTER
Caller: Thomas Behzadcharlene Hurtado    Relationship: Self    Best call back number: [phone]     What test was performed: LAB WORK     When was the test performed: 10.4.23    Additional notes: PATIENT IS WANTING TO SPEAK ABOUT HIS MEDICATIONS THAT WERE ALTERED AS HE IS WANTING TO TAKE A MEDICATION THAT WAS REMOVED FROM HIS LIST.     PLEASE CALL PATIENT BACK TO GIVE TEST RESULTS AND DISCUSS MEDICATIONS, IF NO ANSWER LEAVE A DETAILED MESSAGE.

## 2023-10-10 DIAGNOSIS — E55.9 VITAMIN D DEFICIENCY: ICD-10-CM

## 2023-10-10 RX ORDER — ERGOCALCIFEROL 1.25 MG/1
CAPSULE ORAL
Qty: 12 CAPSULE | Refills: 0 | Status: SHIPPED | OUTPATIENT
Start: 2023-10-10

## 2023-10-10 NOTE — TELEPHONE ENCOUNTER
Rx Refill Note  Requested Prescriptions     Pending Prescriptions Disp Refills    vitamin D (ERGOCALCIFEROL) 1.25 MG (49494 UT) capsule capsule [Pharmacy Med Name: VIT D2 (ERGOCAL) 1.25MG(50,000U) CP] 12 capsule 0     Sig: TAKE 1 CAPSULE BY MOUTH ONCE WEEKLY FOR 12 DOSES      Last office visit with prescribing clinician: 7/20/2023   Last telemedicine visit with prescribing clinician: Visit date not found   Next office visit with prescribing clinician: Visit date not found                         Would you like a call back once the refill request has been completed: [] Yes [] No    If the office needs to give you a call back, can they leave a voicemail: [] Yes [] No    Diann Rader MA  10/10/23, 09:34 EDT

## 2023-10-11 LAB
TESTOST FREE SERPL-MCNC: 1 PG/ML (ref 6.6–18.1)
TESTOST SERPL-MCNC: 209 NG/DL (ref 264–916)

## 2023-10-16 DIAGNOSIS — E34.9 TESTOSTERONE DEFICIENCY: ICD-10-CM

## 2023-10-16 RX ORDER — TESTOSTERONE CYPIONATE 200 MG/ML
100 INJECTION, SOLUTION INTRAMUSCULAR
Qty: 10 ML | Refills: 0 | Status: SHIPPED | OUTPATIENT
Start: 2023-10-16

## 2023-10-16 NOTE — TELEPHONE ENCOUNTER
Zach Tran, DO  [facility]  Clinical Pool2 days ago       What medication is he referring to?  Blood work looks good besides low testosterone, is he taking his testosterone injection?   Attempted to contact patient, no answer. LVM with office # given.    Hub may relay message and document.

## 2023-10-16 NOTE — TELEPHONE ENCOUNTER
Rx Refill Note  Requested Prescriptions     Pending Prescriptions Disp Refills    Testosterone Cypionate (DEPOTESTOTERONE CYPIONATE) 200 MG/ML injection [Pharmacy Med Name: TESTOSTERONE CYP 200MG/ML SDV 1ML] 10 mL      Sig: INJECT 1 ML INTO THE MUSCLE EVERY 14 DAYS AS DIRECTED BY PRESCRIBER      Last office visit with prescribing clinician: 10/4/2023     Next office visit with prescribing clinician: 1/5/2024    Csa:  Uds:     Catherine Rockwell MA  10/16/23, 14:54 EDT

## 2023-10-16 NOTE — TELEPHONE ENCOUNTER
Name: TALIA LOPEZ    Relationship: SELF    Best Callback Number: [phone]    HUB PROVIDED THE RELAY MESSAGE FROM THE OFFICE    PATIENT HAS FURTHER QUESTIONS AND WOULD LIKE A CALL BACK AT THE FOLLOWING PHONE NUMBER [phone]    ADDITIONAL INFORMATION: NOT TAKING TESTOSTERONE CURRENTLY BUT WILL RESUME, DUE TO BAD ALLERGIES AND HE DIDN'T WANT TO TAKE THAT DURING THAT TIME. PLEASE BE SURE HE HAS A CURRENT TESTOSTERONE PRESCRIPTION. PATIENT DOES NOT KNOW WHAT MEDICATION BUT IT WAS SUPPOSEED TO HELP HAIR LOSS.

## 2023-10-31 ENCOUNTER — OFFICE VISIT (OUTPATIENT)
Dept: FAMILY MEDICINE CLINIC | Facility: CLINIC | Age: 73
End: 2023-10-31
Payer: MEDICARE

## 2023-10-31 ENCOUNTER — LAB (OUTPATIENT)
Dept: LAB | Facility: HOSPITAL | Age: 73
End: 2023-10-31
Payer: MEDICARE

## 2023-10-31 VITALS
SYSTOLIC BLOOD PRESSURE: 130 MMHG | OXYGEN SATURATION: 97 % | HEIGHT: 72 IN | WEIGHT: 185 LBS | HEART RATE: 73 BPM | DIASTOLIC BLOOD PRESSURE: 82 MMHG | BODY MASS INDEX: 25.06 KG/M2
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
## 2023-10-31 DIAGNOSIS — R79.89 LOW TESTOSTERONE: ICD-10-CM

## 2023-10-31 DIAGNOSIS — L98.9 SKIN LESIONS: Primary | ICD-10-CM

## 2023-10-31 DIAGNOSIS — M25.50 MULTIPLE JOINT PAIN: ICD-10-CM

## 2023-10-31 PROCEDURE — 84402 ASSAY OF FREE TESTOSTERONE: CPT | Performed by: INTERNAL MEDICINE

## 2023-10-31 PROCEDURE — 84403 ASSAY OF TOTAL TESTOSTERONE: CPT | Performed by: INTERNAL MEDICINE

## 2023-10-31 RX ORDER — MELOXICAM 15 MG/1
15 TABLET ORAL DAILY
Qty: 14 TABLET | Refills: 0 | Status: SHIPPED | OUTPATIENT
Start: 2023-10-31 | End: 2023-11-14

## 2023-10-31 RX ORDER — MELOXICAM 15 MG/1
15 TABLET ORAL DAILY
Qty: 14 TABLET | Refills: 0 | Status: SHIPPED | OUTPATIENT
Start: 2023-10-31 | End: 2023-10-31 | Stop reason: SDUPTHER

## 2023-11-02 ENCOUNTER — TELEPHONE (OUTPATIENT)
Dept: FAMILY MEDICINE CLINIC | Facility: CLINIC | Age: 73
End: 2023-11-02
Payer: MEDICARE

## 2023-11-02 NOTE — TELEPHONE ENCOUNTER
"Patient is on 400mg of gabapentin. Has terrible \"stinging\" on one toe and now in the arch of his foot. It comes and goes and he has run out of his meds. Wants to know if we can increase gabapentin? If not, he can wait until he is seen by his pain management doctor on Sunday. However, he wanted to let Dr. Tran know that his hands and feet have been swelling badly. He forgot to mention that at his last visit.   "

## 2023-11-03 NOTE — TELEPHONE ENCOUNTER
Patient informed of recommendations from Dr. Tran to follow up with pain management.   Pt verbalized understanding and had no further questions or concerns at this time.

## 2023-11-03 NOTE — TELEPHONE ENCOUNTER
Zach Tran DO [facility] [address] Clinical Pool38 minutes ago (2:27 PM)       I recommend discussing gabapentin dose with pain management since he has a contract with them and they are prescribing.

## 2023-11-03 NOTE — PROGRESS NOTES
Chief Complaint   Patient presents with    Arthritis     Arthritis in hands, bug bite       HPI:  Behzad Guzman is a 73 y.o. male who presents today for follow-up of recurring skin lesions.  Suspects it may be bug bites.  Established with dermatology, biopsies taken a few days ago.  Reports chronic hand and foot pain.    ROS:  Constitutional: no fevers, night sweats or unexplained weight loss  Eyes: no vision changes  ENT: no runny nose, ear pain, sore throat  Cardio: no chest pain, palpitations  Pulm: no shortness of breath, wheezing, or cough  GI: no abdominal pain or changes in bowel movements  : no difficulty urinating  MSK: no difficulty ambulating, + joint pain  Neuro: no weakness, dizziness or headache  Psych: no trouble sleeping  Endo: no change in appetite      Past Medical History:   Diagnosis Date    Asthma     Chronic hip pain, left     Chronic pain in left shoulder     Hyperlipidemia     Hypertension     Leg length discrepancy     Neck pain, chronic     Neuropathy     Panic attacks     Pre-diabetes     Prediabetes     Spinal stenosis       Family History   Problem Relation Age of Onset    Heart attack Father 65    Other Mother         accident    No Known Problems Maternal Grandmother     No Known Problems Maternal Grandfather     No Known Problems Paternal Grandmother     Stroke Paternal Grandfather       Social History     Socioeconomic History    Marital status:    Tobacco Use    Smoking status: Former     Packs/day: 0.50     Years: 10.00     Additional pack years: 0.00     Total pack years: 5.00     Types: Cigarettes     Quit date:      Years since quittin.8    Smokeless tobacco: Never   Vaping Use    Vaping Use: Never used   Substance and Sexual Activity    Alcohol use: No    Drug use: No    Sexual activity: Defer      Allergies   Allergen Reactions    Peanut Butter Flavor Anaphylaxis    Shellfish Allergy Anaphylaxis    Amoxicillin Rash and Other (See Comments)     Penicillins Rash    Sulfa Antibiotics Myalgia    Doxycycline GI Bleeding    Latex Rash    Pregabalin Other (See Comments)      Immunization History   Administered Date(s) Administered    COVID-19 (PFIZER) Purple Cap Monovalent 09/14/2021, 10/08/2021    Flu Vaccine Quad PF >36MO 09/30/2015    Fluzone High-Dose 65+yrs 11/22/2022    Pneumococcal Conjugate 13-Valent (PCV13) 09/22/2016    Pneumococcal Polysaccharide (PPSV23) 05/18/2018    Tdap 04/22/2022        PE:  Vitals:    10/31/23 1517   BP: 130/82   Pulse: 73   SpO2: 97%      Body mass index is 25.08 kg/m².    Gen Appearance: NAD  HEENT: Normocephalic, PERRLA, no thyromegaly, trache midline  Heart: RRR, normal S1 and S2, no murmur  Lungs: CTA b/l, no wheezing, no crackles  Abdomen: Soft, non-tender, non-distended, no guarding and BSx4  MSK: Moves all extremities well, normal gait, no peripheral edema  Pulses: Palpable and equal b/l  Lymph nodes: No palpable lymphadenopathy   Neuro: No focal deficits      Current Outpatient Medications   Medication Sig Dispense Refill    albuterol (ACCUNEB) 0.63 MG/3ML nebulizer solution Take 3 mL by nebulization Every 6 (Six) Hours As Needed for Wheezing. 3 mL 12    albuterol sulfate  (90 Base) MCG/ACT inhaler INHALE TWO PUFFS BY MOUTH EVERY 6 HOURS AS NEEDED FOR WHEEZING OR SHORTNESS OF AIR 6.7 g 1    Cholecalciferol (VITAMIN D) 125 MCG (5000 UT) capsule capsule Take 1 capsule by mouth Daily. 30 capsule 0    citalopram (CeleXA) 20 MG tablet       cloNIDine (CATAPRES) 0.1 MG tablet Take 1 tablet by mouth Every 8 (Eight) Hours.      diazePAM (VALIUM) 5 MG tablet Take 1 tablet by mouth Every 6 (Six) Hours As Needed.  0    Diclofenac Sodium (VOLTAREN) 1 % gel gel Apply 4 g topically to the appropriate area as directed 3 (Three) Times a Day. (Patient taking differently: Apply 4 g topically to the appropriate area as directed As Needed.) 100 g 0    fluticasone (Flonase) 50 MCG/ACT nasal spray 2 sprays into the nostril(s) as  "directed by provider Daily. 2 puffs each nostril 18.2 mL 0    gabapentin (NEURONTIN) 400 MG capsule Take 1 capsule by mouth 2 (two) times a day.      glucose monitor monitoring kit 1 each Daily. 1 each 0    HYDROcodone-acetaminophen (NORCO)  MG per tablet Take 1 tablet by mouth Every 6 (Six) Hours As Needed for Moderate Pain.      Lancets (onetouch ultrasoft) lancets Use one daily to test blood sugars 100 each 12    losartan-hydrochlorothiazide (HYZAAR) 50-12.5 MG per tablet TAKE ONE TABLET BY MOUTH DAILY 90 tablet 3    meloxicam (Mobic) 15 MG tablet Take 1 tablet by mouth Daily for 14 days. 14 tablet 0    metFORMIN (GLUCOPHAGE) 500 MG tablet Take 1 tablet by mouth 2 (Two) Times a Day With Meals. 180 tablet 3    metoprolol tartrate (LOPRESSOR) 50 MG tablet Take 1 tablet by mouth 2 (Two) Times a Day. 180 tablet 3    mirtazapine (Remeron) 15 MG tablet Take 1 tablet by mouth Every Night. 90 tablet 3    mupirocin (BACTROBAN) 2 % ointment APPLY A THIN LAYER TO OPEN AREAS ON LOWER LEGS TWICE DAILY UNTIL HEALED      Naloxegol Oxalate (MOVANTIK) 12.5 MG tablet       naloxone (NARCAN) 4 MG/0.1ML nasal spray 1 spray into the nostril(s) as directed by provider.      Needle, Disp, 22G X 1\" misc 1 each Every 14 (Fourteen) Days. 100 each 0    olopatadine (PATANOL) 0.1 % ophthalmic solution       omeprazole (priLOSEC) 40 MG capsule TAKE 1 CAPSULE BY MOUTH DAILY BEFORE A MEAL 90 capsule 1    OneTouch Verio test strip 1 each by Other route Daily. for testing 300 each 2    oxyCODONE (ROXICODONE) 15 MG immediate release tablet TAKE 1/2 A TABLET BY MOUTH AT BEDTIME AND 1/2 TABLET IN MIDDLE OF NIGHT AS NEEDED AS DIRECTED      permethrin (ELIMITE) 5 % cream Apply 1 application  topically to the appropriate area as directed 1 (One) Time.      pravastatin (PRAVACHOL) 20 MG tablet Take 1 tablet by mouth Daily. 90 tablet 1    sucralfate (CARAFATE) 1 g tablet Take 1 tablet 30 minutes before each meal and at bedtime 60 tablet 1    " "Syringe 23G X 1\" 3 ML misc Use every 21 days to take testosterone 12 each 1    Testosterone Cypionate (DEPOTESTOTERONE CYPIONATE) 200 MG/ML injection Inject 0.5 mL into the appropriate muscle as directed by prescriber Every 14 (Fourteen) Days. 10 mL 0    traZODone (DESYREL) 50 MG tablet       vitamin D (ERGOCALCIFEROL) 1.25 MG (45977 UT) capsule capsule TAKE 1 CAPSULE BY MOUTH ONCE WEEKLY FOR 12 DOSES 12 capsule 0     No current facility-administered medications for this visit.      Counseling was given to patient for the following topics: diagnostic results, impressions, risks and benefits of treatment options, and importance of treatment compliance . Total time of the encounter was 40 minutes and 20 minutes was spent face to face counseling.    Diagnoses and all orders for this visit:    1. Skin lesions (Primary)  Defer to dermatology.  Biopsy pending.  Encourage patient to follow-up with specialist last appointment.  2. Bug bite, subsequent encounter    3. Multiple joint pain  -     Discontinue: meloxicam (Mobic) 15 MG tablet; Take 1 tablet by mouth Daily for 14 days.  Dispense: 14 tablet; Refill: 0  -     meloxicam (Mobic) 15 MG tablet; Take 1 tablet by mouth Daily for 14 days.  Dispense: 14 tablet; Refill: 0  Trial daily Mobic.  4. Low testosterone  -     Testosterone, Free, Total; Future  -     Testosterone, Free, Total  Checking testosterone levels, patient reports quite a bit of fatigue.       No follow-ups on file.     Dictated Utilizing Dragon Dictation    Please note that portions of this note were completed with a voice recognition program.    Part of this note may be an electronic transcription/translation of spoken language to printed text using the Dragon Dictation System.  "

## 2023-11-06 LAB
TESTOST FREE SERPL-MCNC: 2.2 PG/ML (ref 6.6–18.1)
TESTOST SERPL-MCNC: 342 NG/DL (ref 264–916)

## 2023-11-21 DIAGNOSIS — I10 PRIMARY HYPERTENSION: ICD-10-CM

## 2023-11-21 RX ORDER — METOPROLOL TARTRATE 50 MG/1
50 TABLET, FILM COATED ORAL 2 TIMES DAILY
Qty: 180 TABLET | Refills: 0 | Status: SHIPPED | OUTPATIENT
Start: 2023-11-21

## 2023-11-21 NOTE — TELEPHONE ENCOUNTER
Rx Refill Note  Requested Prescriptions     Pending Prescriptions Disp Refills    metoprolol tartrate (LOPRESSOR) 50 MG tablet [Pharmacy Med Name: METOPROLOL TARTRATE 50 MG TAB] 180 tablet 3     Sig: TAKE ONE TABLET BY MOUTH TWICE A DAY      Last office visit with prescribing clinician: 10/31/2023   Last telemedicine visit with prescribing clinician: Visit date not found   Next office visit with prescribing clinician: 1/5/2024                         Would you like a call back once the refill request has been completed: [] Yes [] No    If the office needs to give you a call back, can they leave a voicemail: [] Yes [] No    Diann Rader MA  11/21/23, 11:41 EST

## 2023-12-08 DIAGNOSIS — E34.9 TESTOSTERONE DEFICIENCY: ICD-10-CM

## 2023-12-08 NOTE — TELEPHONE ENCOUNTER
Rx Refill Note  Requested Prescriptions     Pending Prescriptions Disp Refills    Testosterone Cypionate (DEPOTESTOTERONE CYPIONATE) 200 MG/ML injection [Pharmacy Med Name: TESTOSTERONE  MG/ML SDV] 6 mL      Sig: INJECT 1 ML INTO THE APPROPIATE MUSCLE AS DIRECTED BY PRESCRIBER EVERY 14 DAYS      Last office visit with prescribing clinician: 10/31/2023   Last telemedicine visit with prescribing clinician: Visit date not found   Next office visit with prescribing clinician: 12/15/2023                         Would you like a call back once the refill request has been completed: [] Yes [] No    If the office needs to give you a call back, can they leave a voicemail: [] Yes [] No    Diann Rader MA  12/08/23, 12:25 EST

## 2023-12-11 RX ORDER — TESTOSTERONE CYPIONATE 200 MG/ML
INJECTION, SOLUTION INTRAMUSCULAR
Qty: 6 ML | Refills: 0 | Status: SHIPPED | OUTPATIENT
Start: 2023-12-11

## 2023-12-21 RX ORDER — SYRINGE WITH NEEDLE, 1 ML 25GX5/8"
SYRINGE, EMPTY DISPOSABLE MISCELLANEOUS
Qty: 12 EACH | Refills: 1 | Status: SHIPPED | OUTPATIENT
Start: 2023-12-21

## 2023-12-21 NOTE — TELEPHONE ENCOUNTER
"Rx Refill Note  Requested Prescriptions     Pending Prescriptions Disp Refills    Syringe/Needle, Disp, (B-D 3CC LUER-YAA SYR 23GX1\") 23G X 1\" 3 ML misc [Pharmacy Med Name: BD 3ML LUERLOCK [id number] SYRN] 12 each 1     Sig: USE 1 SYRINGE EVERY 21 DAYS TO TAKE TESTOSTERONE      Last office visit with prescribing clinician: 10/31/2023     Next office visit with prescribing clinician: 12/22/2023   Catherine Rockwell MA  12/21/23, 14:10 EST  " contact guard

## 2023-12-22 ENCOUNTER — OFFICE VISIT (OUTPATIENT)
Dept: FAMILY MEDICINE CLINIC | Facility: CLINIC | Age: 73
End: 2023-12-22
Payer: MEDICARE

## 2023-12-22 VITALS
HEIGHT: 72 IN | BODY MASS INDEX: 25.25 KG/M2 | HEART RATE: 99 BPM | WEIGHT: 186.4 LBS | SYSTOLIC BLOOD PRESSURE: 150 MMHG | DIASTOLIC BLOOD PRESSURE: 94 MMHG | OXYGEN SATURATION: 95 %

## 2023-12-22 DIAGNOSIS — K04.7 TOOTH INFECTION: Primary | ICD-10-CM

## 2023-12-22 DIAGNOSIS — I10 HYPERTENSION, UNSPECIFIED TYPE: ICD-10-CM

## 2023-12-22 DIAGNOSIS — E11.42 TYPE 2 DIABETES MELLITUS WITH DIABETIC POLYNEUROPATHY, WITHOUT LONG-TERM CURRENT USE OF INSULIN: ICD-10-CM

## 2023-12-22 RX ORDER — CEFDINIR 300 MG/1
300 CAPSULE ORAL 2 TIMES DAILY
Qty: 14 CAPSULE | Refills: 0 | Status: SHIPPED | OUTPATIENT
Start: 2023-12-22 | End: 2023-12-29

## 2023-12-22 NOTE — LETTER
[facility]  Vaccine Consent Form    Patient Name:  Behzad Guzman  Patient :  1950     Vaccine(s) Ordered    Fluzone High-Dose 65+yrs  Pneumococcal Conjugate Vaccine 20-Valent (PCV20)        Screening Checklist  The following questions should be completed prior to vaccination. If you answer “yes” to any question, it does not necessarily mean you should not be vaccinated. It just means we may need to clarify or ask more questions. If a question is unclear, please ask your healthcare provider to explain it.    Yes No   Any fever or moderate to severe illness today (mild illness and/or antibiotic treatment are not contraindications)?     Do you have a history of a serious reaction to any previous vaccinations, such as anaphylaxis, encephalopathy within 7 days, Guillain-[place] syndrome within 6 weeks, seizure?     Have you received any live vaccine(s) (e.g MMR, JESUS) or any other vaccines in the last month (to ensure duplicate doses aren't given)?     Do you have an anaphylactic allergy to latex (DTaP, DTaP-IPV, Hep A, Hep B, MenB, RV, Td, Tdap), baker’s yeast (Hep B, HPV), polysorbates (RSV, nirsevimab, PCV 20, Rotavirrus, Tdap, Shingrix), or gelatin (JESUS, MMR)?     Do you have an anaphylactic allergy to neomycin (Rabies, JESUS, MMR, IPV, Hep A), polymyxin B (IPV), or streptomycin (IPV)?      Any cancer, leukemia, AIDS, or other immune system disorder? (JESUS, MMR, RV)     Do you have a parent, brother, or sister with an immune system problem (if immune competence of vaccine recipient clinically verified, can proceed)? (MMR, JESUS)     Any recent steroid treatments for >2 weeks, chemotherapy, or radiation treatment? (JESUS, MMR)     Have you received antibody-containing blood transfusions or IVIG in the past 11 months (recommended interval is dependent on product)? (MMR, JESUS)     Have you taken antiviral drugs (acyclovir, famciclovir, valacyclovir for JESUS) in the last 24 or 48 hours, respectively?      Are you  pregnant or planning to become pregnant within 1 month? (JESUS, MMR, HPV, IPV, MenB, Abrexvy; For Hep B- refer to Engerix-B; For RSV - Abrysvo is indicated for 32-36 weeks of pregnancy from September to January)     For infants, have you ever been told your child has had intussusception or a medical emergency involving obstruction of the intestine (Rotavirus)? If not for an infant, can skip this question.         *Ordering Physician/APC should be consulted if “yes” is checked by the patient or guardian above.      I have received, read, and understand the Vaccine Information Statement (VIS) for each vaccine ordered above.  I have considered my health status as well as the health status of my close contacts.  I have taken the opportunity to discuss my vaccine questions with my health care provider.   I have requested that the ordered vaccine(s) be given to me.  I understand the benefits and risks of the vaccines.  I understand that I should remain in the clinic for 15 minutes after receiving the vaccine(s).  _________________________________________________________  Signature of Patient or Parent/Legal Guardian ____________________  Date

## 2023-12-22 NOTE — PROGRESS NOTES
Chief Complaint   Patient presents with    Follow-up     Seen at  in [place] for mouth infection       HPI:  Behzad Guzman is a 73 y.o. male who presents today for recurrent tooth infections.  Has an appointment with dentist in February.    ROS:  Constitutional: no fevers, night sweats or unexplained weight loss  Eyes: no vision changes  ENT: no runny nose, ear pain, sore throat  Cardio: no chest pain, palpitations  Pulm: no shortness of breath, wheezing, or cough  GI: no abdominal pain or changes in bowel movements  : no difficulty urinating  MSK: no difficulty ambulating, no joint pain  Neuro: no weakness, dizziness or headache  Psych: no trouble sleeping  Endo: no change in appetite      Past Medical History:   Diagnosis Date    Asthma     Chronic hip pain, left     Chronic pain in left shoulder     Hyperlipidemia     Hypertension     Leg length discrepancy     Neck pain, chronic     Neuropathy     Panic attacks     Pre-diabetes     Prediabetes     Spinal stenosis       Family History   Problem Relation Age of Onset    Heart attack Father 65    Other Mother         accident    No Known Problems Maternal Grandmother     No Known Problems Maternal Grandfather     No Known Problems Paternal Grandmother     Stroke Paternal Grandfather       Social History     Socioeconomic History    Marital status:    Tobacco Use    Smoking status: Former     Packs/day: 0.50     Years: 10.00     Additional pack years: 0.00     Total pack years: 5.00     Types: Cigarettes     Quit date:      Years since quittin.9    Smokeless tobacco: Never   Vaping Use    Vaping Use: Never used   Substance and Sexual Activity    Alcohol use: No    Drug use: No    Sexual activity: Defer      Allergies   Allergen Reactions    Peanut Butter Flavor Anaphylaxis    Shellfish Allergy Anaphylaxis    Amoxicillin Rash and Other (See Comments)    Penicillins Rash    Sulfa Antibiotics Myalgia    Doxycycline GI Bleeding    Latex  Rash    Pregabalin Other (See Comments)      Immunization History   Administered Date(s) Administered    COVID-19 (PFIZER) Purple Cap Monovalent 09/14/2021, 10/08/2021    Flu Vaccine Quad PF >36MO 09/30/2015    Fluzone High-Dose 65+yrs 11/22/2022, 12/22/2023    Pneumococcal Conjugate 13-Valent (PCV13) 09/22/2016    Pneumococcal Conjugate 20-Valent (PCV20) 12/22/2023    Pneumococcal Polysaccharide (PPSV23) 05/18/2018    Tdap 04/22/2022        PE:  Vitals:    12/22/23 0858   BP: 150/94   Pulse: 99   SpO2: 95%      Body mass index is 25.27 kg/m².    Gen Appearance: NAD  HEENT: Normocephalic, PERRLA, no thyromegaly, trache midline  Heart: RRR, normal S1 and S2, no murmur  Lungs: CTA b/l, no wheezing, no crackles  Abdomen: Soft, non-tender, non-distended, no guarding and BSx4  MSK: Moves all extremities well, normal gait, no peripheral edema  Pulses: Palpable and equal b/l  Lymph nodes: No palpable lymphadenopathy   Neuro: No focal deficits      Current Outpatient Medications   Medication Sig Dispense Refill    albuterol (ACCUNEB) 0.63 MG/3ML nebulizer solution Take 3 mL by nebulization Every 6 (Six) Hours As Needed for Wheezing. 3 mL 12    albuterol sulfate  (90 Base) MCG/ACT inhaler INHALE TWO PUFFS BY MOUTH EVERY 6 HOURS AS NEEDED FOR WHEEZING OR SHORTNESS OF AIR 6.7 g 1    Cholecalciferol (VITAMIN D) 125 MCG (5000 UT) capsule capsule Take 1 capsule by mouth Daily. 30 capsule 0    citalopram (CeleXA) 20 MG tablet       cloNIDine (CATAPRES) 0.1 MG tablet Take 1 tablet by mouth Every 8 (Eight) Hours.      diazePAM (VALIUM) 5 MG tablet Take 1 tablet by mouth Every 6 (Six) Hours As Needed.  0    Diclofenac Sodium (VOLTAREN) 1 % gel gel Apply 4 g topically to the appropriate area as directed 3 (Three) Times a Day. (Patient taking differently: Apply 4 g topically to the appropriate area as directed As Needed.) 100 g 0    fluticasone (Flonase) 50 MCG/ACT nasal spray 2 sprays into the nostril(s) as directed by provider  "Daily. 2 puffs each nostril 18.2 mL 0    gabapentin (NEURONTIN) 400 MG capsule Take 1 capsule by mouth 2 (two) times a day.      glucose monitor monitoring kit 1 each Daily. 1 each 0    HYDROcodone-acetaminophen (NORCO)  MG per tablet Take 1 tablet by mouth Every 6 (Six) Hours As Needed for Moderate Pain.      Lancets (onetouch ultrasoft) lancets Use one daily to test blood sugars 100 each 12    losartan-hydrochlorothiazide (HYZAAR) 50-12.5 MG per tablet TAKE ONE TABLET BY MOUTH DAILY 90 tablet 3    metFORMIN (GLUCOPHAGE) 500 MG tablet Take 1 tablet by mouth 2 (Two) Times a Day With Meals. 180 tablet 3    metoprolol tartrate (LOPRESSOR) 50 MG tablet TAKE ONE TABLET BY MOUTH TWICE A  tablet 0    mirtazapine (Remeron) 15 MG tablet Take 1 tablet by mouth Every Night. 90 tablet 3    mupirocin (BACTROBAN) 2 % ointment APPLY A THIN LAYER TO OPEN AREAS ON LOWER LEGS TWICE DAILY UNTIL HEALED      Naloxegol Oxalate (MOVANTIK) 12.5 MG tablet       Needle, Disp, 22G X 1\" misc 1 each Every 14 (Fourteen) Days. 100 each 0    olopatadine (PATANOL) 0.1 % ophthalmic solution       omeprazole (priLOSEC) 40 MG capsule TAKE 1 CAPSULE BY MOUTH DAILY BEFORE A MEAL 90 capsule 1    OneTouch Verio test strip 1 each by Other route Daily. for testing 300 each 2    oxyCODONE (ROXICODONE) 15 MG immediate release tablet TAKE 1/2 A TABLET BY MOUTH AT BEDTIME AND 1/2 TABLET IN MIDDLE OF NIGHT AS NEEDED AS DIRECTED      permethrin (ELIMITE) 5 % cream Apply 1 application  topically to the appropriate area as directed 1 (One) Time.      pravastatin (PRAVACHOL) 20 MG tablet Take 1 tablet by mouth Daily. 90 tablet 1    sucralfate (CARAFATE) 1 g tablet Take 1 tablet 30 minutes before each meal and at bedtime 60 tablet 1    Syringe/Needle, Disp, (B-D 3CC LUER-YAA SYR 23GX1\") 23G X 1\" 3 ML misc USE 1 SYRINGE EVERY 21 DAYS TO TAKE TESTOSTERONE 12 each 1    Testosterone Cypionate (DEPOTESTOTERONE CYPIONATE) 200 MG/ML injection INJECT 1 ML INTO " THE APPROPIATE MUSCLE AS DIRECTED BY PRESCRIBER EVERY 14 DAYS 6 mL 0    traZODone (DESYREL) 50 MG tablet       vitamin D (ERGOCALCIFEROL) 1.25 MG (36804 UT) capsule capsule TAKE 1 CAPSULE BY MOUTH ONCE WEEKLY FOR 12 DOSES 12 capsule 0    cefdinir (OMNICEF) 300 MG capsule Take 1 capsule by mouth 2 (Two) Times a Day for 7 days. 14 capsule 0     No current facility-administered medications for this visit.      Encourage patient to take antibiotics as prescribed.  May need earlier dental appointment for tooth removal.    Follow-up in 1 to 2 weeks, recheck for tooth infection as well as diabetes follow-up.  Blood pressure uncontrolled today but he did not take his medication.    Diagnoses and all orders for this visit:    1. Tooth infection (Primary)  -     cefdinir (OMNICEF) 300 MG capsule; Take 1 capsule by mouth 2 (Two) Times a Day for 7 days.  Dispense: 14 capsule; Refill: 0    2. Hypertension, unspecified type    3. Type 2 diabetes mellitus with diabetic polyneuropathy, without long-term current use of insulin    Other orders  -     Fluzone High-Dose 65+yrs  -     Pneumococcal Conjugate Vaccine 20-Valent (PCV20)         No follow-ups on file.     Dictated Utilizing Dragon Dictation    Please note that portions of this note were completed with a voice recognition program.    Part of this note may be an electronic transcription/translation of spoken language to printed text using the Dragon Dictation System.

## 2024-01-01 DIAGNOSIS — E55.9 VITAMIN D DEFICIENCY: ICD-10-CM

## 2024-01-02 RX ORDER — ERGOCALCIFEROL 1.25 MG/1
CAPSULE ORAL
Qty: 12 CAPSULE | Refills: 0 | Status: SHIPPED | OUTPATIENT
Start: 2024-01-02

## 2024-01-02 NOTE — TELEPHONE ENCOUNTER
Rx Refill Note  Requested Prescriptions     Pending Prescriptions Disp Refills    vitamin D (ERGOCALCIFEROL) 1.25 MG (15365 UT) capsule capsule [Pharmacy Med Name: VITAMIN D2 1.25MG(50,000 UNIT)] 12 capsule 0     Sig: TAKE 1 CAPSULE BY MOUTH ONCE WEEKLY FOR 12 DOSES      Last office visit with prescribing clinician: 7/20/2023   Last telemedicine visit with prescribing clinician: Visit date not found   Next office visit with prescribing clinician: 1/5/24                        Would you like a call back once the refill request has been completed: [] Yes [] No    If the office needs to give you a call back, can they leave a voicemail: [] Yes [] No    Diann Rader MA  01/02/24, 07:52 EST

## 2024-01-05 ENCOUNTER — TELEPHONE (OUTPATIENT)
Dept: FAMILY MEDICINE CLINIC | Facility: CLINIC | Age: 74
End: 2024-01-05

## 2024-01-05 NOTE — TELEPHONE ENCOUNTER
Caller: Behzad Guzman    Relationship: Self    Best call back number:     [phone]     Who are you requesting to speak with (clinical staff, provider,  specific staff member): CLINICAL, DR BERNABE     What was the call regarding:  PATIENT SAID HE IS GOING THROUGH A LOT RIGHT NOW WITH HIS TEETH AND CANCELED HIS MEDICARE WELLNESS VISIT FOR TODAY 1-5-24  HE WILL RESCHEDULE AT A LATER TIME   REQUESTING A CALL FROM DR BERNABE

## 2024-01-31 RX ORDER — PRAVASTATIN SODIUM 20 MG
20 TABLET ORAL DAILY
Qty: 90 TABLET | Refills: 1 | Status: SHIPPED | OUTPATIENT
Start: 2024-01-31

## 2024-01-31 NOTE — TELEPHONE ENCOUNTER
Rx Refill Note  Requested Prescriptions     Pending Prescriptions Disp Refills    pravastatin (PRAVACHOL) 20 MG tablet [Pharmacy Med Name: PRAVASTATIN SODIUM 20 MG TAB] 90 tablet 1     Sig: TAKE 1 TABLET BY MOUTH DAILY      Last office visit with prescribing clinician: 12/22/2023   Last telemedicine visit with prescribing clinician: Visit date not found   Next office visit with prescribing clinician: Visit date not found                         Would you like a call back once the refill request has been completed: [] Yes [] No    If the office needs to give you a call back, can they leave a voicemail: [] Yes [] No    Macey Melendez MA  01/31/24, 08:41 EST

## 2024-02-18 DIAGNOSIS — I10 PRIMARY HYPERTENSION: ICD-10-CM

## 2024-02-19 RX ORDER — LOSARTAN POTASSIUM AND HYDROCHLOROTHIAZIDE 12.5; 5 MG/1; MG/1
TABLET ORAL
Qty: 90 TABLET | Refills: 1 | Status: SHIPPED | OUTPATIENT
Start: 2024-02-19

## 2024-02-21 RX ORDER — OMEPRAZOLE 40 MG/1
40 CAPSULE, DELAYED RELEASE ORAL DAILY
Qty: 90 CAPSULE | Refills: 1 | Status: SHIPPED | OUTPATIENT
Start: 2024-02-21

## 2024-03-07 ENCOUNTER — TELEPHONE (OUTPATIENT)
Dept: FAMILY MEDICINE CLINIC | Facility: CLINIC | Age: 74
End: 2024-03-07
Payer: MEDICARE

## 2024-03-10 DIAGNOSIS — E34.9 TESTOSTERONE DEFICIENCY: ICD-10-CM

## 2024-03-11 RX ORDER — TESTOSTERONE CYPIONATE 200 MG/ML
INJECTION, SOLUTION INTRAMUSCULAR
Qty: 6 ML | Refills: 0 | Status: SHIPPED | OUTPATIENT
Start: 2024-03-11

## 2024-03-11 NOTE — TELEPHONE ENCOUNTER
Rx Refill Note  Requested Prescriptions     Pending Prescriptions Disp Refills    Testosterone Cypionate (DEPOTESTOTERONE CYPIONATE) 200 MG/ML injection [Pharmacy Med Name: TESTOSTERONE  MG/ML] 6 mL      Sig: INJECT 1ML INTRAMUSCULARLY AS DIRECTED BY PRESCRIBER EVERY 14 DAYS      Last office visit with prescribing clinician: 12/22/2023   Last telemedicine visit with prescribing clinician: Visit date not found   Next office visit with prescribing clinician: Visit date not found                         Would you like a call back once the refill request has been completed: [] Yes [] No    If the office needs to give you a call back, can they leave a voicemail: [] Yes [] No    Diann Rader MA  03/11/24, 08:30 EDT

## 2024-03-23 DIAGNOSIS — E11.42 TYPE 2 DIABETES MELLITUS WITH DIABETIC POLYNEUROPATHY, WITHOUT LONG-TERM CURRENT USE OF INSULIN: ICD-10-CM

## 2024-03-25 ENCOUNTER — TELEPHONE (OUTPATIENT)
Dept: FAMILY MEDICINE CLINIC | Facility: CLINIC | Age: 74
End: 2024-03-25
Payer: MEDICARE

## 2024-03-25 NOTE — TELEPHONE ENCOUNTER
Caller: Behzad Guzman    Relationship: Self    Best call back number: [phone]    What is the best time to reach you: ANYTIME     Who are you requesting to speak with (clinical staff, provider,  specific staff member): ANUSHA         What was the call regarding: THE PATIENT WANTS TO DISCUSS GETTING DIABETIC SHOES

## 2024-03-25 NOTE — TELEPHONE ENCOUNTER
Rx Refill Note  Requested Prescriptions     Pending Prescriptions Disp Refills    metFORMIN (GLUCOPHAGE) 500 MG tablet [Pharmacy Med Name: METFORMIN 500MG TABLETS] 90 tablet      Sig: TAKE 1 TABLET BY MOUTH DAILY WITH BREAKFAST      Last office visit with prescribing clinician: 12/22/2023   Last telemedicine visit with prescribing clinician: Visit date not found   Next office visit with prescribing clinician: Visit date not found                         Would you like a call back once the refill request has been completed: [] Yes [] No    If the office needs to give you a call back, can they leave a voicemail: [] Yes [] No    Macey Melendez MA  03/25/24, 08:18 EDT

## 2024-03-27 ENCOUNTER — OFFICE VISIT (OUTPATIENT)
Dept: FAMILY MEDICINE CLINIC | Facility: CLINIC | Age: 74
End: 2024-03-27
Payer: MEDICARE

## 2024-03-27 VITALS
BODY MASS INDEX: 25.52 KG/M2 | HEIGHT: 72 IN | OXYGEN SATURATION: 97 % | WEIGHT: 188.4 LBS | SYSTOLIC BLOOD PRESSURE: 136 MMHG | HEART RATE: 75 BPM | DIASTOLIC BLOOD PRESSURE: 84 MMHG

## 2024-03-27 DIAGNOSIS — E11.42 TYPE 2 DIABETES MELLITUS WITH DIABETIC POLYNEUROPATHY, WITHOUT LONG-TERM CURRENT USE OF INSULIN: ICD-10-CM

## 2024-03-27 DIAGNOSIS — L98.9 SKIN LESION: ICD-10-CM

## 2024-03-27 DIAGNOSIS — R79.89 OTHER SPECIFIED ABNORMAL FINDINGS OF BLOOD CHEMISTRY: ICD-10-CM

## 2024-03-27 DIAGNOSIS — R51.9 INTRACTABLE HEADACHE, UNSPECIFIED CHRONICITY PATTERN, UNSPECIFIED HEADACHE TYPE: ICD-10-CM

## 2024-03-27 DIAGNOSIS — I25.10 CORONARY ARTERY DISEASE INVOLVING NATIVE CORONARY ARTERY OF NATIVE HEART WITHOUT ANGINA PECTORIS: Primary | ICD-10-CM

## 2024-03-27 NOTE — PROGRESS NOTES
Chief Complaint   Patient presents with    Hospital Follow Up Visit     Dc: 2024 from [facility]. Had chest pain after having teeth removed, had stent put in. Has had upset stomach and headaches since.        HPI:  Behzad Guzman is a 73 y.o. male who presents today for hospital follow-up coronary artery disease with a stent placed in RCA.  No further chest pain since discharge.  Admitted to [facility].  Data deficient on discharge summary today.  Reports she is taking medication as prescribed including aspirin and antiplatelet.    ROS:  Constitutional: no fevers, night sweats or unexplained weight loss  Eyes: no vision changes  ENT: no runny nose, ear pain, sore throat  Cardio: no chest pain, palpitations  Pulm: no shortness of breath, wheezing, or cough  GI: no abdominal pain or changes in bowel movements  : no difficulty urinating  MSK: no difficulty ambulating, no joint pain  Neuro: no weakness, dizziness or headache  Psych: no trouble sleeping  Endo: no change in appetite      Past Medical History:   Diagnosis Date    Asthma     Chronic hip pain, left     Chronic pain in left shoulder     Hyperlipidemia     Hypertension     Leg length discrepancy     Neck pain, chronic     Neuropathy     Panic attacks     Pre-diabetes     Prediabetes     Spinal stenosis       Family History   Problem Relation Age of Onset    Heart attack Father 65    Other Mother         accident    No Known Problems Maternal Grandmother     No Known Problems Maternal Grandfather     No Known Problems Paternal Grandmother     Stroke Paternal Grandfather       Social History     Socioeconomic History    Marital status:    Tobacco Use    Smoking status: Former     Current packs/day: 0.00     Average packs/day: 0.5 packs/day for 10.0 years (5.0 ttl pk-yrs)     Types: Cigarettes     Start date:      Quit date:      Years since quittin.2    Smokeless tobacco: Never   Vaping Use    Vaping status:  Never Used   Substance and Sexual Activity    Alcohol use: No    Drug use: No    Sexual activity: Defer      Allergies   Allergen Reactions    Peanut Butter Flavor Anaphylaxis    Shellfish Allergy Anaphylaxis    Amoxicillin Rash and Other (See Comments)    Penicillins Rash    Sulfa Antibiotics Myalgia    Doxycycline GI Bleeding    Latex Rash    Pregabalin Other (See Comments)      Immunization History   Administered Date(s) Administered    COVID-19 (PFIZER) Purple Cap Monovalent 09/14/2021, 10/08/2021    Flu Vaccine Quad PF >36MO 09/30/2015    Fluzone High-Dose 65+yrs 11/22/2022, 12/22/2023    Pneumococcal Conjugate 13-Valent (PCV13) 09/22/2016    Pneumococcal Conjugate 20-Valent (PCV20) 12/22/2023    Pneumococcal Polysaccharide (PPSV23) 05/18/2018    Tdap 04/22/2022        PE:  Vitals:    03/27/24 1337   BP: 136/84   Pulse: 75   SpO2: 97%      Body mass index is 25.55 kg/m².    Gen Appearance: NAD  HEENT: Normocephalic, PERRLA, no thyromegaly, trache midline  Heart: RRR, normal S1 and S2, no murmur  Lungs: CTA b/l, no wheezing, no crackles  Abdomen: Soft, non-tender, non-distended, no guarding and BSx4  MSK: Moves all extremities well, normal gait, no peripheral edema  Pulses: Palpable and equal b/l  Lymph nodes: No palpable lymphadenopathy   Neuro: No focal deficits      Current Outpatient Medications   Medication Sig Dispense Refill    albuterol (ACCUNEB) 0.63 MG/3ML nebulizer solution Take 3 mL by nebulization Every 6 (Six) Hours As Needed for Wheezing. 3 mL 12    albuterol sulfate  (90 Base) MCG/ACT inhaler INHALE TWO PUFFS BY MOUTH EVERY 6 HOURS AS NEEDED FOR WHEEZING OR SHORTNESS OF AIR 6.7 g 1    citalopram (CeleXA) 20 MG tablet       cloNIDine (CATAPRES) 0.1 MG tablet Take 1 tablet by mouth Every 8 (Eight) Hours.      diazePAM (VALIUM) 5 MG tablet Take 1 tablet by mouth Every 6 (Six) Hours As Needed.  0    Diclofenac Sodium (VOLTAREN) 1 % gel gel Apply 4 g topically to the appropriate area as  "directed 3 (Three) Times a Day. (Patient taking differently: Apply 4 g topically to the appropriate area as directed As Needed.) 100 g 0    fluticasone (Flonase) 50 MCG/ACT nasal spray 2 sprays into the nostril(s) as directed by provider Daily. 2 puffs each nostril 18.2 mL 0    gabapentin (NEURONTIN) 400 MG capsule Take 1 capsule by mouth 2 (two) times a day.      glucose monitor monitoring kit 1 each Daily. 1 each 0    HYDROcodone-acetaminophen (NORCO)  MG per tablet Take 1 tablet by mouth Every 6 (Six) Hours As Needed for Moderate Pain.      Lancets (onetouch ultrasoft) lancets Use one daily to test blood sugars 100 each 12    losartan-hydrochlorothiazide (HYZAAR) 50-12.5 MG per tablet TAKE ONE TABLET BY MOUTH DAILY 90 tablet 1    metFORMIN (GLUCOPHAGE) 500 MG tablet TAKE 1 TABLET BY MOUTH DAILY WITH BREAKFAST 180 tablet 0    metoprolol tartrate (LOPRESSOR) 50 MG tablet TAKE ONE TABLET BY MOUTH TWICE A  tablet 0    mirtazapine (Remeron) 15 MG tablet Take 1 tablet by mouth Every Night. 90 tablet 3    mupirocin (BACTROBAN) 2 % ointment Apply  topically to the appropriate area as directed 2 (Two) Times a Day for 7 days. 15 g 0    Naloxegol Oxalate (MOVANTIK) 12.5 MG tablet       Needle, Disp, 22G X 1\" misc 1 each Every 14 (Fourteen) Days. 100 each 0    olopatadine (PATANOL) 0.1 % ophthalmic solution       omeprazole (priLOSEC) 40 MG capsule TAKE 1 CAPSULE BY MOUTH DAILY BEFORE A MEAL 90 capsule 1    OneTouch Verio test strip 1 each by Other route Daily. for testing 300 each 2    oxyCODONE (ROXICODONE) 15 MG immediate release tablet TAKE 1/2 A TABLET BY MOUTH AT BEDTIME AND 1/2 TABLET IN MIDDLE OF NIGHT AS NEEDED AS DIRECTED      permethrin (ELIMITE) 5 % cream Apply 1 Application topically to the appropriate area as directed 1 (One) Time.      pravastatin (PRAVACHOL) 20 MG tablet TAKE 1 TABLET BY MOUTH DAILY 90 tablet 1    sucralfate (CARAFATE) 1 g tablet Take 1 tablet 30 minutes before each meal and at " "bedtime 60 tablet 1    Syringe/Needle, Disp, (B-D 3CC LUER-YAA SYR 23GX1\") 23G X 1\" 3 ML misc USE 1 SYRINGE EVERY 21 DAYS TO TAKE TESTOSTERONE 12 each 1    Testosterone Cypionate (DEPOTESTOTERONE CYPIONATE) 200 MG/ML injection INJECT 1ML INTRAMUSCULARLY AS DIRECTED BY PRESCRIBER EVERY 14 DAYS 6 mL 0    traZODone (DESYREL) 50 MG tablet       vitamin D (ERGOCALCIFEROL) 1.25 MG (35948 UT) capsule capsule TAKE 1 CAPSULE BY MOUTH ONCE WEEKLY FOR 12 DOSES 12 capsule 0     No current facility-administered medications for this visit.      Follow-up with dermatology for recurrent skin lesions, possibly bug bites.  Mupirocin ointment refilled today.  Checking blood work today.  Patient wishes to establish with [facility].    Patient reports a recurring headache since time of chest pain that has not resolved.  Still awaiting records but will check CT scan if not done during hospitalization.  Patient unsure if he had a head scan at hospital or not.    Counseling was given to patient for the following topics: diagnostic results, impressions, and risks and benefits of treatment options . Total time of the encounter was 40 minutes and 22 minutes was spent face to face counseling.    Diagnoses and all orders for this visit:    1. Coronary artery disease involving native coronary artery of native heart without angina pectoris (Primary)  -     CBC & Differential; Future  -     Comprehensive Metabolic Panel; Future  -     Hemoglobin A1c; Future  -     Lipid Panel; Future    2. Skin lesion  -     mupirocin (BACTROBAN) 2 % ointment; Apply  topically to the appropriate area as directed 2 (Two) Times a Day for 7 days.  Dispense: 15 g; Refill: 0    3. Intractable headache, unspecified chronicity pattern, unspecified headache type  -     CT Head Without Contrast; Future    4. Other specified abnormal findings of blood chemistry  -     Hemoglobin A1c; Future         No follow-ups on file.     Dictated Utilizing Dragon " Dictation    Please note that portions of this note were completed with a voice recognition program.    Part of this note may be an electronic transcription/translation of spoken language to printed text using the Dragon Dictation System.

## 2024-04-02 ENCOUNTER — TELEPHONE (OUTPATIENT)
Dept: FAMILY MEDICINE CLINIC | Facility: CLINIC | Age: 74
End: 2024-04-02
Payer: MEDICARE

## 2024-04-02 NOTE — TELEPHONE ENCOUNTER
The patient called to make an appointment this week, because he has chills and is worried he is sick.  He also is requesting a Referral to Cardiology after his stent placement approx 10 days ago. He would like the Referral to Cardiology to start today. He thought it was started on 3/27/24 when he saw Dr. Tran.  Also, he has already used the cream that was prescribed to him on 3/27/24. He said it was for bug bites. It looks like there was (2) refills, but he said the pharmacy told him there were no refills left on this.    Please call patient to discuss.

## 2024-04-04 ENCOUNTER — OFFICE VISIT (OUTPATIENT)
Dept: FAMILY MEDICINE CLINIC | Facility: CLINIC | Age: 74
End: 2024-04-04
Payer: MEDICARE

## 2024-04-04 ENCOUNTER — TELEPHONE (OUTPATIENT)
Dept: FAMILY MEDICINE CLINIC | Facility: CLINIC | Age: 74
End: 2024-04-04

## 2024-04-04 VITALS
BODY MASS INDEX: 25.47 KG/M2 | HEART RATE: 59 BPM | OXYGEN SATURATION: 98 % | WEIGHT: 188 LBS | HEIGHT: 72 IN | DIASTOLIC BLOOD PRESSURE: 88 MMHG | SYSTOLIC BLOOD PRESSURE: 126 MMHG

## 2024-04-04 DIAGNOSIS — R26.89 BALANCE DISORDER: ICD-10-CM

## 2024-04-04 DIAGNOSIS — I10 PRIMARY HYPERTENSION: ICD-10-CM

## 2024-04-04 DIAGNOSIS — R51.9 INTRACTABLE HEADACHE, UNSPECIFIED CHRONICITY PATTERN, UNSPECIFIED HEADACHE TYPE: ICD-10-CM

## 2024-04-04 DIAGNOSIS — E11.42 TYPE 2 DIABETES MELLITUS WITH DIABETIC POLYNEUROPATHY, WITHOUT LONG-TERM CURRENT USE OF INSULIN: ICD-10-CM

## 2024-04-04 DIAGNOSIS — I25.10 CORONARY ARTERY DISEASE INVOLVING NATIVE CORONARY ARTERY OF NATIVE HEART WITHOUT ANGINA PECTORIS: Primary | ICD-10-CM

## 2024-04-04 NOTE — PROGRESS NOTES
Chief Complaint   Patient presents with    Referral     Would like cardiology referral   cad    HPI:  Behzad Guzman is a 73 y.o. male who presents today for follow-up CAD.    ROS:  Constitutional: no fevers, night sweats or unexplained weight loss  Eyes: no vision changes  ENT: no runny nose, ear pain, sore throat  Cardio: no chest pain, palpitations  Pulm: no shortness of breath, wheezing, or cough  GI: no abdominal pain or changes in bowel movements  : no difficulty urinating  MSK: no difficulty ambulating, no joint pain  Neuro: no weakness, dizziness or headache  Psych: no trouble sleeping  Endo: no change in appetite      Past Medical History:   Diagnosis Date    Asthma     Chronic hip pain, left     Chronic pain in left shoulder     Hyperlipidemia     Hypertension     Leg length discrepancy     Neck pain, chronic     Neuropathy     Panic attacks     Pre-diabetes     Prediabetes     Spinal stenosis       Family History   Problem Relation Age of Onset    Heart attack Father 65    Other Mother         accident    No Known Problems Maternal Grandmother     No Known Problems Maternal Grandfather     No Known Problems Paternal Grandmother     Stroke Paternal Grandfather       Social History     Socioeconomic History    Marital status:    Tobacco Use    Smoking status: Former     Current packs/day: 0.00     Average packs/day: 0.5 packs/day for 10.0 years (5.0 ttl pk-yrs)     Types: Cigarettes     Start date:      Quit date:      Years since quittin.2    Smokeless tobacco: Never   Vaping Use    Vaping status: Never Used   Substance and Sexual Activity    Alcohol use: No    Drug use: No    Sexual activity: Defer      Allergies   Allergen Reactions    Peanut Butter Flavor Anaphylaxis    Shellfish Allergy Anaphylaxis    Amoxicillin Rash and Other (See Comments)    Penicillins Rash    Sulfa Antibiotics Myalgia    Doxycycline GI Bleeding    Latex Rash    Pregabalin Other (See Comments)       Immunization History   Administered Date(s) Administered    COVID-19 (PFIZER) Purple Cap Monovalent 09/14/2021, 10/08/2021    Flu Vaccine Quad PF >36MO 09/30/2015    Fluzone High-Dose 65+yrs 11/22/2022, 12/22/2023    Pneumococcal Conjugate 13-Valent (PCV13) 09/22/2016    Pneumococcal Conjugate 20-Valent (PCV20) 12/22/2023    Pneumococcal Polysaccharide (PPSV23) 05/18/2018    Tdap 04/22/2022        PE:  Vitals:    04/04/24 1105   BP: 126/88   Pulse: 59   SpO2: 98%      Body mass index is 25.49 kg/m².    Gen Appearance: NAD  HEENT: Normocephalic, PERRLA, no thyromegaly, trache midline  Heart: RRR, normal S1 and S2, no murmur  Lungs: CTA b/l, no wheezing, no crackles  Abdomen: Soft, non-tender, non-distended, no guarding and BSx4  MSK: Moves all extremities well, normal gait, no peripheral edema  Pulses: Palpable and equal b/l  Lymph nodes: No palpable lymphadenopathy   Neuro: No focal deficits      Current Outpatient Medications   Medication Sig Dispense Refill    albuterol (ACCUNEB) 0.63 MG/3ML nebulizer solution Take 3 mL by nebulization Every 6 (Six) Hours As Needed for Wheezing. 3 mL 12    albuterol sulfate  (90 Base) MCG/ACT inhaler INHALE TWO PUFFS BY MOUTH EVERY 6 HOURS AS NEEDED FOR WHEEZING OR SHORTNESS OF AIR 6.7 g 1    citalopram (CeleXA) 20 MG tablet       cloNIDine (CATAPRES) 0.1 MG tablet Take 1 tablet by mouth Every 8 (Eight) Hours.      diazePAM (VALIUM) 5 MG tablet Take 1 tablet by mouth Every 6 (Six) Hours As Needed.  0    Diclofenac Sodium (VOLTAREN) 1 % gel gel Apply 4 g topically to the appropriate area as directed 3 (Three) Times a Day. (Patient taking differently: Apply 4 g topically to the appropriate area as directed As Needed.) 100 g 0    fluticasone (Flonase) 50 MCG/ACT nasal spray 2 sprays into the nostril(s) as directed by provider Daily. 2 puffs each nostril 18.2 mL 0    gabapentin (NEURONTIN) 400 MG capsule Take 1 capsule by mouth 2 (two) times a day.      glucose monitor  "monitoring kit 1 each Daily. 1 each 0    HYDROcodone-acetaminophen (NORCO)  MG per tablet Take 1 tablet by mouth Every 6 (Six) Hours As Needed for Moderate Pain.      Lancets (onetouch ultrasoft) lancets Use one daily to test blood sugars 100 each 12    losartan-hydrochlorothiazide (HYZAAR) 50-12.5 MG per tablet TAKE ONE TABLET BY MOUTH DAILY 90 tablet 1    metFORMIN (GLUCOPHAGE) 500 MG tablet TAKE 1 TABLET BY MOUTH DAILY WITH BREAKFAST 180 tablet 0    metoprolol tartrate (LOPRESSOR) 50 MG tablet TAKE ONE TABLET BY MOUTH TWICE A  tablet 0    mirtazapine (Remeron) 15 MG tablet Take 1 tablet by mouth Every Night. 90 tablet 3    Naloxegol Oxalate (MOVANTIK) 12.5 MG tablet       Needle, Disp, 22G X 1\" misc 1 each Every 14 (Fourteen) Days. 100 each 0    olopatadine (PATANOL) 0.1 % ophthalmic solution       omeprazole (priLOSEC) 40 MG capsule TAKE 1 CAPSULE BY MOUTH DAILY BEFORE A MEAL 90 capsule 1    OneTouch Verio test strip 1 each by Other route Daily. for testing 300 each 2    oxyCODONE (ROXICODONE) 15 MG immediate release tablet TAKE 1/2 A TABLET BY MOUTH AT BEDTIME AND 1/2 TABLET IN MIDDLE OF NIGHT AS NEEDED AS DIRECTED      permethrin (ELIMITE) 5 % cream Apply 1 Application topically to the appropriate area as directed 1 (One) Time.      pravastatin (PRAVACHOL) 20 MG tablet TAKE 1 TABLET BY MOUTH DAILY 90 tablet 1    sucralfate (CARAFATE) 1 g tablet Take 1 tablet 30 minutes before each meal and at bedtime 60 tablet 1    Syringe/Needle, Disp, (B-D 3CC LUER-YAA SYR 23GX1\") 23G X 1\" 3 ML misc USE 1 SYRINGE EVERY 21 DAYS TO TAKE TESTOSTERONE 12 each 1    Testosterone Cypionate (DEPOTESTOTERONE CYPIONATE) 200 MG/ML injection INJECT 1ML INTRAMUSCULARLY AS DIRECTED BY PRESCRIBER EVERY 14 DAYS 6 mL 0    traZODone (DESYREL) 50 MG tablet       vitamin D (ERGOCALCIFEROL) 1.25 MG (22788 UT) capsule capsule TAKE 1 CAPSULE BY MOUTH ONCE WEEKLY FOR 12 DOSES 12 capsule 0     No current facility-administered " medications for this visit.      Information given to patient about [facility] referral.  He plans on calling to schedule an appointment later today.  Still have not received records from [facility].  Will request records again today.  Reviewed medication list with patient.  Discussed coronary artery disease with patient and wife.    Blood pressure well-controlled today, no change to prescription medication.    Continue current diabetic medication, ordered blood work including A1c.    Counseling was given to patient for the following topics: diagnostic results, impressions, risks and benefits of treatment options, and importance of treatment compliance . Total time of the encounter was 40 minutes and 20 minutes was spent face to face counseling.    Diagnoses and all orders for this visit:    1. Coronary artery disease involving native coronary artery of native heart without angina pectoris (Primary)    2. Intractable headache, unspecified chronicity pattern, unspecified headache type    3. Primary hypertension    4. Type 2 diabetes mellitus with diabetic polyneuropathy, without long-term current use of insulin    5. Balance disorder         No follow-ups on file.     Dictated Utilizing Dragon Dictation    Please note that portions of this note were completed with a voice recognition program.    Part of this note may be an electronic transcription/translation of spoken language to printed text using the Dragon Dictation System.

## 2024-04-04 NOTE — TELEPHONE ENCOUNTER
The patient came to the window wanting to know a Cardiologist's name at [facility] (Referred by Dr. Tran) because he wanted to get in as soon as possible. He gave me the name Ella to talk to at the Cardiologist's Office. I talked with Ella there, and I told her he wanted the earliest to be seen. She set him up with an appointment with Dr. Cadet, Tuesday, 4/9/24. Referral was locked & I was unable to add the message under his Referral, 'Communication'.

## 2024-04-05 ENCOUNTER — TELEPHONE (OUTPATIENT)
Dept: FAMILY MEDICINE CLINIC | Facility: CLINIC | Age: 74
End: 2024-04-05
Payer: MEDICARE

## 2024-04-05 NOTE — TELEPHONE ENCOUNTER
Caller: [facility]    Relationship: Other    Best call back number: [phone]     What form or medical record are you requesting: DIABETIC MANAGEMENT FOOT EXAM    Who is requesting this form or medical record from you: [facility]    How would you like to receive the form or medical records (pick-up, mail, fax): FAX  If fax, what is the fax number: [phone]      ATTN: CHARLES    Timeframe paperwork needed:ASAP    Additional notes: RECEIVED FAX YESTERDAY BUT THE BOTTOM SIGNATURE WAS CUT OFF. PLEASE RE FAX THE DOCUMENT AS SOON AS POSSIBLE

## 2024-04-06 ENCOUNTER — HOSPITAL ENCOUNTER (OUTPATIENT)
Dept: CT IMAGING | Facility: HOSPITAL | Age: 74
Discharge: HOME | End: 2024-04-06
Payer: MEDICARE

## 2024-04-06 DIAGNOSIS — R51.9 INTRACTABLE HEADACHE, UNSPECIFIED CHRONICITY PATTERN, UNSPECIFIED HEADACHE TYPE: ICD-10-CM

## 2024-04-06 PROCEDURE — 70450 CT HEAD/BRAIN W/O DYE: CPT

## 2024-04-07 NOTE — H&P (VIEW-ONLY)
[facility]   Consult  Behzad Guzman  1950    VISIT DATE:  04/09/24    PCP:   Zach Tran,   [address] [facility] [address]        CC:  Chest pain, unspecified type (Patient complains of chest pressure.)      Problem List:  HTN  Hyperlipidemia  Asthma  CAD with recent RCA stenting  Multiple dental extractions  Multiple traumatic orthopedic injuries    Cardiac testing     echo August 2022  Left ventricular ejection fraction appears to be 56 - 60%. Left ventricular systolic function is normal.  Left ventricular diastolic function is consistent with (grade I) impaired relaxation.  Left ventricular wall thickness is consistent with hypertrophy. Sigmoid-shaped ventricular septum is present    Cardiac cath March 2024:  High-grade stenosis in the lares stroke takeoff of the RCA.  LAD with minimal nonobstructive disease, circumflex is nondominant there is a 70% bifurcation lesion within the OM branch.  PCI undertaken to the RCA.  Staged intervention recommended to the circumflex    Echo March 2024: Mild LVH: EF 65 to 70%, aortic valve with thickened leaflets and a mean gradient of 10 mmHg.    History of Present Illness:  Behzad Guzman  Is a 73 y.o. male with pertinent cardiac history detailed above.  Patient had an RCA stent placed at [facility].  This was in March.  He  does report improvement in chest pain after  his PCI.  He does endorse returning tightness in the chest.  He states it mostly occurs at night.  He is not having it this morning.  EKG shows an inferior infarct.  He has lateral ST elevation which appears consistent with early repolarization.  This was noted on previous EKGs.  He has known Lcx/ OM bifurcation lesion reported at 70%.   He was planned to go back to [place] for this on April 16 but he did not want to keep that appointment.  He states it was a [place] cardi[place] that performed his PCI that may not  be there again.  Risk factors for CAD include hypertension hyperlipidemia diabetes.  This most recent non-STEMI occurred right after dental extractions when he was in large bit of pain.      Patient Active Problem List    Diagnosis Date Noted    Bilateral carpal tunnel syndrome 2022    Infected tick bite 2021    Multiple thyroid nodules 10/07/2021    Trigger middle finger 2021    Trigger finger, right 2021    Chronic pain in left shoulder     Chronic hip pain, left     Neck pain, chronic     Leg length discrepancy     Panic attacks     Pre-diabetes     Asthma     Spinal stenosis     Prediabetes     Neuropathy     Hypertension     Hyperlipidemia     Congestion of nasal sinus 10/16/2017    Benign essential hypertension 2010       Allergies   Allergen Reactions    Peanut Butter Flavor Anaphylaxis    Shellfish Allergy Anaphylaxis    Amoxicillin Rash and Other (See Comments)    Penicillins Rash    Sulfa Antibiotics Myalgia    Doxycycline GI Bleeding    Latex Rash    Pregabalin Other (See Comments)       Social History     Socioeconomic History    Marital status:    Tobacco Use    Smoking status: Former     Current packs/day: 0.00     Average packs/day: 0.5 packs/day for 10.0 years (5.0 ttl pk-yrs)     Types: Cigarettes     Start date:      Quit date:      Years since quittin.2    Smokeless tobacco: Never   Vaping Use    Vaping status: Never Used   Substance and Sexual Activity    Alcohol use: No    Drug use: No    Sexual activity: Defer       Family History   Problem Relation Age of Onset    Heart attack Father 65    Other Mother         accident    No Known Problems Maternal Grandmother     No Known Problems Maternal Grandfather     No Known Problems Paternal Grandmother     Stroke Paternal Grandfather        Current Medications:    Current Outpatient Medications:     albuterol (ACCUNEB) 0.63 MG/3ML nebulizer solution, Take 3 mL by nebulization Every 6 (Six) Hours As  "Needed for Wheezing., Disp: 3 mL, Rfl: 12    albuterol sulfate  (90 Base) MCG/ACT inhaler, INHALE TWO PUFFS BY MOUTH EVERY 6 HOURS AS NEEDED FOR WHEEZING OR SHORTNESS OF AIR, Disp: 6.7 g, Rfl: 1    citalopram (CeleXA) 20 MG tablet, , Disp: , Rfl:     cloNIDine (CATAPRES) 0.1 MG tablet, Take 1 tablet by mouth Every 8 (Eight) Hours., Disp: , Rfl:     diazePAM (VALIUM) 5 MG tablet, Take 1 tablet by mouth Every 6 (Six) Hours As Needed., Disp: , Rfl: 0    Diclofenac Sodium (VOLTAREN) 1 % gel gel, Apply 4 g topically to the appropriate area as directed 3 (Three) Times a Day. (Patient taking differently: Apply 4 g topically to the appropriate area as directed As Needed.), Disp: 100 g, Rfl: 0    fluticasone (Flonase) 50 MCG/ACT nasal spray, 2 sprays into the nostril(s) as directed by provider Daily. 2 puffs each nostril, Disp: 18.2 mL, Rfl: 0    gabapentin (NEURONTIN) 400 MG capsule, Take 1 capsule by mouth 2 (two) times a day., Disp: , Rfl:     glucose monitor monitoring kit, 1 each Daily., Disp: 1 each, Rfl: 0    HYDROcodone-acetaminophen (NORCO)  MG per tablet, Take 1 tablet by mouth Every 6 (Six) Hours As Needed for Moderate Pain., Disp: , Rfl:     Lancets (onetouch ultrasoft) lancets, Use one daily to test blood sugars, Disp: 100 each, Rfl: 12    losartan-hydrochlorothiazide (HYZAAR) 50-12.5 MG per tablet, TAKE ONE TABLET BY MOUTH DAILY, Disp: 90 tablet, Rfl: 1    metFORMIN (GLUCOPHAGE) 500 MG tablet, TAKE 1 TABLET BY MOUTH DAILY WITH BREAKFAST, Disp: 180 tablet, Rfl: 0    metoprolol tartrate (LOPRESSOR) 50 MG tablet, TAKE ONE TABLET BY MOUTH TWICE A DAY, Disp: 180 tablet, Rfl: 0    mirtazapine (Remeron) 15 MG tablet, Take 1 tablet by mouth Every Night., Disp: 90 tablet, Rfl: 3    Naloxegol Oxalate (MOVANTIK) 12.5 MG tablet, , Disp: , Rfl:     Needle, Disp, 22G X 1\" misc, 1 each Every 14 (Fourteen) Days., Disp: 100 each, Rfl: 0    olopatadine (PATANOL) 0.1 % ophthalmic solution, , Disp: , Rfl:     OneTouch " "Verio test strip, 1 each by Other route Daily. for testing, Disp: 300 each, Rfl: 2    oxyCODONE (ROXICODONE) 15 MG immediate release tablet, TAKE 1/2 A TABLET BY MOUTH AT BEDTIME AND 1/2 TABLET IN MIDDLE OF NIGHT AS NEEDED AS DIRECTED, Disp: , Rfl:     permethrin (ELIMITE) 5 % cream, Apply 1 Application topically to the appropriate area as directed 1 (One) Time., Disp: , Rfl:     sucralfate (CARAFATE) 1 g tablet, Take 1 tablet 30 minutes before each meal and at bedtime, Disp: 60 tablet, Rfl: 1    Syringe/Needle, Disp, (B-D 3CC LUER-YAA SYR 23GX1\") 23G X 1\" 3 ML misc, USE 1 SYRINGE EVERY 21 DAYS TO TAKE TESTOSTERONE, Disp: 12 each, Rfl: 1    Testosterone Cypionate (DEPOTESTOTERONE CYPIONATE) 200 MG/ML injection, INJECT 1ML INTRAMUSCULARLY AS DIRECTED BY PRESCRIBER EVERY 14 DAYS, Disp: 6 mL, Rfl: 0    traZODone (DESYREL) 50 MG tablet, , Disp: , Rfl:     vitamin D (ERGOCALCIFEROL) 1.25 MG (69199 UT) capsule capsule, TAKE 1 CAPSULE BY MOUTH ONCE WEEKLY FOR 12 DOSES, Disp: 12 capsule, Rfl: 0    aspirin 81 MG EC tablet, Take 1 tablet by mouth Daily., Disp: , Rfl:     clopidogrel (PLAVIX) 75 MG tablet, Take 1 tablet by mouth Daily., Disp: 30 tablet, Rfl: 11    pantoprazole (PROTONIX) 40 MG EC tablet, Take 1 tablet by mouth Daily., Disp: 30 tablet, Rfl: 11    rosuvastatin (CRESTOR) 40 MG tablet, Take 1 tablet by mouth Daily., Disp: 90 tablet, Rfl: 3     Review of Systems   Cardiovascular:  Positive for chest pain. Negative for palpitations.   Musculoskeletal:  Positive for arthritis and myalgias.   Neurological:  Positive for paresthesias.       Vitals:    04/09/24 0900   BP: 130/72   Pulse: 68   Weight: 84.4 kg (186 lb)   Height: 182.9 cm (72\")       Physical Exam  Neck:      Vascular: No carotid bruit.   Cardiovascular:      Rate and Rhythm: Normal rate and regular rhythm.      Heart sounds: Normal heart sounds.   Pulmonary:      Effort: Pulmonary effort is normal.      Breath sounds: Normal breath sounds. "   Musculoskeletal:      Right lower leg: No edema.      Left lower leg: No edema.   Neurological:      Mental Status: He is alert.         Diagnostic Data:    ECG 12 Lead    Date/Time: 4/9/2024 9:16 AM  Performed by: Charli Weems MD    Authorized by: Charli Weems MD  Comparison: compared with previous ECG from 7/7/2022  Rhythm: sinus rhythm  Q waves: III, II and aVF    T elevation: III and aVF    Clinical impression: abnormal EKG  Clinical impression comment: Inferior infarct.  Diffuse upsloping ST elevation suggesting early repolarization        Lab Results   Component Value Date    TRIG 240 (H) 10/04/2023    HDL 43 10/04/2023     Lab Results   Component Value Date    GLUCOSE 143 (H) 10/04/2023    BUN 7 (L) 10/04/2023    CREATININE 1.00 10/04/2023     10/04/2023    K 3.6 10/04/2023    CL 96 (L) 10/04/2023    CO2 27.0 10/04/2023     Lab Results   Component Value Date    HGBA1C 8.2 10/04/2023     Lab Results   Component Value Date    WBC 8.76 10/04/2023    HGB 16.9 10/04/2023    HCT 48.7 10/04/2023     10/04/2023       Assessment:   Diagnosis Plan   1. NSTEMI, initial episode of care        2. History of non-ST elevation myocardial infarction (NSTEMI)  ECG 12 Lead    Case Request Cath Lab: Left Heart Cath          Plan:        Hypertension  Losartan-HCTZ, metoprolol,  clonidine 0.1mg TID  BP controlled in the office.  No changes made  Coronary artery disease  RCA stent March 2024, there is a residual circumflex stenosis reported.  70% at the bifurcation with an OM  We will request the cath films for review.  Given his ongoing symptoms agree with scheduling the staged PCI.  Will arrange with Dr. Rey here  Continue aspirin and Plavix  Change omeprazole to pantoprazole to avoid Plavix interaction  Hyperlipidemia  Total cholesterol 188, , triglycerides 240  On pravastatin 20  Recommend increase statin intensity, will change to crestor 40mg  Diabetes  A1c 8.2  Consider  Ozempic, will discuss at next visit given multiple med changes today    Advance Care Planning   ACP discussion was held with the patient during this visit. Patient does not have an advance directive, declines further assistance.       Charli Weems MD [facility]

## 2024-04-07 NOTE — PROGRESS NOTES
[facility]   Consult  Behzad Guzman  1950    VISIT DATE:  04/09/24    PCP:   Zach Tran,   [address] [facility] [address]        CC:  Chest pain, unspecified type (Patient complains of chest pressure.)      Problem List:  HTN  Hyperlipidemia  Asthma  CAD with recent RCA stenting  Multiple dental extractions  Multiple traumatic orthopedic injuries    Cardiac testing     echo August 2022  Left ventricular ejection fraction appears to be 56 - 60%. Left ventricular systolic function is normal.  Left ventricular diastolic function is consistent with (grade I) impaired relaxation.  Left ventricular wall thickness is consistent with hypertrophy. Sigmoid-shaped ventricular septum is present    Cardiac cath March 2024:  High-grade stenosis in the lares stroke takeoff of the RCA.  LAD with minimal nonobstructive disease, circumflex is nondominant there is a 70% bifurcation lesion within the OM branch.  PCI undertaken to the RCA.  Staged intervention recommended to the circumflex    Echo March 2024: Mild LVH: EF 65 to 70%, aortic valve with thickened leaflets and a mean gradient of 10 mmHg.    History of Present Illness:  Behzad Guzman  Is a 73 y.o. male with pertinent cardiac history detailed above.  Patient had an RCA stent placed at [facility].  This was in March.  He  does report improvement in chest pain after  his PCI.  He does endorse returning tightness in the chest.  He states it mostly occurs at night.  He is not having it this morning.  EKG shows an inferior infarct.  He has lateral ST elevation which appears consistent with early repolarization.  This was noted on previous EKGs.  He has known Lcx/ OM bifurcation lesion reported at 70%.   He was planned to go back to [place] for this on April 16 but he did not want to keep that appointment.  He states it was a visiting/locum's cardiologist that performed his PCI that may not  be there again.  Risk factors for CAD include hypertension hyperlipidemia diabetes.  This most recent non-STEMI occurred right after dental extractions when he was in large bit of pain.      Patient Active Problem List    Diagnosis Date Noted    Bilateral carpal tunnel syndrome 2022    Infected tick bite 2021    Multiple thyroid nodules 10/07/2021    Trigger middle finger 2021    Trigger finger, right 2021    Chronic pain in left shoulder     Chronic hip pain, left     Neck pain, chronic     Leg length discrepancy     Panic attacks     Pre-diabetes     Asthma     Spinal stenosis     Prediabetes     Neuropathy     Hypertension     Hyperlipidemia     Congestion of nasal sinus 10/16/2017    Benign essential hypertension 2010       Allergies   Allergen Reactions    Peanut Butter Flavor Anaphylaxis    Shellfish Allergy Anaphylaxis    Amoxicillin Rash and Other (See Comments)    Penicillins Rash    Sulfa Antibiotics Myalgia    Doxycycline GI Bleeding    Latex Rash    Pregabalin Other (See Comments)       Social History     Socioeconomic History    Marital status:    Tobacco Use    Smoking status: Former     Current packs/day: 0.00     Average packs/day: 0.5 packs/day for 10.0 years (5.0 ttl pk-yrs)     Types: Cigarettes     Start date:      Quit date:      Years since quittin.2    Smokeless tobacco: Never   Vaping Use    Vaping status: Never Used   Substance and Sexual Activity    Alcohol use: No    Drug use: No    Sexual activity: Defer       Family History   Problem Relation Age of Onset    Heart attack Father 65    Other Mother         accident    No Known Problems Maternal Grandmother     No Known Problems Maternal Grandfather     No Known Problems Paternal Grandmother     Stroke Paternal Grandfather        Current Medications:    Current Outpatient Medications:     albuterol (ACCUNEB) 0.63 MG/3ML nebulizer solution, Take 3 mL by nebulization Every 6 (Six) Hours As  "Needed for Wheezing., Disp: 3 mL, Rfl: 12    albuterol sulfate  (90 Base) MCG/ACT inhaler, INHALE TWO PUFFS BY MOUTH EVERY 6 HOURS AS NEEDED FOR WHEEZING OR SHORTNESS OF AIR, Disp: 6.7 g, Rfl: 1    citalopram (CeleXA) 20 MG tablet, , Disp: , Rfl:     cloNIDine (CATAPRES) 0.1 MG tablet, Take 1 tablet by mouth Every 8 (Eight) Hours., Disp: , Rfl:     diazePAM (VALIUM) 5 MG tablet, Take 1 tablet by mouth Every 6 (Six) Hours As Needed., Disp: , Rfl: 0    Diclofenac Sodium (VOLTAREN) 1 % gel gel, Apply 4 g topically to the appropriate area as directed 3 (Three) Times a Day. (Patient taking differently: Apply 4 g topically to the appropriate area as directed As Needed.), Disp: 100 g, Rfl: 0    fluticasone (Flonase) 50 MCG/ACT nasal spray, 2 sprays into the nostril(s) as directed by provider Daily. 2 puffs each nostril, Disp: 18.2 mL, Rfl: 0    gabapentin (NEURONTIN) 400 MG capsule, Take 1 capsule by mouth 2 (two) times a day., Disp: , Rfl:     glucose monitor monitoring kit, 1 each Daily., Disp: 1 each, Rfl: 0    HYDROcodone-acetaminophen (NORCO)  MG per tablet, Take 1 tablet by mouth Every 6 (Six) Hours As Needed for Moderate Pain., Disp: , Rfl:     Lancets (onetouch ultrasoft) lancets, Use one daily to test blood sugars, Disp: 100 each, Rfl: 12    losartan-hydrochlorothiazide (HYZAAR) 50-12.5 MG per tablet, TAKE ONE TABLET BY MOUTH DAILY, Disp: 90 tablet, Rfl: 1    metFORMIN (GLUCOPHAGE) 500 MG tablet, TAKE 1 TABLET BY MOUTH DAILY WITH BREAKFAST, Disp: 180 tablet, Rfl: 0    metoprolol tartrate (LOPRESSOR) 50 MG tablet, TAKE ONE TABLET BY MOUTH TWICE A DAY, Disp: 180 tablet, Rfl: 0    mirtazapine (Remeron) 15 MG tablet, Take 1 tablet by mouth Every Night., Disp: 90 tablet, Rfl: 3    Naloxegol Oxalate (MOVANTIK) 12.5 MG tablet, , Disp: , Rfl:     Needle, Disp, 22G X 1\" misc, 1 each Every 14 (Fourteen) Days., Disp: 100 each, Rfl: 0    olopatadine (PATANOL) 0.1 % ophthalmic solution, , Disp: , Rfl:     OneTouch " "Verio test strip, 1 each by Other route Daily. for testing, Disp: 300 each, Rfl: 2    oxyCODONE (ROXICODONE) 15 MG immediate release tablet, TAKE 1/2 A TABLET BY MOUTH AT BEDTIME AND 1/2 TABLET IN MIDDLE OF NIGHT AS NEEDED AS DIRECTED, Disp: , Rfl:     permethrin (ELIMITE) 5 % cream, Apply 1 Application topically to the appropriate area as directed 1 (One) Time., Disp: , Rfl:     sucralfate (CARAFATE) 1 g tablet, Take 1 tablet 30 minutes before each meal and at bedtime, Disp: 60 tablet, Rfl: 1    Syringe/Needle, Disp, (B-D 3CC LUER-YAA SYR 23GX1\") 23G X 1\" 3 ML misc, USE 1 SYRINGE EVERY 21 DAYS TO TAKE TESTOSTERONE, Disp: 12 each, Rfl: 1    Testosterone Cypionate (DEPOTESTOTERONE CYPIONATE) 200 MG/ML injection, INJECT 1ML INTRAMUSCULARLY AS DIRECTED BY PRESCRIBER EVERY 14 DAYS, Disp: 6 mL, Rfl: 0    traZODone (DESYREL) 50 MG tablet, , Disp: , Rfl:     vitamin D (ERGOCALCIFEROL) 1.25 MG (16460 UT) capsule capsule, TAKE 1 CAPSULE BY MOUTH ONCE WEEKLY FOR 12 DOSES, Disp: 12 capsule, Rfl: 0    aspirin 81 MG EC tablet, Take 1 tablet by mouth Daily., Disp: , Rfl:     clopidogrel (PLAVIX) 75 MG tablet, Take 1 tablet by mouth Daily., Disp: 30 tablet, Rfl: 11    pantoprazole (PROTONIX) 40 MG EC tablet, Take 1 tablet by mouth Daily., Disp: 30 tablet, Rfl: 11    rosuvastatin (CRESTOR) 40 MG tablet, Take 1 tablet by mouth Daily., Disp: 90 tablet, Rfl: 3     Review of Systems   Cardiovascular:  Positive for chest pain. Negative for palpitations.   Musculoskeletal:  Positive for arthritis and myalgias.   Neurological:  Positive for paresthesias.       Vitals:    04/09/24 0900   BP: 130/72   Pulse: 68   Weight: 84.4 kg (186 lb)   Height: 182.9 cm (72\")       Physical Exam  Neck:      Vascular: No carotid bruit.   Cardiovascular:      Rate and Rhythm: Normal rate and regular rhythm.      Heart sounds: Normal heart sounds.   Pulmonary:      Effort: Pulmonary effort is normal.      Breath sounds: Normal breath sounds. "   Musculoskeletal:      Right lower leg: No edema.      Left lower leg: No edema.   Neurological:      Mental Status: He is alert.         Diagnostic Data:    ECG 12 Lead    Date/Time: 4/9/2024 9:16 AM  Performed by: Charli Weems MD    Authorized by: Charli Weems MD  Comparison: compared with previous ECG from 7/7/2022  Rhythm: sinus rhythm  Q waves: III, II and aVF    T elevation: III and aVF    Clinical impression: abnormal EKG  Clinical impression comment: Inferior infarct.  Diffuse upsloping ST elevation suggesting early repolarization        Lab Results   Component Value Date    TRIG 240 (H) 10/04/2023    HDL 43 10/04/2023     Lab Results   Component Value Date    GLUCOSE 143 (H) 10/04/2023    BUN 7 (L) 10/04/2023    CREATININE 1.00 10/04/2023     10/04/2023    K 3.6 10/04/2023    CL 96 (L) 10/04/2023    CO2 27.0 10/04/2023     Lab Results   Component Value Date    HGBA1C 8.2 10/04/2023     Lab Results   Component Value Date    WBC 8.76 10/04/2023    HGB 16.9 10/04/2023    HCT 48.7 10/04/2023     10/04/2023       Assessment:   Diagnosis Plan   1. NSTEMI, initial episode of care        2. History of non-ST elevation myocardial infarction (NSTEMI)  ECG 12 Lead    Case Request Cath Lab: Left Heart Cath          Plan:        Hypertension  Losartan-HCTZ, metoprolol,  clonidine 0.1mg TID  BP controlled in the office.  No changes made  Coronary artery disease  RCA stent March 2024, there is a residual circumflex stenosis reported.  70% at the bifurcation with an OM  We will request the cath films for review.  Given his ongoing symptoms agree with scheduling the staged PCI.  Will arrange with Dr. Rey here  Continue aspirin and Plavix  Change omeprazole to pantoprazole to avoid Plavix interaction  Hyperlipidemia  Total cholesterol 188, , triglycerides 240  On pravastatin 20  Recommend increase statin intensity, will change to crestor 40mg  Diabetes  A1c 8.2  Consider  Ozempic, will discuss at next visit given multiple med changes today    Advance Care Planning   ACP discussion was held with the patient during this visit. Patient does not have an advance directive, declines further assistance.       Charli Weems MD [facility]

## 2024-04-08 DIAGNOSIS — L98.9 SKIN LESION: ICD-10-CM

## 2024-04-09 ENCOUNTER — HOSPITAL ENCOUNTER (OUTPATIENT)
Dept: CARDIOLOGY | Facility: HOSPITAL | Age: 74
Discharge: HOME | End: 2024-04-09

## 2024-04-09 ENCOUNTER — PATIENT ROUNDING (BHMG ONLY) (OUTPATIENT)
Dept: CARDIOLOGY | Facility: CLINIC | Age: 74
End: 2024-04-09
Payer: MEDICARE

## 2024-04-09 ENCOUNTER — OFFICE VISIT (OUTPATIENT)
Dept: CARDIOLOGY | Facility: CLINIC | Age: 74
End: 2024-04-09
Payer: MEDICARE

## 2024-04-09 ENCOUNTER — TELEPHONE (OUTPATIENT)
Dept: FAMILY MEDICINE CLINIC | Facility: CLINIC | Age: 74
End: 2024-04-09
Payer: MEDICARE

## 2024-04-09 VITALS
HEART RATE: 68 BPM | DIASTOLIC BLOOD PRESSURE: 72 MMHG | WEIGHT: 186 LBS | BODY MASS INDEX: 25.19 KG/M2 | SYSTOLIC BLOOD PRESSURE: 130 MMHG | HEIGHT: 72 IN

## 2024-04-09 DIAGNOSIS — I21.4 NSTEMI, INITIAL EPISODE OF CARE: Primary | ICD-10-CM

## 2024-04-09 DIAGNOSIS — I25.2 HISTORY OF NON-ST ELEVATION MYOCARDIAL INFARCTION (NSTEMI): ICD-10-CM

## 2024-04-09 DIAGNOSIS — Z00.6 ENCOUNTER FOR EXAMINATION FOR NORMAL COMPARISON AND CONTROL IN CLINICAL RESEARCH PROGRAM: ICD-10-CM

## 2024-04-09 RX ORDER — ASPIRIN 81 MG/1
81 TABLET ORAL DAILY
Start: 2024-04-09

## 2024-04-09 RX ORDER — CLOPIDOGREL BISULFATE 75 MG/1
75 TABLET ORAL DAILY
Qty: 30 TABLET | Refills: 11 | Status: SHIPPED | OUTPATIENT
Start: 2024-04-09

## 2024-04-09 RX ORDER — ROSUVASTATIN CALCIUM 40 MG/1
40 TABLET, COATED ORAL DAILY
Qty: 90 TABLET | Refills: 3 | Status: SHIPPED | OUTPATIENT
Start: 2024-04-09

## 2024-04-09 RX ORDER — PANTOPRAZOLE SODIUM 40 MG/1
40 TABLET, DELAYED RELEASE ORAL DAILY
Qty: 30 TABLET | Refills: 11 | Status: SHIPPED | OUTPATIENT
Start: 2024-04-09

## 2024-04-09 NOTE — PROGRESS NOTES
April 9, 2024    Hello, may I speak with Behzad Guzman? Yes.    My name is Amy REYNAGA      I am  with [facility] CARDIOLOG[facility]  [address]  [address]  [place] [address] [phone].    Before we get started may I verify your date of birth? 1950, Yes, correct.    I am calling to officially welcome you to our practice and ask about your recent visit. Is this a good time to talk? Yes.    Tell me about your visit with us. What things went well?  Very professional/smooth  Pleasant  experience.  Doctor was very concerned with situation.        We're always looking for ways to make our patients' experiences even better. Do you have recommendations on ways we may improve?  No. I wish my records were sent from my surgeon @ [facility], so Dr. Weems could see them.     Overall were you satisfied with your first visit to our practice? Yes.       I appreciate you taking the time to speak with me today. Is there anything else I can do for you? No.      Thank you, and have a great day.

## 2024-04-09 NOTE — TELEPHONE ENCOUNTER
Name: Behzad Guzman    Relationship: Self    Best Callback Number: [phone]     HUB PROVIDED THE RELAY MESSAGE FROM THE OFFICE   PATIENT VOICED UNDERSTANDING AND HAS NO FURTHER QUESTIONS AT THIS TIME    ADDITIONAL INFORMATION: HE DOES NEED THE MEDICATION CALLED IN.

## 2024-04-09 NOTE — TELEPHONE ENCOUNTER
mupirocin (BACTROBAN) 2 % ointment     The original prescription was reordered on 3/27/2024 by Zach Tran DO. Renewing this prescription may not be appropriate.       Called and left vm for patient to return call to see if he is asking to have this sent in again     OK TO RELAY

## 2024-04-10 PROBLEM — I25.2 HISTORY OF NON-ST ELEVATION MYOCARDIAL INFARCTION (NSTEMI): Status: ACTIVE | Noted: 2024-04-09

## 2024-04-12 ENCOUNTER — PREP FOR SURGERY (OUTPATIENT)
Dept: OTHER | Facility: HOSPITAL | Age: 74
End: 2024-04-12
Payer: MEDICARE

## 2024-04-12 RX ORDER — SODIUM CHLORIDE 0.9 % (FLUSH) 0.9 %
10 SYRINGE (ML) INJECTION AS NEEDED
OUTPATIENT
Start: 2024-04-12

## 2024-04-12 RX ORDER — ACETAMINOPHEN 325 MG/1
650 TABLET ORAL EVERY 4 HOURS PRN
OUTPATIENT
Start: 2024-04-12

## 2024-04-12 RX ORDER — SODIUM CHLORIDE 0.9 % (FLUSH) 0.9 %
10 SYRINGE (ML) INJECTION EVERY 12 HOURS SCHEDULED
OUTPATIENT
Start: 2024-04-12

## 2024-04-12 RX ORDER — SODIUM CHLORIDE 9 MG/ML
40 INJECTION, SOLUTION INTRAVENOUS AS NEEDED
OUTPATIENT
Start: 2024-04-12

## 2024-04-12 RX ORDER — ASPIRIN 81 MG/1
324 TABLET, CHEWABLE ORAL ONCE
OUTPATIENT
Start: 2024-04-12 | End: 2024-04-12

## 2024-04-12 RX ORDER — ASPIRIN 81 MG/1
81 TABLET ORAL DAILY
OUTPATIENT
Start: 2024-04-13

## 2024-04-12 RX ORDER — NITROGLYCERIN 0.4 MG/1
0.4 TABLET SUBLINGUAL
OUTPATIENT
Start: 2024-04-12

## 2024-04-16 ENCOUNTER — APPOINTMENT (OUTPATIENT)
Dept: CARDIOLOGY | Facility: HOSPITAL | Age: 74
End: 2024-04-16
Payer: MEDICARE

## 2024-04-16 ENCOUNTER — HOSPITAL ENCOUNTER (OUTPATIENT)
Facility: HOSPITAL | Age: 74
Setting detail: HOSPITAL OUTPATIENT SURGERY
Discharge: HOME | End: 2024-04-16
Attending: INTERNAL MEDICINE | Admitting: PHYSICIAN ASSISTANT
Payer: MEDICARE

## 2024-04-16 VITALS
DIASTOLIC BLOOD PRESSURE: 97 MMHG | SYSTOLIC BLOOD PRESSURE: 148 MMHG | TEMPERATURE: 97 F | OXYGEN SATURATION: 99 % | HEART RATE: 63 BPM | RESPIRATION RATE: 18 BRPM | HEIGHT: 72 IN | WEIGHT: 179.2 LBS | BODY MASS INDEX: 24.27 KG/M2

## 2024-04-16 DIAGNOSIS — R07.2 PRECORDIAL PAIN: Primary | ICD-10-CM

## 2024-04-16 DIAGNOSIS — I25.2 HISTORY OF NON-ST ELEVATION MYOCARDIAL INFARCTION (NSTEMI): ICD-10-CM

## 2024-04-16 LAB
ALBUMIN SERPL-MCNC: 4.8 G/DL (ref 3.5–5.2)
ALBUMIN/GLOB SERPL: 1.8 G/DL
ALP SERPL-CCNC: 50 U/L (ref 39–117)
ALT SERPL W P-5'-P-CCNC: 19 U/L (ref 1–41)
ANION GAP SERPL CALCULATED.3IONS-SCNC: 12 MMOL/L (ref 5–15)
AST SERPL-CCNC: 26 U/L (ref 1–40)
BILIRUB SERPL-MCNC: 0.7 MG/DL (ref 0–1.2)
BUN BLDA-MCNC: 11 MG/DL (ref 8–26)
BUN SERPL-MCNC: 10 MG/DL (ref 8–23)
BUN/CREAT SERPL: 10.9 (ref 7–25)
CA-I BLDA-SCNC: 1.24 MMOL/L (ref 1.2–1.32)
CALCIUM SPEC-SCNC: 9.3 MG/DL (ref 8.6–10.5)
CHLORIDE BLDA-SCNC: 91 MMOL/L (ref 98–109)
CHLORIDE SERPL-SCNC: 92 MMOL/L (ref 98–107)
CHOLEST SERPL-MCNC: 153 MG/DL (ref 0–200)
CO2 BLDA-SCNC: 27 MMOL/L (ref 24–29)
CO2 SERPL-SCNC: 27 MMOL/L (ref 22–29)
CREAT BLDA-MCNC: 0.9 MG/DL (ref 0.6–1.3)
CREAT SERPL-MCNC: 0.92 MG/DL (ref 0.76–1.27)
DEPRECATED RDW RBC AUTO: 40.2 FL (ref 37–54)
EGFRCR SERPLBLD CKD-EPI 2021: 88.4 ML/MIN/1.73
EGFRCR SERPLBLD CKD-EPI 2021: 90.7 ML/MIN/1.73
ERYTHROCYTE [DISTWIDTH] IN BLOOD BY AUTOMATED COUNT: 12.7 % (ref 12.3–15.4)
GLOBULIN UR ELPH-MCNC: 2.6 GM/DL
GLUCOSE BLDC GLUCOMTR-MCNC: 119 MG/DL (ref 70–130)
GLUCOSE SERPL-MCNC: 114 MG/DL (ref 65–99)
HBA1C MFR BLD: 6.7 % (ref 4.8–5.6)
HCT VFR BLD AUTO: 45.3 % (ref 37.5–51)
HCT VFR BLDA CALC: 46 % (ref 38–51)
HDLC SERPL-MCNC: 52 MG/DL (ref 40–60)
HGB BLD-MCNC: 15.5 G/DL (ref 13–17.7)
HGB BLDA-MCNC: 15.6 G/DL (ref 12–17)
LDLC SERPL CALC-MCNC: 78 MG/DL (ref 0–100)
LDLC/HDLC SERPL: 1.44 {RATIO}
MCH RBC QN AUTO: 29.7 PG (ref 26.6–33)
MCHC RBC AUTO-ENTMCNC: 34.2 G/DL (ref 31.5–35.7)
MCV RBC AUTO: 86.8 FL (ref 79–97)
PLATELET # BLD AUTO: 205 10*3/MM3 (ref 140–450)
PMV BLD AUTO: 11.2 FL (ref 6–12)
POTASSIUM BLDA-SCNC: 3.7 MMOL/L (ref 3.5–4.9)
POTASSIUM SERPL-SCNC: 3.8 MMOL/L (ref 3.5–5.2)
PROT SERPL-MCNC: 7.4 G/DL (ref 6–8.5)
RBC # BLD AUTO: 5.22 10*6/MM3 (ref 4.14–5.8)
SODIUM BLD-SCNC: 131 MMOL/L (ref 138–146)
SODIUM SERPL-SCNC: 131 MMOL/L (ref 136–145)
TRIGL SERPL-MCNC: 131 MG/DL (ref 0–150)
VLDLC SERPL-MCNC: 23 MG/DL (ref 5–40)
WBC NRBC COR # BLD AUTO: 8.83 10*3/MM3 (ref 3.4–10.8)

## 2024-04-16 PROCEDURE — 25810000003 SODIUM CHLORIDE 0.9 % SOLUTION: Performed by: PHYSICIAN ASSISTANT

## 2024-04-16 PROCEDURE — 85014 HEMATOCRIT: CPT

## 2024-04-16 PROCEDURE — 83036 HEMOGLOBIN GLYCOSYLATED A1C: CPT | Performed by: PHYSICIAN ASSISTANT

## 2024-04-16 PROCEDURE — 80053 COMPREHEN METABOLIC PANEL: CPT | Performed by: PHYSICIAN ASSISTANT

## 2024-04-16 PROCEDURE — 80061 LIPID PANEL: CPT | Performed by: PHYSICIAN ASSISTANT

## 2024-04-16 PROCEDURE — 85027 COMPLETE CBC AUTOMATED: CPT | Performed by: PHYSICIAN ASSISTANT

## 2024-04-16 PROCEDURE — 80047 BASIC METABLC PNL IONIZED CA: CPT

## 2024-04-16 RX ORDER — ACETAMINOPHEN 325 MG/1
650 TABLET ORAL EVERY 4 HOURS PRN
Status: DISCONTINUED | OUTPATIENT
Start: 2024-04-16 | End: 2024-04-16 | Stop reason: HOSPADM

## 2024-04-16 RX ORDER — PRAVASTATIN SODIUM 20 MG
20 TABLET ORAL DAILY
COMMUNITY
End: 2024-04-17 | Stop reason: ALTCHOICE

## 2024-04-16 RX ORDER — SODIUM CHLORIDE 0.9 % (FLUSH) 0.9 %
10 SYRINGE (ML) INJECTION AS NEEDED
Status: DISCONTINUED | OUTPATIENT
Start: 2024-04-16 | End: 2024-04-16 | Stop reason: HOSPADM

## 2024-04-16 RX ORDER — OMEPRAZOLE 40 MG/1
40 CAPSULE, DELAYED RELEASE ORAL DAILY
COMMUNITY
End: 2024-04-17 | Stop reason: ALTCHOICE

## 2024-04-16 RX ORDER — REGADENOSON 0.08 MG/ML
0.4 INJECTION, SOLUTION INTRAVENOUS ONCE
Status: DISCONTINUED | OUTPATIENT
Start: 2024-04-16 | End: 2024-04-16 | Stop reason: HOSPADM

## 2024-04-16 RX ORDER — FEXOFENADINE HCL 180 MG/1
180 TABLET ORAL DAILY
COMMUNITY

## 2024-04-16 RX ORDER — ALCAFTADINE 2.5 MG/ML
1 SOLUTION/ DROPS OPHTHALMIC ONCE
COMMUNITY

## 2024-04-16 RX ORDER — SODIUM CHLORIDE 0.9 % (FLUSH) 0.9 %
10 SYRINGE (ML) INJECTION EVERY 12 HOURS SCHEDULED
Status: DISCONTINUED | OUTPATIENT
Start: 2024-04-16 | End: 2024-04-16 | Stop reason: HOSPADM

## 2024-04-16 RX ORDER — SODIUM CHLORIDE 9 MG/ML
40 INJECTION, SOLUTION INTRAVENOUS AS NEEDED
Status: DISCONTINUED | OUTPATIENT
Start: 2024-04-16 | End: 2024-04-16 | Stop reason: HOSPADM

## 2024-04-16 RX ORDER — ASPIRIN 81 MG/1
81 TABLET ORAL DAILY
Status: DISCONTINUED | OUTPATIENT
Start: 2024-04-17 | End: 2024-04-16 | Stop reason: HOSPADM

## 2024-04-16 RX ORDER — NITROGLYCERIN 0.4 MG/1
0.4 TABLET SUBLINGUAL
Status: DISCONTINUED | OUTPATIENT
Start: 2024-04-16 | End: 2024-04-16 | Stop reason: HOSPADM

## 2024-04-16 RX ORDER — AZELASTINE 1 MG/ML
2 SPRAY, METERED NASAL 2 TIMES DAILY
COMMUNITY

## 2024-04-16 RX ORDER — ASPIRIN 81 MG/1
324 TABLET, CHEWABLE ORAL ONCE
Status: COMPLETED | OUTPATIENT
Start: 2024-04-16 | End: 2024-04-16

## 2024-04-16 RX ADMIN — ASPIRIN 324 MG: 81 TABLET, CHEWABLE ORAL at 08:29

## 2024-04-16 RX ADMIN — SODIUM CHLORIDE 253.2 ML: 9 INJECTION, SOLUTION INTRAVENOUS at 08:30

## 2024-04-16 NOTE — PROGRESS NOTES
I visited this patient and his wife to assist in the completion of advance directives. A completed Living Will is complete and filed appropriately, and the original plus two copies have been provided to the patient.

## 2024-04-16 NOTE — INTERVAL H&P NOTE
"  H&P reviewed. The patient was examined and there are no changes to the H&P.      Vitals and Labs today:  Vitals:    04/16/24 0756 04/16/24 0804 04/16/24 0810   BP:  161/93 148/97   BP Location:  Left arm    Patient Position:  Lying    Pulse:  65 63   Resp:  18    Temp:  97 °F (36.1 °C)    TempSrc:  Temporal    SpO2:  96% 99%   Weight: 81.3 kg (179 lb 3.2 oz)     Height: 182.9 cm (72\")         Lab Results   Component Value Date    WBC 8.83 04/16/2024    HGB 15.6 04/16/2024    HCT 46 04/16/2024    MCV 86.8 04/16/2024     04/16/2024     Lab Results   Component Value Date    GLUCOSE 143 (H) 10/04/2023    BUN 7 (L) 10/04/2023    CREATININE 0.90 04/16/2024    EGFR 90.7 04/16/2024    BCR 7.0 10/04/2023    K 3.6 10/04/2023    CO2 27.0 10/04/2023    CALCIUM 10.0 10/04/2023    ALBUMIN 4.7 10/04/2023    BILITOT 0.6 10/04/2023    AST 24 10/04/2023    ALT 17 10/04/2023     Lab Results   Component Value Date    HGBA1C 8.2 10/04/2023     Lab Results   Component Value Date    CHOL 188 10/04/2023    TRIG 240 (H) 10/04/2023    HDL 43 10/04/2023     (H) 10/04/2023     Attending addendum:  I met with the patient and showed him pictures of his previous coronary angiogram.  He has severe tortuosity of all vessels.  He reports occasional chest pain, somewhat atypical.  I explained the difficult nature of percutaneous intervention of the circumflex due to bifurcation and tortuosity.  Offered a stress/pet scan to assess for ischemia prior to undertaking complex intervention, patient was agreeable with this plan and wants to hold off on percutaneous coronary mention.  Therefore, I ordered a stress/pet scan which will be performed today prior to discharge.  Also discussed with patient's primary cardiologist Dr. Weems who agrees.    Bin Rey MD, FACC, [facility]  Interventional Cardiology  04/16/24  10:14 EDT        "

## 2024-04-17 ENCOUNTER — TELEPHONE (OUTPATIENT)
Dept: CARDIOLOGY | Facility: CLINIC | Age: 74
End: 2024-04-17
Payer: MEDICARE

## 2024-04-17 RX ORDER — PANTOPRAZOLE SODIUM 40 MG/1
40 TABLET, DELAYED RELEASE ORAL DAILY
Start: 2024-04-17

## 2024-04-17 RX ORDER — ROSUVASTATIN CALCIUM 40 MG/1
40 TABLET, COATED ORAL DAILY
Start: 2024-04-17

## 2024-04-17 NOTE — TELEPHONE ENCOUNTER
Yes lets plan on him switching to rosuvastatin and pantoprazole now.  I will follow-up on his scheduled stress test and review the results with him.

## 2024-04-17 NOTE — TELEPHONE ENCOUNTER
Patient called to verify medications. Dr. Weems changed pravastatin to rosuvastatin and omeprazole to pantoprazole at LOV 4/9/24. Medications were switched back 4/16/24 when patient came in for LHC. LHC cancelled and pt scheduled for stress test 4/29/24.      Patient states that he wasn't supposed to change medication after LHC.     There is no note to reference from 4/16/24 when meds were changed on med list.       Do you want patient to switch pravastatin to Crestor now? Change omeprazole to pantoprazole now?    Please advise.

## 2024-04-22 ENCOUNTER — TELEPHONE (OUTPATIENT)
Dept: CARDIOLOGY | Facility: CLINIC | Age: 74
End: 2024-04-22
Payer: MEDICARE

## 2024-04-22 NOTE — TELEPHONE ENCOUNTER
Patient called and states that he takes Testosterone cypionate 200 tmg/ml shot every 21 days. Pt is not exactly sure of his dose. Pt missed shot last month due to other issues. Patient has been on shot for ten years.       Can patient continue to take these shots?      Please advise.

## 2024-04-25 ENCOUNTER — DOCUMENTATION (OUTPATIENT)
Dept: FAMILY MEDICINE CLINIC | Facility: CLINIC | Age: 74
End: 2024-04-25
Payer: MEDICARE

## 2024-04-25 NOTE — PROGRESS NOTES
Patient came in stating [facility] needed his diabetic forms filled out for his shoes. The forms were scanned into chart, but missing one signature from Dr. Tran. Reprinted, had Dr. Tran sign, faxed over to [facility], and gave the patient a copy to give them as well.

## 2024-04-29 ENCOUNTER — HOSPITAL ENCOUNTER (OUTPATIENT)
Dept: CARDIOLOGY | Facility: HOSPITAL | Age: 74
Discharge: HOME | End: 2024-04-29
Admitting: INTERNAL MEDICINE
Payer: MEDICARE

## 2024-04-29 VITALS — HEIGHT: 72 IN | BODY MASS INDEX: 24.24 KG/M2 | WEIGHT: 179 LBS

## 2024-04-29 DIAGNOSIS — R07.2 PRECORDIAL PAIN: ICD-10-CM

## 2024-04-29 LAB
BH CV REST NUCLEAR ISOTOPE DOSE: 29.9 MCI
BH CV STRESS BP STAGE 2: NORMAL
BH CV STRESS BP STAGE 4: NORMAL
BH CV STRESS COMMENTS STAGE 1: NORMAL
BH CV STRESS DOSE REGADENOSON STAGE 1: 0.4
BH CV STRESS DURATION MIN STAGE 1: 1
BH CV STRESS DURATION MIN STAGE 2: 1
BH CV STRESS DURATION MIN STAGE 3: 1
BH CV STRESS DURATION MIN STAGE 4: 1
BH CV STRESS DURATION SEC STAGE 1: 10
BH CV STRESS DURATION SEC STAGE 2: 0
BH CV STRESS DURATION SEC STAGE 3: 0
BH CV STRESS DURATION SEC STAGE 4: 0
BH CV STRESS HR STAGE 1: 87
BH CV STRESS HR STAGE 2: 89
BH CV STRESS HR STAGE 3: 84
BH CV STRESS HR STAGE 4: 86
BH CV STRESS NUCLEAR ISOTOPE DOSE: 29.9 MCI
BH CV STRESS O2 STAGE 1: 97
BH CV STRESS O2 STAGE 2: 99
BH CV STRESS O2 STAGE 3: 98
BH CV STRESS O2 STAGE 4: 98
BH CV STRESS PROTOCOL 1: NORMAL
BH CV STRESS RECOVERY BP: NORMAL MMHG
BH CV STRESS RECOVERY HR: 79 BPM
BH CV STRESS RECOVERY O2: 95 %
BH CV STRESS STAGE 1: 1
BH CV STRESS STAGE 2: 2
BH CV STRESS STAGE 3: 3
BH CV STRESS STAGE 4: 4
MAXIMAL PREDICTED HEART RATE: 148 BPM
PERCENT MAX PREDICTED HR: 60.81 %
STRESS BASELINE BP: NORMAL MMHG
STRESS BASELINE HR: 87 BPM
STRESS O2 SAT REST: 95 %
STRESS PERCENT HR: 72 %
STRESS POST ESTIMATED WORKLOAD: 1 METS
STRESS POST EXERCISE DUR MIN: 4 MIN
STRESS POST EXERCISE DUR SEC: 0 SEC
STRESS POST O2 SAT PEAK: 99 %
STRESS POST PEAK BP: NORMAL MMHG
STRESS POST PEAK HR: 90 BPM
STRESS TARGET HR: 126 BPM

## 2024-04-29 PROCEDURE — 78431 MYOCRD IMG PET RST&STRS CT: CPT

## 2024-04-29 PROCEDURE — A9555 RB82 RUBIDIUM: HCPCS | Performed by: INTERNAL MEDICINE

## 2024-04-29 PROCEDURE — 0 RUBIDIUM CHLORIDE: Performed by: INTERNAL MEDICINE

## 2024-04-29 PROCEDURE — 25010000002 REGADENOSON 0.4 MG/5ML SOLUTION: Performed by: INTERNAL MEDICINE

## 2024-04-29 PROCEDURE — 93017 CV STRESS TEST TRACING ONLY: CPT

## 2024-04-29 PROCEDURE — 93018 CV STRESS TEST I&R ONLY: CPT | Performed by: INTERNAL MEDICINE

## 2024-04-29 PROCEDURE — 78431 MYOCRD IMG PET RST&STRS CT: CPT | Performed by: INTERNAL MEDICINE

## 2024-04-29 RX ORDER — REGADENOSON 0.08 MG/ML
0.4 INJECTION, SOLUTION INTRAVENOUS ONCE
Status: COMPLETED | OUTPATIENT
Start: 2024-04-29 | End: 2024-04-29

## 2024-04-29 RX ADMIN — RUBIDIUM CHLORIDE RB-82 1 DOSE: 150 INJECTION, SOLUTION INTRAVENOUS at 12:01

## 2024-04-29 RX ADMIN — RUBIDIUM CHLORIDE RB-82 1 DOSE: 150 INJECTION, SOLUTION INTRAVENOUS at 12:12

## 2024-04-29 RX ADMIN — REGADENOSON 0.4 MG: 0.08 INJECTION, SOLUTION INTRAVENOUS at 12:00

## 2024-05-02 ENCOUNTER — TELEPHONE (OUTPATIENT)
Dept: CARDIOLOGY | Facility: CLINIC | Age: 74
End: 2024-05-02
Payer: MEDICARE

## 2024-05-02 NOTE — TELEPHONE ENCOUNTER
Patient called for stress test results, and to know if he could resume testosterone injections.     Please advise.

## 2024-05-03 NOTE — TELEPHONE ENCOUNTER
Can we let patient know I apologize I think his results initially went to another doctor's box      His stress test was normal.  Normal blood flow to the heart so I think we can continue to hold off on left heart catheterization.  He can resume his previous testosterone injections.

## 2024-05-03 NOTE — TELEPHONE ENCOUNTER
Patient called back, discussed NSK recommendations above. Pt verbalizes understanding and agreeable to plan.

## 2024-05-10 ENCOUNTER — TELEPHONE (OUTPATIENT)
Dept: FAMILY MEDICINE CLINIC | Facility: CLINIC | Age: 74
End: 2024-05-10
Payer: MEDICARE

## 2024-05-10 NOTE — TELEPHONE ENCOUNTER
Caller: [facility]    Relationship to patient:     Best call back number: [phone]    Patient is needing: A FEW WEEKS AGO THEY FAXED OVER SOME PAPERWORK REQUESTING NOTES FROM LAST OFFICE VISIT. THE ONLY THING THEY NEED IS DR BERNABE'S SIGNATURE. IT CAN BE ELECTRONICALLY SIGNED OR FAXED BACK.

## 2024-05-14 NOTE — TELEPHONE ENCOUNTER
CALLED BACK AND ADVSD [facility]. MISBAH SAID THEY ARE AWARE OF ISSUE AND OF THE ISSUE.

## 2024-05-26 DIAGNOSIS — I10 PRIMARY HYPERTENSION: ICD-10-CM

## 2024-05-28 RX ORDER — METOPROLOL TARTRATE 50 MG/1
50 TABLET, FILM COATED ORAL 2 TIMES DAILY
Qty: 180 TABLET | Refills: 0 | Status: SHIPPED | OUTPATIENT
Start: 2024-05-28

## 2024-05-28 NOTE — TELEPHONE ENCOUNTER
Rx Refill Note  Requested Prescriptions     Pending Prescriptions Disp Refills    metoprolol tartrate (LOPRESSOR) 50 MG tablet [Pharmacy Med Name: METOPROLOL TARTRATE 50 MG TAB] 180 tablet 0     Sig: TAKE 1 TABLET BY MOUTH TWICE A DAY      Last office visit with prescribing clinician: 4/4/2024   Last telemedicine visit with prescribing clinician: Visit date not found   Next office visit with prescribing clinician: Visit date not found                         Would you like a call back once the refill request has been completed: [] Yes [] No    If the office needs to give you a call back, can they leave a voicemail: [] Yes [] No    Macey Melendez MA  05/28/24, 08:34 EDT

## 2024-05-29 ENCOUNTER — TELEPHONE (OUTPATIENT)
Dept: FAMILY MEDICINE CLINIC | Facility: CLINIC | Age: 74
End: 2024-05-29
Payer: MEDICARE

## 2024-05-31 DIAGNOSIS — E34.9 TESTOSTERONE DEFICIENCY: ICD-10-CM

## 2024-05-31 RX ORDER — TESTOSTERONE CYPIONATE 200 MG/ML
INJECTION, SOLUTION INTRAMUSCULAR
Qty: 6 ML | OUTPATIENT
Start: 2024-05-31

## 2024-06-05 NOTE — TELEPHONE ENCOUNTER
Spoke with Carlie with [facility] - she explained that they received everything and no further forms were needed.

## 2024-06-11 DIAGNOSIS — E34.9 TESTOSTERONE DEFICIENCY: Primary | ICD-10-CM

## 2024-06-11 RX ORDER — TESTOSTERONE ENANTHATE 200 MG/ML
200 INJECTION, SOLUTION INTRAMUSCULAR
Qty: 5 ML | Refills: 1 | Status: SHIPPED | OUTPATIENT
Start: 2024-06-11

## 2024-06-12 ENCOUNTER — PRIOR AUTHORIZATION (OUTPATIENT)
Dept: FAMILY MEDICINE CLINIC | Facility: CLINIC | Age: 74
End: 2024-06-12
Payer: MEDICARE

## 2024-06-12 NOTE — TELEPHONE ENCOUNTER
Key: [id number]  Testosterone Enanthate 200MG/ML solution  Status: PA Request  Created: June 12th, 2024 [phone]  Sent: June 12th, 2024    Approved today  PA Case: [id number], Status: Approved, Coverage Starts on: 3/13/2024 12:00:00 AM, Coverage Ends on: 6/12/2025 12:00:00 AM.

## 2024-06-18 ENCOUNTER — TELEPHONE (OUTPATIENT)
Dept: FAMILY MEDICINE CLINIC | Facility: CLINIC | Age: 74
End: 2024-06-18

## 2024-06-18 NOTE — TELEPHONE ENCOUNTER
Sw patient, he stated that his is in a lot of pain. He is having pain in his joints (ankle, knuckles, wrist, toes, neck, collar bone and mainly his hands).     Patient states that his medication has not been working and that the pain levels are off the charts. Pt also stated the pain in his fingers feels like someone is sticking a hot windy in his finger.

## 2024-06-19 ENCOUNTER — LAB (OUTPATIENT)
Dept: LAB | Facility: HOSPITAL | Age: 74
End: 2024-06-19
Payer: MEDICARE

## 2024-06-19 ENCOUNTER — OFFICE VISIT (OUTPATIENT)
Dept: FAMILY MEDICINE CLINIC | Facility: CLINIC | Age: 74
End: 2024-06-19
Payer: MEDICARE

## 2024-06-19 VITALS
HEART RATE: 90 BPM | SYSTOLIC BLOOD PRESSURE: 134 MMHG | OXYGEN SATURATION: 96 % | WEIGHT: 185.9 LBS | HEIGHT: 72 IN | DIASTOLIC BLOOD PRESSURE: 74 MMHG | BODY MASS INDEX: 25.18 KG/M2

## 2024-06-19 DIAGNOSIS — M79.10 MYALGIA: ICD-10-CM

## 2024-06-19 DIAGNOSIS — M19.041 PRIMARY OSTEOARTHRITIS OF BOTH HANDS: ICD-10-CM

## 2024-06-19 DIAGNOSIS — M25.50 POLYARTHRALGIA: ICD-10-CM

## 2024-06-19 DIAGNOSIS — R61 NIGHT SWEATS: ICD-10-CM

## 2024-06-19 DIAGNOSIS — L98.9 SKIN LESION: ICD-10-CM

## 2024-06-19 DIAGNOSIS — E55.9 VITAMIN D DEFICIENCY: ICD-10-CM

## 2024-06-19 DIAGNOSIS — R52 GENERALIZED PAIN: Primary | ICD-10-CM

## 2024-06-19 DIAGNOSIS — M19.042 PRIMARY OSTEOARTHRITIS OF BOTH HANDS: ICD-10-CM

## 2024-06-19 DIAGNOSIS — R52 GENERALIZED PAIN: ICD-10-CM

## 2024-06-19 PROCEDURE — 86038 ANTINUCLEAR ANTIBODIES: CPT

## 2024-06-19 PROCEDURE — 86140 C-REACTIVE PROTEIN: CPT

## 2024-06-19 PROCEDURE — 85025 COMPLETE CBC W/AUTO DIFF WBC: CPT

## 2024-06-19 PROCEDURE — 99214 OFFICE O/P EST MOD 30 MIN: CPT | Performed by: PHYSICIAN ASSISTANT

## 2024-06-19 PROCEDURE — 82607 VITAMIN B-12: CPT

## 2024-06-19 PROCEDURE — 3075F SYST BP GE 130 - 139MM HG: CPT | Performed by: PHYSICIAN ASSISTANT

## 2024-06-19 PROCEDURE — 82306 VITAMIN D 25 HYDROXY: CPT

## 2024-06-19 PROCEDURE — 82550 ASSAY OF CK (CPK): CPT

## 2024-06-19 PROCEDURE — 84443 ASSAY THYROID STIM HORMONE: CPT

## 2024-06-19 PROCEDURE — 3078F DIAST BP <80 MM HG: CPT | Performed by: PHYSICIAN ASSISTANT

## 2024-06-19 PROCEDURE — 80053 COMPREHEN METABOLIC PANEL: CPT

## 2024-06-19 PROCEDURE — 3044F HG A1C LEVEL LT 7.0%: CPT | Performed by: PHYSICIAN ASSISTANT

## 2024-06-19 PROCEDURE — 85652 RBC SED RATE AUTOMATED: CPT

## 2024-06-19 PROCEDURE — 1125F AMNT PAIN NOTED PAIN PRSNT: CPT | Performed by: PHYSICIAN ASSISTANT

## 2024-06-19 PROCEDURE — 86431 RHEUMATOID FACTOR QUANT: CPT

## 2024-06-19 PROCEDURE — 84550 ASSAY OF BLOOD/URIC ACID: CPT

## 2024-06-20 ENCOUNTER — TELEPHONE (OUTPATIENT)
Dept: FAMILY MEDICINE CLINIC | Facility: CLINIC | Age: 74
End: 2024-06-20
Payer: MEDICARE

## 2024-06-20 DIAGNOSIS — E34.9 TESTOSTERONE DEFICIENCY: ICD-10-CM

## 2024-06-20 LAB
25(OH)D3 SERPL-MCNC: 35.5 NG/ML (ref 30–100)
ALBUMIN SERPL-MCNC: 4.4 G/DL (ref 3.5–5.2)
ALBUMIN/GLOB SERPL: 2.2 G/DL
ALP SERPL-CCNC: 46 U/L (ref 39–117)
ALT SERPL W P-5'-P-CCNC: 15 U/L (ref 1–41)
ANION GAP SERPL CALCULATED.3IONS-SCNC: 8.7 MMOL/L (ref 5–15)
AST SERPL-CCNC: 19 U/L (ref 1–40)
BASOPHILS # BLD AUTO: 0.06 10*3/MM3 (ref 0–0.2)
BASOPHILS NFR BLD AUTO: 0.8 % (ref 0–1.5)
BILIRUB SERPL-MCNC: 0.6 MG/DL (ref 0–1.2)
BUN SERPL-MCNC: 6 MG/DL (ref 8–23)
BUN/CREAT SERPL: 6 (ref 7–25)
CALCIUM SPEC-SCNC: 8.8 MG/DL (ref 8.6–10.5)
CHLORIDE SERPL-SCNC: 99 MMOL/L (ref 98–107)
CHROMATIN AB SERPL-ACNC: <10 IU/ML (ref 0–14)
CK SERPL-CCNC: 116 U/L (ref 20–200)
CO2 SERPL-SCNC: 26.3 MMOL/L (ref 22–29)
CREAT SERPL-MCNC: 1 MG/DL (ref 0.76–1.27)
CRP SERPL-MCNC: 0.85 MG/DL (ref 0–0.5)
DEPRECATED RDW RBC AUTO: 42.5 FL (ref 37–54)
EGFRCR SERPLBLD CKD-EPI 2021: 80 ML/MIN/1.73
EOSINOPHIL # BLD AUTO: 0.27 10*3/MM3 (ref 0–0.4)
EOSINOPHIL NFR BLD AUTO: 3.8 % (ref 0.3–6.2)
ERYTHROCYTE [DISTWIDTH] IN BLOOD BY AUTOMATED COUNT: 13.4 % (ref 12.3–15.4)
ERYTHROCYTE [SEDIMENTATION RATE] IN BLOOD: 4 MM/HR (ref 0–20)
GLOBULIN UR ELPH-MCNC: 2 GM/DL
GLUCOSE SERPL-MCNC: 120 MG/DL (ref 65–99)
HCT VFR BLD AUTO: 40.3 % (ref 37.5–51)
HGB BLD-MCNC: 13.7 G/DL (ref 13–17.7)
IMM GRANULOCYTES # BLD AUTO: 0.01 10*3/MM3 (ref 0–0.05)
IMM GRANULOCYTES NFR BLD AUTO: 0.1 % (ref 0–0.5)
LYMPHOCYTES # BLD AUTO: 2.29 10*3/MM3 (ref 0.7–3.1)
LYMPHOCYTES NFR BLD AUTO: 31.9 % (ref 19.6–45.3)
MCH RBC QN AUTO: 29.8 PG (ref 26.6–33)
MCHC RBC AUTO-ENTMCNC: 34 G/DL (ref 31.5–35.7)
MCV RBC AUTO: 87.6 FL (ref 79–97)
MONOCYTES # BLD AUTO: 0.74 10*3/MM3 (ref 0.1–0.9)
MONOCYTES NFR BLD AUTO: 10.3 % (ref 5–12)
NEUTROPHILS NFR BLD AUTO: 3.8 10*3/MM3 (ref 1.7–7)
NEUTROPHILS NFR BLD AUTO: 53.1 % (ref 42.7–76)
NRBC BLD AUTO-RTO: 0 /100 WBC (ref 0–0.2)
PLATELET # BLD AUTO: 183 10*3/MM3 (ref 140–450)
PMV BLD AUTO: 11.4 FL (ref 6–12)
POTASSIUM SERPL-SCNC: 4.2 MMOL/L (ref 3.5–5.2)
PROT SERPL-MCNC: 6.4 G/DL (ref 6–8.5)
RBC # BLD AUTO: 4.6 10*6/MM3 (ref 4.14–5.8)
SODIUM SERPL-SCNC: 134 MMOL/L (ref 136–145)
TSH SERPL DL<=0.05 MIU/L-ACNC: 1.22 UIU/ML (ref 0.27–4.2)
URATE SERPL-MCNC: 3.9 MG/DL (ref 3.4–7)
VIT B12 BLD-MCNC: 580 PG/ML (ref 211–946)
WBC NRBC COR # BLD AUTO: 7.17 10*3/MM3 (ref 3.4–10.8)

## 2024-06-20 NOTE — TELEPHONE ENCOUNTER
Caller: Behzad Guzman Nicola    Relationship: Self    Best call back number:  [phone] (Mobile)     Requested Prescriptions:   Requested Prescriptions      No prescriptions requested or ordered in this encounter    Testosterone Enanthate 200 MG/ML solution   REQUESTING A PRESCRIPTION FOR ONE     Pharm[facility] where [facility] be sent:  [facility]     Last office visit with prescribing clinician: 4/4/2024   Last telemedicine visit with prescribing clinician: Visit date not found   Next office visit with prescribing clinician: 6/28/2024     Additional details provided by patient:  ALSO PATIENT IS REQUESTING DR BERNABE'S NOTES SENT TO   DR [facility]         Would you like a call back once the refill request has been completed: [] Yes [x] No    If the office needs to give you a call back, can they leave a voicemail: [] Yes [x] No

## 2024-06-20 NOTE — TELEPHONE ENCOUNTER
Caller: Talia Lopez    Relationship: Self    Best call back number: [phone] (Mobile)     What form or medical record are you requesting: SUMMARY OF THE VISIT FROM 6-19-24 WITH TANJA VARGAS      Who is requesting this form or medical record from you:  PATIENT     How would you like to receive the form or medical records (pick-up, mail, fax):   MAIL TO   TALIA LOPEZ   [address] [place] [phone]

## 2024-06-21 LAB — ANA SER QL: NEGATIVE

## 2024-06-21 RX ORDER — TESTOSTERONE ENANTHATE 200 MG/ML
200 INJECTION, SOLUTION INTRAMUSCULAR
Qty: 5 ML | Refills: 1 | OUTPATIENT
Start: 2024-06-21

## 2024-06-21 NOTE — TELEPHONE ENCOUNTER
Failed protocol:  Controlled Substance Med Protocol [id number]/2024 04:20 PM   Protocol Details Urine Toxicology Performed in Last 12 Months    No Benzodiazepines on Active Med List    Recent Appt with me in Past 3 Months    Controlled Substance Agreement is on file    Medication not refilled in past 28 days     Rx Refill Note  Requested Prescriptions     Pending Prescriptions Disp Refills    Testosterone Enanthate 200 MG/ML solution 5 mL 1     Sig: Inject 200 mg into the appropriate muscle as directed by prescriber Every 14 (Fourteen) Days.      Last office visit with prescribing clinician: 4/4/2024   Last telemedicine visit with prescribing clinician: Visit date not found   Next office visit with prescribing clinician: 6/20/2024                         Would you like a call back once the refill request has been completed: [] Yes [] No    If the office needs to give you a call back, can they leave a voicemail: [] Yes [] No    Imer Luna, PCT  06/21/24, 02:17 EDT

## 2024-06-23 ENCOUNTER — APPOINTMENT (OUTPATIENT)
Dept: GENERAL RADIOLOGY | Facility: HOSPITAL | Age: 74
End: 2024-06-23
Payer: MEDICARE

## 2024-06-23 ENCOUNTER — APPOINTMENT (OUTPATIENT)
Dept: CT IMAGING | Facility: HOSPITAL | Age: 74
End: 2024-06-23
Payer: MEDICARE

## 2024-06-23 ENCOUNTER — HOSPITAL ENCOUNTER (INPATIENT)
Facility: HOSPITAL | Age: 74
LOS: 4 days | Discharge: HOME HEALTH | End: 2024-06-27
Attending: EMERGENCY MEDICINE | Admitting: HOSPITALIST
Payer: MEDICARE

## 2024-06-23 ENCOUNTER — ANESTHESIA EVENT (OUTPATIENT)
Dept: GASTROENTEROLOGY | Facility: HOSPITAL | Age: 74
End: 2024-06-23
Payer: MEDICARE

## 2024-06-23 ENCOUNTER — ANESTHESIA (OUTPATIENT)
Dept: GASTROENTEROLOGY | Facility: HOSPITAL | Age: 74
End: 2024-06-23
Payer: MEDICARE

## 2024-06-23 DIAGNOSIS — K22.89 MUCOSAL ABNORMALITY OF ESOPHAGUS: ICD-10-CM

## 2024-06-23 DIAGNOSIS — G89.29 CHRONIC PAIN IN LEFT SHOULDER: ICD-10-CM

## 2024-06-23 DIAGNOSIS — M21.70 LEG LENGTH DISCREPANCY: ICD-10-CM

## 2024-06-23 DIAGNOSIS — G89.29 NECK PAIN, CHRONIC: ICD-10-CM

## 2024-06-23 DIAGNOSIS — E04.2 MULTIPLE THYROID NODULES: ICD-10-CM

## 2024-06-23 DIAGNOSIS — M54.2 NECK PAIN, CHRONIC: ICD-10-CM

## 2024-06-23 DIAGNOSIS — G56.03 BILATERAL CARPAL TUNNEL SYNDROME: ICD-10-CM

## 2024-06-23 DIAGNOSIS — F41.0 PANIC ATTACKS: ICD-10-CM

## 2024-06-23 DIAGNOSIS — D72.825 BANDEMIA: ICD-10-CM

## 2024-06-23 DIAGNOSIS — T18.2XXA FOREIGN BODY IN STOMACH, INITIAL ENCOUNTER: ICD-10-CM

## 2024-06-23 DIAGNOSIS — R09.81 CONGESTION OF NASAL SINUS: ICD-10-CM

## 2024-06-23 DIAGNOSIS — J96.01 ACUTE RESPIRATORY FAILURE WITH HYPOXIA: ICD-10-CM

## 2024-06-23 DIAGNOSIS — M25.552 CHRONIC HIP PAIN, LEFT: ICD-10-CM

## 2024-06-23 DIAGNOSIS — I25.2 HISTORY OF NON-ST ELEVATION MYOCARDIAL INFARCTION (NSTEMI): ICD-10-CM

## 2024-06-23 DIAGNOSIS — M25.512 CHRONIC PAIN IN LEFT SHOULDER: ICD-10-CM

## 2024-06-23 DIAGNOSIS — R73.03 PRE-DIABETES: ICD-10-CM

## 2024-06-23 DIAGNOSIS — J18.9 PNEUMONIA OF BOTH UPPER LOBES DUE TO INFECTIOUS ORGANISM: Primary | ICD-10-CM

## 2024-06-23 DIAGNOSIS — R41.82 ALTERED MENTAL STATUS, UNSPECIFIED ALTERED MENTAL STATUS TYPE: ICD-10-CM

## 2024-06-23 DIAGNOSIS — G89.29 CHRONIC HIP PAIN, LEFT: ICD-10-CM

## 2024-06-23 DIAGNOSIS — G62.9 NEUROPATHY: ICD-10-CM

## 2024-06-23 PROBLEM — E11.9 T2DM (TYPE 2 DIABETES MELLITUS): Status: ACTIVE | Noted: 2024-06-23
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
## 2024-06-23 PROBLEM — R50.9 FEVER: Status: RESOLVED | Noted: 2024-06-23 | Resolved: 2024-06-23

## 2024-06-23 PROBLEM — R50.9 FEVER: Status: ACTIVE | Noted: 2024-06-23

## 2024-06-23 LAB
ALBUMIN SERPL-MCNC: 4.3 G/DL (ref 3.5–5.2)
ALBUMIN/GLOB SERPL: 1.9 G/DL
ALP SERPL-CCNC: 50 U/L (ref 39–117)
ALT SERPL W P-5'-P-CCNC: 11 U/L (ref 1–41)
ANION GAP SERPL CALCULATED.3IONS-SCNC: 10 MMOL/L (ref 5–15)
AST SERPL-CCNC: 23 U/L (ref 1–40)
B PARAPERT DNA SPEC QL NAA+PROBE: NOT DETECTED
B PERT DNA SPEC QL NAA+PROBE: NOT DETECTED
BASOPHILS # BLD AUTO: 0.03 10*3/MM3 (ref 0–0.2)
BASOPHILS NFR BLD AUTO: 0.2 % (ref 0–1.5)
BILIRUB SERPL-MCNC: 0.8 MG/DL (ref 0–1.2)
BILIRUB UR QL STRIP: NEGATIVE
BUN SERPL-MCNC: 15 MG/DL (ref 8–23)
BUN/CREAT SERPL: 12.1 (ref 7–25)
C PNEUM DNA NPH QL NAA+NON-PROBE: NOT DETECTED
CALCIUM SPEC-SCNC: 9.9 MG/DL (ref 8.6–10.5)
CHLORIDE SERPL-SCNC: 95 MMOL/L (ref 98–107)
CLARITY UR: CLEAR
CO2 SERPL-SCNC: 30 MMOL/L (ref 22–29)
COLOR UR: YELLOW
CREAT SERPL-MCNC: 1.24 MG/DL (ref 0.76–1.27)
D-LACTATE SERPL-SCNC: 1.7 MMOL/L (ref 0.5–2)
DEPRECATED RDW RBC AUTO: 48 FL (ref 37–54)
EGFRCR SERPLBLD CKD-EPI 2021: 61.8 ML/MIN/1.73
EOSINOPHIL # BLD AUTO: 0.02 10*3/MM3 (ref 0–0.4)
EOSINOPHIL NFR BLD AUTO: 0.1 % (ref 0.3–6.2)
ERYTHROCYTE [DISTWIDTH] IN BLOOD BY AUTOMATED COUNT: 14.4 % (ref 12.3–15.4)
FLUAV SUBTYP SPEC NAA+PROBE: NOT DETECTED
FLUBV RNA ISLT QL NAA+PROBE: NOT DETECTED
GLOBULIN UR ELPH-MCNC: 2.3 GM/DL
GLUCOSE BLDC GLUCOMTR-MCNC: 157 MG/DL (ref 70–130)
GLUCOSE BLDC GLUCOMTR-MCNC: 170 MG/DL (ref 70–130)
GLUCOSE SERPL-MCNC: 186 MG/DL (ref 65–99)
GLUCOSE UR STRIP-MCNC: NEGATIVE MG/DL
HADV DNA SPEC NAA+PROBE: NOT DETECTED
HCOV 229E RNA SPEC QL NAA+PROBE: NOT DETECTED
HCOV HKU1 RNA SPEC QL NAA+PROBE: NOT DETECTED
HCOV NL63 RNA SPEC QL NAA+PROBE: NOT DETECTED
HCOV OC43 RNA SPEC QL NAA+PROBE: NOT DETECTED
HCT VFR BLD AUTO: 43.1 % (ref 37.5–51)
HGB BLD-MCNC: 14.3 G/DL (ref 13–17.7)
HGB UR QL STRIP.AUTO: NEGATIVE
HMPV RNA NPH QL NAA+NON-PROBE: NOT DETECTED
HOLD SPECIMEN: NORMAL
HPIV1 RNA ISLT QL NAA+PROBE: NOT DETECTED
HPIV2 RNA SPEC QL NAA+PROBE: NOT DETECTED
HPIV3 RNA NPH QL NAA+PROBE: NOT DETECTED
HPIV4 P GENE NPH QL NAA+PROBE: NOT DETECTED
IMM GRANULOCYTES # BLD AUTO: 0.1 10*3/MM3 (ref 0–0.05)
IMM GRANULOCYTES NFR BLD AUTO: 0.6 % (ref 0–0.5)
KETONES UR QL STRIP: ABNORMAL
LEUKOCYTE ESTERASE UR QL STRIP.AUTO: NEGATIVE
LYMPHOCYTES # BLD AUTO: 0.82 10*3/MM3 (ref 0.7–3.1)
LYMPHOCYTES NFR BLD AUTO: 5.2 % (ref 19.6–45.3)
M PNEUMO IGG SER IA-ACNC: NOT DETECTED
MCH RBC QN AUTO: 29.9 PG (ref 26.6–33)
MCHC RBC AUTO-ENTMCNC: 33.2 G/DL (ref 31.5–35.7)
MCV RBC AUTO: 90 FL (ref 79–97)
MONOCYTES # BLD AUTO: 1.13 10*3/MM3 (ref 0.1–0.9)
MONOCYTES NFR BLD AUTO: 7.2 % (ref 5–12)
NEUTROPHILS NFR BLD AUTO: 13.52 10*3/MM3 (ref 1.7–7)
NEUTROPHILS NFR BLD AUTO: 86.7 % (ref 42.7–76)
NITRITE UR QL STRIP: NEGATIVE
NRBC BLD AUTO-RTO: 0 /100 WBC (ref 0–0.2)
PH UR STRIP.AUTO: 7.5 [PH] (ref 5–8)
PLATELET # BLD AUTO: 192 10*3/MM3 (ref 140–450)
PMV BLD AUTO: 11.1 FL (ref 6–12)
POTASSIUM SERPL-SCNC: 3.6 MMOL/L (ref 3.5–5.2)
PROCALCITONIN SERPL-MCNC: 0.21 NG/ML (ref 0–0.25)
PROT SERPL-MCNC: 6.6 G/DL (ref 6–8.5)
PROT UR QL STRIP: NEGATIVE
RBC # BLD AUTO: 4.79 10*6/MM3 (ref 4.14–5.8)
RHINOVIRUS RNA SPEC NAA+PROBE: NOT DETECTED
RSV RNA NPH QL NAA+NON-PROBE: NOT DETECTED
SARS-COV-2 RNA NPH QL NAA+NON-PROBE: NOT DETECTED
SODIUM SERPL-SCNC: 135 MMOL/L (ref 136–145)
SP GR UR STRIP: 1.02 (ref 1–1.03)
UROBILINOGEN UR QL STRIP: ABNORMAL
WBC NRBC COR # BLD AUTO: 15.62 10*3/MM3 (ref 3.4–10.8)
WHOLE BLOOD HOLD COAG: NORMAL
WHOLE BLOOD HOLD SPECIMEN: NORMAL

## 2024-06-23 PROCEDURE — 71045 X-RAY EXAM CHEST 1 VIEW: CPT

## 2024-06-23 PROCEDURE — 87070 CULTURE OTHR SPECIMN AEROBIC: CPT | Performed by: INTERNAL MEDICINE

## 2024-06-23 PROCEDURE — 84145 PROCALCITONIN (PCT): CPT | Performed by: EMERGENCY MEDICINE

## 2024-06-23 PROCEDURE — 63710000001 INSULIN LISPRO (HUMAN) PER 5 UNITS: Performed by: INTERNAL MEDICINE

## 2024-06-23 PROCEDURE — 76000 FLUOROSCOPY <1 HR PHYS/QHP: CPT

## 2024-06-23 PROCEDURE — 83655 ASSAY OF LEAD: CPT | Performed by: INTERNAL MEDICINE

## 2024-06-23 PROCEDURE — 99223 1ST HOSP IP/OBS HIGH 75: CPT | Performed by: INTERNAL MEDICINE

## 2024-06-23 PROCEDURE — 83605 ASSAY OF LACTIC ACID: CPT | Performed by: EMERGENCY MEDICINE

## 2024-06-23 PROCEDURE — 94640 AIRWAY INHALATION TREATMENT: CPT

## 2024-06-23 PROCEDURE — 71250 CT THORAX DX C-: CPT

## 2024-06-23 PROCEDURE — 80053 COMPREHEN METABOLIC PANEL: CPT | Performed by: EMERGENCY MEDICINE

## 2024-06-23 PROCEDURE — 70450 CT HEAD/BRAIN W/O DYE: CPT

## 2024-06-23 PROCEDURE — 74018 RADEX ABDOMEN 1 VIEW: CPT

## 2024-06-23 PROCEDURE — 25810000003 SEPSIS FLUID NS 0.9 % SOLUTION: Performed by: EMERGENCY MEDICINE

## 2024-06-23 PROCEDURE — 82948 REAGENT STRIP/BLOOD GLUCOSE: CPT

## 2024-06-23 PROCEDURE — 25010000002 PROPOFOL 10 MG/ML EMULSION

## 2024-06-23 PROCEDURE — 25010000002 FENTANYL CITRATE (PF) 100 MCG/2ML SOLUTION

## 2024-06-23 PROCEDURE — 99223 1ST HOSP IP/OBS HIGH 75: CPT | Performed by: NURSE PRACTITIONER

## 2024-06-23 PROCEDURE — 88305 TISSUE EXAM BY PATHOLOGIST: CPT | Performed by: INTERNAL MEDICINE

## 2024-06-23 PROCEDURE — 25010000002 KETOROLAC TROMETHAMINE PER 15 MG: Performed by: EMERGENCY MEDICINE

## 2024-06-23 PROCEDURE — 87205 SMEAR GRAM STAIN: CPT | Performed by: INTERNAL MEDICINE

## 2024-06-23 PROCEDURE — 85025 COMPLETE CBC W/AUTO DIFF WBC: CPT | Performed by: EMERGENCY MEDICINE

## 2024-06-23 PROCEDURE — 36415 COLL VENOUS BLD VENIPUNCTURE: CPT

## 2024-06-23 PROCEDURE — 87449 NOS EACH ORGANISM AG IA: CPT | Performed by: INTERNAL MEDICINE

## 2024-06-23 PROCEDURE — 83825 ASSAY OF MERCURY: CPT | Performed by: INTERNAL MEDICINE

## 2024-06-23 PROCEDURE — 25010000002 VANCOMYCIN HCL IN NACL 1.75-0.9 GM/500ML-% SOLUTION: Performed by: EMERGENCY MEDICINE

## 2024-06-23 PROCEDURE — P9612 CATHETERIZE FOR URINE SPEC: HCPCS

## 2024-06-23 PROCEDURE — 25010000002 CEFTRIAXONE PER 250 MG: Performed by: EMERGENCY MEDICINE

## 2024-06-23 PROCEDURE — 94799 UNLISTED PULMONARY SVC/PX: CPT

## 2024-06-23 PROCEDURE — 81003 URINALYSIS AUTO W/O SCOPE: CPT | Performed by: EMERGENCY MEDICINE

## 2024-06-23 PROCEDURE — 43239 EGD BIOPSY SINGLE/MULTIPLE: CPT | Performed by: INTERNAL MEDICINE

## 2024-06-23 PROCEDURE — 25810000003 LACTATED RINGERS PER 1000 ML

## 2024-06-23 PROCEDURE — 99285 EMERGENCY DEPT VISIT HI MDM: CPT

## 2024-06-23 PROCEDURE — 0DB58ZX EXCISION OF ESOPHAGUS, VIA NATURAL OR ARTIFICIAL OPENING ENDOSCOPIC, DIAGNOSTIC: ICD-10-PCS | Performed by: INTERNAL MEDICINE

## 2024-06-23 PROCEDURE — 87040 BLOOD CULTURE FOR BACTERIA: CPT | Performed by: EMERGENCY MEDICINE

## 2024-06-23 PROCEDURE — 76000 FLUOROSCOPY <1 HR PHYS/QHP: CPT | Performed by: INTERNAL MEDICINE

## 2024-06-23 PROCEDURE — 0202U NFCT DS 22 TRGT SARS-COV-2: CPT | Performed by: EMERGENCY MEDICINE

## 2024-06-23 RX ORDER — LIDOCAINE HYDROCHLORIDE 10 MG/ML
0.5 INJECTION, SOLUTION EPIDURAL; INFILTRATION; INTRACAUDAL; PERINEURAL ONCE AS NEEDED
Status: DISCONTINUED | OUTPATIENT
Start: 2024-06-23 | End: 2024-06-23 | Stop reason: HOSPADM

## 2024-06-23 RX ORDER — ROSUVASTATIN CALCIUM 20 MG/1
40 TABLET, COATED ORAL NIGHTLY
Status: DISCONTINUED | OUTPATIENT
Start: 2024-06-23 | End: 2024-06-27 | Stop reason: HOSPADM

## 2024-06-23 RX ORDER — AZITHROMYCIN 250 MG/1
500 TABLET, FILM COATED ORAL ONCE
Status: COMPLETED | OUTPATIENT
Start: 2024-06-23 | End: 2024-06-23

## 2024-06-23 RX ORDER — SUCCINYLCHOLINE/SOD CL,ISO/PF 200MG/10ML
SYRINGE (ML) INTRAVENOUS AS NEEDED
Status: DISCONTINUED | OUTPATIENT
Start: 2024-06-23 | End: 2024-06-23 | Stop reason: SURG

## 2024-06-23 RX ORDER — PANTOPRAZOLE SODIUM 40 MG/1
40 TABLET, DELAYED RELEASE ORAL
Status: DISCONTINUED | OUTPATIENT
Start: 2024-06-23 | End: 2024-06-27 | Stop reason: HOSPADM

## 2024-06-23 RX ORDER — SODIUM CHLORIDE 0.9 % (FLUSH) 0.9 %
10 SYRINGE (ML) INJECTION EVERY 12 HOURS SCHEDULED
Status: DISCONTINUED | OUTPATIENT
Start: 2024-06-23 | End: 2024-06-23 | Stop reason: HOSPADM

## 2024-06-23 RX ORDER — SODIUM CHLORIDE, SODIUM LACTATE, POTASSIUM CHLORIDE, CALCIUM CHLORIDE 600; 310; 30; 20 MG/100ML; MG/100ML; MG/100ML; MG/100ML
INJECTION, SOLUTION INTRAVENOUS CONTINUOUS PRN
Status: DISCONTINUED | OUTPATIENT
Start: 2024-06-23 | End: 2024-06-23 | Stop reason: SURG

## 2024-06-23 RX ORDER — AZITHROMYCIN 250 MG/1
250 TABLET, FILM COATED ORAL
Status: COMPLETED | OUTPATIENT
Start: 2024-06-24 | End: 2024-06-27

## 2024-06-23 RX ORDER — SODIUM CHLORIDE 0.9 % (FLUSH) 0.9 %
10 SYRINGE (ML) INJECTION AS NEEDED
Status: DISCONTINUED | OUTPATIENT
Start: 2024-06-23 | End: 2024-06-27 | Stop reason: HOSPADM

## 2024-06-23 RX ORDER — HYDROCHLOROTHIAZIDE 25 MG/1
12.5 TABLET ORAL
Status: DISCONTINUED | OUTPATIENT
Start: 2024-06-23 | End: 2024-06-27 | Stop reason: HOSPADM

## 2024-06-23 RX ORDER — KETOROLAC TROMETHAMINE 30 MG/ML
30 INJECTION, SOLUTION INTRAMUSCULAR; INTRAVENOUS ONCE
Status: DISCONTINUED | OUTPATIENT
Start: 2024-06-23 | End: 2024-06-23

## 2024-06-23 RX ORDER — NICOTINE POLACRILEX 4 MG
15 LOZENGE BUCCAL
Status: DISCONTINUED | OUTPATIENT
Start: 2024-06-23 | End: 2024-06-27 | Stop reason: HOSPADM

## 2024-06-23 RX ORDER — FENTANYL CITRATE 50 UG/ML
INJECTION, SOLUTION INTRAMUSCULAR; INTRAVENOUS AS NEEDED
Status: DISCONTINUED | OUTPATIENT
Start: 2024-06-23 | End: 2024-06-23 | Stop reason: SURG

## 2024-06-23 RX ORDER — FAMOTIDINE 20 MG/1
20 TABLET, FILM COATED ORAL ONCE
Status: DISCONTINUED | OUTPATIENT
Start: 2024-06-23 | End: 2024-06-23 | Stop reason: HOSPADM

## 2024-06-23 RX ORDER — SODIUM CHLORIDE, SODIUM LACTATE, POTASSIUM CHLORIDE, CALCIUM CHLORIDE 600; 310; 30; 20 MG/100ML; MG/100ML; MG/100ML; MG/100ML
30 INJECTION, SOLUTION INTRAVENOUS CONTINUOUS PRN
Status: DISCONTINUED | OUTPATIENT
Start: 2024-06-23 | End: 2024-06-27 | Stop reason: HOSPADM

## 2024-06-23 RX ORDER — VANCOMYCIN 1.75 GRAM/500 ML IN 0.9 % SODIUM CHLORIDE INTRAVENOUS
20 ONCE
Status: COMPLETED | OUTPATIENT
Start: 2024-06-23 | End: 2024-06-23

## 2024-06-23 RX ORDER — SODIUM CHLORIDE, SODIUM LACTATE, POTASSIUM CHLORIDE, CALCIUM CHLORIDE 600; 310; 30; 20 MG/100ML; MG/100ML; MG/100ML; MG/100ML
9 INJECTION, SOLUTION INTRAVENOUS CONTINUOUS
Status: DISCONTINUED | OUTPATIENT
Start: 2024-06-23 | End: 2024-06-27 | Stop reason: HOSPADM

## 2024-06-23 RX ORDER — DEXTROSE MONOHYDRATE 25 G/50ML
25 INJECTION, SOLUTION INTRAVENOUS
Status: DISCONTINUED | OUTPATIENT
Start: 2024-06-23 | End: 2024-06-27 | Stop reason: HOSPADM

## 2024-06-23 RX ORDER — LOSARTAN POTASSIUM 50 MG/1
50 TABLET ORAL
Status: DISCONTINUED | OUTPATIENT
Start: 2024-06-23 | End: 2024-06-27 | Stop reason: HOSPADM

## 2024-06-23 RX ORDER — FAMOTIDINE 10 MG/ML
20 INJECTION, SOLUTION INTRAVENOUS ONCE
Status: DISCONTINUED | OUTPATIENT
Start: 2024-06-23 | End: 2024-06-23 | Stop reason: HOSPADM

## 2024-06-23 RX ORDER — IBUPROFEN 600 MG/1
1 TABLET ORAL
Status: DISCONTINUED | OUTPATIENT
Start: 2024-06-23 | End: 2024-06-27 | Stop reason: HOSPADM

## 2024-06-23 RX ORDER — OXYCODONE HYDROCHLORIDE AND ACETAMINOPHEN 5; 325 MG/1; MG/1
1 TABLET ORAL ONCE
Status: COMPLETED | OUTPATIENT
Start: 2024-06-23 | End: 2024-06-23

## 2024-06-23 RX ORDER — IPRATROPIUM BROMIDE AND ALBUTEROL SULFATE 2.5; .5 MG/3ML; MG/3ML
3 SOLUTION RESPIRATORY (INHALATION) EVERY 6 HOURS PRN
Status: DISCONTINUED | OUTPATIENT
Start: 2024-06-23 | End: 2024-06-27 | Stop reason: HOSPADM

## 2024-06-23 RX ORDER — MIDAZOLAM HYDROCHLORIDE 1 MG/ML
0.5 INJECTION INTRAMUSCULAR; INTRAVENOUS
Status: DISCONTINUED | OUTPATIENT
Start: 2024-06-23 | End: 2024-06-23 | Stop reason: HOSPADM

## 2024-06-23 RX ORDER — INSULIN LISPRO 100 [IU]/ML
2-7 INJECTION, SOLUTION INTRAVENOUS; SUBCUTANEOUS
Status: DISCONTINUED | OUTPATIENT
Start: 2024-06-23 | End: 2024-06-27 | Stop reason: HOSPADM

## 2024-06-23 RX ORDER — IPRATROPIUM BROMIDE AND ALBUTEROL SULFATE 2.5; .5 MG/3ML; MG/3ML
3 SOLUTION RESPIRATORY (INHALATION) ONCE AS NEEDED
Status: DISCONTINUED | OUTPATIENT
Start: 2024-06-23 | End: 2024-06-23 | Stop reason: HOSPADM

## 2024-06-23 RX ORDER — ASPIRIN 81 MG/1
81 TABLET ORAL DAILY
Status: DISCONTINUED | OUTPATIENT
Start: 2024-06-24 | End: 2024-06-27 | Stop reason: HOSPADM

## 2024-06-23 RX ORDER — HYDROCODONE BITARTRATE AND ACETAMINOPHEN 10; 325 MG/1; MG/1
1 TABLET ORAL EVERY 6 HOURS PRN
Status: DISCONTINUED | OUTPATIENT
Start: 2024-06-23 | End: 2024-06-26

## 2024-06-23 RX ORDER — ROCURONIUM BROMIDE 10 MG/ML
INJECTION, SOLUTION INTRAVENOUS AS NEEDED
Status: DISCONTINUED | OUTPATIENT
Start: 2024-06-23 | End: 2024-06-23 | Stop reason: SURG

## 2024-06-23 RX ORDER — SODIUM CHLORIDE 9 MG/ML
40 INJECTION, SOLUTION INTRAVENOUS AS NEEDED
Status: DISCONTINUED | OUTPATIENT
Start: 2024-06-23 | End: 2024-06-23 | Stop reason: HOSPADM

## 2024-06-23 RX ORDER — ACETAMINOPHEN 500 MG
1000 TABLET ORAL ONCE
Status: COMPLETED | OUTPATIENT
Start: 2024-06-23 | End: 2024-06-23

## 2024-06-23 RX ORDER — EPHEDRINE SULFATE 50 MG/ML
INJECTION INTRAVENOUS AS NEEDED
Status: DISCONTINUED | OUTPATIENT
Start: 2024-06-23 | End: 2024-06-23 | Stop reason: SURG

## 2024-06-23 RX ORDER — GABAPENTIN 400 MG/1
400 CAPSULE ORAL 3 TIMES DAILY
Status: DISCONTINUED | OUTPATIENT
Start: 2024-06-23 | End: 2024-06-25

## 2024-06-23 RX ORDER — PEG-3350, SODIUM SULFATE, SODIUM CHLORIDE, POTASSIUM CHLORIDE, SODIUM ASCORBATE AND ASCORBIC ACID 7.5-2.691G
1000 KIT ORAL ONCE
Qty: 1000 ML | Refills: 0 | Status: COMPLETED | OUTPATIENT
Start: 2024-06-23 | End: 2024-06-23

## 2024-06-23 RX ORDER — PROPOFOL 10 MG/ML
VIAL (ML) INTRAVENOUS AS NEEDED
Status: DISCONTINUED | OUTPATIENT
Start: 2024-06-23 | End: 2024-06-23 | Stop reason: SURG

## 2024-06-23 RX ORDER — PHENYLEPHRINE HCL IN 0.9% NACL 1 MG/10 ML
SYRINGE (ML) INTRAVENOUS AS NEEDED
Status: DISCONTINUED | OUTPATIENT
Start: 2024-06-23 | End: 2024-06-23 | Stop reason: SURG

## 2024-06-23 RX ORDER — SODIUM CHLORIDE 0.9 % (FLUSH) 0.9 %
10 SYRINGE (ML) INJECTION AS NEEDED
Status: DISCONTINUED | OUTPATIENT
Start: 2024-06-23 | End: 2024-06-23 | Stop reason: HOSPADM

## 2024-06-23 RX ORDER — ONDANSETRON 2 MG/ML
4 INJECTION INTRAMUSCULAR; INTRAVENOUS ONCE AS NEEDED
Status: DISCONTINUED | OUTPATIENT
Start: 2024-06-23 | End: 2024-06-23 | Stop reason: HOSPADM

## 2024-06-23 RX ORDER — KETOROLAC TROMETHAMINE 15 MG/ML
15 INJECTION, SOLUTION INTRAMUSCULAR; INTRAVENOUS ONCE
Status: COMPLETED | OUTPATIENT
Start: 2024-06-23 | End: 2024-06-23

## 2024-06-23 RX ADMIN — IPRATROPIUM BROMIDE AND ALBUTEROL SULFATE 3 ML: 2.5; .5 SOLUTION RESPIRATORY (INHALATION) at 20:14

## 2024-06-23 RX ADMIN — PEG-3350, SODIUM SULFATE, SODIUM CHLORIDE, POTASSIUM CHLORIDE, SODIUM ASCORBATE AND ASCORBIC ACID 1000 ML: KIT at 18:19

## 2024-06-23 RX ADMIN — KETOROLAC TROMETHAMINE 15 MG: 15 INJECTION, SOLUTION INTRAMUSCULAR; INTRAVENOUS at 12:15

## 2024-06-23 RX ADMIN — Medication 180 MG: at 14:56

## 2024-06-23 RX ADMIN — Medication 100 MCG: at 15:05

## 2024-06-23 RX ADMIN — PANTOPRAZOLE SODIUM 40 MG: 40 TABLET, DELAYED RELEASE ORAL at 17:23

## 2024-06-23 RX ADMIN — Medication 100 MCG: at 14:58

## 2024-06-23 RX ADMIN — EPHEDRINE SULFATE 10 MG: 50 INJECTION INTRAVENOUS at 15:14

## 2024-06-23 RX ADMIN — Medication 100 MCG: at 15:10

## 2024-06-23 RX ADMIN — GABAPENTIN 400 MG: 400 CAPSULE ORAL at 22:22

## 2024-06-23 RX ADMIN — ROCURONIUM BROMIDE 5 MG: 10 INJECTION INTRAVENOUS at 14:56

## 2024-06-23 RX ADMIN — SODIUM CHLORIDE, POTASSIUM CHLORIDE, SODIUM LACTATE AND CALCIUM CHLORIDE: 600; 310; 30; 20 INJECTION, SOLUTION INTRAVENOUS at 14:51

## 2024-06-23 RX ADMIN — LOSARTAN POTASSIUM 50 MG: 50 TABLET, FILM COATED ORAL at 17:23

## 2024-06-23 RX ADMIN — INSULIN LISPRO 2 UNITS: 100 INJECTION, SOLUTION INTRAVENOUS; SUBCUTANEOUS at 22:18

## 2024-06-23 RX ADMIN — FENTANYL CITRATE 100 MCG: 50 INJECTION, SOLUTION INTRAMUSCULAR; INTRAVENOUS at 14:56

## 2024-06-23 RX ADMIN — SODIUM CHLORIDE 2550 ML: 9 INJECTION, SOLUTION INTRAVENOUS at 12:14

## 2024-06-23 RX ADMIN — AZITHROMYCIN DIHYDRATE 500 MG: 250 TABLET ORAL at 17:23

## 2024-06-23 RX ADMIN — ROSUVASTATIN CALCIUM 40 MG: 20 TABLET, COATED ORAL at 22:22

## 2024-06-23 RX ADMIN — INSULIN LISPRO 2 UNITS: 100 INJECTION, SOLUTION INTRAVENOUS; SUBCUTANEOUS at 17:21

## 2024-06-23 RX ADMIN — METOPROLOL TARTRATE 50 MG: 25 TABLET, FILM COATED ORAL at 22:14

## 2024-06-23 RX ADMIN — ACETAMINOPHEN 1000 MG: 500 TABLET ORAL at 10:22

## 2024-06-23 RX ADMIN — HYDROCODONE BITARTRATE AND ACETAMINOPHEN 1 TABLET: 10; 325 TABLET ORAL at 23:37

## 2024-06-23 RX ADMIN — HYDROCHLOROTHIAZIDE 12.5 MG: 25 TABLET ORAL at 17:22

## 2024-06-23 RX ADMIN — HYDROCODONE BITARTRATE AND ACETAMINOPHEN 1 TABLET: 10; 325 TABLET ORAL at 17:37

## 2024-06-23 RX ADMIN — SODIUM CHLORIDE 1000 MG: 900 INJECTION INTRAVENOUS at 11:08

## 2024-06-23 RX ADMIN — PROPOFOL 200 MG: 10 INJECTION, EMULSION INTRAVENOUS at 14:56

## 2024-06-23 RX ADMIN — Medication 1750 MG: at 12:13

## 2024-06-23 RX ADMIN — GABAPENTIN 400 MG: 400 CAPSULE ORAL at 17:22

## 2024-06-23 RX ADMIN — OXYCODONE HYDROCHLORIDE AND ACETAMINOPHEN 1 TABLET: 5; 325 TABLET ORAL at 12:17

## 2024-06-23 NOTE — ED NOTES
Behzad Guzman    Nursing Report ED to Floor:  Mental status: Answers all orientation questions appropriately. Confused regarding other things.   Ambulatory status: Assist x1. Unsteady  Oxygen Therapy:  2L NC  Cardiac Rhythm: ns  Admitted from: home  Safety Concerns:  fall risk  Social Issues: wife at bedside   ED Room #:  22    ED Nurse Phone Extension - [phone] or may call [phone].      HPI:   Chief Complaint   Patient presents with    Altered Mental Status    Weakness - Generalized       Past Medical History:  Past Medical History:   Diagnosis Date    Asthma     Chronic hip pain, left     Chronic pain in left shoulder     Hyperlipidemia     Hypertension     Leg length discrepancy     Neck pain, chronic     Neuropathy     Panic attacks     Pre-diabetes     Prediabetes     Spinal stenosis         Past Surgical History:  Past Surgical History:   Procedure Laterality Date    LEG SURGERY      ROTATOR CUFF REPAIR Right     SHOULDER SURGERY Left     WRIST SURGERY Left         Admitting Doctor:   Ratna Condon MD    Consulting Provider(s):  Consults       No orders found from 5/25/2024 to 6/24/2024.             Admitting Diagnosis:   The primary encounter diagnosis was Pneumonia of both upper lobes due to infectious organism. Diagnoses of Acute respiratory failure with hypoxia, Altered mental status, unspecified altered mental status type, and Bandemia were also pertinent to this visit.    Most Recent Vitals:   Vitals:    06/23/24 1120 06/23/24 1121 06/23/24 1124 06/23/24 1200   BP:    97/68   BP Location:       Patient Position:       Pulse: 94 94  90   Resp:       Temp:   98.8 °F (37.1 °C)    TempSrc:   Oral    SpO2: (!) 87% (!) 87%  94%   Weight:       Height:           Active LDAs/IV Access:   Lines, Drains & Airways       Active LDAs       Name Placement date Placement time Site Days    Peripheral IV 06/23/24 1011 Right;Posterior Forearm 06/23/24  1011  Forearm  less than 1    Peripheral IV 06/23/24 1024  Anterior;Left Forearm 06/23/24  1024  Forearm  less than 1                    Labs (abnormal labs have a star):   Labs Reviewed   COMPREHENSIVE METABOLIC PANEL - Abnormal; Notable for the following components:       Result Value    Glucose 186 (*)     Sodium 135 (*)     Chloride 95 (*)     CO2 30.0 (*)     All other components within normal limits    Narrative:     GFR Normal >60  Chronic Kidney Disease <60  Kidney Failure <15    The GFR formula is only valid for adults with stable renal function between ages 18 and 70.   URINALYSIS W/ MICROSCOPIC IF INDICATED (NO CULTURE) - Abnormal; Notable for the following components:    Ketones, UA Trace (*)     All other components within normal limits    Narrative:     Urine microscopic not indicated.   CBC WITH AUTO DIFFERENTIAL - Abnormal; Notable for the following components:    WBC 15.62 (*)     Neutrophil % 86.7 (*)     Lymphocyte % 5.2 (*)     Eosinophil % 0.1 (*)     Immature Grans % 0.6 (*)     Neutrophils, Absolute 13.52 (*)     Monocytes, Absolute 1.13 (*)     Immature Grans, Absolute 0.10 (*)     All other components within normal limits   RESPIRATORY PANEL PCR W/ COVID-19 (SARS-COV-2), NP SWAB IN UTM/VTP, 2 HR TAT - Normal    Narrative:     In the setting of a positive respiratory panel with a viral infection PLUS a negative procalcitonin without other underlying concern for bacterial infection, consider observing off antibiotics or discontinuation of antibiotics and continue supportive care. If the respiratory panel is positive for atypical bacterial infection (Bordetella pertussis, Chlamydophila pneumoniae, or Mycoplasma pneumoniae), consider antibiotic de-escalation to target atypical bacterial infection.   LACTIC ACID, PLASMA - Normal   PROCALCITONIN - Normal    Narrative:     As a Marker for Sepsis (Non-Neonates):    1. <0.5 ng/mL represents a low risk of severe sepsis and/or septic shock.  2. >2 ng/mL represents a high risk of severe sepsis and/or septic  "shock.    As a Marker for Lower Respiratory Tract Infections that require antibiotic therapy:    PCT on Admission    Antibiotic Therapy       6-12 Hrs later    >0.5                Strongly Recommended  >0.25 - <0.5        Recommended   0.1 - 0.25          Discouraged              Remeasure/reassess PCT  <0.1                Strongly Discouraged     Remeasure/reassess PCT    As 28 day mortality risk marker: \"Change in Procalcitonin Result\" (>80% or <=80%) if Day 0 (or Day 1) and Day 4 values are available. Refer to http://www.[email or web address]-pct-calculator.com    Change in PCT <=80%  A decrease of PCT levels below or equal to 80% defines a positive change in PCT test result representing a higher risk for 28-day all-cause mortality of patients diagnosed with severe sepsis for septic shock.    Change in PCT >80%  A decrease of PCT levels of more than 80% defines a negative change in PCT result representing a lower risk for 28-day all-cause mortality of patients diagnosed with severe sepsis or septic shock.      COVID PRE-OP / PRE-PROCEDURE SCREENING ORDER (NO ISOLATION)    Narrative:     The following orders were created for panel order COVID PRE-OP / PRE-PROCEDURE SCREENING ORDER (NO ISOLATION) - Swab, Nasopharynx.  Procedure                               Abnormality         Status                     ---------                               -----------         ------                     Respiratory Panel PCR w/...[[id number]]  Normal              Final result                 Please view results for these tests on the individual orders.   BLOOD CULTURE   BLOOD CULTURE   RAINBOW DRAW    Narrative:     The following orders were created for panel order Benge Draw.  Procedure                               Abnormality         Status                     ---------                               -----------         ------                     Green Top (Gel)[[id number]]                                  Final result               Lavender " Top[[id number]]                                     Final result               Gold Top - SST[[id number]]                                   Final result               Gray Top[[id number]]                                         Final result               Light Blue Top[[id number]]                                   Final result                 Please view results for these tests on the individual orders.   POCT GLUCOSE FINGERSTICK   CBC AND DIFFERENTIAL    Narrative:     The following orders were created for panel order CBC & Differential.  Procedure                               Abnormality         Status                     ---------                               -----------         ------                     CBC Auto Differential[[id number]]        Abnormal            Final result                 Please view results for these tests on the individual orders.   GREEN TOP   LAVENDER TOP   GOLD TOP - SST   GRAY TOP   LIGHT BLUE TOP       Meds Given in ED:   Medications   sodium chloride 0.9 % flush 10 mL (has no administration in time range)   vancomycin IVPB 1750 mg in 0.9% Sodium Chloride (premix) 500 mL (1,750 mg Intravenous New Bag 6/23/24 1213)   sepsis fluid NS 0.9 % bolus 2,550 mL (2,550 mL Intravenous New Bag 6/23/24 1214)   acetaminophen (TYLENOL) tablet 1,000 mg (1,000 mg Oral Given 6/23/24 1022)   cefTRIAXone (ROCEPHIN) 1,000 mg in sodium chloride 0.9 % 100 mL MBP (1,000 mg Intravenous New Bag 6/23/24 1108)   ketorolac (TORADOL) injection 15 mg (15 mg Intravenous Given 6/23/24 1215)   oxyCODONE-acetaminophen (PERCOCET) 5-325 MG per tablet 1 tablet (1 tablet Oral Given 6/23/24 1217)           Last NIH score:                                                          Dysphagia screening results:  Patient Factors Component (Dysphagia:Stroke or Rule-out)  Best Eye Response: 4-->(E4) spontaneous (06/23/24 1013)  Best Motor Response: 6-->(M6) obeys commands (06/23/24 1013)  Best Verbal Response: 5-->(V5) oriented  (06/23/24 1013)  Inez Coma Scale Score: 15 (06/23/24 1013)     Inez Coma Scale:  No data recorded     CIWA:        Restraint Type:            Isolation Status:  No active isolations

## 2024-06-23 NOTE — Clinical Note
RECOMMENDATIONS:     NEW ORDERS        Orders Placed This Encounter    SERVICE TO PHYSICAL THERAPY       Based on evaluation, occupational or physical therapists may be utilized, unless otherwise indicated here.          Referral Priority:   Routine       Referral Type:   Consult & Treatment       Referral Reason:   PreCert/Auth Required       Requested Specialty:   Physical Therapy       Number of Visits Requested:   1       Expiration Date:   2/14/2025      IMAGING  No new imaging recommended today      IMAGING  No new imaging recommended today      MEDICATIONS      New Prescriptions     No medications on file      In the context of localized musculoskeletal pain, the patient was advised to trial the use of diclofenac (Voltaren) gel. This is a non-steroidal anti-inflammatory drug (NSAID) which can be applied directly to the painful area 2-3 times a day. OK to initiate treatment with a pea-sized amount three times a day for two weeks, then change to as needed dosing. Please otherwise follow the instructions on the tube of medication. Side effects of the application of this medication most often include dry skin, itching, or rash.                Please continue on your current medication plan as prescribed by your healthcare team.     INTERVENTIONS  PLAN: bilateral SACROILIAC JOINT INJECTION (patient has external LifeVest)  01315     In the presence of low back pain consistent with sacroiliac joint dysfunction, the option of a sacroiliac joint steroid injection was chosen. This is an injection focused on decreasing the irritation present within the sacroiliac joint with the use of steroid . Though rare, the risks of this procedure include infection, bleeding, and nerve damage. These risks and benefits were discussed in detail with the patient. Will preemptively plan for procedure, but typically prior authorization must be obtained first. Patient understands that procedure will need to be delayed if prior  Level of Care: Telemetry [5]   Diagnosis: Fever [980351]   Admitting Physician: BRIDGER LOERA [[id number]]   authorization is declined. Peer to peers are not offered if lack of a course of physical therapy is the reason for denial.     Procedure Instructions  - Patient will consider having a  present on the day of the procedure.   - Patient will contact the clinic with any new signs of infection including rash, fever, etc.   - Instructions for holding anticoagulation medications are described below.  - NPO instructions: Patient does not need to fast prior to the procedure.      This patient does not have an active medication from one of the medication groupers.      REFERRALS  PT: Please see a physical therapist for gentle conditioning exercises for management of pain. The focus of the physical therapy should be on improving muscle strength and range of motion. The physical therapist would evaluate the patient and determine the best forms of therapy to accomplish these goals. Please continue physical therapy and stretching exercises at home.   Please continue your home exercises     FOLLOW UP  No follow-ups on file.  Next appointment: Visit date not found      My clinical findings, medical decision-making, and treatment plan were discussed in depth with the patient, including a multimodal approach.      Thank you for allowing me to participate in the care of this patient. Please contact me with any additional questions.            INJECTION / PROCEDURE INSTRUCTION    You are scheduled for a procedure, Sacroiliac Joint Injection, with Lori Mustafa MD    *Failure to attend a procedural visit without prior notification may result in dismissal from the practice*     Vaccines   These should be avoided within 2 weeks before or 1 week after procedures with steroid as the steroid can interfere with vaccine response.     If You Get Sick, Have an Infection or Have a Surgical Procedure  Call us at [phone]. For your benefit, it is possible that the injection may need to be rescheduled.     Location and Arrival time  [facility]  [facility] at [address], [place]- in [facility] (See map below). Your procedure time is scheduled on 03/08/2024 at 11:20 am.  Please arrive at 10:20 am.    Driving   Consider having a , though you may drive to and from your procedure.    Medications  Take your scheduled medications as prescribed. You may continue to take Nonsteroidal Antiinflammatory medications. If your blood pressure is too high then the procedure may be cancelled.     Diet       You may eat as usual, though you may be more comfortable if you eat light.    Other Preparation:  Shower or bathe on the morning of your procedure to decrease the risk of infection.    *We understand that unplanned events may cause you to miss your appointments.  If you are unable to attend the appointment for your procedure, give us at least 24 hours of notice if possible.  Please be aware that if you miss your procedure appointment without notifying us in advance, we may no longer be able to serve you as your pain management provider.*    *Failure to follow these instructions may result in your injection / procedure being cancelled.*

## 2024-06-23 NOTE — ANESTHESIA PROCEDURE NOTES
Airway  Urgency: elective    Date/Time: 6/23/2024 2:57 PM  Airway not difficult    General Information and Staff    Patient location during procedure: OR  CRNA/CAA: Magan Hernandez CRNA    Indications and Patient Condition  Indications for airway management: airway protection    Preoxygenated: yes  MILS not maintained throughout  Mask difficulty assessment: 0 - not attempted    Final Airway Details  Final airway type: endotracheal airway      Successful airway: ETT  Cuffed: yes   Successful intubation technique: video laryngoscopy and RSI  Endotracheal tube insertion site: oral  Blade: Rush  Blade size: 3  ETT size (mm): 7.0  Cormack-Lehane Classification: grade I - full view of glottis  Placement verified by: chest auscultation and capnometry   Cuff volume (mL): 10  Measured from: lips  ETT/EBT  to lips (cm): 22  Number of attempts at approach: 1  Assessment: lips, teeth, and gum same as pre-op and atraumatic intubation    Additional Comments  Negative epigastric sounds, Breath sound equal bilaterally with symmetric chest rise and fall

## 2024-06-23 NOTE — ANESTHESIA POSTPROCEDURE EVALUATION
Patient: Behzad Guzman    Procedure Summary       Date: 06/23/24 Room / Location:  [facility]    Anesthesia Start: 1451 Anesthesia Stop: 1534    Procedure: ESOPHAGOGASTRODUODENOSCOPY WITH FOREIGN BODY REMOVAL WITH FLOROSCOPY Diagnosis:     Surgeons: Adan Huston MD Provider: José Miguel Aiken MD    Anesthesia Type: general ASA Status: 3 - Emergent            Anesthesia Type: general    Vitals  Vitals Value Taken Time   /56 06/23/24 1534   Temp 97 °F (36.1 °C) 06/23/24 1534   Pulse 77 06/23/24 1535   Resp 14 06/23/24 1534   SpO2 91 % 06/23/24 1535   Vitals shown include unfiled device data.        Post Anesthesia Care and Evaluation    Patient location during evaluation: PACU  Patient participation: complete - patient participated  Level of consciousness: sleepy but conscious  Pain score: 0  Pain management: adequate    Airway patency: patent  Anesthetic complications: No anesthetic complications  PONV Status: none  Cardiovascular status: hemodynamically stable and acceptable  Respiratory status: nonlabored ventilation, acceptable and nasal cannula  Hydration status: acceptable    Comments: Pt arrived to PACU with no issues during transport. Pt placed directly to monitors. Vital signs are within parameters. Pt maintaining ventilation spontaneously. No changes to dental. Report given to PACU and all question and concerns were addressed as well as the plan of care.

## 2024-06-23 NOTE — PLAN OF CARE
Problem: Skin Injury Risk Increased  Goal: Skin Health and Integrity  Outcome: Ongoing, Progressing   Goal Outcome Evaluation:              Outcome Evaluation: VSS. 2LNC. PRN's given for pain. EGD completed without success of removal of foreign body. Bowel prep to be given tonight. Clear liquids tolerated. Pt is confused at times. Wife at bedside. No other concerns at this time. Will continue with the plan of care.

## 2024-06-23 NOTE — ED PROVIDER NOTES
"  [facility]    EMERGENCY DEPARTMENT ENCOUNTER      Pt Name: Behzad Guzman  MRN: [id number]  YOB: 1950  Date of evaluation: 6/23/2024  Provider: Jose Ron DO    CHIEF COMPLAINT       Chief Complaint   Patient presents with    Altered Mental Status    Weakness - Generalized     HPI  Stated Reason for Visit: PT TO ER FROM HOME WEAKNESS. PT WIFE STATES PT HAS HIGH FEVER AND ALTERED MENTAL STATUS. WIFE SAYS LAST KNOWN NORMAL SEVERAL DAYS AGO. WENT TO PCP FRIDAY AND RX ABX- WIFE UNSURE OF ACTUAL DX. History Obtained From: patient;family     HISTORY OF PRESENT ILLNESS  (Location/Symptom, Timing/Onset, Context/Setting, Quality, Duration, Modifying Factors, Severity.)   Behzad Guzman is a 73 y.o. male who presents to the emergency department for evaluation with his wife secondary to a concern for intermittent fever which has been present for the last few days, is noting that he is starting to act \"off\" for the last couple days, wandering around the house, bringing up stories and things that have happened years ago.  She notes he has had a very mild cough and congestion, has been seen by his PCP, denies any recent antibiotic usage that they are currently taking perhaps was prescribed something they have not picked up from the pharmacy.  She notes he has been wandering in the house in the evenings, not acting at his normal self.  Patient believes he may have had a \"turkey mites\" at some point causing some skin picking issues and possible last cellulitic infections.  Wife notes this has been an issue for the patient over the last 1 year.  She states she does not drink alcohol on a daily basis, no drugs of abuse.  He denies any fall, injury or head trauma, she notes she felt them earlier this morning he felt warm [facility] for further assessment.  He notes a cough with some sputum production, denies any abdominal pain, urinary or bowel complaints.  No other acute systemic " complaints.      Nursing notes were reviewed.      PAST MEDICAL HISTORY     Past Medical History:   Diagnosis Date    Asthma     Chronic hip pain, left     Chronic pain in left shoulder     Hyperlipidemia     Hypertension     Leg length discrepancy     Neck pain, chronic     Neuropathy     Panic attacks     Pre-diabetes     Prediabetes     Spinal stenosis          SURGICAL HISTORY       Past Surgical History:   Procedure Laterality Date    LEG SURGERY      ROTATOR CUFF REPAIR Right     SHOULDER SURGERY Left     WRIST SURGERY Left          CURRENT MEDICATIONS       Current Facility-Administered Medications:     sepsis fluid NS 0.9 % bolus 2,550 mL, 30 mL/kg, Intravenous, Once, Jose Ron DO, 2,550 mL at 06/23/24 1214    sodium chloride 0.9 % flush 10 mL, 10 mL, Intravenous, PRN, Jose Ron DO    vancomycin IVPB 1750 mg in 0.9% Sodium Chloride (premix) 500 mL, 20 mg/kg, Intravenous, Once, Jose Ron DO, Last Rate: 285.7 mL/hr at 06/23/24 1213, 1,750 mg at 06/23/24 1213    Current Outpatient Medications:     albuterol (ACCUNEB) 0.63 MG/3ML nebulizer solution, Take 3 mL by nebulization Every 6 (Six) Hours As Needed for Wheezing., Disp: 3 mL, Rfl: 12    albuterol sulfate  (90 Base) MCG/ACT inhaler, INHALE TWO PUFFS BY MOUTH EVERY 6 HOURS AS NEEDED FOR WHEEZING OR SHORTNESS OF AIR, Disp: 6.7 g, Rfl: 1    Alcaftadine (Lastacaft) 0.25 % solution, Apply 1 drop to eye(s) as directed by provider 1 (One) Time., Disp: , Rfl:     aspirin 81 MG EC tablet, Take 1 tablet by mouth Daily. (Patient not taking: Reported on 6/19/2024), Disp: , Rfl:     azelastine (ASTELIN) 0.1 % nasal spray, 2 sprays into the nostril(s) as directed by provider 2 (Two) Times a Day. Use in each nostril as directed, Disp: , Rfl:     clopidogrel (PLAVIX) 75 MG tablet, Take 1 tablet by mouth Daily., Disp: 30 tablet, Rfl: 11    diazePAM (VALIUM) 5 MG tablet, Take 1 tablet by mouth Every 6 (Six) Hours As Needed., Disp:  ", Rfl: 0    fexofenadine (ALLEGRA) 180 MG tablet, Take 1 tablet by mouth Daily., Disp: , Rfl:     fluticasone (Flonase) 50 MCG/ACT nasal spray, 2 sprays into the nostril(s) as directed by provider Daily. 2 puffs each nostril, Disp: 18.2 mL, Rfl: 0    gabapentin (NEURONTIN) 400 MG capsule, Take 1 capsule by mouth 3 (Three) Times a Day., Disp: , Rfl:     glucose monitor monitoring kit, 1 each Daily., Disp: 1 each, Rfl: 0    HYDROcodone-acetaminophen (NORCO)  MG per tablet, Take 1 tablet by mouth Every 6 (Six) Hours As Needed for Moderate Pain., Disp: , Rfl:     Lancets (onetouch ultrasoft) lancets, Use one daily to test blood sugars, Disp: 100 each, Rfl: 12    losartan-hydrochlorothiazide (HYZAAR) 50-12.5 MG per tablet, TAKE ONE TABLET BY MOUTH DAILY, Disp: 90 tablet, Rfl: 1    metFORMIN (GLUCOPHAGE) 500 MG tablet, TAKE 1 TABLET BY MOUTH DAILY WITH BREAKFAST (Patient taking differently: Take 1 tablet by mouth 2 (Two) Times a Day With Meals.), Disp: 180 tablet, Rfl: 0    metoprolol tartrate (LOPRESSOR) 50 MG tablet, TAKE 1 TABLET BY MOUTH TWICE A DAY, Disp: 180 tablet, Rfl: 0    Needle, Disp, 22G X 1\" misc, 1 each Every 14 (Fourteen) Days., Disp: 100 each, Rfl: 0    OneTouch Verio test strip, 1 each by Other route Daily. for testing, Disp: 300 each, Rfl: 2    oxyCODONE (ROXICODONE) 15 MG immediate release tablet, TAKE 1/2 A TABLET BY MOUTH AT BEDTIME AND 1/2 TABLET IN MIDDLE OF NIGHT AS NEEDED AS DIRECTED, Disp: , Rfl:     pantoprazole (PROTONIX) 40 MG EC tablet, Take 1 tablet by mouth Daily., Disp: , Rfl:     rosuvastatin (CRESTOR) 40 MG tablet, Take 1 tablet by mouth Daily., Disp: , Rfl:     Syringe/Needle, Disp, (B-D 3CC LUER-YAA SYR 23GX1\") 23G X 1\" 3 ML misc, USE 1 SYRINGE EVERY 21 DAYS TO TAKE TESTOSTERONE, Disp: 12 each, Rfl: 1    Testosterone Enanthate 200 MG/ML solution, Inject 200 mg into the appropriate muscle as directed by prescriber Every 14 (Fourteen) Days., Disp: 5 mL, Rfl: 1    vitamin D " (ERGOCALCIFEROL) 1.25 MG (52679 UT) capsule capsule, TAKE 1 CAPSULE BY MOUTH ONCE WEEKLY FOR 12 DOSES, Disp: 12 capsule, Rfl: 0    ALLERGIES     Peanut butter flavor, Shellfish allergy, Amoxicillin, Penicillins, Sulfa antibiotics, Doxycycline, Latex, and Pregabalin    FAMILY HISTORY       Family History   Problem Relation Age of Onset    Heart attack Father 65    Other Mother         accident    No Known Problems Maternal Grandmother     No Known Problems Maternal Grandfather     No Known Problems Paternal Grandmother     Stroke Paternal Grandfather           SOCIAL HISTORY       Social History     Socioeconomic History    Marital status:    Tobacco Use    Smoking status: Former     Current packs/day: 0.00     Average packs/day: 0.5 packs/day for 10.0 years (5.0 ttl pk-yrs)     Types: Cigarettes     Start date:      Quit date:      Years since quittin.5    Smokeless tobacco: Never   Vaping Use    Vaping status: Never Used   Substance and Sexual Activity    Alcohol use: No    Drug use: No    Sexual activity: Defer         PHYSICAL EXAM    (up to 7 for level 4, 8 or more for level 5)     Vitals:    24 1121 24 1124 24 1200 24 1229   BP:   97/68 103/66   BP Location:       Patient Position:       Pulse: 94  90 85   Resp:       Temp:  98.8 °F (37.1 °C)     TempSrc:  Oral     SpO2: (!) 87%  94% 95%   Weight:       Height:           Physical Exam  General : Patient is awake, conversational but telling stories about events which he thinks are current but have been a year or 2 old per the wife, does seem slightly altered  HEENT: Pupils are equally round, EOMI, conjunctivae clear  Neck: Neck is supple, full range of motion, trachea midline  Cardiac: Heart tachycardic rate with regularrhythm, no murmurs, rubs, or gallops  Lungs: Lungs with decreased breath sounds bilaterally, slight cough, very faint expiratory wheezes.  Chest wall: There is no tenderness to palpation over the chest  wall or over ribs  Abdomen: Abdomen is soft, nontender, nondistended. There are no firm or pulsatile masses, no rebound rigidity or guarding  Musculoskeletal: 5 out of 5 strength in all 4 extremities.  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  No large or palpable abscess.  skin is warm and dry        DIAGNOSTIC RESULTS     EKG:  All EKGs are interpreted by the Emergency Department Physician who either signs or Co-signs this chart in the absence of a cardiologist.    No orders to display       RADIOLOGY:     [x] Radiologist's Report Reviewed:  CT Head Without Contrast   Final Result   1.No acute intracranial abnormality is identified.   2.Findings compatible with chronic microvascular ischemic change.   3.Scattered paranasal sinus mucosal thickening with mild fluid in the left maxillary sinus. Please correlate clinically for acute sinusitis.         Electronically Signed: Arley Miguel MD     6/23/2024 11:04 AM EDT     Workstation ID: [id number]      CT Chest Without Contrast Diagnostic   Final Result   Impression:   1. Multifocal tree-in-bud and scattered peribronchovascular groundglass opacities bilaterally most pronounced in the right upper lobe compatible with endobronchial infection or inflammation. No consolidation.   2. Two cylindrical radiodense foreign bodies in stomach at the gastric antrum/pylorus measuring 2.3 x 0.6 cm, correlate for foreign body ingestion.   3. Coronary artery  calcifications and additional chronic findings above.            Electronically Signed: Jame Garcia MD     6/23/2024 11:21 AM EDT     Workstation ID: [id number]      XR Chest 1 View   Final Result   No radiographic findings of acute cardiopulmonary abnormality.         Electronically Signed: Raffy Roberta     6/23/2024 10:25 AM EDT     Workstation ID: [id number]      XR Abdomen KUB    (Results Pending)       I ordered and independently reviewed the above noted radiographic studies.      I viewed images of chest x-ray which showed no acute cardiopulmonary process per my independent interpretation.    See radiologist's dictation for official interpretation.      ED BEDSIDE ULTRASOUND:   Performed by ED Physician - none    LABS:    I have reviewed and interpreted all of the currently available lab results from this visit (if applicable):  Results for orders placed or performed during the hospital encounter of 06/23/24   Respiratory Panel PCR w/COVID-19(SARS-CoV-2) KATHY/MARILY/DONATO/PAD/COR/KRISTOPHER In-House, NP Swab in UTM/VTM, 2 HR TAT - Swab, Nasopharynx    Specimen: Nasopharynx; Swab   Result Value Ref Range    ADENOVIRUS, PCR Not Detected Not Detected    Coronavirus 229E Not Detected Not Detected    Coronavirus HKU1 Not Detected Not Detected    Coronavirus NL63 Not Detected Not Detected    Coronavirus OC43 Not Detected Not Detected    COVID19 Not Detected Not Detected - Ref. Range    Human Metapneumovirus Not Detected Not Detected    Human Rhinovirus/Enterovirus Not Detected Not Detected    Influenza A PCR Not Detected Not Detected    Influenza B PCR Not Detected Not Detected    Parainfluenza Virus 1 Not Detected Not Detected    Parainfluenza Virus 2 Not Detected Not Detected    Parainfluenza Virus 3 Not Detected Not Detected    Parainfluenza Virus 4 Not Detected Not Detected    RSV, PCR Not Detected Not Detected    Bordetella pertussis pcr Not Detected Not Detected    Bordetella parapertussis PCR Not Detected Not Detected     Chlamydophila pneumoniae PCR Not Detected Not Detected    Mycoplasma pneumo by PCR Not Detected Not Detected   Lactic Acid, Plasma    Specimen: Blood   Result Value Ref Range    Lactate 1.7 0.5 - 2.0 mmol/L   Procalcitonin    Specimen: Blood   Result Value Ref Range    Procalcitonin 0.21 0.00 - 0.25 ng/mL   Comprehensive Metabolic Panel    Specimen: Blood   Result Value Ref Range    Glucose 186 (H) 65 - 99 mg/dL    BUN 15 8 - 23 mg/dL    Creatinine 1.24 0.76 - 1.27 mg/dL    Sodium 135 (L) 136 - 145 mmol/L    Potassium 3.6 3.5 - 5.2 mmol/L    Chloride 95 (L) 98 - 107 mmol/L    CO2 30.0 (H) 22.0 - 29.0 mmol/L    Calcium 9.9 8.6 - 10.5 mg/dL    Total Protein 6.6 6.0 - 8.5 g/dL    Albumin 4.3 3.5 - 5.2 g/dL    ALT (SGPT) 11 1 - 41 U/L    AST (SGOT) 23 1 - 40 U/L    Alkaline Phosphatase 50 39 - 117 U/L    Total Bilirubin 0.8 0.0 - 1.2 mg/dL    Globulin 2.3 gm/dL    A/G Ratio 1.9 g/dL    BUN/Creatinine Ratio 12.1 7.0 - 25.0    Anion Gap 10.0 5.0 - 15.0 mmol/L    eGFR 61.8 >60.0 mL/min/1.73   Urinalysis With Microscopic If Indicated (No Culture) - Straight Cath    Specimen: Straight Cath; Urine   Result Value Ref Range    Color, UA Yellow Yellow, Straw    Appearance, UA Clear Clear    pH, UA 7.5 5.0 - 8.0    Specific Gravity, UA 1.019 1.001 - 1.030    Glucose, UA Negative Negative    Ketones, UA Trace (A) Negative    Bilirubin, UA Negative Negative    Blood, UA Negative Negative    Protein, UA Negative Negative    Leuk Esterase, UA Negative Negative    Nitrite, UA Negative Negative    Urobilinogen, UA 1.0 E.U./dL 0.2 - 1.0 E.U./dL   CBC Auto Differential    Specimen: Blood   Result Value Ref Range    WBC 15.62 (H) 3.40 - 10.80 10*3/mm3    RBC 4.79 4.14 - 5.80 10*6/mm3    Hemoglobin 14.3 13.0 - 17.7 g/dL    Hematocrit 43.1 37.5 - 51.0 %    MCV 90.0 79.0 - 97.0 fL    MCH 29.9 26.6 - 33.0 pg    MCHC 33.2 31.5 - 35.7 g/dL    RDW 14.4 12.3 - 15.4 %    RDW-SD 48.0 37.0 - 54.0 fl    MPV 11.1 6.0 - 12.0 fL    Platelets 192 140 -  450 10*3/mm3    Neutrophil % 86.7 (H) 42.7 - 76.0 %    Lymphocyte % 5.2 (L) 19.6 - 45.3 %    Monocyte % 7.2 5.0 - 12.0 %    Eosinophil % 0.1 (L) 0.3 - 6.2 %    Basophil % 0.2 0.0 - 1.5 %    Immature Grans % 0.6 (H) 0.0 - 0.5 %    Neutrophils, Absolute 13.52 (H) 1.70 - 7.00 10*3/mm3    Lymphocytes, Absolute 0.82 0.70 - 3.10 10*3/mm3    Monocytes, Absolute 1.13 (H) 0.10 - 0.90 10*3/mm3    Eosinophils, Absolute 0.02 0.00 - 0.40 10*3/mm3    Basophils, Absolute 0.03 0.00 - 0.20 10*3/mm3    Immature Grans, Absolute 0.10 (H) 0.00 - 0.05 10*3/mm3    nRBC 0.0 0.0 - 0.2 /100 WBC   Green Top (Gel)   Result Value Ref Range    Extra Tube Hold for add-ons.    Lavender Top   Result Value Ref Range    Extra Tube hold for add-on    Gold Top - SST   Result Value Ref Range    Extra Tube Hold for add-ons.    Gray Top   Result Value Ref Range    Extra Tube Hold for add-ons.    Light Blue Top   Result Value Ref Range    Extra Tube Hold for add-ons.         If labs were ordered, I independently reviewed the results and considered them in treating the patient.      EMERGENCY DEPARTMENT COURSE and DIFFERENTIAL DIAGNOSIS/MDM:   Vitals:  AS OF 13:01 EDT    BP - 103/66  HR - 85  TEMP - 98.8 °F (37.1 °C) (Oral)  O2 SATS - 95%      Orders placed during this visit:  Orders Placed This Encounter   Procedures    COVID PRE-OP / PRE-PROCEDURE SCREENING ORDER (NO ISOLATION) - Swab, Nasopharynx    Blood Culture - Blood,    Blood Culture - Blood,    Respiratory Panel PCR w/COVID-19(SARS-CoV-2) KATHY/MARILY/DONATO/PAD/COR/KRISTOPHER In-House, NP Swab in UTM/VTM, 2 HR TAT - Swab, Nasopharynx    XR Chest 1 View    CT Head Without Contrast    CT Chest Without Contrast Diagnostic    XR Abdomen KUB    Lactic Acid, Plasma    Procalcitonin    [place] Draw    Comprehensive Metabolic Panel    Urinalysis With Microscopic If Indicated (No Culture) - Urine, Catheter    CBC Auto Differential    Diet: Regular/House; Fluid Consistency: Thin (IDDSI 0)    Continuous Pulse Oximetry     Vital Signs    Oxygen Therapy- Nasal Cannula; Titrate 1-6 LPM Per SpO2; 90 - 95%    POC Glucose Once    Insert Peripheral IV    Initiate Observation Status    Inpatient Admission    ED Bed Request    Fall Precautions    CBC & Differential    Green Top (Gel)    Lavender Top    Gold Top - SST    Gray Top    Light Blue Top       All labs have been independently reviewed by me.  All radiology studies have been reviewed by me and the radiologist dictating the report.  All EKG's have been independently viewed and interpreted by me.      Discussion below represents my analysis of pertinent findings related to patient's condition, differential diagnosis, treatment plan and final disposition.    Differential diagnosis:  The differential diagnosis associated with the patient's presentation includes: Sepsis, pneumonia, encephalopathy, cellulitis    Additional sources  Discussed/ obtained information from independent historians:   [x] Spouse, wife at bedside  [] Parent  [] Family member  [] Friend  [] EMS   [] Other:    External (non-ED) record review:   [] Inpatient record:   [] Office record:   [] Outpatient record:   [] Prior Outpatient labs:   [] Prior Outpatient radiology:   [] Primary Care record:   [] Outside ED record:   [] Other:     Patient's care impacted by:   [] Diabetes  [] Hypertension  [] CHF  [] Hyperlipidemia  [] Coronary Artery Disease   [] COPD   [] Cancer   [] Tobacco Abuse   [] Substance Abuse    [] Other:     Care significantly affected by Social Determinants of Health (housing and economic circumstances, unemployment)    [] Yes     [x] No   If yes, Patient's care significantly limited by Social Determinants of Health including:   [] Inadequate housing   [] Low income   [] Alcoholism and drug addiction in family   [] Problems related to primary support group   [] Unemployment   [] Problems related to employment   [] Other Social Determinants of Health:       MEDICATIONS ADMINISTERED IN ED:  Medications    sodium chloride 0.9 % flush 10 mL (has no administration in time range)   vancomycin IVPB 1750 mg in 0.9% Sodium Chloride (premix) 500 mL (1,750 mg Intravenous New Bag 6/23/24 1213)   sepsis fluid NS 0.9 % bolus 2,550 mL (2,550 mL Intravenous New Bag 6/23/24 1214)   acetaminophen (TYLENOL) tablet 1,000 mg (1,000 mg Oral Given 6/23/24 1022)   cefTRIAXone (ROCEPHIN) 1,000 mg in sodium chloride 0.9 % 100 mL MBP (0 mg Intravenous Stopped 6/23/24 1238)   ketorolac (TORADOL) injection 15 mg (15 mg Intravenous Given 6/23/24 1215)   oxyCODONE-acetaminophen (PERCOCET) 5-325 MG per tablet 1 tablet (1 tablet Oral Given 6/23/24 1217)              Is a pleasant 73-year-old male who was had intermittent fever, altered state over the last few days.  He is awake and alert really not making sense with his story storm initial discussion, wife is having to interject to correct the patient.  He has a temperature of 101.6 upon arrival, he is mildly tachycardic, is had a cough and congestion as well.  We did initiate IV look, labs and septic workup.  Image including CT head and chest.  Results as above.  White count 15.6, left shift, normal kidney function and liver function, lactic acid of 1.7 with a normal procalcitonin.  He was started on broad-spectrum antibiotics.  Viral panel is unremarkable.  CT head chest with bilateral tree-in-bud opacities, possible inflammatory versus infectious in nature which could be causing his hypoxia, respiratory failure.  There is also 2 cylindrical foreign bodies in the stomach of which the patient has no idea of any recent ingestions.  He does some dental work completed about 6 weeks ago which he may have had some ingestions then, certainly could be further addressed with GI consultation, possible EGD for removal.  At this point I do feel the patient would benefit from admission to the hospital for further workup and treatment given his respiratory failure hypoxia and pneumonia and stomach foreign  bodies.  Patient and family were updated to current plan of care, case discussed with hospitalist Dr. Cherry, Dr. Loera.        PROCEDURES:  Procedures    CRITICAL CARE TIME    Total Critical Care time was 0 minutes, excluding separately reportable procedures.   There was a high probability of clinically significant/life threatening deterioration in the patient's condition which required my urgent intervention.      FINAL IMPRESSION      1. Pneumonia of both upper lobes due to infectious organism    2. Acute respiratory failure with hypoxia    3. Altered mental status, unspecified altered mental status type    4. Bandemia          DISPOSITION/PLAN     ED Disposition       ED Disposition   Decision to Admit    Condition   --    Comment   Level of Care: Telemetry [5]   Diagnosis: Pneumonia [[id number]]   Admitting Physician: BRIDGER LOERA [[id number]]   Attending Physician: BRIDGER LOERA [[id number]]   Certification: I Certify That Inpatient Hospital Services Are Medically Necessary For Greater Than 2 Midnights                   Comment: Please note this report has been produced using speech recognition software.      Jose Ron DO  Attending Emergency Physician         Jose Ron DO  06/23/24 [id number]
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
## 2024-06-23 NOTE — ANESTHESIA PREPROCEDURE EVALUATION
Anesthesia Evaluation     Patient summary reviewed and Nursing notes reviewed   NPO Solid Status: > 8 hours  NPO Liquid Status: > 8 hours           Airway   Mallampati: I  TM distance: >3 FB  Neck ROM: full  No difficulty expected  Dental    (+) edentulous    Pulmonary    (+) ,decreased breath sounds  Cardiovascular   Exercise tolerance: good (4-7 METS)    Rhythm: regular  Rate: normal        Neuro/Psych  GI/Hepatic/Renal/Endo      Musculoskeletal     Abdominal    Substance History      OB/GYN          Other                    Anesthesia Plan    ASA 3 - emergent     general     intravenous induction     Anesthetic plan, risks, benefits, and alternatives have been provided, discussed and informed consent has been obtained with: patient.    CODE STATUS:    Code Status (Patient has no pulse and is not breathing): CPR (Attempt to Resuscitate)  Medical Interventions (Patient has pulse or is breathing): Full Support

## 2024-06-24 ENCOUNTER — APPOINTMENT (OUTPATIENT)
Dept: NEUROLOGY | Facility: HOSPITAL | Age: 74
End: 2024-06-24
Payer: MEDICARE

## 2024-06-24 ENCOUNTER — APPOINTMENT (OUTPATIENT)
Dept: GENERAL RADIOLOGY | Facility: HOSPITAL | Age: 74
End: 2024-06-24
Payer: MEDICARE

## 2024-06-24 LAB
ANION GAP SERPL CALCULATED.3IONS-SCNC: 11 MMOL/L (ref 5–15)
BASOPHILS # BLD AUTO: 0.02 10*3/MM3 (ref 0–0.2)
BASOPHILS NFR BLD AUTO: 0.2 % (ref 0–1.5)
BUN SERPL-MCNC: 9 MG/DL (ref 8–23)
BUN/CREAT SERPL: 10.8 (ref 7–25)
CALCIUM SPEC-SCNC: 8.2 MG/DL (ref 8.6–10.5)
CHLORIDE SERPL-SCNC: 101 MMOL/L (ref 98–107)
CO2 SERPL-SCNC: 28 MMOL/L (ref 22–29)
CREAT SERPL-MCNC: 0.83 MG/DL (ref 0.76–1.27)
DEPRECATED RDW RBC AUTO: 46.9 FL (ref 37–54)
EGFRCR SERPLBLD CKD-EPI 2021: 93 ML/MIN/1.73
EOSINOPHIL # BLD AUTO: 0.15 10*3/MM3 (ref 0–0.4)
EOSINOPHIL NFR BLD AUTO: 1.7 % (ref 0.3–6.2)
ERYTHROCYTE [DISTWIDTH] IN BLOOD BY AUTOMATED COUNT: 14.5 % (ref 12.3–15.4)
GLUCOSE BLDC GLUCOMTR-MCNC: 132 MG/DL (ref 70–130)
GLUCOSE BLDC GLUCOMTR-MCNC: 146 MG/DL (ref 70–130)
GLUCOSE BLDC GLUCOMTR-MCNC: 180 MG/DL (ref 70–130)
GLUCOSE BLDC GLUCOMTR-MCNC: 202 MG/DL (ref 70–130)
GLUCOSE SERPL-MCNC: 124 MG/DL (ref 65–99)
HCT VFR BLD AUTO: 35.8 % (ref 37.5–51)
HGB BLD-MCNC: 11.7 G/DL (ref 13–17.7)
IMM GRANULOCYTES # BLD AUTO: 0.02 10*3/MM3 (ref 0–0.05)
IMM GRANULOCYTES NFR BLD AUTO: 0.2 % (ref 0–0.5)
L PNEUMO1 AG UR QL IA: NEGATIVE
LYMPHOCYTES # BLD AUTO: 1.54 10*3/MM3 (ref 0.7–3.1)
LYMPHOCYTES NFR BLD AUTO: 17.1 % (ref 19.6–45.3)
MCH RBC QN AUTO: 29.2 PG (ref 26.6–33)
MCHC RBC AUTO-ENTMCNC: 32.7 G/DL (ref 31.5–35.7)
MCV RBC AUTO: 89.3 FL (ref 79–97)
MONOCYTES # BLD AUTO: 0.64 10*3/MM3 (ref 0.1–0.9)
MONOCYTES NFR BLD AUTO: 7.1 % (ref 5–12)
NEUTROPHILS NFR BLD AUTO: 6.61 10*3/MM3 (ref 1.7–7)
NEUTROPHILS NFR BLD AUTO: 73.7 % (ref 42.7–76)
NRBC BLD AUTO-RTO: 0 /100 WBC (ref 0–0.2)
PLATELET # BLD AUTO: 140 10*3/MM3 (ref 140–450)
PMV BLD AUTO: 11.1 FL (ref 6–12)
POTASSIUM SERPL-SCNC: 3.3 MMOL/L (ref 3.5–5.2)
POTASSIUM SERPL-SCNC: 4 MMOL/L (ref 3.5–5.2)
RBC # BLD AUTO: 4.01 10*6/MM3 (ref 4.14–5.8)
S PNEUM AG SPEC QL LA: NEGATIVE
SODIUM SERPL-SCNC: 140 MMOL/L (ref 136–145)
WBC NRBC COR # BLD AUTO: 8.98 10*3/MM3 (ref 3.4–10.8)

## 2024-06-24 PROCEDURE — 82948 REAGENT STRIP/BLOOD GLUCOSE: CPT

## 2024-06-24 PROCEDURE — 85025 COMPLETE CBC W/AUTO DIFF WBC: CPT | Performed by: INTERNAL MEDICINE

## 2024-06-24 PROCEDURE — 84132 ASSAY OF SERUM POTASSIUM: CPT | Performed by: HOSPITALIST

## 2024-06-24 PROCEDURE — 97162 PT EVAL MOD COMPLEX 30 MIN: CPT

## 2024-06-24 PROCEDURE — 95819 EEG AWAKE AND ASLEEP: CPT

## 2024-06-24 PROCEDURE — 99233 SBSQ HOSP IP/OBS HIGH 50: CPT | Performed by: HOSPITALIST

## 2024-06-24 PROCEDURE — 80048 BASIC METABOLIC PNL TOTAL CA: CPT | Performed by: INTERNAL MEDICINE

## 2024-06-24 PROCEDURE — 25010000002 CEFTRIAXONE PER 250 MG: Performed by: INTERNAL MEDICINE

## 2024-06-24 PROCEDURE — 74018 RADEX ABDOMEN 1 VIEW: CPT

## 2024-06-24 PROCEDURE — 99232 SBSQ HOSP IP/OBS MODERATE 35: CPT | Performed by: NURSE PRACTITIONER

## 2024-06-24 PROCEDURE — 97116 GAIT TRAINING THERAPY: CPT

## 2024-06-24 PROCEDURE — 95819 EEG AWAKE AND ASLEEP: CPT | Performed by: PSYCHIATRY & NEUROLOGY

## 2024-06-24 PROCEDURE — 63710000001 INSULIN LISPRO (HUMAN) PER 5 UNITS: Performed by: INTERNAL MEDICINE

## 2024-06-24 RX ORDER — DIAZEPAM 5 MG/1
5 TABLET ORAL EVERY 6 HOURS PRN
Status: DISCONTINUED | OUTPATIENT
Start: 2024-06-24 | End: 2024-06-27 | Stop reason: HOSPADM

## 2024-06-24 RX ORDER — OXYCODONE HYDROCHLORIDE 15 MG/1
7.5 TABLET ORAL NIGHTLY PRN
Status: DISCONTINUED | OUTPATIENT
Start: 2024-06-24 | End: 2024-06-27 | Stop reason: HOSPADM

## 2024-06-24 RX ORDER — POTASSIUM CHLORIDE 20 MEQ/1
40 TABLET, EXTENDED RELEASE ORAL EVERY 4 HOURS
Status: COMPLETED | OUTPATIENT
Start: 2024-06-24 | End: 2024-06-24

## 2024-06-24 RX ADMIN — HYDROCODONE BITARTRATE AND ACETAMINOPHEN 1 TABLET: 10; 325 TABLET ORAL at 17:03

## 2024-06-24 RX ADMIN — HYDROCHLOROTHIAZIDE 12.5 MG: 25 TABLET ORAL at 09:16

## 2024-06-24 RX ADMIN — INSULIN LISPRO 2 UNITS: 100 INJECTION, SOLUTION INTRAVENOUS; SUBCUTANEOUS at 20:48

## 2024-06-24 RX ADMIN — GABAPENTIN 400 MG: 400 CAPSULE ORAL at 20:48

## 2024-06-24 RX ADMIN — OXYCODONE HYDROCHLORIDE 7.5 MG: 15 TABLET ORAL at 20:48

## 2024-06-24 RX ADMIN — LOSARTAN POTASSIUM 50 MG: 50 TABLET, FILM COATED ORAL at 09:16

## 2024-06-24 RX ADMIN — GABAPENTIN 400 MG: 400 CAPSULE ORAL at 15:10

## 2024-06-24 RX ADMIN — METOPROLOL TARTRATE 50 MG: 25 TABLET, FILM COATED ORAL at 20:48

## 2024-06-24 RX ADMIN — POTASSIUM CHLORIDE 40 MEQ: 1500 TABLET, EXTENDED RELEASE ORAL at 11:24

## 2024-06-24 RX ADMIN — DIAZEPAM 5 MG: 5 TABLET ORAL at 02:34

## 2024-06-24 RX ADMIN — PANTOPRAZOLE SODIUM 40 MG: 40 TABLET, DELAYED RELEASE ORAL at 09:16

## 2024-06-24 RX ADMIN — DIAZEPAM 5 MG: 5 TABLET ORAL at 15:02

## 2024-06-24 RX ADMIN — OXYCODONE HYDROCHLORIDE 7.5 MG: 15 TABLET ORAL at 02:34

## 2024-06-24 RX ADMIN — POTASSIUM CHLORIDE 40 MEQ: 1500 TABLET, EXTENDED RELEASE ORAL at 15:02

## 2024-06-24 RX ADMIN — PANTOPRAZOLE SODIUM 40 MG: 40 TABLET, DELAYED RELEASE ORAL at 17:03

## 2024-06-24 RX ADMIN — ASPIRIN 81 MG: 81 TABLET, COATED ORAL at 09:16

## 2024-06-24 RX ADMIN — METOPROLOL TARTRATE 50 MG: 25 TABLET, FILM COATED ORAL at 09:16

## 2024-06-24 RX ADMIN — GABAPENTIN 400 MG: 400 CAPSULE ORAL at 09:16

## 2024-06-24 RX ADMIN — HYDROCODONE BITARTRATE AND ACETAMINOPHEN 1 TABLET: 10; 325 TABLET ORAL at 09:23

## 2024-06-24 RX ADMIN — SODIUM CHLORIDE 1000 MG: 900 INJECTION INTRAVENOUS at 11:24

## 2024-06-24 RX ADMIN — ROSUVASTATIN CALCIUM 40 MG: 20 TABLET, COATED ORAL at 20:48

## 2024-06-24 RX ADMIN — AZITHROMYCIN DIHYDRATE 250 MG: 250 TABLET ORAL at 09:16

## 2024-06-24 RX ADMIN — INSULIN LISPRO 3 UNITS: 100 INJECTION, SOLUTION INTRAVENOUS; SUBCUTANEOUS at 17:04

## 2024-06-24 NOTE — CASE MANAGEMENT/SOCIAL WORK
Discharge Planning Assessment  [facility]     Patient Name: Behzad Guzman  MRN: [id number]  Today's Date: 6/24/2024    Admit Date: 6/23/2024    Plan: home   Discharge Needs Assessment       Row Name 06/24/24 0902       Living Environment    People in Home spouse    Name(s) of People in Home wife    Current Living Arrangements home    Primary Care Provided by self;spouse/significant other       Transition Planning    Patient/Family Anticipates Transition to home with family       Discharge Needs Assessment    Readmission Within the Last 30 Days no previous admission in last 30 days    Equipment Currently Used at Home none    Concerns to be Addressed basic needs;discharge planning                   Discharge Plan       Row Name 06/24/24 0902       Plan    Plan home    Patient/Family in Agreement with Plan yes    Plan Comments I met with this patient bedside. He lives with his wife in [facility]. He is independent with self care, but needs assistance with all other activities of daily living. He is independent with mobility. PT and OT have been consulted. He anticipates returning home after this hospitalization, and his wife can transport. Case management will follow.    Final Discharge Disposition Code 01 - home or self-care                  Continued Care and Services - Admitted Since 6/23/2024    No active coordination exists for this encounter.       Expected Discharge Date and Time       Expected Discharge Date Expected Discharge Time    Jun 25, 2024            Demographic Summary       Row Name 06/24/24 0901       General Information    General Information Comments I confirmed that Zach Tran is Mr Cesar' PCP and he has [employer] Medicare                   Functional Status       Row Name 06/24/24 0902       Functional Status, IADL    Medications independent    Meal Preparation completely dependent    Housekeeping completely dependent    Laundry completely dependent    Shopping completely dependent                    Psychosocial    No documentation.                  Abuse/Neglect    No documentation.                  Legal    No documentation.                  Substance Abuse    No documentation.                  Patient Forms    No documentation.                     Tasia Cline RN
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
## 2024-06-24 NOTE — PLAN OF CARE
Goal Outcome Evaluation:         VSS, alert oriented x4, Room air  Full liquid diet, tolerating well  1 large BM  Adequate uop  Prn meds for chronic pain   Ambulated in vargas with PT .   JUAN in am to eval foreign body

## 2024-06-24 NOTE — THERAPY EVALUATION
Patient Name: Behzad Guzman  : 1950    MRN: [id number]                              Today's Date: 2024       Admit Date: 2024    Visit Dx:     ICD-10-CM ICD-9-CM   1. Pneumonia of both upper lobes due to infectious organism  J18.9 486   2. Acute respiratory failure with hypoxia  J96.01 518.81   3. Altered mental status, unspecified altered mental status type  R41.82 780.97   4. Bandemia  D72.825 288.66   5. Foreign body in stomach, initial encounter  T18.2XXA 935.2     E915   6. Mucosal abnormality of esophagus  K22.89 530.9     Patient Active Problem List   Diagnosis    Chronic pain in left shoulder    Chronic hip pain, left    Neck pain, chronic    Leg length discrepancy    Panic attacks    Asthma    Spinal stenosis    Neuropathy    Hypertension    Hyperlipidemia    Congestion of nasal sinus    Multiple thyroid nodules    Bilateral carpal tunnel syndrome    Trigger middle finger    Trigger finger, right    History of non-ST elevation myocardial infarction (NSTEMI)    Pneumonia    Foreign body in stomach    T2DM (type 2 diabetes mellitus)     Past Medical History:   Diagnosis Date    Asthma     Chronic hip pain, left     Chronic pain in left shoulder     Hyperlipidemia     Hypertension     Infected tick bite 2021    Leg length discrepancy     Neck pain, chronic     Neuropathy     Panic attacks     Pre-diabetes     Prediabetes     Spinal stenosis      Past Surgical History:   Procedure Laterality Date    ENDOSCOPY WITH FOREIGN BODY REMOVAL N/A 2024    Procedure: ESOPHAGOGASTRODUODENOSCOPY WITH FOREIGN BODY REMOVAL WITH FLOROSCOPY;  Surgeon: Adan Huston MD;  Location: [facility];  Service: Gastroenterology;  Laterality: N/A;    LEG SURGERY      ROTATOR CUFF REPAIR Right     SHOULDER SURGERY Left     WRIST SURGERY Left       General Information       Row Name 24 1128          Physical Therapy Time and Intention    Document Type evaluation  -AB     Mode of Treatment  physical therapy  -AB       Row Name 06/24/24 1128          General Information    Patient Profile Reviewed yes  -AB     Prior Level of Function independent:;all household mobility;community mobility;gait;transfer;bed mobility;ADL's  Denies AD use. Reports intermittent instability at home.  -AB     Existing Precautions/Restrictions fall;other (see comments)  confusion  -AB     Barriers to Rehab medically complex  -AB       Row Name 06/24/24 1128          Living Environment    People in Home spouse  -AB       Row Name 06/24/24 1128          Home Main Entrance    Number of Stairs, Main Entrance two  -AB     Stair Railings, Main Entrance none  -AB       Row Name 06/24/24 1128          Stairs Within Home, Primary    Stairs, Within Home, Primary 2 steps into main living space and 1 step into bathroom  -AB     Number of Stairs, Within Home, Primary three  -AB     Stair Railings, Within Home, Primary none  -AB       Row Name 06/24/24 1128          Cognition    Orientation Status (Cognition) oriented x 3  -AB       Row Name 06/24/24 1128          Safety Issues, Functional Mobility    Safety Issues Affecting Function (Mobility) awareness of need for assistance;impulsivity;insight into deficits/self-awareness;sequencing abilities;safety precautions follow-through/compliance;safety precaution awareness;judgment;problem-solving  -AB     Impairments Affecting Function (Mobility) balance;endurance/activity tolerance;strength;pain  -AB     Comment, Safety Issues/Impairments (Mobility) Required consistent cues for improved safety awareness  -AB               User Key  (r) = Recorded By, (t) = Taken By, (c) = Cosigned By      Initials Name Provider Type    AB Leanne Cai PT Physical Therapist                   Mobility       Row Name 06/24/24 1131          Bed Mobility    Bed Mobility supine-sit;scooting/bridging  -AB     Scooting/Bridging [place] (Bed Mobility) standby assist  -AB     Supine-Sit [place] (Bed  Mobility) standby assist  -AB     Assistive Device (Bed Mobility) head of bed elevated  -AB     Comment, (Bed Mobility) no issues noted  -AB       Row Name 06/24/24 1131          Transfers    Comment, (Transfers) Cues for hand placement, sequencing, and improved safety awareness. Pt impulsive with mobility and requires cues to wait for therapist. No AD used  -AB       Row Name 06/24/24 1131          Sit-Stand Transfer    Sit-Stand [place] (Transfers) contact guard;1 person assist;verbal cues  -AB       Row Name 06/24/24 1131          Gait/Stairs (Locomotion)    [place] Level (Gait) contact guard;1 person assist;verbal cues  -AB     Patient was able to Ambulate yes  -AB     Distance in Feet (Gait) 160  -AB     Deviations/Abnormal Patterns (Gait) bilateral deviations;patti decreased;gait speed decreased;stride length decreased  -AB     Bilateral Gait Deviations heel strike decreased  -AB     [place] Level (Stairs) unable to assess  -AB     Comment, (Gait/Stairs) Pt ambulated with step through gait pattern and slowed patti. Cues for upright posture and focusing on task. Pt is easily distracted but able to be re-directed. No overt LOB. Increased postural sway noted without LOB. Further activity limited by fatigue.  -AB               User Key  (r) = Recorded By, (t) = Taken By, (c) = Cosigned By      Initials Name Provider Type    AB Leanne Cai, PT Physical Therapist                   Obj/Interventions       Row Name 06/24/24 1133          Range of Motion Comprehensive    General Range of Motion bilateral lower extremity ROM WFL  -AB       Row Name 06/24/24 1133          Strength Comprehensive (MMT)    General Manual Muscle Testing (MMT) Assessment lower extremity strength deficits identified  -AB     Comment, General Manual Muscle Testing (MMT) Assessment BLE grossly 4-/5  -AB       Row Name 06/24/24 1133          Balance    Balance Assessment sitting static balance;sitting dynamic  balance;standing static balance;standing dynamic balance  -AB     Static Sitting Balance standby assist  -AB     Dynamic Sitting Balance standby assist  -AB     Position, Sitting Balance unsupported;sitting edge of bed  -AB     Static Standing Balance contact guard  -AB     Dynamic Standing Balance contact guard;1-person assist;verbal cues  -AB     Position/Device Used, Standing Balance unsupported  -AB     Balance Interventions sitting;standing;sit to stand;static;dynamic;occupation based/functional task  -AB     Comment, Balance No overt LOB. Intermittent unsteadiness noted with pt able to self correct.  -AB       Row Name 06/24/24 1133          Sensory Assessment (Somatosensory)    Sensory Assessment (Somatosensory) LE sensation intact;other (see comments)  Pt reports intermittent burning sensation but otherwise able to detect light touch  -AB               User Key  (r) = Recorded By, (t) = Taken By, (c) = Cosigned By      Initials Name Provider Type    AB Leanne Cai, PT Physical Therapist                   Goals/Plan       Row Name 06/24/24 1143          Bed Mobility Goal 1 (PT)    Activity/Assistive Device (Bed Mobility Goal 1, PT) sit to supine;supine to sit  -AB     [place] Level/Cues Needed (Bed Mobility Goal 1, PT) independent  -AB     Time Frame (Bed Mobility Goal 1, PT) short term goal (STG);5 days  -AB       Row Name 06/24/24 1143          Transfer Goal 1 (PT)    Activity/Assistive Device (Transfer Goal 1, PT) sit-to-stand/stand-to-sit;bed-to-chair/chair-to-bed  -AB     [place] Level/Cues Needed (Transfer Goal 1, PT) independent  -AB     Time Frame (Transfer Goal 1, PT) long term goal (LTG);10 days  -AB       Row Name 06/24/24 1143          Gait Training Goal 1 (PT)    Activity/Assistive Device (Gait Training Goal 1, PT) gait (walking locomotion)  -AB     [place] Level (Gait Training Goal 1, PT) standby assist  -AB     Distance (Gait Training Goal 1, PT) 350  -AB     Time Frame (Gait  Training Goal 1, PT) long term goal (LTG);10 days  -AB       Row Name 06/24/24 1143          Stairs Goal 1 (PT)    Activity/Assistive Device (Stairs Goal 1, PT) ascending stairs;descending stairs  -AB     [place] Level/Cues Needed (Stairs Goal 1, PT) contact guard required  -AB     Number of Stairs (Stairs Goal 1, PT) 3  -AB     Time Frame (Stairs Goal 1, PT) long term goal (LTG);10 days  -AB       Row Name 06/24/24 1143          Therapy Assessment/Plan (PT)    Planned Therapy Interventions (PT) balance training;bed mobility training;gait training;home exercise program;patient/family education;postural re-education;transfer training;stretching;strengthening;stair training;ROM (range of motion)  -AB               User Key  (r) = Recorded By, (t) = Taken By, (c) = Cosigned By      Initials Name Provider Type    AB Leanne Cai, PT Physical Therapist                   Clinical Impression       Row Name 06/24/24 1135          Pain    Additional Documentation Pain Scale: FACES Pre/Post-Treatment (Group)  -AB       [facility] Name 06/24/24 1135          Pain Scale: FACES Pre/Post-Treatment    Pain: FACES Scale, Pretreatment 2-->hurts little bit  -AB     Posttreatment Pain Rating 4-->hurts little more  -AB     Pain Location - Side/Orientation Right  -AB     Pain Location lower  -AB     Pain Location - extremity  -AB     Pre/Posttreatment Pain Comment Pt reports pain in shin area of R LE. RN aware.  -AB       Row Name 06/24/24 1131          Plan of Care Review    Plan of Care Reviewed With patient  -AB     Progress no change  -AB     Outcome Evaluation PT initial eval completed. Pt presents below his functional baseline with weakness, impaired balance, and decreased activity tolerance. Consistent cues required for improved safety awareness. Ambulation of 160' with CGA and no AD was well tolerated. Further IPPT is warrented. PT rec d/c home with 24/7 assist and HHPT when medically appropriate.  -AB       Row Name 06/24/24  1135          Therapy Assessment/Plan (PT)    Patient/Family Therapy Goals Statement (PT) Go home  -AB     Rehab Potential (PT) good, to achieve stated therapy goals  -AB     Criteria for Skilled Interventions Met (PT) yes;meets criteria;skilled treatment is necessary  -AB     Therapy Frequency (PT) daily  -AB     Predicted Duration of Therapy Intervention (PT) 10 days  -AB       Row Name 06/24/24 1135          Vital Signs    Pre Systolic BP Rehab 126  -AB     Pre Treatment Diastolic BP 77  -AB     Post Systolic BP Rehab 138  -AB     Post Treatment Diastolic BP 83  -AB     Pretreatment Heart Rate (beats/min) 76  -AB     Posttreatment Heart Rate (beats/min) 75  -AB     O2 Delivery Pre Treatment room air  -AB     O2 Delivery Intra Treatment room air  -AB     Post SpO2 (%) 93  -AB     O2 Delivery Post Treatment room air  -AB     Pre Patient Position Supine  -AB     Intra Patient Position Standing  -AB     Post Patient Position Sitting  -AB       Row Name 06/24/24 1135          Positioning and Restraints    Pre-Treatment Position in bed  -AB     Post Treatment Position chair  -AB     In Chair notified nsg;reclined;sitting;call light within reach;encouraged to call for assist;exit alarm on;legs elevated;waffle cushion;with nsg  -AB               User Key  (r) = Recorded By, (t) = Taken By, (c) = Cosigned By      Initials Name Provider Type    AB Leanne Cai, PT Physical Therapist                   Outcome Measures       Row Name 06/24/24 1144          How much help from another person do you currently need...    Turning from your back to your side while in flat bed without using bedrails? 4  -AB     Moving from lying on back to sitting on the side of a flat bed without bedrails? 3  -AB     Moving to and from a bed to a chair (including a wheelchair)? 3  -AB     Standing up from a chair using your arms (e.g., wheelchair, bedside chair)? 3  -AB     Climbing 3-5 steps with a railing? 3  -AB     To walk in hospital room?  3  -AB     AM-PAC 6 Clicks Score (PT) 19  -AB     Highest Level of Mobility Goal 6 --> Walk 10 steps or more  -AB       Row Name 06/24/24 1144          Functional Assessment    Outcome Measure Options AM-PAC 6 Clicks Basic Mobility (PT)  -AB               User Key  (r) = Recorded By, (t) = Taken By, (c) = Cosigned By      Initials Name Provider Type    AB Leanne Cai PT Physical Therapist                                 Physical Therapy Education       Title: PT OT SLP Therapies (In Progress)       Topic: Physical Therapy (In Progress)       Point: Mobility training (Done)       Learning Progress Summary             Patient Acceptance, E,D, VU,NR by AB at 6/24/2024 1144                         Point: Home exercise program (Not Started)       Learner Progress:  Not documented in this visit.              Point: Body mechanics (Done)       Learning Progress Summary             Patient Acceptance, E,D, VU,NR by AB at 6/24/2024 1144                         Point: Precautions (Done)       Learning Progress Summary             Patient Acceptance, E,D, VU,NR by AB at 6/24/2024 1144                                         User Key       Initials Effective Dates Name Provider Type Discipline    AB 09/22/22 -  Leanne Cai PT Physical Therapist PT                  PT Recommendation and Plan  Planned Therapy Interventions (PT): balance training, bed mobility training, gait training, home exercise program, patient/family education, postural re-education, transfer training, stretching, strengthening, stair training, ROM (range of motion)  Plan of Care Reviewed With: patient  Progress: no change  Outcome Evaluation: PT initial eval completed. Pt presents below his functional baseline with weakness, impaired balance, and decreased activity tolerance. Consistent cues required for improved safety awareness. Ambulation of 160' with CGA and no AD was well tolerated. Further IPPT is warrented. PT rec d/c home with 24/7 assist  and HHPT when medically appropriate.     Time Calculation:   PT Evaluation Complexity  History, PT Evaluation Complexity: 1-2 personal factors and/or comorbidities  Examination of Body Systems (PT Eval Complexity): total of 3 or more elements  Clinical Presentation (PT Evaluation Complexity): evolving  Clinical Decision Making (PT Evaluation Complexity): moderate complexity  Overall Complexity (PT Evaluation Complexity): moderate complexity     PT Charges       Row Name 06/24/24 1145             Time Calculation    Start Time 1049  -AB      PT Received On 06/24/24  -AB      PT Goal Re-Cert Due Date 07/04/24  -AB         Timed Charges    31628 - Gait Training Minutes  8  -AB         Untimed Charges    PT Eval/Re-eval Minutes 50  -AB         Total Minutes    Timed Charges Total Minutes 8  -AB      Untimed Charges Total Minutes 50  -AB       Total Minutes 58  -AB                User Key  (r) = Recorded By, (t) = Taken By, (c) = Cosigned By      Initials Name Provider Type    AB Leanne Cai, PT Physical Therapist                  Therapy Charges for Today       Code Description Service Date Service Provider Modifiers Qty    30353540912 HC GAIT TRAINING EA 15 MIN 6/24/2024 Leanne Cai, PT GP 1    09057200565 HC PT EVAL MOD COMPLEXITY 4 6/24/2024 Leanne Cai, PT GP 1            PT G-Codes  Outcome Measure Options: AM-PAC 6 Clicks Basic Mobility (PT)  AM-PAC 6 Clicks Score (PT): 19  PT Discharge Summary  Anticipated Discharge Disposition (PT): home with 24/7 care, home with home health    Leanne Cai, PT  6/24/2024
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
## 2024-06-24 NOTE — PLAN OF CARE
Goal Outcome Evaluation:  Plan of Care Reviewed With: patient        Progress: no change  Outcome Evaluation: PT initial eval completed. Pt presents below his functional baseline with weakness, impaired balance, and decreased activity tolerance. Consistent cues required for improved safety awareness. Ambulation of 160' with CGA and no AD was well tolerated. Further IPPT is warrented. PT rec d/c home with 24/7 assist and HHPT when medically appropriate.      Anticipated Discharge Disposition (PT): home with 24/7 care, home with home health

## 2024-06-24 NOTE — PAYOR COMM NOTE
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     Prediabetes     Spinal stenosis          SURGICAL HISTORY       Past Surgical History:   Procedure Laterality Date    LEG SURGERY      ROTATOR CUFF REPAIR Right     SHOULDER SURGERY Left     WRIST SURGERY Left          CURRENT MEDICATIONS       Current Facility-Administered Medications:     sepsis fluid NS 0.9 % bolus 2,550 mL, 30 mL/kg, Intravenous, Once, Jose Ron DO, 2,550 mL at 06/23/24 1214    sodium chloride 0.9 % flush 10 mL, 10 mL, Intravenous, PRN, Jose Ron DO    vancomycin IVPB 1750 mg in 0.9% Sodium Chloride (premix) 500 mL, 20 mg/kg, Intravenous, Once, Jose Ron DO, Last Rate: 285.7 mL/hr at 06/23/24 1213, 1,750 mg at 06/23/24 1213    Current Outpatient Medications:     albuterol (ACCUNEB) 0.63 MG/3ML nebulizer solution, Take 3 mL by nebulization Every 6 (Six) Hours As Needed for Wheezing., Disp: 3 mL, Rfl: 12    albuterol sulfate  (90 Base) MCG/ACT inhaler, INHALE TWO PUFFS BY MOUTH EVERY 6 HOURS AS NEEDED FOR WHEEZING OR SHORTNESS OF AIR, Disp: 6.7 g, Rfl: 1    " "Alcaftadine (Lastacaft) 0.25 % solution, Apply 1 drop to eye(s) as directed by provider 1 (One) Time., Disp: , Rfl:     aspirin 81 MG EC tablet, Take 1 tablet by mouth Daily. (Patient not taking: Reported on 6/19/2024), Disp: , Rfl:     azelastine (ASTELIN) 0.1 % nasal spray, 2 sprays into the nostril(s) as directed by provider 2 (Two) Times a Day. Use in each nostril as directed, Disp: , Rfl:     clopidogrel (PLAVIX) 75 MG tablet, Take 1 tablet by mouth Daily., Disp: 30 tablet, Rfl: 11    diazePAM (VALIUM) 5 MG tablet, Take 1 tablet by mouth Every 6 (Six) Hours As Needed., Disp: , Rfl: 0    fexofenadine (ALLEGRA) 180 MG tablet, Take 1 tablet by mouth Daily., Disp: , Rfl:     fluticasone (Flonase) 50 MCG/ACT nasal spray, 2 sprays into the nostril(s) as directed by provider Daily. 2 puffs each nostril, Disp: 18.2 mL, Rfl: 0    gabapentin (NEURONTIN) 400 MG capsule, Take 1 capsule by mouth 3 (Three) Times a Day., Disp: , Rfl:     glucose monitor monitoring kit, 1 each Daily., Disp: 1 each, Rfl: 0    HYDROcodone-acetaminophen (NORCO)  MG per tablet, Take 1 tablet by mouth Every 6 (Six) Hours As Needed for Moderate Pain., Disp: , Rfl:     Lancets (onetouch ultrasoft) lancets, Use one daily to test blood sugars, Disp: 100 each, Rfl: 12    losartan-hydrochlorothiazide (HYZAAR) 50-12.5 MG per tablet, TAKE ONE TABLET BY MOUTH DAILY, Disp: 90 tablet, Rfl: 1    metFORMIN (GLUCOPHAGE) 500 MG tablet, TAKE 1 TABLET BY MOUTH DAILY WITH BREAKFAST (Patient taking differently: Take 1 tablet by mouth 2 (Two) Times a Day With Meals.), Disp: 180 tablet, Rfl: 0    metoprolol tartrate (LOPRESSOR) 50 MG tablet, TAKE 1 TABLET BY MOUTH TWICE A DAY, Disp: 180 tablet, Rfl: 0    Needle, Disp, 22G X 1\" misc, 1 each Every 14 (Fourteen) Days., Disp: 100 each, Rfl: 0    OneTouch Verio test strip, 1 each by Other route Daily. for testing, Disp: 300 each, Rfl: 2    oxyCODONE (ROXICODONE) 15 MG immediate release tablet, TAKE 1/2 A TABLET BY " "MOUTH AT BEDTIME AND 1/2 TABLET IN MIDDLE OF NIGHT AS NEEDED AS DIRECTED, Disp: , Rfl:     pantoprazole (PROTONIX) 40 MG EC tablet, Take 1 tablet by mouth Daily., Disp: , Rfl:     rosuvastatin (CRESTOR) 40 MG tablet, Take 1 tablet by mouth Daily., Disp: , Rfl:     Syringe/Needle, Disp, (B-D 3CC LUER-YAA SYR 23GX1\") 23G X 1\" 3 ML misc, USE 1 SYRINGE EVERY 21 DAYS TO TAKE TESTOSTERONE, Disp: 12 each, Rfl: 1    Testosterone Enanthate 200 MG/ML solution, Inject 200 mg into the appropriate muscle as directed by prescriber Every 14 (Fourteen) Days., Disp: 5 mL, Rfl: 1    vitamin D (ERGOCALCIFEROL) 1.25 MG (29229 UT) capsule capsule, TAKE 1 CAPSULE BY MOUTH ONCE WEEKLY FOR 12 DOSES, Disp: 12 capsule, Rfl: 0    ALLERGIES     Peanut butter flavor, Shellfish allergy, Amoxicillin, Penicillins, Sulfa antibiotics, Doxycycline, Latex, and Pregabalin    FAMILY HISTORY       Family History   Problem Relation Age of Onset    Heart attack Father 65    Other Mother         accident    No Known Problems Maternal Grandmother     No Known Problems Maternal Grandfather     No Known Problems Paternal Grandmother     Stroke Paternal Grandfather           SOCIAL HISTORY       Social History     Socioeconomic History    Marital status:    Tobacco Use    Smoking status: Former     Current packs/day: 0.00     Average packs/day: 0.5 packs/day for 10.0 years (5.0 ttl pk-yrs)     Types: Cigarettes     Start date:      Quit date:      Years since quittin.5    Smokeless tobacco: Never   Vaping Use    Vaping status: Never Used   Substance and Sexual Activity    Alcohol use: No    Drug use: No    Sexual activity: Defer         PHYSICAL EXAM    (up to 7 for level 4, 8 or more for level 5)     Vitals:    24 1121 24 1124 24 1200 24 1229   BP:   97/68 103/66   BP Location:       Patient Position:       Pulse: 94  90 85   Resp:       Temp:  98.8 °F (37.1 °C)     TempSrc:  Oral     SpO2: (!) 87%  94% 95%   Weight: "       Height:           Physical Exam  General : Patient is awake, conversational but telling stories about events which he thinks are current but have been a year or 2 old per the wife, does seem slightly altered  HEENT: Pupils are equally round, EOMI, conjunctivae clear  Neck: Neck is supple, full range of motion, trachea midline  Cardiac: Heart tachycardic rate with regularrhythm, no murmurs, rubs, or gallops  Lungs: Lungs with decreased breath sounds bilaterally, slight cough, very faint expiratory wheezes.  Chest wall: There is no tenderness to palpation over the chest wall or over ribs  Abdomen: Abdomen is soft, nontender, nondistended. There are no firm or pulsatile masses, no rebound rigidity or guarding  Musculoskeletal: 5 out of 5 strength in all 4 extremities.  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  No large or palpable abscess.  skin is warm and dry        DIAGNOSTIC RESULTS     EKG:  All EKGs are interpreted by the Emergency Department Physician who either signs or Co-signs this chart in the absence of a cardiologist.    No orders to display       RADIOLOGY:     [x] Radiologist's Report Reviewed:  CT Head Without Contrast   Final Result   1.No acute intracranial abnormality is identified.   2.Findings compatible with chronic microvascular ischemic change.   3.Scattered paranasal sinus mucosal thickening with mild fluid in the left maxillary sinus. Please correlate clinically  for acute sinusitis.         Electronically Signed: Arley Miguel MD     6/23/2024 11:04 AM EDT     Workstation ID: [id number]      CT Chest Without Contrast Diagnostic   Final Result   Impression:   1. Multifocal tree-in-bud and scattered peribronchovascular groundglass opacities bilaterally most pronounced in the right upper lobe compatible with endobronchial infection or inflammation. No consolidation.   2. Two cylindrical radiodense foreign bodies in stomach at the gastric antrum/pylorus measuring 2.3 x 0.6 cm, correlate for foreign body ingestion.   3. Coronary artery calcifications and additional chronic findings above.            Electronically Signed: Jame Garcia MD     6/23/2024 11:21 AM EDT     Workstation ID: [id number]      XR Chest 1 View   Final Result   No radiographic findings of acute cardiopulmonary abnormality.         Electronically Signed: Raffy Granados     6/23/2024 10:25 AM EDT     Workstation ID: [id number]      XR Abdomen KUB    (Results Pending)       I ordered and independently reviewed the above noted radiographic studies.      I viewed images of chest x-ray which showed no acute cardiopulmonary process per my independent interpretation.    See radiologist's dictation for official interpretation.      ED BEDSIDE ULTRASOUND:   Performed by ED Physician - none    LABS:    I have reviewed and interpreted all of the currently available lab results from this visit (if applicable):  Results for orders placed or performed during the hospital encounter of 06/23/24   Respiratory Panel PCR w/COVID-19(SARS-CoV-2) KATHY/MARILY/DONATO/PAD/COR/KRISTOPHER In-House, NP Swab in UTM/VTM, 2 HR TAT - Swab, Nasopharynx    Specimen: Nasopharynx; Swab   Result Value Ref Range    ADENOVIRUS, PCR Not Detected Not Detected    Coronavirus 229E Not Detected Not Detected    Coronavirus HKU1 Not Detected Not Detected    Coronavirus NL63 Not Detected Not Detected    Coronavirus OC43 Not Detected Not Detected    COVID19 Not Detected Not  Detected - Ref. Range    Human Metapneumovirus Not Detected Not Detected    Human Rhinovirus/Enterovirus Not Detected Not Detected    Influenza A PCR Not Detected Not Detected    Influenza B PCR Not Detected Not Detected    Parainfluenza Virus 1 Not Detected Not Detected    Parainfluenza Virus 2 Not Detected Not Detected    Parainfluenza Virus 3 Not Detected Not Detected    Parainfluenza Virus 4 Not Detected Not Detected    RSV, PCR Not Detected Not Detected    Bordetella pertussis pcr Not Detected Not Detected    Bordetella parapertussis PCR Not Detected Not Detected    Chlamydophila pneumoniae PCR Not Detected Not Detected    Mycoplasma pneumo by PCR Not Detected Not Detected   Lactic Acid, Plasma    Specimen: Blood   Result Value Ref Range    Lactate 1.7 0.5 - 2.0 mmol/L   Procalcitonin    Specimen: Blood   Result Value Ref Range    Procalcitonin 0.21 0.00 - 0.25 ng/mL   Comprehensive Metabolic Panel    Specimen: Blood   Result Value Ref Range    Glucose 186 (H) 65 - 99 mg/dL    BUN 15 8 - 23 mg/dL    Creatinine 1.24 0.76 - 1.27 mg/dL    Sodium 135 (L) 136 - 145 mmol/L    Potassium 3.6 3.5 - 5.2 mmol/L    Chloride 95 (L) 98 - 107 mmol/L    CO2 30.0 (H) 22.0 - 29.0 mmol/L    Calcium 9.9 8.6 - 10.5 mg/dL    Total Protein 6.6 6.0 - 8.5 g/dL    Albumin 4.3 3.5 - 5.2 g/dL    ALT (SGPT) 11 1 - 41 U/L    AST (SGOT) 23 1 - 40 U/L    Alkaline Phosphatase 50 39 - 117 U/L    Total Bilirubin 0.8 0.0 - 1.2 mg/dL    Globulin 2.3 gm/dL    A/G Ratio 1.9 g/dL    BUN/Creatinine Ratio 12.1 7.0 - 25.0    Anion Gap 10.0 5.0 - 15.0 mmol/L    eGFR 61.8 >60.0 mL/min/1.73   Urinalysis With Microscopic If Indicated (No Culture) - Straight Cath    Specimen: Straight Cath; Urine   Result Value Ref Range    Color, UA Yellow Yellow, Straw    Appearance, UA Clear Clear    pH, UA 7.5 5.0 - 8.0    Specific Gravity, UA 1.019 1.001 - 1.030    Glucose, UA Negative Negative    Ketones, UA Trace (A) Negative    Bilirubin, UA Negative Negative     Blood, UA Negative Negative    Protein, UA Negative Negative    Leuk Esterase, UA Negative Negative    Nitrite, UA Negative Negative    Urobilinogen, UA 1.0 E.U./dL 0.2 - 1.0 E.U./dL   CBC Auto Differential    Specimen: Blood   Result Value Ref Range    WBC 15.62 (H) 3.40 - 10.80 10*3/mm3    RBC 4.79 4.14 - 5.80 10*6/mm3    Hemoglobin 14.3 13.0 - 17.7 g/dL    Hematocrit 43.1 37.5 - 51.0 %    MCV 90.0 79.0 - 97.0 fL    MCH 29.9 26.6 - 33.0 pg    MCHC 33.2 31.5 - 35.7 g/dL    RDW 14.4 12.3 - 15.4 %    RDW-SD 48.0 37.0 - 54.0 fl    MPV 11.1 6.0 - 12.0 fL    Platelets 192 140 - 450 10*3/mm3    Neutrophil % 86.7 (H) 42.7 - 76.0 %    Lymphocyte % 5.2 (L) 19.6 - 45.3 %    Monocyte % 7.2 5.0 - 12.0 %    Eosinophil % 0.1 (L) 0.3 - 6.2 %    Basophil % 0.2 0.0 - 1.5 %    Immature Grans % 0.6 (H) 0.0 - 0.5 %    Neutrophils, Absolute 13.52 (H) 1.70 - 7.00 10*3/mm3    Lymphocytes, Absolute 0.82 0.70 - 3.10 10*3/mm3    Monocytes, Absolute 1.13 (H) 0.10 - 0.90 10*3/mm3    Eosinophils, Absolute 0.02 0.00 - 0.40 10*3/mm3    Basophils, Absolute 0.03 0.00 - 0.20 10*3/mm3    Immature Grans, Absolute 0.10 (H) 0.00 - 0.05 10*3/mm3    nRBC 0.0 0.0 - 0.2 /100 WBC   Green Top (Gel)   Result Value Ref Range    Extra Tube Hold for add-ons.    Lavender Top   Result Value Ref Range    Extra Tube hold for add-on    Gold Top - SST   Result Value Ref Range    Extra Tube Hold for add-ons.    Gray Top   Result Value Ref Range    Extra Tube Hold for add-ons.    Light Blue Top   Result Value Ref Range    Extra Tube Hold for add-ons.         If labs were ordered, I independently reviewed the results and considered them in treating the patient.      EMERGENCY DEPARTMENT COURSE and DIFFERENTIAL DIAGNOSIS/MDM:   Vitals:  AS OF 13:01 EDT    BP - 103/66  HR - 85  TEMP - 98.8 °F (37.1 °C) (Oral)  O2 SATS - 95%      Orders placed during this visit:  Orders Placed This Encounter   Procedures    COVID PRE-OP / PRE-PROCEDURE SCREENING ORDER (NO ISOLATION) - Swab,  Nasopharynx    Blood Culture - Blood,    Blood Culture - Blood,    Respiratory Panel PCR w/COVID-19(SARS-CoV-2) KATHY/MARILY/DONATO/PAD/COR/KRISTOPHER In-House, NP Swab in UTM/VTM, 2 HR TAT - Swab, Nasopharynx    XR Chest 1 View    CT Head Without Contrast    CT Chest Without Contrast Diagnostic    XR Abdomen KUB    Lactic Acid, Plasma    Procalcitonin    [place] Draw    Comprehensive Metabolic Panel    Urinalysis With Microscopic If Indicated (No Culture) - Urine, Catheter    CBC Auto Differential    Diet: Regular/House; Fluid Consistency: Thin (IDDSI 0)    Continuous Pulse Oximetry    Vital Signs    Oxygen Therapy- Nasal Cannula; Titrate 1-6 LPM Per SpO2; 90 - 95%    POC Glucose Once    Insert Peripheral IV    Initiate Observation Status    Inpatient Admission    ED Bed Request    Fall Precautions    CBC & Differential    Green Top (Gel)    Lavender Top    Gold Top - SST    Gray Top    Light Blue Top       All labs have been independently reviewed by me.  All radiology studies have been reviewed by me and the radiologist dictating the report.  All EKG's have been independently viewed and interpreted by me.      Discussion below represents my analysis of pertinent findings related to patient's condition, differential diagnosis, treatment plan and final disposition.    Differential diagnosis:  The differential diagnosis associated with the patient's presentation includes: Sepsis, pneumonia, encephalopathy, cellulitis    Additional sources  Discussed/ obtained information from independent historians:   [x] Spouse, wife at bedside  [] Parent  [] Family member  [] Friend  [] EMS   [] Other:    External (non-ED) record review:   [] Inpatient record:   [] Office record:   [] Outpatient record:   [] Prior Outpatient labs:   [] Prior Outpatient radiology:   [] Primary Care record:   [] Outside ED record:   [] Other:     Patient's care impacted by:   [] Diabetes  [] Hypertension  [] CHF  [] Hyperlipidemia  [] Coronary Artery Disease   []  COPD   [] Cancer   [] Tobacco Abuse   [] Substance Abuse    [] Other:     Care significantly affected by Social Determinants of Health (housing and economic circumstances, unemployment)    [] Yes     [x] No   If yes, Patient's care significantly limited by Social Determinants of Health including:   [] Inadequate housing   [] Low income   [] Alcoholism and drug addiction in family   [] Problems related to primary support group   [] Unemployment   [] Problems related to employment   [] Other Social Determinants of Health:       MEDICATIONS ADMINISTERED IN ED:  Medications   sodium chloride 0.9 % flush 10 mL (has no administration in time range)   vancomycin IVPB 1750 mg in 0.9% Sodium Chloride (premix) 500 mL (1,750 mg Intravenous New Bag 6/23/24 1213)   sepsis fluid NS 0.9 % bolus 2,550 mL (2,550 mL Intravenous New Bag 6/23/24 1214)   acetaminophen (TYLENOL) tablet 1,000 mg (1,000 mg Oral Given 6/23/24 1022)   cefTRIAXone (ROCEPHIN) 1,000 mg in sodium chloride 0.9 % 100 mL MBP (0 mg Intravenous Stopped 6/23/24 1238)   ketorolac (TORADOL) injection 15 mg (15 mg Intravenous Given 6/23/24 1215)   oxyCODONE-acetaminophen (PERCOCET) 5-325 MG per tablet 1 tablet (1 tablet Oral Given 6/23/24 1217)              Is a pleasant 73-year-old male who was had intermittent fever, altered state over the last few days.  He is awake and alert really not making sense with his story storm initial discussion, wife is having to interject to correct the patient.  He has a temperature of 101.6 upon arrival, he is mildly tachycardic, is had a cough and congestion as well.  We did initiate IV look, labs and septic workup.  Image including CT head and chest.  Results as above.  White count 15.6, left shift, normal kidney function and liver function, lactic acid of 1.7 with a normal procalcitonin.  He was started on broad-spectrum antibiotics.  Viral panel is unremarkable.  CT head chest with bilateral tree-in-bud opacities, possible  inflammatory versus infectious in nature which could be causing his hypoxia, respiratory failure.  There is also 2 cylindrical foreign bodies in the stomach of which the patient has no idea of any recent ingestions.  He does some dental work completed about 6 weeks ago which he may have had some ingestions then, certainly could be further addressed with GI consultation, possible EGD for removal.  At this point I do feel the patient would benefit from admission to the hospital for further workup and treatment given his respiratory failure hypoxia and pneumonia and stomach foreign bodies.  Patient and family were updated to current plan of care, case discussed with hospitalist Dr. Cherry, Dr. Loera.        PROCEDURES:  Procedures    CRITICAL CARE TIME    Total Critical Care time was 0 minutes, excluding separately reportable procedures.   There was a high probability of clinically significant/life threatening deterioration in the patient's condition which required my urgent intervention.      FINAL IMPRESSION      1. Pneumonia of both upper lobes due to infectious organism    2. Acute respiratory failure with hypoxia    3. Altered mental status, unspecified altered mental status type    4. Bandemia          DISPOSITION/PLAN     ED Disposition       ED Disposition   Decision to Admit    Condition   --    Comment   Level of Care: Telemetry [5]   Diagnosis: Pneumonia [423520]   Admitting Physician: BRIDGER LOERA [[id number]]   Attending Physician: BRIDGER LOERA [[id number]]   Certification: I Certify That Inpatient Hospital Services Are Medically Necessary For Greater Than 2 Midnights                   Comment: Please note this report has been produced using speech recognition software.      Jose Ron DO  Attending Emergency Physician         Jose Ron DO  06/23/24 1303      Electronically signed by Jose Ron DO at 06/23/24 1303       Vital Signs (last 3 days)       Date/Time Temp Temp  src Pulse Resp BP Patient Position SpO2    06/24/24 1119 97.8 (36.6) Oral 77 16 138/83 Lying --    06/24/24 0917 -- -- 81 -- 126/77 -- --    06/24/24 0900 -- -- 77 -- -- -- --    06/24/24 0700 -- -- 86 -- -- -- --    06/24/24 0655 98 (36.7) Oral 80 16 119/73 Lying 91    06/24/24 0600 -- -- 77 -- -- -- 92    06/24/24 0500 -- -- 77 -- -- -- 94    06/24/24 0450 97.7 (36.5) Oral 79 16 112/76 Lying 88    06/24/24 0400 -- -- 77 -- -- -- 91    06/24/24 0300 -- -- 75 -- -- -- 92    06/24/24 0200 -- -- 73 18 -- -- 92    06/24/24 0100 -- -- 69 -- -- -- 92    06/24/24 0000 -- -- 70 16 -- -- 94    06/23/24 2340 96.5 (35.8) Axillary 70  16 109/70  Lying 95    Pulse: Simultaneous filing. User may be unaware of other data. at 06/23/24 2340    BP: Simultaneous filing. User may be unaware of other data. at 06/23/24 2340    06/23/24 2300 -- -- 73 -- -- -- 93    06/23/24 2200 -- -- 82 16 -- -- 96    06/23/24 2100 -- -- 78 -- -- -- 91    06/23/24 2014 -- -- 71 14 -- -- 92    06/23/24 2000 -- -- 76 -- -- -- 93    06/23/24 1955 98 (36.7) Oral 75 16 130/78 Lying 95    06/23/24 1900 -- -- 79 -- -- -- 99    06/23/24 1800 -- -- 72 -- -- -- 96    06/23/24 1631 96 (35.6) Axillary 76 16 126/91 Lying 98    06/23/24 1615 98 (36.7) Temporal 73 16 112/70 -- 96    06/23/24 1600 97.8 (36.6) Temporal 74 16 102/60 -- 91    06/23/24 1545 97.6 (36.4) Temporal 76 16 100/57 -- 91    06/23/24 1534 97 (36.1) -- 71 14 104/56 -- 92    06/23/24 1532 97.8 (36.6) Temporal 78 16 104/56 -- 92    06/23/24 1438 -- -- 76 16 114/76 -- 95    06/23/24 1355 97.8 (36.6) Oral 77 18 113/67 Lying 96    06/23/24 1229 -- -- 85 -- 103/66 -- 95    06/23/24 1200 -- -- 90 -- 97/68 -- 94    06/23/24 1124 98.8 (37.1) Oral -- -- -- -- --    06/23/24 1121 -- -- 94 -- -- -- 87    06/23/24 1120 -- -- 94 -- -- -- 87    06/23/24 1119 -- -- 93 -- -- -- 88    06/23/24 1114 -- -- -- -- 102/67 -- --    06/23/24 1047 -- -- 101 -- 113/82 -- 93    06/23/24 1037 -- -- 98 -- -- -- 91    06/23/24  1032 -- -- 101 -- 113/65 -- --    06/23/24 1022 -- -- 103 -- 99/74 -- 91    06/23/24 1012 -- -- 104 -- 105/66 -- 91    06/23/24 0956 101.6 (38.7) Oral 114 20 121/76 Sitting 89          Oxygen Therapy (last 3 days)       Date/Time SpO2 Device (Oxygen Therapy) Flow (L/min) Oxygen Concentration (%) ETCO2 (mmHg)    06/24/24 1119 -- nasal cannula 2 -- --    06/24/24 0900 -- nasal cannula 2 -- --    06/24/24 0655 91 nasal cannula 2 -- --    06/24/24 0600 92 nasal cannula 2 -- --    06/24/24 0500 94 -- -- -- --    06/24/24 0450 88 -- -- -- --    06/24/24 0400 91 nasal cannula 2 -- --    06/24/24 0300 92 -- -- -- --    06/24/24 0200 92 nasal cannula 2 -- --    06/24/24 0100 92 -- -- -- --    06/24/24 0000 94 nasal cannula 2 -- --    06/23/24 2340 95 nasal cannula 2 -- --    06/23/24 2300 93 -- -- -- --    06/23/24 2200 96 nasal cannula 2 -- --    06/23/24 2100 91 -- -- -- --    06/23/24 2014 92 nasal cannula 2 -- --    06/23/24 2000 93 -- -- -- --    06/23/24 1955 95 room air -- -- --    06/23/24 1900 99 -- -- -- --    06/23/24 1800 96 nasal cannula 2 -- --    06/23/24 1631 98 nasal cannula 2 -- --    06/23/24 1615 96 nasal cannula 2 -- --    06/23/24 1600 91 nasal cannula 2 -- --    06/23/24 1545 91 nasal cannula 2 -- --    06/23/24 1534 92 nasal cannula with ETCO2 4 -- --    06/23/24 1532 92 nasal cannula 2 -- --    06/23/24 1438 95 room air -- -- --    06/23/24 1400 -- nasal cannula 2 -- --    06/23/24 1355 96 nasal cannula 2 -- --    06/23/24 1229 95 -- -- -- --    06/23/24 1200 94 -- -- -- --    06/23/24 11:25:05 -- nasal cannula 2 -- --    06/23/24 1121 87 -- -- -- --    06/23/24 1120 87 -- -- -- --    06/23/24 1119 88 -- -- -- --    06/23/24 1047 93 -- -- -- --    06/23/24 1037 91 -- -- -- --    06/23/24 1022 91 -- -- -- --    06/23/24 1012 91 -- -- -- --    06/23/24 0956 89 room air -- -- --          Facility-Administered Medications as of 6/24/2024   Medication Dose Route Frequency Provider Last Rate Last Admin     [COMPLETED] acetaminophen (TYLENOL) tablet 1,000 mg  1,000 mg Oral Once Jose Ron DO   1,000 mg at 06/23/24 1022    aspirin EC tablet 81 mg  81 mg Oral Daily Ratna Condon MD   81 mg at 06/24/24 0916    azithromycin (ZITHROMAX) tablet 250 mg  250 mg Oral Q24H Ratna Condon MD   250 mg at 06/24/24 0916    [COMPLETED] azithromycin (ZITHROMAX) tablet 500 mg  500 mg Oral Once Ratna Condon MD   500 mg at 06/23/24 1723    Calcium Replacement - Follow Nurse / BPA Driven Protocol   Does not apply PRN Elaine Cherry DO        [COMPLETED] cefTRIAXone (ROCEPHIN) 1,000 mg in sodium chloride 0.9 % 100 mL MBP  1,000 mg Intravenous Once Jose Ron DO   Stopped at 06/23/24 1238    cefTRIAXone (ROCEPHIN) 1,000 mg in sodium chloride 0.9 % 100 mL MBP  1,000 mg Intravenous Q24H Ratna Condon  mL/hr at 06/24/24 1124 1,000 mg at 06/24/24 1124    dextrose (D50W) (25 g/50 mL) IV injection 25 g  25 g Intravenous Q15 Min PRN Ratna Condon MD        dextrose (GLUTOSE) oral gel 15 g  15 g Oral Q15 Min PRN Ratna Condon MD        diazePAM (VALIUM) tablet 5 mg  5 mg Oral Q6H PRN Adan Brown MD   5 mg at 06/24/24 0234    gabapentin (NEURONTIN) capsule 400 mg  400 mg Oral TID Ratna Condon MD   400 mg at 06/24/24 0916    glucagon (GLUCAGEN) injection 1 mg  1 mg Intramuscular Q15 Min PRN Ratna Condon MD        losartan (COZAAR) tablet 50 mg  50 mg Oral Q24H Ratna Condon MD   50 mg at 06/24/24 0916    And    hydroCHLOROthiazide tablet 12.5 mg  12.5 mg Oral Q24H Ratna Condon MD   12.5 mg at 06/24/24 0916    HYDROcodone-acetaminophen (NORCO)  MG per tablet 1 tablet  1 tablet Oral Q6H PRN Ratna Condon MD   1 tablet at 06/24/24 0923    Insulin Lispro (humaLOG)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

## 2024-06-25 ENCOUNTER — APPOINTMENT (OUTPATIENT)
Dept: GENERAL RADIOLOGY | Facility: HOSPITAL | Age: 74
End: 2024-06-25
Payer: MEDICARE

## 2024-06-25 ENCOUNTER — HOME HEALTH ADMISSION (OUTPATIENT)
Dept: HOME HEALTH SERVICES | Facility: HOME HEALTHCARE | Age: 74
End: 2024-06-25
Payer: MEDICARE

## 2024-06-25 LAB
ALBUMIN SERPL-MCNC: 3.3 G/DL (ref 3.5–5.2)
ALBUMIN/GLOB SERPL: 1.4 G/DL
ALP SERPL-CCNC: 42 U/L (ref 39–117)
ALT SERPL W P-5'-P-CCNC: 10 U/L (ref 1–41)
ANION GAP SERPL CALCULATED.3IONS-SCNC: 11 MMOL/L (ref 5–15)
AST SERPL-CCNC: 17 U/L (ref 1–40)
BILIRUB SERPL-MCNC: 0.7 MG/DL (ref 0–1.2)
BUN SERPL-MCNC: 4 MG/DL (ref 8–23)
BUN/CREAT SERPL: 4.8 (ref 7–25)
CALCIUM SPEC-SCNC: 8.5 MG/DL (ref 8.6–10.5)
CHLORIDE SERPL-SCNC: 102 MMOL/L (ref 98–107)
CO2 SERPL-SCNC: 27 MMOL/L (ref 22–29)
CREAT SERPL-MCNC: 0.84 MG/DL (ref 0.76–1.27)
DEPRECATED RDW RBC AUTO: 47.2 FL (ref 37–54)
EGFRCR SERPLBLD CKD-EPI 2021: 92.7 ML/MIN/1.73
ERYTHROCYTE [DISTWIDTH] IN BLOOD BY AUTOMATED COUNT: 14.1 % (ref 12.3–15.4)
GLOBULIN UR ELPH-MCNC: 2.4 GM/DL
GLUCOSE BLDC GLUCOMTR-MCNC: 115 MG/DL (ref 70–130)
GLUCOSE BLDC GLUCOMTR-MCNC: 122 MG/DL (ref 70–130)
GLUCOSE BLDC GLUCOMTR-MCNC: 175 MG/DL (ref 70–130)
GLUCOSE BLDC GLUCOMTR-MCNC: 199 MG/DL (ref 70–130)
GLUCOSE BLDC GLUCOMTR-MCNC: 209 MG/DL (ref 70–130)
GLUCOSE SERPL-MCNC: 118 MG/DL (ref 65–99)
HCT VFR BLD AUTO: 39.6 % (ref 37.5–51)
HGB BLD-MCNC: 13.1 G/DL (ref 13–17.7)
MCH RBC QN AUTO: 29.8 PG (ref 26.6–33)
MCHC RBC AUTO-ENTMCNC: 33.1 G/DL (ref 31.5–35.7)
MCV RBC AUTO: 90.2 FL (ref 79–97)
PLATELET # BLD AUTO: 142 10*3/MM3 (ref 140–450)
PMV BLD AUTO: 11.1 FL (ref 6–12)
POTASSIUM SERPL-SCNC: 3.7 MMOL/L (ref 3.5–5.2)
PROT SERPL-MCNC: 5.7 G/DL (ref 6–8.5)
RBC # BLD AUTO: 4.39 10*6/MM3 (ref 4.14–5.8)
SODIUM SERPL-SCNC: 140 MMOL/L (ref 136–145)
WBC NRBC COR # BLD AUTO: 6.05 10*3/MM3 (ref 3.4–10.8)

## 2024-06-25 PROCEDURE — 99231 SBSQ HOSP IP/OBS SF/LOW 25: CPT | Performed by: HOSPITALIST

## 2024-06-25 PROCEDURE — 25010000002 CEFTRIAXONE PER 250 MG: Performed by: INTERNAL MEDICINE

## 2024-06-25 PROCEDURE — 97165 OT EVAL LOW COMPLEX 30 MIN: CPT

## 2024-06-25 PROCEDURE — 74018 RADEX ABDOMEN 1 VIEW: CPT

## 2024-06-25 PROCEDURE — 80053 COMPREHEN METABOLIC PANEL: CPT | Performed by: HOSPITALIST

## 2024-06-25 PROCEDURE — 63710000001 INSULIN LISPRO (HUMAN) PER 5 UNITS: Performed by: INTERNAL MEDICINE

## 2024-06-25 PROCEDURE — 99231 SBSQ HOSP IP/OBS SF/LOW 25: CPT | Performed by: PHYSICIAN ASSISTANT

## 2024-06-25 PROCEDURE — 82948 REAGENT STRIP/BLOOD GLUCOSE: CPT

## 2024-06-25 PROCEDURE — 85027 COMPLETE CBC AUTOMATED: CPT | Performed by: HOSPITALIST

## 2024-06-25 RX ORDER — GABAPENTIN 300 MG/1
600 CAPSULE ORAL 3 TIMES DAILY
Status: DISCONTINUED | OUTPATIENT
Start: 2024-06-25 | End: 2024-06-27 | Stop reason: HOSPADM

## 2024-06-25 RX ADMIN — GABAPENTIN 400 MG: 400 CAPSULE ORAL at 08:15

## 2024-06-25 RX ADMIN — HYDROCODONE BITARTRATE AND ACETAMINOPHEN 1 TABLET: 10; 325 TABLET ORAL at 16:40

## 2024-06-25 RX ADMIN — ROSUVASTATIN CALCIUM 40 MG: 20 TABLET, COATED ORAL at 20:39

## 2024-06-25 RX ADMIN — AZITHROMYCIN DIHYDRATE 250 MG: 250 TABLET ORAL at 08:14

## 2024-06-25 RX ADMIN — LOSARTAN POTASSIUM 50 MG: 50 TABLET, FILM COATED ORAL at 08:14

## 2024-06-25 RX ADMIN — SODIUM CHLORIDE 1000 MG: 900 INJECTION INTRAVENOUS at 13:55

## 2024-06-25 RX ADMIN — ASPIRIN 81 MG: 81 TABLET, COATED ORAL at 08:15

## 2024-06-25 RX ADMIN — GABAPENTIN 600 MG: 300 CAPSULE ORAL at 20:39

## 2024-06-25 RX ADMIN — DIAZEPAM 5 MG: 5 TABLET ORAL at 02:25

## 2024-06-25 RX ADMIN — METOPROLOL TARTRATE 50 MG: 25 TABLET, FILM COATED ORAL at 08:24

## 2024-06-25 RX ADMIN — HYDROCODONE BITARTRATE AND ACETAMINOPHEN 1 TABLET: 10; 325 TABLET ORAL at 08:24

## 2024-06-25 RX ADMIN — INSULIN LISPRO 3 UNITS: 100 INJECTION, SOLUTION INTRAVENOUS; SUBCUTANEOUS at 13:54

## 2024-06-25 RX ADMIN — PANTOPRAZOLE SODIUM 40 MG: 40 TABLET, DELAYED RELEASE ORAL at 08:15

## 2024-06-25 RX ADMIN — GABAPENTIN 600 MG: 300 CAPSULE ORAL at 16:40

## 2024-06-25 RX ADMIN — PANTOPRAZOLE SODIUM 40 MG: 40 TABLET, DELAYED RELEASE ORAL at 16:40

## 2024-06-25 RX ADMIN — INSULIN LISPRO 2 UNITS: 100 INJECTION, SOLUTION INTRAVENOUS; SUBCUTANEOUS at 20:39

## 2024-06-25 RX ADMIN — DIAZEPAM 5 MG: 5 TABLET ORAL at 23:30

## 2024-06-25 RX ADMIN — METOPROLOL TARTRATE 50 MG: 25 TABLET, FILM COATED ORAL at 20:39

## 2024-06-25 RX ADMIN — HYDROCHLOROTHIAZIDE 12.5 MG: 25 TABLET ORAL at 08:15

## 2024-06-25 NOTE — PLAN OF CARE
Goal Outcome Evaluation:  Plan of Care Reviewed With: patient           Outcome Evaluation: VSS, NRS, requiring 2L nc to maintain sats above 90% when resting, complaints of pain and unable to stay asleep for periods more than an hour, prns administered per order, ambulating well in room with stand by assistance, more alert and able to clearly comunicate tonight vs the previous night, fall percautions in place

## 2024-06-25 NOTE — THERAPY DISCHARGE NOTE
Acute Care - Occupational Therapy Discharge  [facility]    Patient Name: Behzad Guzman  : 1950    MRN: [id number]                              Today's Date: 2024       Admit Date: 2024    Visit Dx:     ICD-10-CM ICD-9-CM   1. Pneumonia of both upper lobes due to infectious organism  J18.9 486   2. Acute respiratory failure with hypoxia  J96.01 518.81   3. Altered mental status, unspecified altered mental status type  R41.82 780.97   4. Bandemia  D72.825 288.66   5. Foreign body in stomach, initial encounter  T18.2XXA 935.2     E915   6. Mucosal abnormality of esophagus  K22.89 530.9   7. Chronic pain in left shoulder  M25.512 719.41    G89.29 338.29   8. Chronic hip pain, left  M25.552 719.45    G89.29 338.29   9. Neck pain, chronic  M54.2 723.1    G89.29 338.29   10. Leg length discrepancy  M21.70 736.81   11. Panic attacks  F41.0 300.01   12. Pre-diabetes  R73.03 790.29   13. Neuropathy  G62.9 355.9   14. Congestion of nasal sinus  R09.81 478.19   15. Multiple thyroid nodules  E04.2 241.1   16. Bilateral carpal tunnel syndrome  G56.03 354.0   17. History of non-ST elevation myocardial infarction (NSTEMI)  I25.2 412     Patient Active Problem List   Diagnosis    Chronic pain in left shoulder    Chronic hip pain, left    Neck pain, chronic    Leg length discrepancy    Panic attacks    Asthma    Spinal stenosis    Neuropathy    Hypertension    Hyperlipidemia    Congestion of nasal sinus    Multiple thyroid nodules    Bilateral carpal tunnel syndrome    Trigger middle finger    Trigger finger, right    History of non-ST elevation myocardial infarction (NSTEMI)    Pneumonia    Foreign body in stomach    T2DM (type 2 diabetes mellitus)     Past Medical History:   Diagnosis Date    Asthma     Chronic hip pain, left     Chronic pain in left shoulder     Hyperlipidemia     Hypertension     Infected tick bite 2021    Leg length discrepancy     Neck pain, chronic     Neuropathy     Panic  attacks     Pre-diabetes     Prediabetes     Spinal stenosis      Past Surgical History:   Procedure Laterality Date    ENDOSCOPY WITH FOREIGN BODY REMOVAL N/A 6/23/2024    Procedure: ESOPHAGOGASTRODUODENOSCOPY WITH FOREIGN BODY REMOVAL WITH FLOROSCOPY;  Surgeon: Adan Huston MD;  Location: [facility] ENDOSCOPY;  Service: Gastroenterology;  Laterality: N/A;    LEG SURGERY      ROTATOR CUFF REPAIR Right     SHOULDER SURGERY Left     WRIST SURGERY Left       General Information       Row Name 06/25/24 1451          OT Time and Intention    Document Type discharge evaluation/summary  -AN     Mode of Treatment occupational therapy  -AN       Row Name 06/25/24 [facility]          General Information    Patient Profile Reviewed yes  -AN     Prior Level of Function independent:;all household mobility;community mobility;gait;ADL's  -AN     Existing Precautions/Restrictions fall;other (see comments)  confusion  -AN     Barriers to Rehab medically complex  -AN       Row Name 06/25/24 [facility]          Living Environment    People in Home spouse  -AN       [facility] Name 06/25/24 [facility]          Home Main Entrance    Number of Stairs, Main Entrance two  -AN     Stair Railings, Main Entrance none  -AN       [facility] Name 06/25/24 [facility]          Stairs Within Home, Primary    Stairs, Within Home, Primary 2 steps into main living space and 1 step into bathroom  -AN     Number of Stairs, Within Home, Primary three  -AN     Stair Railings, Within Home, Primary none  -AN       Row Name 06/25/24 [facility]          Cognition    Orientation Status (Cognition) oriented x 3  -AN       [facility] Name 06/25/24 [facility]          Safety Issues, Functional Mobility    Safety Issues Affecting Function (Mobility) safety precaution awareness;safety precautions follow-through/compliance  -AN     Impairments Affecting Function (Mobility) balance;endurance/activity tolerance;strength;pain  -AN               User Key  (r) = Recorded By, (t) = Taken By, (c) = Cosigned By      Initials Name  Provider Type    AN Emy Suarez OT Occupational Therapist                   Mobility/ADL's       Row Name 06/25/24 1452          Bed Mobility    Bed Mobility supine-sit;sit-supine  -AN     Supine-Sit [place] (Bed Mobility) modified independence  -AN     Sit-Supine [place] (Bed Mobility) modified independence  -AN     Assistive Device (Bed Mobility) head of bed elevated  -AN       Row Name 06/25/24 1452          Transfers    Transfers sit-stand transfer;stand-sit transfer  -AN       Row Name 06/25/24 1452          Sit-Stand Transfer    Sit-Stand [place] (Transfers) standby assist  -AN       Row Name 06/25/24 1452          Stand-Sit Transfer    Stand-Sit [place] (Transfers) standby assist  -AN       Row Name 06/25/24 1452          Functional Mobility    Functional Mobility- Ind. Level standby assist  -AN     Functional Mobility-Distance (Feet) --  >household distance  -AN     Functional Mobility- Comment Pt ambulated >household distance with stand by assist and 2 LOB but was able to self correct. Pt reports this is baseline.  -AN       Row Name 06/25/24 1452          Activities of Daily Living    BADL Assessment/Intervention upper body dressing;lower body dressing;grooming;toileting  -AN       Row Name 06/25/24 1452          Upper Body Dressing Assessment/Training    [place] Level (Upper Body Dressing) don;pajama/robe;independent  -AN     Position (Upper Body Dressing) unsupported sitting  -AN       Row Name 06/25/24 1452          Lower Body Dressing Assessment/Training    [place] Level (Lower Body Dressing) don;socks;modified independence  -AN     Position (Lower Body Dressing) edge of bed sitting  -AN       Row Name 06/25/24 1452          Grooming Assessment/Training    [place] Level (Grooming) wash face, hands;standby assist  -AN     Position (Grooming) sink side  -AN       Row Name 06/25/24 1452          Toileting Assessment/Training    [place] Level (Toileting)  supervision  -AN     Assistive Devices (Toileting) commode  -AN               User Key  (r) = Recorded By, (t) = Taken By, (c) = Cosigned By      Initials Name Provider Type    Emy Muñiz OT Occupational Therapist                   Obj/Interventions       Row Name 06/25/24 1454          Sensory Assessment (Somatosensory)    Sensory Assessment (Somatosensory) UE sensation intact  -AN       Row Name 06/25/24 1454          Vision Assessment/Intervention    Visual Impairment/Limitations WFL  -AN       Row Name 06/25/24 1454          Range of Motion Comprehensive    General Range of Motion no range of motion deficits identified  -AN       Row Name 06/25/24 1454          Strength Comprehensive (MMT)    General Manual Muscle Testing (MMT) Assessment no strength deficits identified  -AN       Row Name 06/25/24 1454          Balance    Balance Assessment sitting static balance;sitting dynamic balance;sit to stand dynamic balance;standing dynamic balance;standing static balance  -AN     Static Sitting Balance supervision  -AN     Dynamic Sitting Balance supervision  -AN     Position, Sitting Balance sitting edge of bed  -AN     Sit to Stand Dynamic Balance standby assist  -AN     Static Standing Balance standby assist  -AN     Dynamic Standing Balance standby assist  -AN               User Key  (r) = Recorded By, (t) = Taken By, (c) = Cosigned By      Initials Name Provider Type    Emy Muñiz OT Occupational Therapist                   Goals/Plan    No documentation.                  Clinical Impression       Row Name 06/25/24 1454          Pain Assessment    Additional Documentation Pain Scale: FACES Pre/Post-Treatment (Group)  -AN       Row Name 06/25/24 145          Pain Scale: FACES Pre/Post-Treatment    Pain: FACES Scale, Pretreatment 2-->hurts little bit  -AN     Posttreatment Pain Rating 2-->hurts little bit  -AN     Pain Location generalized  -AN     Pain Location - extremity  -AN      Pre/Posttreatment Pain Comment all extremities in pain  -AN       Row Name 06/25/24 1454          Plan of Care Review    Plan of Care Reviewed With patient  -AN     Progress no change  -AN     Outcome Evaluation Pt presents near his functional baseline with all ADLs and mobility at this time. No skilled OT services warranted. [facility]  -AN       Row Name 06/25/24 1454          Therapy Assessment/Plan (OT)    Criteria for Skilled Therapeutic Interventions Met (OT) no problems identified which require skilled intervention  -AN     Therapy Frequency (OT) evaluation only  -AN       Row Name 06/25/24 1454          Therapy Plan Review/Discharge Plan (OT)    Anticipated Discharge Disposition (OT) home with assist  -AN       Row Name 06/25/24 1454          Vital Signs    O2 Delivery Pre Treatment room air  -AN     O2 Delivery Intra Treatment room air  -AN     O2 Delivery Post Treatment room air  -AN     Pre Patient Position Supine  -AN     Intra Patient Position Standing  -AN     Post Patient Position Supine  -AN       Row Name 06/25/24 1454          Positioning and Restraints    Pre-Treatment Position in bed  -AN     Post Treatment Position bed  -AN     In Bed notified nsg;supine;encouraged to call for assist;call light within reach;exit alarm on;side rails up x2  -AN               User Key  (r) = Recorded By, (t) = Taken By, (c) = Cosigned By      Initials Name Provider Type    Emy Muñiz OT Occupational Therapist                   Outcome Measures       Row Name 06/25/24 3808          How much help from another is currently needed...    Putting on and taking off regular lower body clothing? 4  -AN     Bathing (including washing, rinsing, and drying) 3  -AN     Toileting (which includes using toilet bed pan or urinal) 4  -AN     Putting on and taking off regular upper body clothing 4  -AN     Taking care of personal grooming (such as brushing teeth) 4  -AN     Eating meals 4  -AN     AM-PAC 6  Clicks Score (OT) 23  -AN       Row Name 06/25/24 0824          How much help from another person do you currently need...    Turning from your back to your side while in flat bed without using bedrails? 4  -AM     Moving from lying on back to sitting on the side of a flat bed without bedrails? 4  -AM     Moving to and from a bed to a chair (including a wheelchair)? 3  -AM     Standing up from a chair using your arms (e.g., wheelchair, bedside chair)? 3  -AM     Climbing 3-5 steps with a railing? 3  -AM     To walk in hospital room? 3  -AM     AM-PAC 6 Clicks Score (PT) 20  -AM     Highest Level of Mobility Goal 6 --> Walk 10 steps or more  -AM       Row Name 06/25/24 1456          Functional Assessment    Outcome Measure Options AM-PAC 6 Clicks Daily Activity (OT)  -AN               User Key  (r) = Recorded By, (t) = Taken By, (c) = Cosigned By      Initials Name Provider Type    Leanne Allen, RN Registered Nurse    Emy Muñiz OT Occupational Therapist                  Occupational Therapy Education       Title: PT OT SLP Therapies (In Progress)       Topic: Occupational Therapy (In Progress)       Point: ADL training (Done)       Description:   Instruct learner(s) on proper safety adaptation and remediation techniques during self care or transfers.   Instruct in proper use of assistive devices.                  Learning Progress Summary             Patient Acceptance, E, VU by PHIL at 6/25/2024 7424                         Point: Home exercise program (Not Started)       Description:   Instruct learner(s) on appropriate technique for monitoring, assisting and/or progressing therapeutic exercises/activities.                  Learner Progress:  Not documented in this visit.              Point: Precautions (Done)       Description:   Instruct learner(s) on prescribed precautions during self-care and functional transfers.                  Learning Progress Summary             Patient Acceptance, E, VU by  PHIL at 6/25/2024 1456                         Point: Body mechanics (Done)       Description:   Instruct learner(s) on proper positioning and spine alignment during self-care, functional mobility activities and/or exercises.                  Learning Progress Summary             Patient Acceptance, E, VU by PHIL at 6/25/2024 1456                                         User Key       Initials Effective Dates Name Provider Type Discipline    PHIL 09/21/21 -  Emy Suarez OT Occupational Therapist OT                  OT Recommendation and Plan  Therapy Frequency (OT): evaluation only  Plan of Care Review  Plan of Care Reviewed With: patient  Progress: no change  Outcome Evaluation: Pt presents near his functional baseline with all ADLs and mobility at this time. No skilled OT services warranted. [place].  Plan of Care Reviewed With: patient  Outcome Evaluation: Pt presents near his functional baseline with all ADLs and mobility at this time. No skilled OT services warranted. [place] home with [place] at [place].     Time Calculation:   Evaluation Complexity (OT)  Review Occupational Profile/Medical/Therapy History Complexity: brief/low complexity  Assessment, Occupational Performance/Identification of Deficit Complexity: 1-3 performance deficits  Clinical Decision Making Complexity (OT): problem focused assessment/low complexity  Overall Complexity of Evaluation (OT): low complexity     Time Calculation- OT       Row Name 06/25/24 1457             Time Calculation- OT    OT Start Time 1400  -AN      OT Received On 06/25/24  -AN         Untimed Charges    OT Eval/Re-eval Minutes 46  -AN         Total Minutes    Untimed Charges Total Minutes 46  -AN       Total Minutes 46  -AN                User Key  (r) = Recorded By, (t) = Taken By, (c) = Cosigned By      Initials Name Provider Type    Emy Muñiz OT Occupational Therapist                  Therapy Charges for Today       Code Description Service  Date Service Provider Modifiers Qty    [id number]  OT EVAL LOW COMPLEXITY 4 6/25/2024 Emy Suarez OT GO 1               OT Discharge Summary  Anticipated Discharge Disposition (OT): home with assist  Reason for Discharge: At baseline function  Discharge Destination: Home with assist    Emy Suarez OT  6/25/2024
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
## 2024-06-25 NOTE — CASE MANAGEMENT/SOCIAL WORK
Continued Stay Note  [facility]     Patient Name: Behzad Guzman  MRN: [id number]  Today's Date: 6/25/2024    Admit Date: 6/23/2024    Plan: home with home health   Discharge Plan       Row Name 06/25/24 0913       Plan    Plan home with home health    Patient/Family in Agreement with Plan yes    Plan Comments I spoke with this patient regarding PT recommending home with home health services. He is in agreement and asked the CM to make a referral to [facility] for SN, PT and OT, which I did and he was approved. The order is in EPIC. CM to follow.    Final Discharge Disposition Code 06 - home with home health care                   Discharge Codes    No documentation.                 Expected Discharge Date and Time       Expected Discharge Date Expected Discharge Time    Jun 25, 2024               Tasia Cline RN

## 2024-06-25 NOTE — PLAN OF CARE
Goal Outcome Evaluation:  Plan of Care Reviewed With: patient        Progress: no change  Outcome Evaluation: Pt presents near his functional baseline with all ADLs and mobility at this time. No skilled OT services warranted. Rec home with spouse at dc.      Anticipated Discharge Disposition (OT): home with assist
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
## 2024-06-26 ENCOUNTER — APPOINTMENT (OUTPATIENT)
Dept: GENERAL RADIOLOGY | Facility: HOSPITAL | Age: 74
End: 2024-06-26
Payer: MEDICARE

## 2024-06-26 ENCOUNTER — TELEPHONE (OUTPATIENT)
Dept: FAMILY MEDICINE CLINIC | Facility: CLINIC | Age: 74
End: 2024-06-26
Payer: MEDICARE

## 2024-06-26 LAB
ALBUMIN SERPL-MCNC: 3.5 G/DL (ref 3.5–5.2)
ALBUMIN/GLOB SERPL: 1.4 G/DL
ALP SERPL-CCNC: 45 U/L (ref 39–117)
ALT SERPL W P-5'-P-CCNC: 10 U/L (ref 1–41)
ANION GAP SERPL CALCULATED.3IONS-SCNC: 11 MMOL/L (ref 5–15)
AST SERPL-CCNC: 23 U/L (ref 1–40)
BACTERIA SPEC RESP CULT: NORMAL
BILIRUB SERPL-MCNC: 0.6 MG/DL (ref 0–1.2)
BUN SERPL-MCNC: 4 MG/DL (ref 8–23)
BUN/CREAT SERPL: 4.7 (ref 7–25)
CALCIUM SPEC-SCNC: 9 MG/DL (ref 8.6–10.5)
CHLORIDE SERPL-SCNC: 99 MMOL/L (ref 98–107)
CO2 SERPL-SCNC: 25 MMOL/L (ref 22–29)
CREAT SERPL-MCNC: 0.86 MG/DL (ref 0.76–1.27)
CYTO UR: NORMAL
DEPRECATED RDW RBC AUTO: 43.1 FL (ref 37–54)
EGFRCR SERPLBLD CKD-EPI 2021: 92 ML/MIN/1.73
ERYTHROCYTE [DISTWIDTH] IN BLOOD BY AUTOMATED COUNT: 13.4 % (ref 12.3–15.4)
GLOBULIN UR ELPH-MCNC: 2.5 GM/DL
GLUCOSE BLDC GLUCOMTR-MCNC: 165 MG/DL (ref 70–130)
GLUCOSE BLDC GLUCOMTR-MCNC: 191 MG/DL (ref 70–130)
GLUCOSE BLDC GLUCOMTR-MCNC: 273 MG/DL (ref 70–130)
GLUCOSE BLDC GLUCOMTR-MCNC: 301 MG/DL (ref 70–130)
GLUCOSE SERPL-MCNC: 237 MG/DL (ref 65–99)
GRAM STN SPEC: NORMAL
HCT VFR BLD AUTO: 40.3 % (ref 37.5–51)
HGB BLD-MCNC: 13.7 G/DL (ref 13–17.7)
LAB AP CASE REPORT: NORMAL
LAB AP CLINICAL INFORMATION: NORMAL
LEAD BLDV-MCNC: 24.8 UG/DL (ref 0–3.4)
MCH RBC QN AUTO: 29.8 PG (ref 26.6–33)
MCHC RBC AUTO-ENTMCNC: 34 G/DL (ref 31.5–35.7)
MCV RBC AUTO: 87.8 FL (ref 79–97)
MERCURY BLD-MCNC: <1 UG/L (ref 0–14.9)
PATH REPORT.FINAL DX SPEC: NORMAL
PATH REPORT.GROSS SPEC: NORMAL
PLATELET # BLD AUTO: 157 10*3/MM3 (ref 140–450)
PMV BLD AUTO: 11.1 FL (ref 6–12)
POTASSIUM SERPL-SCNC: 3.7 MMOL/L (ref 3.5–5.2)
PROT SERPL-MCNC: 6 G/DL (ref 6–8.5)
RBC # BLD AUTO: 4.59 10*6/MM3 (ref 4.14–5.8)
SODIUM SERPL-SCNC: 135 MMOL/L (ref 136–145)
WBC NRBC COR # BLD AUTO: 7.22 10*3/MM3 (ref 3.4–10.8)

## 2024-06-26 PROCEDURE — 25010000002 CEFTRIAXONE PER 250 MG: Performed by: INTERNAL MEDICINE

## 2024-06-26 PROCEDURE — 99231 SBSQ HOSP IP/OBS SF/LOW 25: CPT | Performed by: HOSPITALIST

## 2024-06-26 PROCEDURE — 74018 RADEX ABDOMEN 1 VIEW: CPT

## 2024-06-26 PROCEDURE — 82948 REAGENT STRIP/BLOOD GLUCOSE: CPT

## 2024-06-26 PROCEDURE — 85027 COMPLETE CBC AUTOMATED: CPT | Performed by: HOSPITALIST

## 2024-06-26 PROCEDURE — 80053 COMPREHEN METABOLIC PANEL: CPT | Performed by: HOSPITALIST

## 2024-06-26 PROCEDURE — 63710000001 INSULIN LISPRO (HUMAN) PER 5 UNITS: Performed by: INTERNAL MEDICINE

## 2024-06-26 RX ORDER — HYDROCODONE BITARTRATE AND ACETAMINOPHEN 10; 325 MG/1; MG/1
1 TABLET ORAL EVERY 4 HOURS PRN
Status: DISCONTINUED | OUTPATIENT
Start: 2024-06-26 | End: 2024-06-27 | Stop reason: HOSPADM

## 2024-06-26 RX ORDER — HYDRALAZINE HYDROCHLORIDE 20 MG/ML
10 INJECTION INTRAMUSCULAR; INTRAVENOUS EVERY 6 HOURS PRN
Status: DISCONTINUED | OUTPATIENT
Start: 2024-06-26 | End: 2024-06-27 | Stop reason: HOSPADM

## 2024-06-26 RX ADMIN — GABAPENTIN 600 MG: 300 CAPSULE ORAL at 09:19

## 2024-06-26 RX ADMIN — METOPROLOL TARTRATE 50 MG: 25 TABLET, FILM COATED ORAL at 21:15

## 2024-06-26 RX ADMIN — ROSUVASTATIN CALCIUM 40 MG: 20 TABLET, COATED ORAL at 21:15

## 2024-06-26 RX ADMIN — OXYCODONE HYDROCHLORIDE 7.5 MG: 15 TABLET ORAL at 21:15

## 2024-06-26 RX ADMIN — GABAPENTIN 600 MG: 300 CAPSULE ORAL at 21:15

## 2024-06-26 RX ADMIN — INSULIN LISPRO 2 UNITS: 100 INJECTION, SOLUTION INTRAVENOUS; SUBCUTANEOUS at 09:19

## 2024-06-26 RX ADMIN — GABAPENTIN 600 MG: 300 CAPSULE ORAL at 15:49

## 2024-06-26 RX ADMIN — PANTOPRAZOLE SODIUM 40 MG: 40 TABLET, DELAYED RELEASE ORAL at 17:20

## 2024-06-26 RX ADMIN — HYDROCHLOROTHIAZIDE 12.5 MG: 25 TABLET ORAL at 09:20

## 2024-06-26 RX ADMIN — HYDROCODONE BITARTRATE AND ACETAMINOPHEN 1 TABLET: 10; 325 TABLET ORAL at 04:25

## 2024-06-26 RX ADMIN — PANTOPRAZOLE SODIUM 40 MG: 40 TABLET, DELAYED RELEASE ORAL at 09:20

## 2024-06-26 RX ADMIN — SODIUM CHLORIDE 1000 MG: 900 INJECTION INTRAVENOUS at 13:05

## 2024-06-26 RX ADMIN — INSULIN LISPRO 2 UNITS: 100 INJECTION, SOLUTION INTRAVENOUS; SUBCUTANEOUS at 17:20

## 2024-06-26 RX ADMIN — DIAZEPAM 5 MG: 5 TABLET ORAL at 21:15

## 2024-06-26 RX ADMIN — AZITHROMYCIN DIHYDRATE 250 MG: 250 TABLET ORAL at 09:19

## 2024-06-26 RX ADMIN — INSULIN LISPRO 5 UNITS: 100 INJECTION, SOLUTION INTRAVENOUS; SUBCUTANEOUS at 13:05

## 2024-06-26 RX ADMIN — LOSARTAN POTASSIUM 50 MG: 50 TABLET, FILM COATED ORAL at 09:19

## 2024-06-26 RX ADMIN — METOPROLOL TARTRATE 50 MG: 25 TABLET, FILM COATED ORAL at 09:19

## 2024-06-26 RX ADMIN — HYDROCODONE BITARTRATE AND ACETAMINOPHEN 1 TABLET: 10; 325 TABLET ORAL at 14:12

## 2024-06-26 RX ADMIN — INSULIN LISPRO 4 UNITS: 100 INJECTION, SOLUTION INTRAVENOUS; SUBCUTANEOUS at 21:14

## 2024-06-26 RX ADMIN — ASPIRIN 81 MG: 81 TABLET, COATED ORAL at 09:20

## 2024-06-26 NOTE — PLAN OF CARE
Goal Outcome Evaluation:  Plan of Care Reviewed With: patient        Progress: improving     VSS, RA, A/Ox4 with intermittent confusion. PRNs administered for pain. KUB completed. Multiple BMS. Tolerating full liquid diet. Will continue POC.   Problem: Skin Injury Risk Increased  Goal: Skin Health and Integrity  Outcome: Ongoing, Progressing  Intervention: Optimize Skin Protection  Recent Flowsheet Documentation  Taken 6/26/2024 1412 by Phylicia Ponce RN  Head of Bed (HOB) Positioning: HOB elevated  Taken 6/26/2024 1200 by Phylicia Ponce RN  Head of Bed (HOB) Positioning: HOB elevated  Taken 6/26/2024 1000 by Phylicia Ponce RN  Head of Bed (HOB) Positioning: HOB elevated  Taken 6/26/2024 0800 by Phylicia Ponce RN  Pressure Reduction Techniques:   frequent weight shift encouraged   heels elevated off bed  Head of Bed (HOB) Positioning: HOB elevated  Pressure Reduction Devices: pressure-redistributing mattress utilized  Skin Protection:   adhesive use limited   incontinence pads utilized   tubing/devices free from skin contact     Problem: Adult Inpatient Plan of Care  Goal: Plan of Care Review  Outcome: Ongoing, Progressing  Flowsheets (Taken 6/26/2024 1543)  Progress: improving  Plan of Care Reviewed With: patient  Goal: Patient-Specific Goal (Individualized)  Outcome: Ongoing, Progressing  Goal: Absence of Hospital-Acquired Illness or Injury  Outcome: Ongoing, Progressing  Intervention: Identify and Manage Fall Risk  Recent Flowsheet Documentation  Taken 6/26/2024 1412 by Phylicia Ponce RN  Safety Promotion/Fall Prevention:   clutter free environment maintained   activity supervised   assistive device/personal items within reach   fall prevention program maintained   room organization consistent   toileting scheduled   safety round/check completed   nonskid shoes/slippers when out of bed   mobility aid in reach  Taken 6/26/2024 1200 by Phylicia Ponce RN  Safety Promotion/Fall Prevention:   activity supervised    assistive device/personal items within reach   clutter free environment maintained   fall prevention program maintained   room organization consistent   safety round/check completed   toileting scheduled   nonskid shoes/slippers when out of bed   lighting adjusted  Taken 6/26/2024 1000 by Phylicia Ponce RN  Safety Promotion/Fall Prevention:   clutter free environment maintained   assistive device/personal items within reach   activity supervised   fall prevention program maintained   room organization consistent   safety round/check completed   toileting scheduled   nonskid shoes/slippers when out of bed   lighting adjusted  Taken 6/26/2024 0800 by Phylicia Ponce RN  Safety Promotion/Fall Prevention:   activity supervised   assistive device/personal items within reach   clutter free environment maintained   fall prevention program maintained   room organization consistent   safety round/check completed   toileting scheduled   nonskid shoes/slippers when out of bed   lighting adjusted  Intervention: Prevent Skin Injury  Recent Flowsheet Documentation  Taken 6/26/2024 1412 by Phylicia Ponce RN  Body Position: position changed independently  Taken 6/26/2024 1200 by Phylicia Ponce RN  Body Position: position changed independently  Taken 6/26/2024 1000 by Phylicia Ponce RN  Body Position: position changed independently  Taken 6/26/2024 0800 by Phylicia Ponce RN  Body Position: position changed independently  Skin Protection:   adhesive use limited   incontinence pads utilized   tubing/devices free from skin contact  Intervention: Prevent and Manage VTE (Venous Thromboembolism) Risk  Recent Flowsheet Documentation  Taken 6/26/2024 1412 by Phylicia Ponce RN  Activity Management: activity encouraged  Taken 6/26/2024 1200 by Phylicia Ponce RN  Activity Management: activity encouraged  Taken 6/26/2024 1000 by Phylicia Ponce RN  Activity Management: activity encouraged  Taken 6/26/2024 0800 by Phylicia Ponce RN  Activity  Management: activity encouraged  VTE Prevention/Management:   bilateral   sequential compression devices off  Intervention: Prevent Infection  Recent Flowsheet Documentation  Taken 6/26/2024 1412 by Phylicia Ponce RN  Infection Prevention:   visitors restricted/screened   single patient room provided   rest/sleep promoted   hand hygiene promoted  Taken 6/26/2024 1200 by Phylicia Ponce RN  Infection Prevention:   rest/sleep promoted   single patient room provided   visitors restricted/screened   hand hygiene promoted  Taken 6/26/2024 1000 by Phylicia Ponce RN  Infection Prevention:   visitors restricted/screened   rest/sleep promoted   single patient room provided   hand hygiene promoted  Taken 6/26/2024 0800 by Phylicia Ponce RN  Infection Prevention:   rest/sleep promoted   single patient room provided   visitors restricted/screened   hand hygiene promoted  Goal: Optimal Comfort and Wellbeing  Outcome: Ongoing, Progressing  Intervention: Monitor Pain and Promote Comfort  Recent Flowsheet Documentation  Taken 6/26/2024 1412 by Phylicia Ponce RN  Pain Management Interventions: see MAR  Intervention: Provide Person-Centered Care  Recent Flowsheet Documentation  Taken 6/26/2024 0800 by Phylicia Ponce RN  Trust Relationship/Rapport:   care explained   choices provided  Goal: Readiness for Transition of Care  Outcome: Ongoing, Progressing     Problem: Fluid Imbalance (Pneumonia)  Goal: Fluid Balance  Outcome: Ongoing, Progressing     Problem: Infection (Pneumonia)  Goal: Resolution of Infection Signs and Symptoms  Outcome: Ongoing, Progressing     Problem: Respiratory Compromise (Pneumonia)  Goal: Effective Oxygenation and Ventilation  Outcome: Ongoing, Progressing  Intervention: Promote Airway Secretion Clearance  Recent Flowsheet Documentation  Taken 6/26/2024 0800 by Phylicia Ponce RN  Cough And Deep Breathing: done independently per patient  Intervention: Optimize Oxygenation and Ventilation  Recent Flowsheet  Documentation  Taken 6/26/2024 1412 by Phylicia Ponce RN  Head of Bed (HOB) Positioning: HOB elevated  Taken 6/26/2024 1200 by Phylicia Ponce RN  Head of Bed (HOB) Positioning: HOB elevated  Taken 6/26/2024 1000 by Phylicia Ponce RN  Head of Bed (HOB) Positioning: HOB elevated  Taken 6/26/2024 0800 by Phylicia Ponce RN  Head of Bed (HOB) Positioning: HOB elevated     Problem: Confusion Acute  Goal: Optimal Cognitive Function  Outcome: Ongoing, Progressing     Problem: Fall Injury Risk  Goal: Absence of Fall and Fall-Related Injury  Outcome: Ongoing, Progressing  Intervention: Identify and Manage Contributors  Recent Flowsheet Documentation  Taken 6/26/2024 1412 by Phylicia Ponce RN  Medication Review/Management: medications reviewed  Taken 6/26/2024 1200 by Phylicia Ponce RN  Medication Review/Management: medications reviewed  Taken 6/26/2024 1000 by Phylicia Ponce RN  Medication Review/Management: medications reviewed  Taken 6/26/2024 0800 by Phylicia Ponce RN  Medication Review/Management: medications reviewed  Intervention: Promote Injury-Free Environment  Recent Flowsheet Documentation  Taken 6/26/2024 1412 by Phylicia Ponce RN  Safety Promotion/Fall Prevention:   clutter free environment maintained   activity supervised   assistive device/personal items within reach   fall prevention program maintained   room organization consistent   toileting scheduled   safety round/check completed   nonskid shoes/slippers when out of bed   mobility aid in reach  Taken 6/26/2024 1200 by Phylicia Ponce RN  Safety Promotion/Fall Prevention:   activity supervised   assistive device/personal items within reach   clutter free environment maintained   fall prevention program maintained   room organization consistent   safety round/check completed   toileting scheduled   nonskid shoes/slippers when out of bed   lighting adjusted  Taken 6/26/2024 1000 by Phylicia Ponce RN  Safety Promotion/Fall Prevention:   clutter free environment  maintained   assistive device/personal items within reach   activity supervised   fall prevention program maintained   room organization consistent   safety round/check completed   toileting scheduled   nonskid shoes/slippers when out of bed   lighting adjusted  Taken 6/26/2024 0800 by Phylicia Ponce RN  Safety Promotion/Fall Prevention:   activity supervised   assistive device/personal items within reach   clutter free environment maintained   fall prevention program maintained   room organization consistent   safety round/check completed   toileting scheduled   nonskid shoes/slippers when out of bed   lighting adjusted
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
## 2024-06-26 NOTE — LETTER
June 28, 2024      Behzad Guzman    [address] [address]        Dear Mr. Guzman    Below are the results from your recent visit:                  Sincerely,      WINNIE Tran, DO

## 2024-06-26 NOTE — CASE MANAGEMENT/SOCIAL WORK
Continued Stay Note  [facility]     Patient Name: Behzad Guzman  MRN: [id number]  Today's Date: 6/26/2024    Admit Date: 6/23/2024    Plan: home with home health   Discharge Plan       Row Name 06/26/24 0827       Plan    Plan home with home health    Patient/Family in Agreement with Plan yes    Plan Comments This patient has been approved for [facility]  for SN, PT and OT. The order is in EPIC. His plan remains home with his wife to transport. CM to follow.    Final Discharge Disposition Code 06 - home with home health care                   Discharge Codes    No documentation.                 Expected Discharge Date and Time       Expected Discharge Date Expected Discharge Time    Jun 25, 2024               Tasia Cline RN

## 2024-06-26 NOTE — TELEPHONE ENCOUNTER
Mr. Guzman, these lab results are normal aside from a mild elevation in c-reactive protein which is a nonspecific marker of inflammation. Another marker of inflammation, the sedimentation rate, was normal. Your white blood cell count is normal. I do not see any evidence of a significant infection or autoimmune disease. While these results do not explain your symptoms, they are reassuring. The lower dosage of rosuvastatin we discussed could improve your symptoms. I recommend keeping your follow-up appointment next week with Dr. Tran.   Written by Oni Gibson PA-C on 6/21/2024  3:01 PM EDT]      Unable to reach patient.    Please relay

## 2024-06-27 ENCOUNTER — READMISSION MANAGEMENT (OUTPATIENT)
Dept: CALL CENTER | Facility: HOSPITAL | Age: 74
End: 2024-06-27
Payer: MEDICARE

## 2024-06-27 VITALS
SYSTOLIC BLOOD PRESSURE: 151 MMHG | BODY MASS INDEX: 25.38 KG/M2 | WEIGHT: 187.39 LBS | OXYGEN SATURATION: 94 % | HEIGHT: 72 IN | RESPIRATION RATE: 16 BRPM | HEART RATE: 66 BPM | DIASTOLIC BLOOD PRESSURE: 82 MMHG | TEMPERATURE: 97.5 F

## 2024-06-27 PROBLEM — K22.70 BARRETT ESOPHAGUS: Status: ACTIVE | Noted: 2024-06-27

## 2024-06-27 LAB
ALBUMIN SERPL-MCNC: 3.7 G/DL (ref 3.5–5.2)
ALBUMIN/GLOB SERPL: 1.9 G/DL
ALP SERPL-CCNC: 45 U/L (ref 39–117)
ALT SERPL W P-5'-P-CCNC: 12 U/L (ref 1–41)
ANION GAP SERPL CALCULATED.3IONS-SCNC: 13 MMOL/L (ref 5–15)
AST SERPL-CCNC: 22 U/L (ref 1–40)
BILIRUB SERPL-MCNC: 0.7 MG/DL (ref 0–1.2)
BUN SERPL-MCNC: 4 MG/DL (ref 8–23)
BUN/CREAT SERPL: 5.4 (ref 7–25)
CALCIUM SPEC-SCNC: 9.1 MG/DL (ref 8.6–10.5)
CHLORIDE SERPL-SCNC: 97 MMOL/L (ref 98–107)
CO2 SERPL-SCNC: 25 MMOL/L (ref 22–29)
CREAT SERPL-MCNC: 0.74 MG/DL (ref 0.76–1.27)
DEPRECATED RDW RBC AUTO: 41.6 FL (ref 37–54)
EGFRCR SERPLBLD CKD-EPI 2021: 96.3 ML/MIN/1.73
ERYTHROCYTE [DISTWIDTH] IN BLOOD BY AUTOMATED COUNT: 13.4 % (ref 12.3–15.4)
GLOBULIN UR ELPH-MCNC: 1.9 GM/DL
GLUCOSE BLDC GLUCOMTR-MCNC: 229 MG/DL (ref 70–130)
GLUCOSE BLDC GLUCOMTR-MCNC: 234 MG/DL (ref 70–130)
GLUCOSE SERPL-MCNC: 128 MG/DL (ref 65–99)
HCT VFR BLD AUTO: 39.9 % (ref 37.5–51)
HGB BLD-MCNC: 13.9 G/DL (ref 13–17.7)
MCH RBC QN AUTO: 29.4 PG (ref 26.6–33)
MCHC RBC AUTO-ENTMCNC: 34.8 G/DL (ref 31.5–35.7)
MCV RBC AUTO: 84.4 FL (ref 79–97)
PLATELET # BLD AUTO: 177 10*3/MM3 (ref 140–450)
PMV BLD AUTO: 11.4 FL (ref 6–12)
POTASSIUM SERPL-SCNC: 3.8 MMOL/L (ref 3.5–5.2)
PROT SERPL-MCNC: 5.6 G/DL (ref 6–8.5)
RBC # BLD AUTO: 4.73 10*6/MM3 (ref 4.14–5.8)
SODIUM SERPL-SCNC: 135 MMOL/L (ref 136–145)
WBC NRBC COR # BLD AUTO: 7.05 10*3/MM3 (ref 3.4–10.8)

## 2024-06-27 PROCEDURE — 83655 ASSAY OF LEAD: CPT | Performed by: HOSPITALIST

## 2024-06-27 PROCEDURE — 80053 COMPREHEN METABOLIC PANEL: CPT | Performed by: HOSPITALIST

## 2024-06-27 PROCEDURE — 82948 REAGENT STRIP/BLOOD GLUCOSE: CPT

## 2024-06-27 PROCEDURE — 63710000001 INSULIN LISPRO (HUMAN) PER 5 UNITS: Performed by: INTERNAL MEDICINE

## 2024-06-27 PROCEDURE — 85027 COMPLETE CBC AUTOMATED: CPT | Performed by: HOSPITALIST

## 2024-06-27 PROCEDURE — 25010000002 CEFTRIAXONE PER 250 MG: Performed by: INTERNAL MEDICINE

## 2024-06-27 PROCEDURE — 99239 HOSP IP/OBS DSCHRG MGMT >30: CPT | Performed by: HOSPITALIST

## 2024-06-27 RX ORDER — PANTOPRAZOLE SODIUM 40 MG/1
40 TABLET, DELAYED RELEASE ORAL
Qty: 60 TABLET | Refills: 0 | Status: SHIPPED | OUTPATIENT
Start: 2024-06-27

## 2024-06-27 RX ADMIN — DIAZEPAM 5 MG: 5 TABLET ORAL at 03:31

## 2024-06-27 RX ADMIN — INSULIN LISPRO 3 UNITS: 100 INJECTION, SOLUTION INTRAVENOUS; SUBCUTANEOUS at 08:22

## 2024-06-27 RX ADMIN — ASPIRIN 81 MG: 81 TABLET, COATED ORAL at 08:14

## 2024-06-27 RX ADMIN — AZITHROMYCIN DIHYDRATE 250 MG: 250 TABLET ORAL at 08:14

## 2024-06-27 RX ADMIN — GABAPENTIN 600 MG: 300 CAPSULE ORAL at 08:14

## 2024-06-27 RX ADMIN — SODIUM CHLORIDE 1000 MG: 900 INJECTION INTRAVENOUS at 11:16

## 2024-06-27 RX ADMIN — LOSARTAN POTASSIUM 50 MG: 50 TABLET, FILM COATED ORAL at 08:15

## 2024-06-27 RX ADMIN — HYDROCODONE BITARTRATE AND ACETAMINOPHEN 1 TABLET: 10; 325 TABLET ORAL at 08:22

## 2024-06-27 RX ADMIN — INSULIN LISPRO 3 UNITS: 100 INJECTION, SOLUTION INTRAVENOUS; SUBCUTANEOUS at 11:57

## 2024-06-27 RX ADMIN — METOPROLOL TARTRATE 50 MG: 25 TABLET, FILM COATED ORAL at 08:14

## 2024-06-27 RX ADMIN — PANTOPRAZOLE SODIUM 40 MG: 40 TABLET, DELAYED RELEASE ORAL at 08:15

## 2024-06-27 RX ADMIN — HYDROCHLOROTHIAZIDE 12.5 MG: 25 TABLET ORAL at 08:16

## 2024-06-27 RX ADMIN — HYDROCODONE BITARTRATE AND ACETAMINOPHEN 1 TABLET: 10; 325 TABLET ORAL at 03:31

## 2024-06-27 NOTE — PAYOR COMM NOTE
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 Days      vitamin D 1.25 MG (58218 UT) capsule capsule  Commonly known as: ERGOCALCIFEROL   TAKE 1 CAPSULE BY MOUTH " ONCE WEEKLY FOR 12 DOSES             ASK your doctor about these medications        Instructions Start Date   aspirin 81 MG EC tablet   81 mg, Oral, Daily               Allergies   Allergen Reactions    Peanut Butter Flavor Anaphylaxis    Shellfish Allergy Anaphylaxis    Amoxicillin Rash and Other (See Comments)    Penicillins Rash    Sulfa Antibiotics Myalgia    Doxycycline GI Bleeding    Latex Rash    Pregabalin Other (See Comments)         Discharge Dis[place]:  [place] [facility]:  Hospital:  Diet Order   Procedures    Diet: Liquid; Full Liquid; Fluid Consistency: Thin (IDDSI 0)            Activity: as tolerated      Restrictions or Other Recommendations:  none       CODE STATUS:    Code Status and Medical Interventions:   Ordered at: 06/23/24 1422     Code Status (Patient has no pulse and is not breathing):    CPR (Attempt to Resuscitate)     Medical Interventions (Patient has pulse or is breathing):    Full Support       Future Appointments   Date Time Provider Department Center   5/13/2025  9:30 AM Charli Weems MD [facility]       Additional Instructions for the Follow-ups that You Need to Schedule       Ambulatory Referral to Home Health   As directed      Face to Face Visit Date: 6/25/2024   Follow-up provider for Plan of Care?: I treated the patient in an acute care facility and will not continue treatment after discharge.   Follow-up provider: BRE BERNABE [[id number]]   Reason/Clinical Findings: status post hospital stay   Describe mobility limitations that make leaving home difficult: impaired physical mobility and gait endurance; weakness   Nursing/Therapeutic Services Requested: Skilled Nursing Physical Therapy Occupational Therapy   Skilled nursing orders: Cardiopulmonary assessments   PT orders: Therapeutic exercise Gait Training Transfer training Strengthening Home safety assessment   Weight Bearing Status: As Tolerated   Occupational orders: Activities of daily  living Energy conservation Strengthening Home safety assessment   Frequency: 1 Week 1        Discharge Follow-up with PCP   As directed       Currently Documented PCP:    Zach Tran DO    PCP Phone Number:    [phone]     Follow Up Details: within 1 week                      Rahul Macedo DO  06/27/24      Time Spent on Discharge:  I spent  35  minutes on this discharge activity which included: face-to-face encounter with the patient, reviewing the data in the system, coordination of the care with the nursing staff as well as consultants, documentation, and entering orders.            Electronically signed by Rahul Macedo DO at 06/27/24 1154       Discharge Order (From admission, onward)       Start     Ordered    06/27/24 1146  Discharge patient  Once        Expected Discharge Date: 06/27/24   Discharge Disposition: Home-Health Care [facility] [place]   Physician of Record for Attribution - Please select from Treatment Team: RAHUL MACEDO [[id number]]   Review needed by CMO to determine Physician of Record: No   Please choose which facility the patient is currently admitted if they are being discharged to another facility or unit.: [facility]      Question Answer Comment   Physician of Record for Attribution - Please select from Treatment Team RAHUL MACEDO    Review needed by CMO to determine Physician of Record No    Please choose which facility the patient is currently admitted if they are being discharged to another facility or unit. [facility]        06/27/24 1143

## 2024-06-27 NOTE — CASE MANAGEMENT/SOCIAL WORK
Case Management Discharge Note      Final Note: I spoke with this patient regarding his discharge home today with [facility] . The order is in [facility] and they were notified.. He is agreeable. CM called his wife and she is able to transport him home. He denies having any further discharge planning needs.         Selected Continued Care - Admitted Since 6/23/2024       Destination    No services have been selected for the patient.                Durable Medical Equipment    No services have been selected for the patient.                Dialysis/Infusion    No services have been selected for the patient.                Home Medical Care Coordination complete.      Service Provider Selected Services Address Phone Fax Patient Preferred    [facility] ,  Home Rehabilitation [address] [id number] [phone] --              Therapy    No services have been selected for the patient.                Community Resources    No services have been selected for the patient.                Community & DME    No services have been selected for the patient.                         Final Discharge Disposition Code: 06 - home with home health care

## 2024-06-27 NOTE — OUTREACH NOTE
Prep Survey      Flowsheet Row Responses   [facility] patient discharged from? [place]   Is LACE score < 7 ? No   Eligibility [facility]   Date of Admission 06/23/24   Date of Discharge 06/27/24   Discharge Disposition Home-Health Care [facility]   Discharge diagnosis fever,  EGD WITH FOREIGN BODY REMOVAL WITH FLOROSCOPY   Does the patient have one of the following disease processes/diagnoses(primary or secondary)? Other   Does the patient have Home health ordered? Yes   What is the Home health agency?  [facility]    Is there a DME ordered? No   Prep survey completed? Yes            Georgie LENZ - Registered Nurse
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
## 2024-06-28 ENCOUNTER — TRANSITIONAL CARE MANAGEMENT TELEPHONE ENCOUNTER (OUTPATIENT)
Dept: CALL CENTER | Facility: HOSPITAL | Age: 74
End: 2024-06-28
Payer: MEDICARE

## 2024-06-28 LAB
BACTERIA SPEC AEROBE CULT: NORMAL
BACTERIA SPEC AEROBE CULT: NORMAL

## 2024-06-28 NOTE — OUTREACH NOTE
Call Center TCM Note      Flowsheet Row Responses   [facility] patient discharged from? [place]   Does the patient have one of the following disease processes/diagnoses(primary or secondary)? Other   TCM attempt successful? Yes   Call start time 0900   Call end time 0902   Discharge diagnosis fever,  EGD WITH FOREIGN BODY REMOVAL WITH FLOROSCOPY   Meds reviewed with patient/caregiver? Yes   Does the patient have all medications ordered at discharge? N/A   Is the patient taking all medications as directed (includes completed medication regime)? Yes   Does the patient have an appointment with their PCP within 7-14 days of discharge? Yes   What is the Home health agency?  [facility]    Has home health visited the patient within 72 hours of discharge? Unsure   Psychosocial issues? No   Did the patient receive a copy of their discharge instructions? Yes   Nursing interventions Reviewed instructions with patient   What is the patient's perception of their health status since discharge? Improving   Is the patient/caregiver able to teach back signs and symptoms related to disease process for when to call PCP? Yes   Is the patient/caregiver able to teach back signs and symptoms related to disease process for when to call 911? Yes   Is the patient/caregiver able to teach back the hierarchy of who to call/visit for symptoms/problems? PCP, Specialist, Home health nurse, Urgent Care, ED, 911 Yes   If the patient is a current smoker, are they able to teach back resources for cessation? Not a smoker   TCM call completed? Yes   Call end time 0902   Would this patient benefit from a Referral to Amb Social Work? No   Is the patient interested in additional calls from an ambulatory ? No            Macarena Barr LPN    6/28/2024, 09:06 EDT

## 2024-06-29 DIAGNOSIS — I10 PRIMARY HYPERTENSION: ICD-10-CM

## 2024-06-29 LAB — LEAD BLDV-MCNC: 29.6 UG/DL (ref 0–3.4)

## 2024-07-01 ENCOUNTER — LAB (OUTPATIENT)
Dept: LAB | Facility: HOSPITAL | Age: 74
End: 2024-07-01
Payer: MEDICARE

## 2024-07-01 ENCOUNTER — HOSPITAL ENCOUNTER (OUTPATIENT)
Dept: GENERAL RADIOLOGY | Facility: HOSPITAL | Age: 74
Discharge: HOME | End: 2024-07-01
Payer: MEDICARE

## 2024-07-01 ENCOUNTER — OFFICE VISIT (OUTPATIENT)
Dept: FAMILY MEDICINE CLINIC | Facility: CLINIC | Age: 74
End: 2024-07-01
Payer: MEDICARE

## 2024-07-01 VITALS
HEIGHT: 72 IN | RESPIRATION RATE: 18 BRPM | BODY MASS INDEX: 24.57 KG/M2 | OXYGEN SATURATION: 97 % | WEIGHT: 181.4 LBS | DIASTOLIC BLOOD PRESSURE: 80 MMHG | SYSTOLIC BLOOD PRESSURE: 138 MMHG | HEART RATE: 84 BPM

## 2024-07-01 DIAGNOSIS — J40 BRONCHITIS: ICD-10-CM

## 2024-07-01 DIAGNOSIS — R78.71 ELEVATED BLOOD LEAD LEVEL: Primary | ICD-10-CM

## 2024-07-01 DIAGNOSIS — E34.9 TESTOSTERONE DEFICIENCY: ICD-10-CM
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
## 2024-07-01 DIAGNOSIS — R78.71 ELEVATED BLOOD LEAD LEVEL: ICD-10-CM

## 2024-07-01 DIAGNOSIS — E11.42 TYPE 2 DIABETES MELLITUS WITH DIABETIC POLYNEUROPATHY, WITHOUT LONG-TERM CURRENT USE OF INSULIN: ICD-10-CM

## 2024-07-01 DIAGNOSIS — T18.4XXD FOREIGN BODY IN COLON, SUBSEQUENT ENCOUNTER: ICD-10-CM

## 2024-07-01 LAB
ALBUMIN SERPL-MCNC: 4.4 G/DL (ref 3.5–5.2)
ALBUMIN/GLOB SERPL: 1.8 G/DL
ALP SERPL-CCNC: 48 U/L (ref 39–117)
ALT SERPL W P-5'-P-CCNC: 15 U/L (ref 1–41)
ANION GAP SERPL CALCULATED.3IONS-SCNC: 10 MMOL/L (ref 5–15)
AST SERPL-CCNC: 28 U/L (ref 1–40)
BASOPHILS # BLD AUTO: 0.06 10*3/MM3 (ref 0–0.2)
BASOPHILS NFR BLD AUTO: 1 % (ref 0–1.5)
BILIRUB SERPL-MCNC: 0.4 MG/DL (ref 0–1.2)
BUN SERPL-MCNC: 10 MG/DL (ref 8–23)
BUN/CREAT SERPL: 11.4 (ref 7–25)
CALCIUM SPEC-SCNC: 10 MG/DL (ref 8.6–10.5)
CHLORIDE SERPL-SCNC: 101 MMOL/L (ref 98–107)
CO2 SERPL-SCNC: 26 MMOL/L (ref 22–29)
CREAT SERPL-MCNC: 0.88 MG/DL (ref 0.76–1.27)
DEPRECATED RDW RBC AUTO: 42.9 FL (ref 37–54)
EGFRCR SERPLBLD CKD-EPI 2021: 91.4 ML/MIN/1.73
EOSINOPHIL # BLD AUTO: 0.25 10*3/MM3 (ref 0–0.4)
EOSINOPHIL NFR BLD AUTO: 4.1 % (ref 0.3–6.2)
ERYTHROCYTE [DISTWIDTH] IN BLOOD BY AUTOMATED COUNT: 13.1 % (ref 12.3–15.4)
GLOBULIN UR ELPH-MCNC: 2.5 GM/DL
GLUCOSE SERPL-MCNC: 147 MG/DL (ref 65–99)
HCT VFR BLD AUTO: 43.3 % (ref 37.5–51)
HGB BLD-MCNC: 14.3 G/DL (ref 13–17.7)
IMM GRANULOCYTES # BLD AUTO: 0.03 10*3/MM3 (ref 0–0.05)
IMM GRANULOCYTES NFR BLD AUTO: 0.5 % (ref 0–0.5)
LYMPHOCYTES # BLD AUTO: 1.69 10*3/MM3 (ref 0.7–3.1)
LYMPHOCYTES NFR BLD AUTO: 27.8 % (ref 19.6–45.3)
MCH RBC QN AUTO: 29.6 PG (ref 26.6–33)
MCHC RBC AUTO-ENTMCNC: 33 G/DL (ref 31.5–35.7)
MCV RBC AUTO: 89.6 FL (ref 79–97)
MONOCYTES # BLD AUTO: 0.55 10*3/MM3 (ref 0.1–0.9)
MONOCYTES NFR BLD AUTO: 9 % (ref 5–12)
NEUTROPHILS NFR BLD AUTO: 3.51 10*3/MM3 (ref 1.7–7)
NEUTROPHILS NFR BLD AUTO: 57.6 % (ref 42.7–76)
NRBC BLD AUTO-RTO: 0 /100 WBC (ref 0–0.2)
PLATELET # BLD AUTO: 179 10*3/MM3 (ref 140–450)
PMV BLD AUTO: 11.2 FL (ref 6–12)
POTASSIUM SERPL-SCNC: 4.2 MMOL/L (ref 3.5–5.2)
PROT SERPL-MCNC: 6.9 G/DL (ref 6–8.5)
RBC # BLD AUTO: 4.83 10*6/MM3 (ref 4.14–5.8)
SODIUM SERPL-SCNC: 137 MMOL/L (ref 136–145)
WBC NRBC COR # BLD AUTO: 6.09 10*3/MM3 (ref 3.4–10.8)

## 2024-07-01 PROCEDURE — 80053 COMPREHEN METABOLIC PANEL: CPT

## 2024-07-01 PROCEDURE — 83655 ASSAY OF LEAD: CPT

## 2024-07-01 PROCEDURE — 71046 X-RAY EXAM CHEST 2 VIEWS: CPT

## 2024-07-01 PROCEDURE — 99215 OFFICE O/P EST HI 40 MIN: CPT | Performed by: INTERNAL MEDICINE

## 2024-07-01 PROCEDURE — 83036 HEMOGLOBIN GLYCOSYLATED A1C: CPT

## 2024-07-01 PROCEDURE — 74018 RADEX ABDOMEN 1 VIEW: CPT

## 2024-07-01 PROCEDURE — 3075F SYST BP GE 130 - 139MM HG: CPT | Performed by: INTERNAL MEDICINE

## 2024-07-01 PROCEDURE — 1125F AMNT PAIN NOTED PAIN PRSNT: CPT | Performed by: INTERNAL MEDICINE

## 2024-07-01 PROCEDURE — 1159F MED LIST DOCD IN RCRD: CPT | Performed by: INTERNAL MEDICINE

## 2024-07-01 PROCEDURE — 1160F RVW MEDS BY RX/DR IN RCRD: CPT | Performed by: INTERNAL MEDICINE

## 2024-07-01 PROCEDURE — 85025 COMPLETE CBC W/AUTO DIFF WBC: CPT

## 2024-07-01 PROCEDURE — 3044F HG A1C LEVEL LT 7.0%: CPT | Performed by: INTERNAL MEDICINE

## 2024-07-01 PROCEDURE — 83825 ASSAY OF MERCURY: CPT

## 2024-07-01 PROCEDURE — 1111F DSCHRG MED/CURRENT MED MERGE: CPT | Performed by: INTERNAL MEDICINE

## 2024-07-01 PROCEDURE — 3079F DIAST BP 80-89 MM HG: CPT | Performed by: INTERNAL MEDICINE

## 2024-07-01 PROCEDURE — 82175 ASSAY OF ARSENIC: CPT

## 2024-07-01 PROCEDURE — 36415 COLL VENOUS BLD VENIPUNCTURE: CPT

## 2024-07-01 RX ORDER — AZITHROMYCIN 250 MG/1
TABLET, FILM COATED ORAL
Qty: 6 TABLET | Refills: 0 | Status: SHIPPED | OUTPATIENT
Start: 2024-07-01

## 2024-07-01 RX ORDER — LOSARTAN POTASSIUM AND HYDROCHLOROTHIAZIDE 12.5; 5 MG/1; MG/1
1 TABLET ORAL DAILY
Qty: 90 TABLET | Refills: 1 | Status: SHIPPED | OUTPATIENT
Start: 2024-07-01

## 2024-07-01 NOTE — TELEPHONE ENCOUNTER
Patient called stating the pharmacy told him he needed the draw up needles to be ordered; patient would like to get them today

## 2024-07-01 NOTE — PROGRESS NOTES
Chief Complaint   Patient presents with    Hospital Follow Up Visit     Follow up from [facility] ER for Fever    Date of admission 24  Date of discharge 24    HPI:  Behzad Guzman is a 73 y.o. male who presents today for hospital follow-up pneumonia, altered mental status, elevated blood levels.    ROS:  Constitutional: no fevers, night sweats or unexplained weight loss  Eyes: no vision changes  ENT: no runny nose, ear pain, sore throat  Cardio: no chest pain, palpitations  Pulm: no shortness of breath, wheezing, or cough  GI: no abdominal pain or changes in bowel movements  : no difficulty urinating  MSK: no difficulty ambulating, no joint pain  Neuro: no weakness, dizziness or headache  Psych: no trouble sleeping  Endo: no change in appetite      Past Medical History:   Diagnosis Date    Asthma     Chronic hip pain, left     Chronic pain in left shoulder     Hyperlipidemia     Hypertension     Infected tick bite 2021    Leg length discrepancy     Neck pain, chronic     Neuropathy     Panic attacks     Pre-diabetes     Prediabetes     Spinal stenosis       Family History   Problem Relation Age of Onset    Heart attack Father 65    Other Mother         accident    No Known Problems Maternal Grandmother     No Known Problems Maternal Grandfather     No Known Problems Paternal Grandmother     Stroke Paternal Grandfather       Social History     Socioeconomic History    Marital status:    Tobacco Use    Smoking status: Former     Current packs/day: 0.00     Average packs/day: 0.5 packs/day for 10.0 years (5.0 ttl pk-yrs)     Types: Cigarettes     Start date:      Quit date:      Years since quittin.5    Smokeless tobacco: Never   Vaping Use    Vaping status: Never Used   Substance and Sexual Activity    Alcohol use: No    Drug use: No    Sexual activity: Defer      Allergies   Allergen Reactions    Peanut Butter Flavor Anaphylaxis    Shellfish Allergy Anaphylaxis     Amoxicillin Rash and Other (See Comments)    Penicillins Rash    Sulfa Antibiotics Myalgia    Doxycycline GI Bleeding    Latex Rash    Pregabalin Other (See Comments)      Immunization History   Administered Date(s) Administered    COVID-19 (PFIZER) Purple Cap Monovalent 09/14/2021, 10/08/2021    Flu Vaccine Quad PF >36MO 09/30/2015    Fluzone High-Dose 65+yrs 11/22/2022, 12/22/2023    Pneumococcal Conjugate 13-Valent (PCV13) 09/22/2016    Pneumococcal Conjugate 20-Valent (PCV20) 12/22/2023    Pneumococcal Polysaccharide (PPSV23) 05/18/2018    Tdap 04/22/2022        PE:  Vitals:    07/01/24 1407   BP: 138/80   Pulse: 84   Resp: 18   SpO2: 97%      Body mass index is 24.6 kg/m².    Gen Appearance: NAD  HEENT: Normocephalic, PERRLA, no thyromegaly, trache midline  Heart: RRR, normal S1 and S2, no murmur  Lungs: CTA b/l, no wheezing, no crackles  Abdomen: Soft, non-tender, non-distended, no guarding and BSx4  MSK: Moves all extremities well, normal gait, no peripheral edema  Pulses: Palpable and equal b/l  Lymph nodes: No palpable lymphadenopathy   Neuro: No focal deficits      Current Outpatient Medications   Medication Sig Dispense Refill    albuterol (ACCUNEB) 0.63 MG/3ML nebulizer solution Take 3 mL by nebulization Every 6 (Six) Hours As Needed for Wheezing. 3 mL 12    albuterol sulfate  (90 Base) MCG/ACT inhaler INHALE TWO PUFFS BY MOUTH EVERY 6 HOURS AS NEEDED FOR WHEEZING OR SHORTNESS OF AIR 6.7 g 1    Alcaftadine (Lastacaft) 0.25 % solution Apply 1 drop to eye(s) as directed by provider 1 (One) Time.      aspirin 81 MG EC tablet Take 1 tablet by mouth Daily. (Patient not taking: Reported on 6/19/2024)      azelastine (ASTELIN) 0.1 % nasal spray 2 sprays into the nostril(s) as directed by provider 2 (Two) Times a Day. Use in each nostril as directed      azithromycin (Zithromax Z-Rell) 250 MG tablet Take 2 tablets by mouth on day 1, then 1 tablet daily on days 2-5 6 tablet 0    clopidogrel (PLAVIX) 75 MG  "tablet Take 1 tablet by mouth Daily. 30 tablet 11    diazePAM (VALIUM) 5 MG tablet Take 1 tablet by mouth Every 6 (Six) Hours As Needed.  0    fexofenadine (ALLEGRA) 180 MG tablet Take 1 tablet by mouth Daily.      fluticasone (Flonase) 50 MCG/ACT nasal spray 2 sprays into the nostril(s) as directed by provider Daily. 2 puffs each nostril 18.2 mL 0    gabapentin (NEURONTIN) 400 MG capsule Take 1 capsule by mouth 3 (Three) Times a Day.      glucose monitor monitoring kit 1 each Daily. 1 each 0    HYDROcodone-acetaminophen (NORCO)  MG per tablet Take 1 tablet by mouth Every 6 (Six) Hours As Needed for Moderate Pain.      Lancets (onetouch ultrasoft) lancets Use one daily to test blood sugars 100 each 12    losartan-hydrochlorothiazide (HYZAAR) 50-12.5 MG per tablet TAKE 1 TABLET BY MOUTH DAILY 90 tablet 1    metFORMIN (GLUCOPHAGE) 500 MG tablet TAKE 1 TABLET BY MOUTH DAILY WITH BREAKFAST (Patient taking differently: Take 1 tablet by mouth 2 (Two) Times a Day With Meals.) 180 tablet 0    metoprolol tartrate (LOPRESSOR) 50 MG tablet TAKE 1 TABLET BY MOUTH TWICE A  tablet 0    Needle, Disp, (B-D HYPODERMIC NEEDLE 22GX1\") 22G X 1\" misc USE TO INJECT EVERY 14 DAYS 6 each 5    OneTouch Verio test strip 1 each by Other route Daily. for testing 300 each 2    oxyCODONE (ROXICODONE) 15 MG immediate release tablet TAKE 1/2 A TABLET BY MOUTH AT BEDTIME AND 1/2 TABLET IN MIDDLE OF NIGHT AS NEEDED AS DIRECTED      pantoprazole (PROTONIX) 40 MG EC tablet Take 1 tablet by mouth Daily.      pantoprazole (PROTONIX) 40 MG EC tablet Take 1 tablet by mouth 2 (Two) Times a Day Before Meals. 60 tablet 0    rosuvastatin (CRESTOR) 40 MG tablet Take 1 tablet by mouth Daily. (Patient taking differently: Take 1 tablet by mouth Every Night.)      Testosterone Enanthate 200 MG/ML solution Inject 200 mg into the appropriate muscle as directed by prescriber Every 14 (Fourteen) Days. 5 mL 1    vitamin D (ERGOCALCIFEROL) 1.25 MG (80405 " UT) capsule capsule TAKE 1 CAPSULE BY MOUTH ONCE WEEKLY FOR 12 DOSES 12 capsule 0     No current facility-administered medications for this visit.      Patient is unsure how he consumed 4 bullets.  Most recent KUB showing 3 out of 4 at best.  Rechecking KUB today.  His lead level was elevated during hospitalization as well, recheck today.  Significant altered mental status a few days leading up to ER visit.  Recommend hematology evaluation if no improvement in lead levels.    Doing better now since discharge other than fatigue.  Reports some shortness of breath and cough continued.    Counseling was given to patient for the following topics: impressions, risks and benefits of treatment options, and importance of treatment compliance . Total time of the encounter was 40 minutes and 20 minutes was spent face to face counseling.    Current outpatient and discharge medications have been reconciled for the patient.  Reviewed by: Zach Tran DO    Diagnoses and all orders for this visit:    1. Elevated blood lead level (Primary)  -     Heavy Metals, Blood; Future  -     CBC & Differential; Future  -     Comprehensive Metabolic Panel; Future  -     Hemoglobin A1c; Future    2. Bronchitis  -     azithromycin (Zithromax Z-Rell) 250 MG tablet; Take 2 tablets by mouth on day 1, then 1 tablet daily on days 2-5  Dispense: 6 tablet; Refill: 0  -     XR Chest PA & Lateral; Future    3. Bug bite, initial encounter    4. Foreign body in colon, subsequent encounter  -     XR Abdomen KUB; Future    5. Type 2 diabetes mellitus with diabetic polyneuropathy, without long-term current use of insulin    Recent A1c on 7/1/24 well-controlled at 6.8%.  Continue metformin 500 twice daily.  He would benefit from therapeutic shoes and inserts due to foot deformity and peripheral neuropathy with evidence of callus formation on exam.    No follow-ups on file.     Dictated Utilizing Dragon Dictation    Please note that portions of this  note were completed with a voice recognition program.    Part of this note may be an electronic transcription/translation of spoken language to printed text using the Dragon Dictation System.

## 2024-07-01 NOTE — TELEPHONE ENCOUNTER
Rx Refill Note  Requested Prescriptions     Pending Prescriptions Disp Refills    losartan-hydrochlorothiazide (HYZAAR) 50-12.5 MG per tablet [Pharmacy Med Name: LOSARTAN-HCTZ 50-12.5 MG TAB] 90 tablet 1     Sig: TAKE 1 TABLET BY MOUTH DAILY      Last office visit with prescribing clinician: 4/4/2024   Last telemedicine visit with prescribing clinician: Visit date not found   Next office visit with prescribing clinician: 7/1/2024                         Would you like a call back once the refill request has been completed: [] Yes [] No    If the office needs to give you a call back, can they leave a voicemail: [] Yes [] No    Macey Melendez MA  07/01/24, 09:53 EDT

## 2024-07-02 DIAGNOSIS — E34.9 TESTOSTERONE DEFICIENCY: ICD-10-CM

## 2024-07-02 LAB — HBA1C MFR BLD: 6.8 % (ref 4.8–5.6)

## 2024-07-02 RX ORDER — NEEDLES, DISPOSABLE 25GX5/8"
NEEDLE, DISPOSABLE MISCELLANEOUS
Qty: 6 EACH | Refills: 5 | Status: SHIPPED | OUTPATIENT
Start: 2024-07-02

## 2024-07-02 RX ORDER — TESTOSTERONE CYPIONATE 200 MG/ML
INJECTION, SOLUTION INTRAMUSCULAR
Qty: 10 ML | OUTPATIENT
Start: 2024-07-02

## 2024-07-02 NOTE — TELEPHONE ENCOUNTER
Rx Refill Note  Requested Prescriptions     Pending Prescriptions Disp Refills    Testosterone Cypionate (DEPOTESTOTERONE CYPIONATE) 200 MG/ML injection [Pharmacy Med Name: TESTOSTERONE CYP 200MG/ML SDV 1ML] 10 mL      Sig: INJECT 0.5 ML INTO THE APPROPRIATE MUSCLE AS DIRECTED BY PRESCRIBER EVERY 14 DAYS      Last office visit with prescribing clinician: 7/1/2024   Last telemedicine visit with prescribing clinician: Visit date not found   Next office visit with prescribing clinician: Visit date not found   {TIP  Encounters:23}    {TIP  Please add Last Relevant Lab Date if appropriate:23}              {TIP  Is Refill Pharmacy correct?:23}    Would you like a call back once the refill request has been completed: [] Yes [] No    If the office needs to give you a call back, can they leave a voicemail: [] Yes [] No    Macey Melendez MA  07/02/24, 10:47 EDT

## 2024-07-05 NOTE — PROGRESS NOTES
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  This is a change in the lining of the esophagus related to reflux.  There is no evidence of dysplasia or cancer.  Diallo's esophagus does increase the risk of developing cancer over period of time and therefore would recommend repeat EGD in 3 years for surveillance.  Continue twice daily proton pump inhibitor.  Follow-up with your primary care provider as scheduled.

## 2024-07-09 LAB
ARSENIC BLD-MCNC: 2 UG/L (ref 0–9)
LEAD BLDV-MCNC: 30.1 UG/DL (ref 0–3.4)
MERCURY BLD-MCNC: <1 UG/L (ref 0–14.9)

## 2024-07-10 DIAGNOSIS — R78.71 ELEVATED BLOOD LEAD LEVEL: Primary | ICD-10-CM

## 2024-07-16 RX ORDER — ROSUVASTATIN CALCIUM 40 MG/1
40 TABLET, COATED ORAL NIGHTLY
Qty: 90 TABLET | Refills: 3 | Status: SHIPPED | OUTPATIENT
Start: 2024-07-16 | End: 2024-07-16

## 2024-07-16 RX ORDER — ROSUVASTATIN CALCIUM 40 MG/1
40 TABLET, COATED ORAL NIGHTLY
Qty: 90 TABLET | Refills: 3 | Status: SHIPPED | OUTPATIENT
Start: 2024-07-16

## 2024-07-19 ENCOUNTER — TELEPHONE (OUTPATIENT)
Dept: FAMILY MEDICINE CLINIC | Facility: CLINIC | Age: 74
End: 2024-07-19
Payer: MEDICARE

## 2024-07-22 NOTE — TELEPHONE ENCOUNTER
Zach Tran DO [facility] Pool38 minutes ago (9:03 AM)       He had [id number] last year, does not need to repeat for at least 5 years   Patient informed

## 2024-07-26 ENCOUNTER — TELEPHONE (OUTPATIENT)
Dept: FAMILY MEDICINE CLINIC | Facility: CLINIC | Age: 74
End: 2024-07-26

## 2024-07-26 NOTE — TELEPHONE ENCOUNTER
Caller: THELMA    Relationship to patient: [facility] HOME HEALTH NURSE    Best call back number: [phone]    Patient is needing: PATIENT DID NOT ANSWER THE PHONE WHEN THELMA WAS TRYING TO SCHEDULE A TIME TO GO SEE HIM, SO SHE IS MARKING HIM AS A MISSED VISIT FOR THIS WEEK

## 2024-08-05 ENCOUNTER — LAB (OUTPATIENT)
Dept: LAB | Facility: HOSPITAL | Age: 74
End: 2024-08-05
Payer: MEDICARE

## 2024-08-05 DIAGNOSIS — R78.71 ELEVATED BLOOD LEAD LEVEL: ICD-10-CM

## 2024-08-05 DIAGNOSIS — R79.89 OTHER SPECIFIED ABNORMAL FINDINGS OF BLOOD CHEMISTRY: ICD-10-CM

## 2024-08-05 DIAGNOSIS — I25.10 CORONARY ARTERY DISEASE INVOLVING NATIVE CORONARY ARTERY OF NATIVE HEART WITHOUT ANGINA PECTORIS: ICD-10-CM

## 2024-08-05 PROCEDURE — 85025 COMPLETE CBC W/AUTO DIFF WBC: CPT

## 2024-08-05 PROCEDURE — 80061 LIPID PANEL: CPT

## 2024-08-05 PROCEDURE — 80053 COMPREHEN METABOLIC PANEL: CPT

## 2024-08-05 PROCEDURE — 36415 COLL VENOUS BLD VENIPUNCTURE: CPT

## 2024-08-05 PROCEDURE — 83655 ASSAY OF LEAD: CPT

## 2024-08-05 PROCEDURE — 83036 HEMOGLOBIN GLYCOSYLATED A1C: CPT

## 2024-08-06 ENCOUNTER — TELEPHONE (OUTPATIENT)
Dept: FAMILY MEDICINE CLINIC | Facility: CLINIC | Age: 74
End: 2024-08-06
Payer: MEDICARE

## 2024-08-06 DIAGNOSIS — J45.909 EXTRINSIC ASTHMA, UNSPECIFIED ASTHMA SEVERITY, UNSPECIFIED WHETHER COMPLICATED, UNSPECIFIED WHETHER PERSISTENT: ICD-10-CM

## 2024-08-06 DIAGNOSIS — E11.65 TYPE 2 DIABETES MELLITUS WITH HYPERGLYCEMIA, WITHOUT LONG-TERM CURRENT USE OF INSULIN: ICD-10-CM

## 2024-08-06 LAB
ALBUMIN SERPL-MCNC: 4.8 G/DL (ref 3.5–5.2)
ALBUMIN/GLOB SERPL: 1.8 G/DL
ALP SERPL-CCNC: 54 U/L (ref 39–117)
ALT SERPL W P-5'-P-CCNC: 37 U/L (ref 1–41)
ANION GAP SERPL CALCULATED.3IONS-SCNC: 11.7 MMOL/L (ref 5–15)
AST SERPL-CCNC: 36 U/L (ref 1–40)
BASOPHILS # BLD AUTO: 0.06 10*3/MM3 (ref 0–0.2)
BASOPHILS NFR BLD AUTO: 0.7 % (ref 0–1.5)
BILIRUB SERPL-MCNC: 0.7 MG/DL (ref 0–1.2)
BUN SERPL-MCNC: 11 MG/DL (ref 8–23)
BUN/CREAT SERPL: 9.6 (ref 7–25)
CALCIUM SPEC-SCNC: 9.9 MG/DL (ref 8.6–10.5)
CHLORIDE SERPL-SCNC: 90 MMOL/L (ref 98–107)
CHOLEST SERPL-MCNC: 123 MG/DL (ref 0–200)
CO2 SERPL-SCNC: 26.3 MMOL/L (ref 22–29)
CREAT SERPL-MCNC: 1.15 MG/DL (ref 0.76–1.27)
DEPRECATED RDW RBC AUTO: 40.3 FL (ref 37–54)
EGFRCR SERPLBLD CKD-EPI 2021: 67.6 ML/MIN/1.73
EOSINOPHIL # BLD AUTO: 0.16 10*3/MM3 (ref 0–0.4)
EOSINOPHIL NFR BLD AUTO: 1.8 % (ref 0.3–6.2)
ERYTHROCYTE [DISTWIDTH] IN BLOOD BY AUTOMATED COUNT: 12.7 % (ref 12.3–15.4)
GLOBULIN UR ELPH-MCNC: 2.6 GM/DL
GLUCOSE SERPL-MCNC: 140 MG/DL (ref 65–99)
HBA1C MFR BLD: 7.8 % (ref 4.8–5.6)
HCT VFR BLD AUTO: 45.6 % (ref 37.5–51)
HDLC SERPL-MCNC: 48 MG/DL (ref 40–60)
HGB BLD-MCNC: 15.5 G/DL (ref 13–17.7)
IMM GRANULOCYTES # BLD AUTO: 0.02 10*3/MM3 (ref 0–0.05)
IMM GRANULOCYTES NFR BLD AUTO: 0.2 % (ref 0–0.5)
LDLC SERPL CALC-MCNC: 53 MG/DL (ref 0–100)
LDLC/HDLC SERPL: 1.05 {RATIO}
LYMPHOCYTES # BLD AUTO: 2.32 10*3/MM3 (ref 0.7–3.1)
LYMPHOCYTES NFR BLD AUTO: 26.2 % (ref 19.6–45.3)
MCH RBC QN AUTO: 29.7 PG (ref 26.6–33)
MCHC RBC AUTO-ENTMCNC: 34 G/DL (ref 31.5–35.7)
MCV RBC AUTO: 87.4 FL (ref 79–97)
MONOCYTES # BLD AUTO: 1 10*3/MM3 (ref 0.1–0.9)
MONOCYTES NFR BLD AUTO: 11.3 % (ref 5–12)
NEUTROPHILS NFR BLD AUTO: 5.28 10*3/MM3 (ref 1.7–7)
NEUTROPHILS NFR BLD AUTO: 59.8 % (ref 42.7–76)
NRBC BLD AUTO-RTO: 0 /100 WBC (ref 0–0.2)
PLATELET # BLD AUTO: 201 10*3/MM3 (ref 140–450)
PMV BLD AUTO: 11.1 FL (ref 6–12)
POTASSIUM SERPL-SCNC: 4.1 MMOL/L (ref 3.5–5.2)
PROT SERPL-MCNC: 7.4 G/DL (ref 6–8.5)
RBC # BLD AUTO: 5.22 10*6/MM3 (ref 4.14–5.8)
SODIUM SERPL-SCNC: 128 MMOL/L (ref 136–145)
TRIGL SERPL-MCNC: 122 MG/DL (ref 0–150)
VLDLC SERPL-MCNC: 22 MG/DL (ref 5–40)
WBC NRBC COR # BLD AUTO: 8.84 10*3/MM3 (ref 3.4–10.8)

## 2024-08-06 NOTE — TELEPHONE ENCOUNTER
Caller: Behzad Guzman     Relationship: SELF    Best call back number: [phone]    What is your medical concern? HAS SEVERAL BUT MAINLY WANTS TO SPEAK TO SPEAK TO YOU (DR. BERNABE)  REGARDING HIS FOOT AND LEG. HE IS THINKING HE NEEDS AN MRI SOON AS POSSIBLE.  FEELS LIKE HE HAS A HEMATOMA ON HIS LEG.I ASKED HIM SEVERAL TIMES ABOUT COMING IN TO HAVE IT LOOKED AT BUT HE STATED YOU HAVE ALREADY SEEN IT .    How long has this issue been going on? QUITE SOME TIME    Is your provider already aware of this issue? HE BELIEVES HE IS AWARE

## 2024-08-06 NOTE — TELEPHONE ENCOUNTER
Caller: Behzad Guzman    Relationship: Self    Best call back number: [phone]    Requested Prescriptions:   Requested Prescriptions     Pending Prescriptions Disp Refills    albuterol sulfate  (90 Base) MCG/ACT inhaler 6.7 g 1     Sig: Inhale 2 puffs Every 6 (Six) Hours As Needed for Wheezing or Shortness of Air.    glucose monitor monitoring kit 1 each 0     Sig: Use 1 each Daily.    Lancets (onetouch ultrasoft) lancets 100 each 12     Sig: Use one daily to test blood sugars        Pharmacy where request should be sent[id number] - [place], [place] - [address] [facility] [address] - [phone]  - [phone] FX     Last office visit with prescribing clinician: 7/1/2024   Last telemedicine visit with prescribing clinician: Visit date not found   Next office visit with prescribing clinician: Visit date not found     Additional details provided by patient: HIS MACHINE HAS STOPPED WORKING, HE ALSO NEEDS ALL SUPPLIES WITH MACHINE TO TEST HIS SUGAR.    Does the patient have less than a 3 day supply:  [x] Yes  [] No    Would you like a call back once the refill request has been completed: [x] Yes [] No    If the office needs to give you a call back, can they leave a voicemail: [x] Yes [] No    Casandra Richey MA   08/06/24 16:01 EDT

## 2024-08-07 LAB — LEAD BLDV-MCNC: 16.8 UG/DL (ref 0–3.4)

## 2024-08-07 RX ORDER — LANCETS
EACH MISCELLANEOUS
Qty: 100 EACH | Refills: 12 | Status: SHIPPED | OUTPATIENT
Start: 2024-08-07

## 2024-08-07 RX ORDER — ALBUTEROL SULFATE 90 UG/1
2 AEROSOL, METERED RESPIRATORY (INHALATION) EVERY 6 HOURS PRN
Qty: 6.7 G | Refills: 1 | Status: SHIPPED | OUTPATIENT
Start: 2024-08-07

## 2024-08-07 RX ORDER — BLOOD-GLUCOSE METER
1 KIT MISCELLANEOUS DAILY
Qty: 1 EACH | Refills: 0 | Status: SHIPPED | OUTPATIENT
Start: 2024-08-07

## 2024-08-07 NOTE — TELEPHONE ENCOUNTER
Rx Refill Note  Requested Prescriptions     Pending Prescriptions Disp Refills    albuterol sulfate  (90 Base) MCG/ACT inhaler 6.7 g 1     Sig: Inhale 2 puffs Every 6 (Six) Hours As Needed for Wheezing or Shortness of Air.    glucose monitor monitoring kit 1 each 0     Sig: Use 1 each Daily.    Lancets (onetouch ultrasoft) lancets 100 each 12     Sig: Use one daily to test blood sugars      Last office visit with prescribing clinician: 7/1/2024   Last telemedicine visit with prescribing clinician: Visit date not found   Next office visit with prescribing clinician: 8/6/2024                         Would you like a call back once the refill request has been completed: [] Yes [] No    If the office needs to give you a call back, can they leave a voicemail: [] Yes [] No    Macey Melendez MA  08/07/24, 09:00 EDT

## 2024-08-08 ENCOUNTER — TELEPHONE (OUTPATIENT)
Dept: FAMILY MEDICINE CLINIC | Facility: CLINIC | Age: 74
End: 2024-08-08
Payer: MEDICARE

## 2024-08-08 DIAGNOSIS — E11.43 TYPE 2 DIABETES MELLITUS WITH DIABETIC AUTONOMIC NEUROPATHY, WITHOUT LONG-TERM CURRENT USE OF INSULIN: ICD-10-CM

## 2024-08-08 RX ORDER — BLOOD SUGAR DIAGNOSTIC
1 STRIP MISCELLANEOUS DAILY
Qty: 300 EACH | Refills: 2 | Status: SHIPPED | OUTPATIENT
Start: 2024-08-08

## 2024-08-08 NOTE — TELEPHONE ENCOUNTER
Caller: Behzad Guzman Nicola    Relationship: Self    Best call back number: [phone]    Requested Prescriptions:   ONE TOUCH ULTRA 2 TEST STRIPS       Pharmacy where request should be sent: [id number] - [place], [place] - [address]  [address] [facility] [address] - [phone]  - [phone] FX     Last office visit with prescribing clinician: 7/1/2024   Last telemedicine visit with prescribing clinician: Visit date not found   Next office visit with prescribing clinician: Visit date not found     Additional details provided by patient: PATIENT HAS PICKED UP EVERYTHING FROM PHARMACY AND NEEDS TO HAVE ONE TOUCH ULTRA 2 TEST STRIPS    Does the patient have less than a 3 day supply:  [x] Yes  [] No    Would you like a call back once the refill request has been completed: [] Yes [x] No    If the office needs to give you a call back, can they leave a voicemail: [] Yes [x] No    Charla Kurtz MA   08/08/24 12:55 EDT

## 2024-08-09 DIAGNOSIS — R78.71 ELEVATED BLOOD LEAD LEVEL: Primary | ICD-10-CM

## 2024-08-09 DIAGNOSIS — E87.1 HYPONATREMIA: ICD-10-CM

## 2024-08-09 NOTE — TELEPHONE ENCOUNTER
Zach Tran, DO  [facility] Pool3 minutes ago (1:31 PM)       Needs apt to get imaging approved. Please let pt know that lead levels are coming down on recent labwork. I have ordered repeat labs to recheck in 30 days.   Spoke with patient and advised an OV would be best. He is going to call back to schedule

## 2024-08-12 ENCOUNTER — TELEPHONE (OUTPATIENT)
Dept: FAMILY MEDICINE CLINIC | Facility: CLINIC | Age: 74
End: 2024-08-12
Payer: MEDICARE

## 2024-08-12 NOTE — TELEPHONE ENCOUNTER
Caller: Behzad Guzman    Relationship: Self    Best call back number:         Who are you requesting to speak with (clinical staff, provider,  specific staff member): UNKN    Do you know the name of the person who called: UNKN    What was the call regarding: UNKN    THE PATIENT STATED HE RECEIVED A CALL FROM THIS OFFICE, BUT NO MESSAGE WAS LEFT.  PLEASE CALL THE PATIENT BACK IF NEEDED.

## 2024-08-13 ENCOUNTER — OFFICE VISIT (OUTPATIENT)
Dept: FAMILY MEDICINE CLINIC | Facility: CLINIC | Age: 74
End: 2024-08-13
Payer: MEDICARE

## 2024-08-13 VITALS
SYSTOLIC BLOOD PRESSURE: 130 MMHG | WEIGHT: 180.6 LBS | OXYGEN SATURATION: 96 % | HEIGHT: 72 IN | HEART RATE: 69 BPM | DIASTOLIC BLOOD PRESSURE: 76 MMHG | BODY MASS INDEX: 24.46 KG/M2
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
## 2024-08-13 DIAGNOSIS — T14.8XXA HEMATOMA: ICD-10-CM

## 2024-08-13 NOTE — PROGRESS NOTES
Chief Complaint   Patient presents with    Leg Problem     Pt states he has hematoma on right thigh.    Insect Bite     On hands and arms       HPI:  Behzad Guzman is a 73 y.o. male who presents today for follow-up right-sided leg discomfort.  Would like to discuss hematoma.  Has recurrent bug bites.    ROS:  Constitutional: no fevers, night sweats or unexplained weight loss  Eyes: no vision changes  ENT: no runny nose, ear pain, sore throat  Cardio: no chest pain, palpitations  Pulm: no shortness of breath, wheezing, or cough  GI: no abdominal pain or changes in bowel movements  : no difficulty urinating  MSK: no difficulty ambulating, no joint pain  Neuro: no weakness, dizziness or headache  Psych: no trouble sleeping  Endo: no change in appetite      Past Medical History:   Diagnosis Date    Asthma     Chronic hip pain, left     Chronic pain in left shoulder     Hyperlipidemia     Hypertension     Infected tick bite 2021    Leg length discrepancy     Neck pain, chronic     Neuropathy     Panic attacks     Pre-diabetes     Prediabetes     Spinal stenosis       Family History   Problem Relation Age of Onset    Heart attack Father 65    Other Mother         accident    No Known Problems Maternal Grandmother     No Known Problems Maternal Grandfather     No Known Problems Paternal Grandmother     Stroke Paternal Grandfather       Social History     Socioeconomic History    Marital status:    Tobacco Use    Smoking status: Former     Current packs/day: 0.00     Average packs/day: 0.5 packs/day for 10.0 years (5.0 ttl pk-yrs)     Types: Cigarettes     Start date:      Quit date:      Years since quittin.6    Smokeless tobacco: Never   Vaping Use    Vaping status: Never Used   Substance and Sexual Activity    Alcohol use: No    Drug use: No    Sexual activity: Defer      Allergies   Allergen Reactions    Peanut Butter Flavor Anaphylaxis    Shellfish Allergy Anaphylaxis    Amoxicillin  Rash and Other (See Comments)    Penicillins Rash    Sulfa Antibiotics Myalgia    Doxycycline GI Bleeding    Latex Rash    Pregabalin Other (See Comments)      Immunization History   Administered Date(s) Administered    COVID-19 (PFIZER) Purple Cap Monovalent 09/14/2021, 10/08/2021    Flu Vaccine Quad PF >36MO 09/30/2015    Fluzone High-Dose 65+yrs 11/22/2022, 12/22/2023    Pneumococcal Conjugate 13-Valent (PCV13) 09/22/2016    Pneumococcal Conjugate 20-Valent (PCV20) 12/22/2023    Pneumococcal Polysaccharide (PPSV23) 05/18/2018    Tdap 04/22/2022        PE:  Vitals:    08/13/24 1521   BP: 130/76   Pulse: 69   SpO2: 96%      Body mass index is 24.49 kg/m².    Gen Appearance: NAD  HEENT: Normocephalic, PERRLA, no thyromegaly, trache midline  Heart: RRR, normal S1 and S2, no murmur  Lungs: CTA b/l, no wheezing, no crackles  Abdomen: Soft, non-tender, non-distended, no guarding and BSx4  MSK: Moves all extremities well, normal gait, no peripheral edema  Pulses: Palpable and equal b/l  Lymph nodes: No palpable lymphadenopathy   Neuro: No focal deficits      Current Outpatient Medications   Medication Sig Dispense Refill    albuterol (ACCUNEB) 0.63 MG/3ML nebulizer solution Take 3 mL by nebulization Every 6 (Six) Hours As Needed for Wheezing. 3 mL 12    albuterol sulfate  (90 Base) MCG/ACT inhaler Inhale 2 puffs Every 6 (Six) Hours As Needed for Wheezing or Shortness of Air. 6.7 g 1    Alcaftadine (Lastacaft) 0.25 % solution Apply 1 drop to eye(s) as directed by provider 1 (One) Time.      aspirin 81 MG EC tablet Take 1 tablet by mouth Daily.      azelastine (ASTELIN) 0.1 % nasal spray 2 sprays into the nostril(s) as directed by provider 2 (Two) Times a Day. Use in each nostril as directed      clopidogrel (PLAVIX) 75 MG tablet Take 1 tablet by mouth Daily. 30 tablet 11    diazePAM (VALIUM) 5 MG tablet Take 1 tablet by mouth Every 6 (Six) Hours As Needed.  0    fexofenadine (ALLEGRA) 180 MG tablet Take 1 tablet by  "mouth Daily.      fluticasone (Flonase) 50 MCG/ACT nasal spray 2 sprays into the nostril(s) as directed by provider Daily. 2 puffs each nostril 18.2 mL 0    gabapentin (NEURONTIN) 400 MG capsule Take 1 capsule by mouth 3 (Three) Times a Day.      glucose monitor monitoring kit Use 1 each Daily. 1 each 0    HYDROcodone-acetaminophen (NORCO)  MG per tablet Take 1 tablet by mouth Every 6 (Six) Hours As Needed for Moderate Pain.      Lancets (onetouch ultrasoft) lancets Use one daily to test blood sugars 100 each 12    losartan-hydrochlorothiazide (HYZAAR) 50-12.5 MG per tablet TAKE 1 TABLET BY MOUTH DAILY 90 tablet 1    metFORMIN (GLUCOPHAGE) 500 MG tablet TAKE 1 TABLET BY MOUTH DAILY WITH BREAKFAST (Patient taking differently: Take 1 tablet by mouth 2 (Two) Times a Day With Meals.) 180 tablet 0    metoprolol tartrate (LOPRESSOR) 50 MG tablet TAKE 1 TABLET BY MOUTH TWICE A  tablet 0    Needle, Disp, (B-D HYPODERMIC NEEDLE 22GX1\") 22G X 1\" misc USE TO INJECT EVERY 14 DAYS 6 each 5    OneTouch Verio test strip 1 each by Other route Daily. for testing 300 each 2    oxyCODONE (ROXICODONE) 15 MG immediate release tablet TAKE 1/2 A TABLET BY MOUTH AT BEDTIME AND 1/2 TABLET IN MIDDLE OF NIGHT AS NEEDED AS DIRECTED      pantoprazole (PROTONIX) 40 MG EC tablet Take 1 tablet by mouth Daily.      pantoprazole (PROTONIX) 40 MG EC tablet Take 1 tablet by mouth 2 (Two) Times a Day Before Meals. 60 tablet 0    rosuvastatin (CRESTOR) 40 MG tablet Take 1 tablet by mouth Every Night. Replaces pravastatin 90 tablet 3    Testosterone Enanthate 200 MG/ML solution Inject 200 mg into the appropriate muscle as directed by prescriber Every 14 (Fourteen) Days. 5 mL 1    vitamin D (ERGOCALCIFEROL) 1.25 MG (73487 UT) capsule capsule TAKE 1 CAPSULE BY MOUTH ONCE WEEKLY FOR 12 DOSES 12 capsule 0    azithromycin (Zithromax Z-Rell) 250 MG tablet Take 2 tablets by mouth on day 1, then 1 tablet daily on days 2-5 (Patient not taking: " Reported on 8/13/2024) 6 tablet 0     No current facility-administered medications for this visit.      Recommend follow-up with dermatology for further evaluation of bug bites, appear to be scabies versus mites.  He has tried scabies treatment several times.  Encourage patient to hire an  to have his home evaluated.    Discussed 2018 MRI with patient, likely hematoma seen on MRI.  Patient would like to monitor for now.    Counseling was given to patient for the following topics: diagnostic results and impressions . Total time of the encounter was 40 minutes and 20 minutes was spent face to face counseling.      Diagnoses and all orders for this visit:    1. Bug bite, subsequent encounter (Primary)  -     Ambulatory Referral to Dermatology    2. Hematoma         No follow-ups on file.     Dictated Utilizing Dragon Dictation    Please note that portions of this note were completed with a voice recognition program.    Part of this note may be an electronic transcription/translation of spoken language to printed text using the Dragon Dictation System.
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
## 2024-08-14 RX ORDER — MUPIROCIN CALCIUM 20 MG/G
1 CREAM TOPICAL 3 TIMES DAILY
Qty: 30 G | Refills: 1 | Status: SHIPPED | OUTPATIENT
Start: 2024-08-14

## 2024-08-23 ENCOUNTER — LAB (OUTPATIENT)
Dept: LAB | Facility: HOSPITAL | Age: 74
End: 2024-08-23
Payer: MEDICARE

## 2024-08-23 ENCOUNTER — TELEPHONE (OUTPATIENT)
Dept: FAMILY MEDICINE CLINIC | Facility: CLINIC | Age: 74
End: 2024-08-23
Payer: MEDICARE

## 2024-08-23 ENCOUNTER — CONSULT (OUTPATIENT)
Dept: ONCOLOGY | Facility: CLINIC | Age: 74
End: 2024-08-23
Payer: MEDICARE

## 2024-08-23 VITALS
OXYGEN SATURATION: 95 % | WEIGHT: 178 LBS | HEIGHT: 72 IN | SYSTOLIC BLOOD PRESSURE: 118 MMHG | TEMPERATURE: 97.8 F | BODY MASS INDEX: 24.11 KG/M2 | RESPIRATION RATE: 18 BRPM | DIASTOLIC BLOOD PRESSURE: 83 MMHG | HEART RATE: 79 BPM

## 2024-08-23 DIAGNOSIS — R78.71 ELEVATED BLOOD LEAD LEVEL: ICD-10-CM

## 2024-08-23 DIAGNOSIS — T56.0X1A: Primary | ICD-10-CM

## 2024-08-23 DIAGNOSIS — R41.9: Primary | ICD-10-CM

## 2024-08-23 LAB
ALBUMIN SERPL-MCNC: 4.6 G/DL (ref 3.5–5.2)
ALBUMIN/GLOB SERPL: 1.8 G/DL
ALP SERPL-CCNC: 48 U/L (ref 39–117)
ALT SERPL W P-5'-P-CCNC: 24 U/L (ref 1–41)
ANION GAP SERPL CALCULATED.3IONS-SCNC: 12 MMOL/L (ref 5–15)
AST SERPL-CCNC: 28 U/L (ref 1–40)
BASOPHILS # BLD AUTO: 0.05 10*3/MM3 (ref 0–0.2)
BASOPHILS NFR BLD AUTO: 0.7 % (ref 0–1.5)
BILIRUB SERPL-MCNC: 0.6 MG/DL (ref 0–1.2)
BUN SERPL-MCNC: 11 MG/DL (ref 8–23)
BUN/CREAT SERPL: 10.5 (ref 7–25)
CALCIUM SPEC-SCNC: 9.7 MG/DL (ref 8.6–10.5)
CHLORIDE SERPL-SCNC: 91 MMOL/L (ref 98–107)
CO2 SERPL-SCNC: 26 MMOL/L (ref 22–29)
CREAT SERPL-MCNC: 1.05 MG/DL (ref 0.76–1.27)
DEPRECATED RDW RBC AUTO: 39.8 FL (ref 37–54)
EGFRCR SERPLBLD CKD-EPI 2021: 75.4 ML/MIN/1.73
EOSINOPHIL # BLD AUTO: 0.13 10*3/MM3 (ref 0–0.4)
EOSINOPHIL NFR BLD AUTO: 1.9 % (ref 0.3–6.2)
ERYTHROCYTE [DISTWIDTH] IN BLOOD BY AUTOMATED COUNT: 13 % (ref 12.3–15.4)
GLOBULIN UR ELPH-MCNC: 2.5 GM/DL
GLUCOSE SERPL-MCNC: 144 MG/DL (ref 65–99)
HCT VFR BLD AUTO: 45.8 % (ref 37.5–51)
HGB BLD-MCNC: 15.9 G/DL (ref 13–17.7)
IMM GRANULOCYTES # BLD AUTO: 0.02 10*3/MM3 (ref 0–0.05)
IMM GRANULOCYTES NFR BLD AUTO: 0.3 % (ref 0–0.5)
LYMPHOCYTES # BLD AUTO: 1.97 10*3/MM3 (ref 0.7–3.1)
LYMPHOCYTES NFR BLD AUTO: 28.8 % (ref 19.6–45.3)
MCH RBC QN AUTO: 29.5 PG (ref 26.6–33)
MCHC RBC AUTO-ENTMCNC: 34.7 G/DL (ref 31.5–35.7)
MCV RBC AUTO: 85 FL (ref 79–97)
MONOCYTES # BLD AUTO: 0.63 10*3/MM3 (ref 0.1–0.9)
MONOCYTES NFR BLD AUTO: 9.2 % (ref 5–12)
NEUTROPHILS NFR BLD AUTO: 4.05 10*3/MM3 (ref 1.7–7)
NEUTROPHILS NFR BLD AUTO: 59.1 % (ref 42.7–76)
NRBC BLD AUTO-RTO: 0 /100 WBC (ref 0–0.2)
PLATELET # BLD AUTO: 172 10*3/MM3 (ref 140–450)
PMV BLD AUTO: 10.8 FL (ref 6–12)
POTASSIUM SERPL-SCNC: 3.9 MMOL/L (ref 3.5–5.2)
PROT SERPL-MCNC: 7.1 G/DL (ref 6–8.5)
RBC # BLD AUTO: 5.39 10*6/MM3 (ref 4.14–5.8)
SODIUM SERPL-SCNC: 129 MMOL/L (ref 136–145)
WBC NRBC COR # BLD AUTO: 6.85 10*3/MM3 (ref 3.4–10.8)

## 2024-08-23 PROCEDURE — 84202 ASSAY RBC PROTOPORPHYRIN: CPT

## 2024-08-23 PROCEDURE — 83655 ASSAY OF LEAD: CPT

## 2024-08-23 PROCEDURE — 85025 COMPLETE CBC W/AUTO DIFF WBC: CPT

## 2024-08-23 PROCEDURE — 80053 COMPREHEN METABOLIC PANEL: CPT

## 2024-08-23 PROCEDURE — 36415 COLL VENOUS BLD VENIPUNCTURE: CPT

## 2024-08-23 NOTE — TELEPHONE ENCOUNTER
Dr. Alcantara called from  Hematology. He wanted to speak with  regarding the patient. I let him know that  was not here today. He requested 's cell phone number. I gave him the number.
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
## 2024-08-27 DIAGNOSIS — B86 SCABIES: ICD-10-CM

## 2024-08-27 DIAGNOSIS — L08.9 SKIN INFECTION: Primary | ICD-10-CM

## 2024-08-27 LAB
LEAD BLDV-MCNC: 13.8 UG/DL (ref 0–3.4)
ZPP RBC-MCNC: 41 UG/DL (ref 0–99)

## 2024-08-27 RX ORDER — MUPIROCIN 20 MG/G
1 OINTMENT TOPICAL 2 TIMES DAILY
Qty: 60 G | Refills: 1 | Status: SHIPPED | OUTPATIENT
Start: 2024-08-27

## 2024-08-27 RX ORDER — PERMETHRIN 50 MG/G
1 CREAM TOPICAL ONCE
Qty: 1 G | Refills: 0 | Status: SHIPPED | OUTPATIENT
Start: 2024-08-27 | End: 2024-08-27

## 2024-09-12 ENCOUNTER — TELEPHONE (OUTPATIENT)
Dept: FAMILY MEDICINE CLINIC | Facility: CLINIC | Age: 74
End: 2024-09-12
Payer: MEDICARE

## 2024-09-12 NOTE — TELEPHONE ENCOUNTER
Caller: Behzad Guzman    Relationship: Self    Best call back number: [phone]    What medication are you requesting: ANTIBIOTICS    What are your current symptoms: TURKEY MITES ALL OVER HIS BODY    How long have you been experiencing symptoms: OVER A YEAR    Have you had these symptoms before: [x] Yes  [] No    Have you been treated for these symptoms before:  [x] Yes  [] No    If a prescription is needed, what is your preferred pharmacy and phone number: [id number] [place] [address] AT [facility] [address] - [phone]  - [phone] FX     Additional notes: PATIENT HAS BEEN TO DR [facility] TO TRY TO TREAT THIS. THEY GET BETTER, THEN THEY COME BACK

## 2024-09-12 NOTE — TELEPHONE ENCOUNTER
Patient called back and stated they the bites look infected and he'd rather see Dr Tran. I have him scheduled Wednesday.

## 2024-09-12 NOTE — TELEPHONE ENCOUNTER
After speaking with Dr Tran he recommended patient be seen possibly by another dermatology or follow up with his current dermatologist.       The patient is going to call his current dermatologist to see what they may recommend further.

## 2024-09-20 ENCOUNTER — TELEPHONE (OUTPATIENT)
Dept: CARDIOLOGY | Facility: CLINIC | Age: 74
End: 2024-09-20
Payer: MEDICARE

## 2024-09-20 RX ORDER — PANTOPRAZOLE SODIUM 40 MG/1
40 TABLET, DELAYED RELEASE ORAL DAILY
Start: 2024-09-20

## 2024-09-24 ENCOUNTER — TELEPHONE (OUTPATIENT)
Dept: FAMILY MEDICINE CLINIC | Facility: CLINIC | Age: 74
End: 2024-09-24
Payer: MEDICARE

## 2024-09-24 DIAGNOSIS — E34.9 TESTOSTERONE DEFICIENCY: ICD-10-CM

## 2024-09-24 PROBLEM — I25.10 CORONARY ARTERY DISEASE INVOLVING NATIVE CORONARY ARTERY OF NATIVE HEART WITHOUT ANGINA PECTORIS: Status: ACTIVE | Noted: 2024-09-24
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
## 2024-09-26 ENCOUNTER — TELEPHONE (OUTPATIENT)
Dept: FAMILY MEDICINE CLINIC | Facility: CLINIC | Age: 74
End: 2024-09-26
Payer: MEDICARE

## 2024-09-26 ENCOUNTER — EXTERNAL PBMM DATA (OUTPATIENT)
Dept: PHARMACY | Facility: OTHER | Age: 74
End: 2024-09-26
Payer: MEDICARE

## 2024-10-01 ENCOUNTER — OFFICE VISIT (OUTPATIENT)
Dept: CARDIOLOGY | Facility: CLINIC | Age: 74
End: 2024-10-01
Payer: MEDICARE

## 2024-10-01 VITALS
WEIGHT: 188.2 LBS | SYSTOLIC BLOOD PRESSURE: 137 MMHG | BODY MASS INDEX: 25.49 KG/M2 | OXYGEN SATURATION: 96 % | HEART RATE: 70 BPM | DIASTOLIC BLOOD PRESSURE: 89 MMHG | HEIGHT: 72 IN

## 2024-10-01 DIAGNOSIS — I25.10 CORONARY ARTERY DISEASE INVOLVING NATIVE CORONARY ARTERY OF NATIVE HEART WITHOUT ANGINA PECTORIS: Primary | ICD-10-CM

## 2024-10-01 DIAGNOSIS — E78.2 MIXED HYPERLIPIDEMIA: ICD-10-CM

## 2024-10-01 DIAGNOSIS — I10 PRIMARY HYPERTENSION: ICD-10-CM

## 2024-10-01 PROCEDURE — 3075F SYST BP GE 130 - 139MM HG: CPT | Performed by: NURSE PRACTITIONER

## 2024-10-01 PROCEDURE — 3079F DIAST BP 80-89 MM HG: CPT | Performed by: NURSE PRACTITIONER

## 2024-10-01 PROCEDURE — 99214 OFFICE O/P EST MOD 30 MIN: CPT | Performed by: NURSE PRACTITIONER

## 2024-10-01 PROCEDURE — 1159F MED LIST DOCD IN RCRD: CPT | Performed by: NURSE PRACTITIONER

## 2024-10-01 PROCEDURE — 1160F RVW MEDS BY RX/DR IN RCRD: CPT | Performed by: NURSE PRACTITIONER

## 2024-10-07 ENCOUNTER — TELEPHONE (OUTPATIENT)
Dept: FAMILY MEDICINE CLINIC | Facility: CLINIC | Age: 74
End: 2024-10-07
Payer: MEDICARE

## 2024-10-07 NOTE — TELEPHONE ENCOUNTER
PATIENT CALLED TO REQUEST A SCRIPT FOR HIS SCABIES; PATIENT WOULD LIKE A CALL BACK; STATES HE HAS SOMETHING TO TELL DR. BERNABE PERSONALLY BIT DID NOT WANT TO PASS ALONG IN A MESSAGE

## 2024-10-08 NOTE — TELEPHONE ENCOUNTER
Patient is concerned with scabies as someone told him that was what his rash looks like. He would like to know if a script could be called in to give him some relief today until his appt tomorrow.

## 2024-10-08 NOTE — TELEPHONE ENCOUNTER
Unable to reach Behzad Guzman by phone or leave message.    Hub may relay message and document.     Cyndi Huston PA-C  You5 hours ago (10:20 AM)       I have reviewed Dr. Tran notes.  It appears this patient has been referred to dermatology for this condition as our treatment has not been effective.  I would recommend following up with his dermatologist for this if they have been following for this. Would need evaluation in office as I have not seen him for this in the past.  Recommend having records from dermatology office sent to our office, too.   Patient does have appt  scheduled tomorrow. Can discuss in further detail then

## 2024-10-09 ENCOUNTER — OFFICE VISIT (OUTPATIENT)
Dept: FAMILY MEDICINE CLINIC | Facility: CLINIC | Age: 74
End: 2024-10-09
Payer: MEDICARE

## 2024-10-09 ENCOUNTER — HOSPITAL ENCOUNTER (EMERGENCY)
Facility: HOSPITAL | Age: 74
Discharge: HOME | End: 2024-10-10
Attending: STUDENT IN AN ORGANIZED HEALTH CARE EDUCATION/TRAINING PROGRAM
Payer: MEDICARE

## 2024-10-09 ENCOUNTER — LAB (OUTPATIENT)
Dept: LAB | Facility: HOSPITAL | Age: 74
End: 2024-10-09
Payer: MEDICARE

## 2024-10-09 VITALS
HEART RATE: 62 BPM | DIASTOLIC BLOOD PRESSURE: 83 MMHG | WEIGHT: 189 LBS | RESPIRATION RATE: 18 BRPM | TEMPERATURE: 98 F | SYSTOLIC BLOOD PRESSURE: 138 MMHG | OXYGEN SATURATION: 96 % | HEIGHT: 72 IN | BODY MASS INDEX: 25.6 KG/M2

## 2024-10-09 VITALS
HEART RATE: 67 BPM | HEIGHT: 72 IN | SYSTOLIC BLOOD PRESSURE: 132 MMHG | BODY MASS INDEX: 25.47 KG/M2 | OXYGEN SATURATION: 98 % | WEIGHT: 188 LBS | DIASTOLIC BLOOD PRESSURE: 84 MMHG

## 2024-10-09 DIAGNOSIS — R78.71 ELEVATED BLOOD LEAD LEVEL: ICD-10-CM

## 2024-10-09 DIAGNOSIS — E87.1 HYPONATREMIA: ICD-10-CM

## 2024-10-09 DIAGNOSIS — M79.89 SWELLING OF LEFT LOWER EXTREMITY: ICD-10-CM

## 2024-10-09 DIAGNOSIS — M79.89 LEG SWELLING: ICD-10-CM

## 2024-10-09 DIAGNOSIS — Z78.9 D DIMER VALUE NORMAL: ICD-10-CM

## 2024-10-09 DIAGNOSIS — M79.89 SWELLING OF LEFT LOWER EXTREMITY: Primary | ICD-10-CM

## 2024-10-09 DIAGNOSIS — L03.113 CELLULITIS OF RIGHT UPPER EXTREMITY: ICD-10-CM

## 2024-10-09 DIAGNOSIS — B86 SCABIES: Primary | ICD-10-CM

## 2024-10-09 LAB
ALBUMIN SERPL-MCNC: 4.6 G/DL (ref 3.5–5.2)
ALBUMIN/GLOB SERPL: 2.2 G/DL
ALP SERPL-CCNC: 48 U/L (ref 39–117)
ALT SERPL W P-5'-P-CCNC: 19 U/L (ref 1–41)
AMPHET+METHAMPHET UR QL: NEGATIVE
AMPHETAMINES UR QL: NEGATIVE
ANION GAP SERPL CALCULATED.3IONS-SCNC: 12 MMOL/L (ref 5–15)
APAP SERPL-MCNC: <5 MCG/ML (ref 0–30)
AST SERPL-CCNC: 31 U/L (ref 1–40)
BARBITURATES UR QL SCN: NEGATIVE
BASOPHILS # BLD AUTO: 0.06 10*3/MM3 (ref 0–0.2)
BASOPHILS NFR BLD AUTO: 0.8 % (ref 0–1.5)
BENZODIAZ UR QL SCN: POSITIVE
BILIRUB SERPL-MCNC: 0.7 MG/DL (ref 0–1.2)
BILIRUB UR QL STRIP: NEGATIVE
BUN SERPL-MCNC: 9 MG/DL (ref 8–23)
BUN/CREAT SERPL: 9.5 (ref 7–25)
BUPRENORPHINE SERPL-MCNC: NEGATIVE NG/ML
CALCIUM SPEC-SCNC: 9.4 MG/DL (ref 8.6–10.5)
CANNABINOIDS SERPL QL: NEGATIVE
CHLORIDE SERPL-SCNC: 90 MMOL/L (ref 98–107)
CLARITY UR: CLEAR
CO2 SERPL-SCNC: 26 MMOL/L (ref 22–29)
COCAINE UR QL: NEGATIVE
COLOR UR: YELLOW
CREAT SERPL-MCNC: 0.95 MG/DL (ref 0.76–1.27)
CRP SERPL-MCNC: <0.3 MG/DL (ref 0–0.5)
D DIMER PPP FEU-MCNC: 0.41 MCGFEU/ML (ref 0–0.73)
D-LACTATE SERPL-SCNC: 1.8 MMOL/L (ref 0.5–2)
DEPRECATED RDW RBC AUTO: 43.9 FL (ref 37–54)
DEPRECATED RDW RBC AUTO: 45.3 FL (ref 37–54)
EGFRCR SERPLBLD CKD-EPI 2021: 84.5 ML/MIN/1.73
EOSINOPHIL # BLD AUTO: 0.38 10*3/MM3 (ref 0–0.4)
EOSINOPHIL NFR BLD AUTO: 5.2 % (ref 0.3–6.2)
ERYTHROCYTE [DISTWIDTH] IN BLOOD BY AUTOMATED COUNT: 14.1 % (ref 12.3–15.4)
ERYTHROCYTE [DISTWIDTH] IN BLOOD BY AUTOMATED COUNT: 14.2 % (ref 12.3–15.4)
ETHANOL BLD-MCNC: <10 MG/DL (ref 0–10)
FENTANYL UR-MCNC: NEGATIVE NG/ML
GLOBULIN UR ELPH-MCNC: 2.1 GM/DL
GLUCOSE SERPL-MCNC: 117 MG/DL (ref 65–99)
GLUCOSE UR STRIP-MCNC: NEGATIVE MG/DL
HCT VFR BLD AUTO: 42.7 % (ref 37.5–51)
HCT VFR BLD AUTO: 43.5 % (ref 37.5–51)
HGB BLD-MCNC: 14.1 G/DL (ref 13–17.7)
HGB BLD-MCNC: 14.8 G/DL (ref 13–17.7)
HGB UR QL STRIP.AUTO: NEGATIVE
IMM GRANULOCYTES # BLD AUTO: 0.01 10*3/MM3 (ref 0–0.05)
IMM GRANULOCYTES NFR BLD AUTO: 0.1 % (ref 0–0.5)
KETONES UR QL STRIP: NEGATIVE
LEUKOCYTE ESTERASE UR QL STRIP.AUTO: NEGATIVE
LYMPHOCYTES # BLD AUTO: 2.15 10*3/MM3 (ref 0.7–3.1)
LYMPHOCYTES NFR BLD AUTO: 29.5 % (ref 19.6–45.3)
MAGNESIUM SERPL-MCNC: 2.2 MG/DL (ref 1.6–2.4)
MCH RBC QN AUTO: 28.8 PG (ref 26.6–33)
MCH RBC QN AUTO: 28.8 PG (ref 26.6–33)
MCHC RBC AUTO-ENTMCNC: 33 G/DL (ref 31.5–35.7)
MCHC RBC AUTO-ENTMCNC: 34 G/DL (ref 31.5–35.7)
MCV RBC AUTO: 84.8 FL (ref 79–97)
MCV RBC AUTO: 87.1 FL (ref 79–97)
METHADONE UR QL SCN: NEGATIVE
MONOCYTES # BLD AUTO: 0.71 10*3/MM3 (ref 0.1–0.9)
MONOCYTES NFR BLD AUTO: 9.7 % (ref 5–12)
NEUTROPHILS NFR BLD AUTO: 3.99 10*3/MM3 (ref 1.7–7)
NEUTROPHILS NFR BLD AUTO: 54.7 % (ref 42.7–76)
NITRITE UR QL STRIP: NEGATIVE
NRBC BLD AUTO-RTO: 0 /100 WBC (ref 0–0.2)
OPIATES UR QL: POSITIVE
OSMOLALITY SERPL: 266 MOSM/KG (ref 280–301)
OXYCODONE UR QL SCN: POSITIVE
PCP UR QL SCN: NEGATIVE
PH UR STRIP.AUTO: 7 [PH] (ref 5–8)
PHOSPHATE SERPL-MCNC: 4 MG/DL (ref 2.5–4.5)
PLATELET # BLD AUTO: 196 10*3/MM3 (ref 140–450)
PLATELET # BLD AUTO: 196 10*3/MM3 (ref 140–450)
PMV BLD AUTO: 10.7 FL (ref 6–12)
PMV BLD AUTO: 11.3 FL (ref 6–12)
POTASSIUM SERPL-SCNC: 4 MMOL/L (ref 3.5–5.2)
PROCALCITONIN SERPL-MCNC: 0.03 NG/ML (ref 0–0.25)
PROT SERPL-MCNC: 6.7 G/DL (ref 6–8.5)
PROT UR QL STRIP: NEGATIVE
RBC # BLD AUTO: 4.9 10*6/MM3 (ref 4.14–5.8)
RBC # BLD AUTO: 5.13 10*6/MM3 (ref 4.14–5.8)
SALICYLATES SERPL-MCNC: <0.3 MG/DL
SODIUM SERPL-SCNC: 128 MMOL/L (ref 136–145)
SP GR UR STRIP: 1.01 (ref 1–1.03)
TRICYCLICS UR QL SCN: NEGATIVE
TSH SERPL DL<=0.05 MIU/L-ACNC: 1.95 UIU/ML (ref 0.27–4.2)
UROBILINOGEN UR QL STRIP: NORMAL
WBC NRBC COR # BLD AUTO: 7.18 10*3/MM3 (ref 3.4–10.8)
WBC NRBC COR # BLD AUTO: 7.3 10*3/MM3 (ref 3.4–10.8)

## 2024-10-09 PROCEDURE — 63710000001 DIPHENHYDRAMINE PER 50 MG: Performed by: STUDENT IN AN ORGANIZED HEALTH CARE EDUCATION/TRAINING PROGRAM

## 2024-10-09 PROCEDURE — 99214 OFFICE O/P EST MOD 30 MIN: CPT

## 2024-10-09 PROCEDURE — 3075F SYST BP GE 130 - 139MM HG: CPT

## 2024-10-09 PROCEDURE — 84443 ASSAY THYROID STIM HORMONE: CPT | Performed by: STUDENT IN AN ORGANIZED HEALTH CARE EDUCATION/TRAINING PROGRAM

## 2024-10-09 PROCEDURE — 87040 BLOOD CULTURE FOR BACTERIA: CPT | Performed by: STUDENT IN AN ORGANIZED HEALTH CARE EDUCATION/TRAINING PROGRAM

## 2024-10-09 PROCEDURE — 80307 DRUG TEST PRSMV CHEM ANLYZR: CPT | Performed by: STUDENT IN AN ORGANIZED HEALTH CARE EDUCATION/TRAINING PROGRAM

## 2024-10-09 PROCEDURE — 85025 COMPLETE CBC W/AUTO DIFF WBC: CPT | Performed by: STUDENT IN AN ORGANIZED HEALTH CARE EDUCATION/TRAINING PROGRAM

## 2024-10-09 PROCEDURE — 82077 ASSAY SPEC XCP UR&BREATH IA: CPT | Performed by: STUDENT IN AN ORGANIZED HEALTH CARE EDUCATION/TRAINING PROGRAM

## 2024-10-09 PROCEDURE — 86140 C-REACTIVE PROTEIN: CPT | Performed by: STUDENT IN AN ORGANIZED HEALTH CARE EDUCATION/TRAINING PROGRAM

## 2024-10-09 PROCEDURE — 80053 COMPREHEN METABOLIC PANEL: CPT

## 2024-10-09 PROCEDURE — 85379 FIBRIN DEGRADATION QUANT: CPT | Performed by: STUDENT IN AN ORGANIZED HEALTH CARE EDUCATION/TRAINING PROGRAM

## 2024-10-09 PROCEDURE — 81003 URINALYSIS AUTO W/O SCOPE: CPT | Performed by: STUDENT IN AN ORGANIZED HEALTH CARE EDUCATION/TRAINING PROGRAM

## 2024-10-09 PROCEDURE — 83605 ASSAY OF LACTIC ACID: CPT | Performed by: STUDENT IN AN ORGANIZED HEALTH CARE EDUCATION/TRAINING PROGRAM

## 2024-10-09 PROCEDURE — 99283 EMERGENCY DEPT VISIT LOW MDM: CPT

## 2024-10-09 PROCEDURE — 83930 ASSAY OF BLOOD OSMOLALITY: CPT

## 2024-10-09 PROCEDURE — 80143 DRUG ASSAY ACETAMINOPHEN: CPT | Performed by: STUDENT IN AN ORGANIZED HEALTH CARE EDUCATION/TRAINING PROGRAM

## 2024-10-09 PROCEDURE — 83735 ASSAY OF MAGNESIUM: CPT | Performed by: STUDENT IN AN ORGANIZED HEALTH CARE EDUCATION/TRAINING PROGRAM

## 2024-10-09 PROCEDURE — 84145 PROCALCITONIN (PCT): CPT | Performed by: STUDENT IN AN ORGANIZED HEALTH CARE EDUCATION/TRAINING PROGRAM

## 2024-10-09 PROCEDURE — 36415 COLL VENOUS BLD VENIPUNCTURE: CPT

## 2024-10-09 PROCEDURE — 1126F AMNT PAIN NOTED NONE PRSNT: CPT

## 2024-10-09 PROCEDURE — 80053 COMPREHEN METABOLIC PANEL: CPT | Performed by: STUDENT IN AN ORGANIZED HEALTH CARE EDUCATION/TRAINING PROGRAM

## 2024-10-09 PROCEDURE — 3079F DIAST BP 80-89 MM HG: CPT

## 2024-10-09 PROCEDURE — 1160F RVW MEDS BY RX/DR IN RCRD: CPT

## 2024-10-09 PROCEDURE — 83655 ASSAY OF LEAD: CPT

## 2024-10-09 PROCEDURE — 85027 COMPLETE CBC AUTOMATED: CPT

## 2024-10-09 PROCEDURE — 3051F HG A1C>EQUAL 7.0%<8.0%: CPT

## 2024-10-09 PROCEDURE — 93005 ELECTROCARDIOGRAM TRACING: CPT | Performed by: STUDENT IN AN ORGANIZED HEALTH CARE EDUCATION/TRAINING PROGRAM

## 2024-10-09 PROCEDURE — 80179 DRUG ASSAY SALICYLATE: CPT | Performed by: STUDENT IN AN ORGANIZED HEALTH CARE EDUCATION/TRAINING PROGRAM

## 2024-10-09 PROCEDURE — 1159F MED LIST DOCD IN RCRD: CPT

## 2024-10-09 PROCEDURE — 84100 ASSAY OF PHOSPHORUS: CPT | Performed by: STUDENT IN AN ORGANIZED HEALTH CARE EDUCATION/TRAINING PROGRAM

## 2024-10-09 RX ORDER — DIPHENHYDRAMINE HYDROCHLORIDE 50 MG/ML
25 INJECTION INTRAMUSCULAR; INTRAVENOUS ONCE
Status: DISCONTINUED | OUTPATIENT
Start: 2024-10-09 | End: 2024-10-09

## 2024-10-09 RX ORDER — KETOROLAC TROMETHAMINE 15 MG/ML
15 INJECTION, SOLUTION INTRAMUSCULAR; INTRAVENOUS ONCE
Status: DISCONTINUED | OUTPATIENT
Start: 2024-10-09 | End: 2024-10-10 | Stop reason: HOSPADM

## 2024-10-09 RX ORDER — DIPHENHYDRAMINE HCL 25 MG
25 CAPSULE ORAL ONCE
Status: COMPLETED | OUTPATIENT
Start: 2024-10-09 | End: 2024-10-09

## 2024-10-09 RX ORDER — IVERMECTIN 3 MG/1
200 TABLET ORAL ONCE
Status: COMPLETED | OUTPATIENT
Start: 2024-10-09 | End: 2024-10-09

## 2024-10-09 RX ORDER — CLINDAMYCIN HCL 150 MG
150 CAPSULE ORAL 3 TIMES DAILY
Qty: 21 CAPSULE | Refills: 0 | Status: SHIPPED | OUTPATIENT
Start: 2024-10-09 | End: 2024-10-16

## 2024-10-09 RX ADMIN — IVERMECTIN 16500 MCG: 3 TABLET ORAL at 21:49

## 2024-10-09 RX ADMIN — DIPHENHYDRAMINE HYDROCHLORIDE 25 MG: 25 CAPSULE ORAL at 21:49

## 2024-10-09 NOTE — PATIENT INSTRUCTIONS
Exercising to Lose Weight  Getting regular exercise is important for everyone. It is especially important if you are overweight. Being overweight increases your risk of heart disease, stroke, diabetes, high blood pressure, and several types of cancer. Exercising, and reducing the calories you consume, can help you lose weight and improve fitness and health.  Exercise can be moderate or vigorous intensity. To lose weight, most people need to do a certain amount of moderate or vigorous-intensity exercise each week.  How can exercise affect me?  You lose weight when you exercise enough to burn more calories than you eat. Exercise also reduces body fat and builds muscle. The more muscle you have, the more calories you burn. Exercise also:  Improves mood.  Reduces stress and tension.  Improves your overall fitness, flexibility, and endurance.  Increases bone strength.  Moderate-intensity exercise    Moderate-intensity exercise is any activity that gets you moving enough to burn at least three times more energy (calories) than if you were sitting.  Examples of moderate exercise include:  Walking a mile in 15 minutes.  Doing light yard work.  Biking at an easy pace.  Most people should get at least 150 minutes of moderate-intensity exercise a week to maintain their body weight.  Vigorous-intensity exercise  Vigorous-intensity exercise is any activity that gets you moving enough to burn at least six times more calories than if you were sitting. When you exercise at this intensity, you should be working hard enough that you are not able to carry on a conversation.  Examples of vigorous exercise include:  Running.  Playing a team sport, such as football, basketball, and soccer.  Jumping rope.  Most people should get at least 75 minutes a week of vigorous exercise to maintain their body weight.  What actions can I take to lose weight?  The amount of exercise you need to lose weight depends on:  Your age.  The type of  exercise.  Any health conditions you have.  Your overall physical ability.  Talk to your health care provider about how much exercise you need and what types of activities are safe for you.  Nutrition    Make changes to your diet as told by your health care provider or diet and nutrition specialist (dietitian). This may include:  Eating fewer calories.  Eating more protein.  Eating less unhealthy fats.  Eating a diet that includes fresh fruits and vegetables, whole grains, low-fat dairy products, and lean protein.  Avoiding foods with added fat, salt, and sugar.  Drink plenty of water while you exercise to prevent dehydration or heat stroke.  Activity  Choose an activity that you enjoy and set realistic goals. Your health care provider can help you make an exercise plan that works for you.  Exercise at a moderate or vigorous intensity most days of the week.  The intensity of exercise may vary from person to person. You can tell how intense a workout is for you by paying attention to your breathing and heartbeat. Most people will notice their breathing and heartbeat get faster with more intense exercise.  Do resistance training twice each week, such as:  Push-ups.  Sit-ups.  Lifting weights.  Using resistance bands.  Getting short amounts of exercise can be just as helpful as long, structured periods of exercise. If you have trouble finding time to exercise, try doing these things as part of your daily routine:  Get up, stretch, and walk around every 30 minutes throughout the day.  Go for a walk during your lunch break.  Park your car farther away from your destination.  If you take public transportation, get off one stop early and walk the rest of the way.  Make phone calls while standing up and walking around.  Take the stairs instead of elevators or escalators.  Wear comfortable clothes and shoes with good support.  Do not exercise so much that you hurt yourself, feel dizzy, or get very short of breath.  Where to  find more information  [place] Department of Health and Human Services: www.hhs.gov  Centers for Disease Control and Prevention: www.cdc.gov  Contact a health care provider:  Before starting a new exercise program.  If you have questions or concerns about your weight.  If you have a medical problem that keeps you from exercising.  Get help right away if:  You have any of the following while exercising:  Injury.  Dizziness.  Difficulty breathing or shortness of breath that does not go away when you stop exercising.  Chest pain.  Rapid heartbeat.  These symptoms may represent a serious problem that is an emergency. Do not wait to see if the symptoms will go away. Get medical help right away. Call your local emergency services (911 in the [place] Do not drive yourself to the hospital.  Summary  Getting regular exercise is especially important if you are overweight.  Being overweight increases your risk of heart disease, stroke, diabetes, high blood pressure, and several types of cancer.  Losing weight happens when you burn more calories than you eat.  Reducing the amount of calories you eat, and getting regular moderate or vigorous exercise each week, helps you lose weight.  This information is not intended to replace advice given to you by your health care provider. Make sure you discuss any questions you have with your health care provider.  Document Revised: 02/13/2022 Document Reviewed: 02/13/2022  [employer] Patient Education © 2024 [employer]    MyPlate from [employer]    MyPlate is an outline of a general healthy diet based on the Dietary Guidelines for Americans, 6851-9656, from the U.[place]. Department of Agriculture (USDA). It sets guidelines for how much food you should eat from each food group based on your age, sex, and level of physical activity.  What are tips for following MyPlate?  To follow MyPlate recommendations:  Eat a wide variety of fruits and vegetables, grains, and protein foods.  Serve smaller portions and  eat less food throughout the day.  Limit portion sizes to avoid overeating.  Enjoy your food.  Get at least 150 minutes of exercise every week. This is about 30 minutes each day, 5 or more days per week.  It can be difficult to have every meal look like MyPlate. Think about MyPlate as eating guidelines for an entire day, rather than each individual meal.  Fruits and vegetables  Make one half of your plate fruits and vegetables.  Eat many different colors of fruits and vegetables each day.  For a 2,000-calorie daily food plan, eat:  2½ cups of vegetables every day.  2 cups of fruit every day.  1 cup is equal to:  1 cup raw or cooked vegetables.  1 cup raw fruit.  1 medium-sized orange, apple, or banana.  1 cup 100% fruit or vegetable juice.  2 cups raw leafy greens, such as lettuce, spinach, or kale.  ½ cup dried fruit.  Grains  One fourth of your plate should be grains.  Make at least half of the grains you eat each day whole grains.  For a 2,000-calorie daily food plan, eat 6 oz of grains every day.  1 oz is equal to:  1 slice bread.  1 cup cereal.  ½ cup cooked rice, cereal, or pasta.  Protein  One fourth of your plate should be protein.  Eat a wide variety of protein foods, including meat, poultry, fish, eggs, beans, nuts, and tofu.  For a 2,000-calorie daily food plan, eat 5½ oz of protein every day.  1 oz is equal to:  1 oz meat, poultry, or fish.  ¼ cup cooked beans.  1 egg.  ½ oz nuts or seeds.  1 Tbsp peanut butter.  Dairy  Drink fat-free or low-fat (1%) milk.  Eat or drink dairy as a side to meals.  For a 2,000-calorie daily food plan, eat or drink 3 cups of dairy every day.  1 cup is equal to:  1 cup milk, yogurt, cottage cheese, or soy milk (soy beverage).  2 oz processed cheese.  1½ oz natural cheese.  Fats, oils, salt, and sugars  Only small amounts of oils are recommended.  Avoid foods that are high in calories and low in nutritional value (empty calories), like foods high in fat or added  sugars.  Choose foods that are low in salt (sodium). Choose foods that have less than 140 milligrams (mg) of sodium per serving.  Drink water instead of sugary drinks. Drink enough fluid to keep your urine pale yellow.  Where to find support  Work with your health care provider or a dietitian to develop a customized eating plan that is right for you.  Download an [employer] (mobile application) to help you track your daily food intake.  Where to find more information  USDA: ChooseMyPlate.gov  Summary  MyPlate is a general guideline for healthy eating from the USDA. It is based on the Dietary Guidelines for Americans, 0911-7270.  In general, fruits and vegetables should take up one half of your plate, grains should take up one fourth of your plate, and protein should take up one fourth of your plate.  This information is not intended to replace advice given to you by your health care provider. Make sure you discuss any questions you have with your health care provider.  Document Revised: 11/08/2021 Document Reviewed: 11/08/2021  [employer] Patient Education © 2024 [employer]

## 2024-10-09 NOTE — PROGRESS NOTES
"    Office Note     Name: Behzad Guzman    : 1950     MRN: [id number]     Chief Complaint  Leg Pain (Having pain in lower legs, also having swelling. Hot to the touch, wants to make sure there isn't infection. )    Subjective     History of Present Illness:  Behzad Guzman is a 74 y.o. male who presents today for left lower extremity swelling and pain.  Past medical history includes arthritis in his left lower extremity, hyperlipidemia, hypertension, neuropathy, prediabetes spinal stenosis.  Patient states he has been dealing with left lower calf pain for the past 3 weeks.  States the pain is getting worse.  Does wear compression socks regularly and noticed that he is has a line around his calf where he has been wearing them.  He states that he has had some mild swelling in his left lower extremity compared to his right.  He states that the back of his calf feels like a \"really bad cramp.\"  He states that he has noticed more pain with sitting and prolonged positions for extended period of time and with walking.  Denies any recent injuries.  Denies shortness of breath or chest pain.  He states that he takes oxycodone and gabapentin daily and still is having pain.      Patient has also been struggling with a skin infection for the past year.  Currently followed with a dermatologist in [place].  He has also worked with his PCP in the past with this.  Patient states that he has been worked up for turkey mites and scabies which have all been negative.  He states that the spots keep coming back.  He states they are all over his body.  He states they never go away, but will sometimes open up.  Patient does pick at them.  Patient states the spots on his right forearm have worsened this week.  Denies fevers, but feels like he may have chills.  Has not been using anything on top of the lesions.  Patient states that he thinks that is infected.  Does not clean it regularly.  He states the only thing " that has ever helped to clean up some of the lesions is clindamycin.  Patient states that he is allergic to other antibiotics.  Unsure if he has taken Keflex in the past. He states that he does have a limp when he walks, but that his left leg is longer than his right leg and always has been.  Does use mupirocin ointment sometimes.  States that he had pest control come and clear his house.    Review of Systems:   Review of Systems   Constitutional:  Positive for chills. Negative for fatigue and fever.   Respiratory:  Negative for chest tightness and shortness of breath.    Cardiovascular:  Negative for chest pain and palpitations.   Gastrointestinal:  Negative for abdominal pain.   Musculoskeletal:  Positive for myalgias.   Skin:  Positive for rash.   Neurological:  Negative for dizziness and light-headedness.       Past Medical History:   Past Medical History:   Diagnosis Date   • Asthma    • Chronic hip pain, left    • Chronic pain in left shoulder    • Hyperlipidemia    • Hypertension    • Infected tick bite 11/11/2021   • Leg length discrepancy    • Neck pain, chronic    • Neuropathy    • Panic attacks    • Pre-diabetes    • Prediabetes    • Spinal stenosis        Past Surgical History:   Past Surgical History:   Procedure Laterality Date   • ENDOSCOPY WITH FOREIGN BODY REMOVAL N/A 6/23/2024    Procedure: ESOPHAGOGASTRODUODENOSCOPY WITH FOREIGN BODY REMOVAL WITH FLOROSCOPY;  Surgeon: Adan Huston MD;  Location: [facility];  Service: Gastroenterology;  Laterality: N/A;   • LEG SURGERY     • ROTATOR CUFF REPAIR Right    • SHOULDER SURGERY Left    • WRIST SURGERY Left        Family History:   Family History   Problem Relation Age of Onset   • Heart attack Father 65   • No Known Problems Maternal Grandmother    • No Known Problems Maternal Grandfather    • No Known Problems Paternal Grandmother    • Stroke Paternal Grandfather        Social History:   Social History     Socioeconomic History   • Marital  status:    Tobacco Use   • Smoking status: Former     Current packs/day: 0.00     Average packs/day: 0.5 packs/day for 10.0 years (5.0 ttl pk-yrs)     Types: Cigarettes     Start date:      Quit date:      Years since quittin.7     Passive exposure: Past   • Smokeless tobacco: Never   Vaping Use   • Vaping status: Never Used   Substance and Sexual Activity   • Alcohol use: No   • Drug use: No   • Sexual activity: Defer       Immunizations:   Immunization History   Administered Date(s) Administered   • COVID-19 (PFIZER) Purple Cap Monovalent 2021, 10/08/2021   • Flu Vaccine Quad PF >36MO 2015   • Fluzone High-Dose 65+yrs 2022, 2023   • Pneumococcal Conjugate 13-Valent (PCV13) 2016   • Pneumococcal Conjugate 20-Valent (PCV20) 2023   • Pneumococcal Polysaccharide (PPSV23) 2018   • Tdap 2022        Medications:     Current Outpatient Medications:   •  albuterol (ACCUNEB) 0.63 MG/3ML nebulizer solution, Take 3 mL by nebulization Every 6 (Six) Hours As Needed for Wheezing., Disp: 3 mL, Rfl: 12  •  albuterol sulfate  (90 Base) MCG/ACT inhaler, Inhale 2 puffs Every 6 (Six) Hours As Needed for Wheezing or Shortness of Air., Disp: 6.7 g, Rfl: 1  •  Alcaftadine (Lastacaft) 0.25 % solution, Apply 1 drop to eye(s) as directed by provider 1 (One) Time., Disp: , Rfl:   •  aspirin 81 MG EC tablet, Take 1 tablet by mouth Daily., Disp: , Rfl:   •  azelastine (ASTELIN) 0.1 % nasal spray, Administer 2 sprays into the nostril(s) as directed by provider 2 (Two) Times a Day. Use in each nostril as directed, Disp: , Rfl:   •  diazePAM (VALIUM) 5 MG tablet, Take 1 tablet by mouth Every 6 (Six) Hours As Needed., Disp: , Rfl: 0  •  fexofenadine (ALLEGRA) 180 MG tablet, Take 1 tablet by mouth Daily., Disp: , Rfl:   •  fluticasone (Flonase) 50 MCG/ACT nasal spray, 2 sprays into the nostril(s) as directed by provider Daily. 2 puffs each nostril, Disp: 18.2 mL, Rfl: 0  •   "gabapentin (NEURONTIN) 400 MG capsule, Take 1 capsule by mouth 3 (Three) Times a Day., Disp: , Rfl:   •  glucose monitor monitoring kit, Use 1 each Daily., Disp: 1 each, Rfl: 0  •  HYDROcodone-acetaminophen (NORCO)  MG per tablet, Take 1 tablet by mouth Every 6 (Six) Hours As Needed for Moderate Pain., Disp: , Rfl:   •  Lancets (onetouch ultrasoft) lancets, Use one daily to test blood sugars, Disp: 100 each, Rfl: 12  •  losartan-hydrochlorothiazide (HYZAAR) 50-12.5 MG per tablet, TAKE 1 TABLET BY MOUTH DAILY, Disp: 90 tablet, Rfl: 1  •  metFORMIN (GLUCOPHAGE) 500 MG tablet, TAKE 1 TABLET BY MOUTH DAILY WITH BREAKFAST (Patient taking differently: Take 1 tablet by mouth 2 (Two) Times a Day With Meals.), Disp: 180 tablet, Rfl: 0  •  metoprolol tartrate (LOPRESSOR) 50 MG tablet, TAKE 1 TABLET BY MOUTH TWICE A DAY, Disp: 180 tablet, Rfl: 0  •  mupirocin (BACTROBAN) 2 % ointment, Apply 1 Application topically to the appropriate area as directed 2 (Two) Times a Day., Disp: 60 g, Rfl: 1  •  Needle, Disp, (B-D HYPODERMIC NEEDLE 22GX1\") 22G X 1\" misc, USE TO INJECT EVERY 14 DAYS, Disp: 6 each, Rfl: 5  •  Needle, Disp, 21G X 1\" misc, Use to inject every 14 days., Disp: 6 each, Rfl: 5  •  OneTouch Verio test strip, 1 each by Other route Daily. for testing, Disp: 300 each, Rfl: 2  •  oxyCODONE (ROXICODONE) 15 MG immediate release tablet, TAKE 1/2 A TABLET BY MOUTH AT BEDTIME AND 1/2 TABLET IN MIDDLE OF NIGHT AS NEEDED AS DIRECTED, Disp: , Rfl:   •  pantoprazole (PROTONIX) 40 MG EC tablet, Take 1 tablet by mouth 2 (Two) Times a Day Before Meals., Disp: 60 tablet, Rfl: 0  •  pantoprazole (PROTONIX) 40 MG EC tablet, Take 1 tablet by mouth Daily., Disp: , Rfl:   •  rosuvastatin (CRESTOR) 40 MG tablet, Take 1 tablet by mouth Every Night. Replaces pravastatin, Disp: 90 tablet, Rfl: 3  •  Testosterone Enanthate 200 MG/ML solution, Inject 200 mg into the appropriate muscle as directed by prescriber Every 14 (Fourteen) Days., Disp: " "5 mL, Rfl: 1  •  vitamin D (ERGOCALCIFEROL) 1.25 MG (36903 UT) capsule capsule, TAKE 1 CAPSULE BY MOUTH ONCE WEEKLY FOR 12 DOSES, Disp: 12 capsule, Rfl: 0  •  clindamycin (Cleocin) 150 MG capsule, Take 1 capsule by mouth 3 (Three) Times a Day for 7 days., Disp: 21 capsule, Rfl: 0    Allergies:   Allergies   Allergen Reactions   • Peanut Butter Flavor Anaphylaxis   • Shellfish Allergy Anaphylaxis   • Amoxicillin Rash and Other (See Comments)   • Penicillins Rash   • Sulfa Antibiotics Myalgia   • Doxycycline GI Bleeding   • Latex Rash   • Pregabalin Other (See Comments)       Objective     Vital Signs  /84   Pulse 67   Ht 182.9 cm (72.01\")   Wt 85.3 kg (188 lb)   SpO2 98%   BMI 25.49 kg/m²   Estimated body mass index is 25.49 kg/m² as calculated from the following:    Height as of this encounter: 182.9 cm (72.01\").    Weight as of this encounter: 85.3 kg (188 lb).    BMI is >= 25 and <30. (Overweight) The following options were offered after discussion;: exercise counseling/recommendations and nutrition counseling/recommendations       Physical Exam  Constitutional:       Appearance: Normal appearance. He is not diaphoretic.   HENT:      Head: Normocephalic and atraumatic.   Eyes:      Pupils: Pupils are equal, round, and reactive to light.   Cardiovascular:      Rate and Rhythm: Normal rate and regular rhythm.      Pulses: Normal pulses.      Heart sounds: Normal heart sounds.   Pulmonary:      Effort: Pulmonary effort is normal.      Breath sounds: Normal breath sounds.   Musculoskeletal:      Right foot: Normal.      Left foot: Normal.        Legs:       Comments: Warmth and swelling to left lower calf.  Right calf is swollen, but not as much as the left.  Mild erythema present to posterior left calf.  Dry skin present.  Negative Homans' sign.   Skin:     General: Skin is warm.      Comments: Multiple scab-like lesions scattered over body.  Left arm is erythematous with multiple open wounds.  Mildly warm " "to touch.   Neurological:      General: No focal deficit present.      Mental Status: He is alert and oriented to person, place, and time.   Psychiatric:         Mood and Affect: Mood normal.          Results:  No results found for this or any previous visit (from the past 24 hour(s)).     Assessment and Plan     Assessment/Plan:  Diagnoses and all orders for this visit:    1. Swelling of left lower extremity (Primary)  -     US Venous Doppler Lower Extremity Bilateral (duplex); Future  -     Comprehensive Metabolic Panel; Future  -     CBC (No Diff); Future  -     Duplex Venous Lower Extremity - Bilateral CAR; Future    2. Cellulitis of right upper extremity  -     clindamycin (Cleocin) 150 MG capsule; Take 1 capsule by mouth 3 (Three) Times a Day for 7 days.  Dispense: 21 capsule; Refill: 0    Concern for possible blood clot with symptoms left lower extremity swelling, warmth and redness.  Ultimately recommended to go to the ER for evaluation promptly.  He originally declined and signed AMA, but came back and stated that he would like to go.  He states he is going to drive up to the [facility] ER for evaluation.  He states \"if the line is long then I am not going to stay.\"  Will leave ultrasound ordered and in case he decides not to stay.  Advised that if pain is continuing to worsen, develops chest pain or shortness of breath that I would recommend going to be evaluated.    Concern for possible infection of his right forearm.  Multiple scabs present.  Discussed with him seeing a new dermatologist as he seems to be unhappy with his current treatment.  I do not see evidence of any mites, but stated that where he has been followed by dermatology wrist along and states that it has all been a negative workup that we likely need to just protect the barrier of skin that he has not stopped picking.  It appears patient has picked his skin so much that it is bleeding and places.  It is mildly warm to touch.  Patient allergic " to multiple antibiotics and states the only thing that has worked for his skin infections in the past is clindamycin.  Discussed risks of possible GI infection with patient with clindamycin.  He states he tolerated it in the past.  Will send in 7-day supply of clindamycin 150 mg 3 times daily.  Patient begins to develop diarrhea or loose stools he is to return to clinic for reevaluation.  He verbalized understanding and agreed to plan.  He states he is going to go back to his previous dermatologist, but will let us know if he wants any referral.  Advised him to keep the area clean with mild soap and water.  If symptoms worsen advised to return to clinic.    Follow Up  Return if symptoms worsen or fail to improve.    Cyndi Huston PA-C   [facility]

## 2024-10-10 LAB
ALBUMIN SERPL-MCNC: 4.3 G/DL (ref 3.5–5.2)
ALBUMIN/GLOB SERPL: 1.9 G/DL
ALP SERPL-CCNC: 50 U/L (ref 39–117)
ALT SERPL W P-5'-P-CCNC: 20 U/L (ref 1–41)
ANION GAP SERPL CALCULATED.3IONS-SCNC: 14 MMOL/L (ref 5–15)
AST SERPL-CCNC: 30 U/L (ref 1–40)
BILIRUB SERPL-MCNC: 0.6 MG/DL (ref 0–1.2)
BUN SERPL-MCNC: 9 MG/DL (ref 8–23)
BUN/CREAT SERPL: 8.9 (ref 7–25)
CALCIUM SPEC-SCNC: 9.3 MG/DL (ref 8.6–10.5)
CHLORIDE SERPL-SCNC: 88 MMOL/L (ref 98–107)
CO2 SERPL-SCNC: 26 MMOL/L (ref 22–29)
CREAT SERPL-MCNC: 1.01 MG/DL (ref 0.76–1.27)
EGFRCR SERPLBLD CKD-EPI 2021: 78.5 ML/MIN/1.73
GLOBULIN UR ELPH-MCNC: 2.3 GM/DL
GLUCOSE SERPL-MCNC: 124 MG/DL (ref 65–99)
POTASSIUM SERPL-SCNC: 4.2 MMOL/L (ref 3.5–5.2)
PROT SERPL-MCNC: 6.6 G/DL (ref 6–8.5)
SODIUM SERPL-SCNC: 128 MMOL/L (ref 136–145)

## 2024-10-10 NOTE — DISCHARGE INSTRUCTIONS
If you have a Duplex Venous study ordered and have not received a phone call to schedule this test by 10:00 am tomorrow, please call.    [phone] (Monday - Friday)    [phone] (Weekends)

## 2024-10-10 NOTE — ED PROVIDER NOTES
EMERGENCY DEPARTMENT ENCOUNTER    Pt Name: Behzad Guzman  MRN: [id number]  Pt :   1950  Room Number:    Date of encounter:  10/9/2024  PCP: Zach Tran DO  ED Provider: Frankie Woodward MD    Historian: Patient, spouse      HPI:  Chief Complaint: Concern for DVT, cellulitis, chronic itching concern for parasitic infection        Context: Behzad Guzman is a 74-year-old man sent by his PCP for concern of possible blood clot in his left leg he has noticed worsening swelling in that leg for the last few days.  He says for the last year he has been dealing with what he believes is bugs in his blood that are coming up to the skin and causing severe pruritus, he has seen a dermatologist who is worked him up for scabies and was negative he saw his PCP today they diagnosed with cellulitis of the right arm but were also concern for DVT subcentimeter for evaluation.  No other complaints at this time      PAST MEDICAL HISTORY  Past Medical History:   Diagnosis Date   • Asthma    • Chronic hip pain, left    • Chronic pain in left shoulder    • Hyperlipidemia    • Hypertension    • Infected tick bite 2021   • Leg length discrepancy    • Neck pain, chronic    • Neuropathy    • Panic attacks    • Pre-diabetes    • Prediabetes    • Spinal stenosis          PAST SURGICAL HISTORY  Past Surgical History:   Procedure Laterality Date   • ENDOSCOPY WITH FOREIGN BODY REMOVAL N/A 2024    Procedure: ESOPHAGOGASTRODUODENOSCOPY WITH FOREIGN BODY REMOVAL WITH FLOROSCOPY;  Surgeon: Adan Huston MD;  Location: [facility] ENDOSCOPY;  Service: Gastroenterology;  Laterality: N/A;   • LEG SURGERY     • ROTATOR CUFF REPAIR Right    • SHOULDER SURGERY Left    • WRIST SURGERY Left          FAMILY HISTORY  Family History   Problem Relation Age of Onset   • Heart attack Father 65   • No Known Problems Maternal Grandmother    • No Known Problems Maternal Grandfather    • No Known Problems Paternal  Grandmother    • Stroke Paternal Grandfather          SOCIAL HISTORY  Social History     Socioeconomic History   • Marital status:    Tobacco Use   • Smoking status: Former     Current packs/day: 0.00     Average packs/day: 0.5 packs/day for 10.0 years (5.0 ttl pk-yrs)     Types: Cigarettes     Start date:      Quit date:      Years since quittin.8     Passive exposure: Past   • Smokeless tobacco: Never   Vaping Use   • Vaping status: Never Used   Substance and Sexual Activity   • Alcohol use: No   • Drug use: No   • Sexual activity: Defer         ALLERGIES  Peanut butter flavor, Shellfish allergy, Amoxicillin, Penicillins, Sulfa antibiotics, Doxycycline, Latex, and Pregabalin        REVIEW OF SYSTEMS  Review of Systems       All systems reviewed and negative except for those discussed in HPI.       PHYSICAL EXAM    I have reviewed the triage vital signs and nursing notes.    ED Triage Vitals [10/09/24 1758]   Temp Heart Rate Resp BP SpO2   98 °F (36.7 °C) 62 18 138/83 96 %      Temp src Heart Rate Source Patient Position BP Location FiO2 (%)   Oral Monitor Sitting Right arm --       Physical Exam  GENERAL:   Appears anxious, unkempt  HENT: Nares patent.  EYES: No scleral icterus.  CV: Regular rhythm, regular rate.  RESPIRATORY: Normal effort.  No audible wheezes, rales or rhonchi.  ABDOMEN: Soft, nontender  MUSCULOSKELETAL: No deformities.   NEURO: Alert, moves all extremities, follows commands.  SKIN: Warm, dry, diffuse changes of skin picking with crusted lesions , the right arm does have mild erythema and warmth around these consistent with cellulitis agree with primary diagnosis of cellulitis that they started him on antibiotics for today      LAB RESULTS  Recent Results (from the past 24 hour(s))   Osmolality, Serum    Collection Time: 10/09/24  4:21 PM    Specimen: Blood   Result Value Ref Range    Osmolality 266 (L) 280 - 301 mOsm/kg   Comprehensive Metabolic Panel    Collection Time:  10/09/24  4:21 PM    Specimen: Blood   Result Value Ref Range    Glucose 124 (H) 65 - 99 mg/dL    BUN 9 8 - 23 mg/dL    Creatinine 1.01 0.76 - 1.27 mg/dL    Sodium 128 (L) 136 - 145 mmol/L    Potassium 4.2 3.5 - 5.2 mmol/L    Chloride 88 (L) 98 - 107 mmol/L    CO2 26.0 22.0 - 29.0 mmol/L    Calcium 9.3 8.6 - 10.5 mg/dL    Total Protein 6.6 6.0 - 8.5 g/dL    Albumin 4.3 3.5 - 5.2 g/dL    ALT (SGPT) 20 1 - 41 U/L    AST (SGOT) 30 1 - 40 U/L    Alkaline Phosphatase 50 39 - 117 U/L    Total Bilirubin 0.6 0.0 - 1.2 mg/dL    Globulin 2.3 gm/dL    A/G Ratio 1.9 g/dL    BUN/Creatinine Ratio 8.9 7.0 - 25.0    Anion Gap 14.0 5.0 - 15.0 mmol/L    eGFR 78.5 >60.0 mL/min/1.73   CBC (No Diff)    Collection Time: 10/09/24  4:21 PM    Specimen: Blood   Result Value Ref Range    WBC 7.18 3.40 - 10.80 10*3/mm3    RBC 4.90 4.14 - 5.80 10*6/mm3    Hemoglobin 14.1 13.0 - 17.7 g/dL    Hematocrit 42.7 37.5 - 51.0 %    MCV 87.1 79.0 - 97.0 fL    MCH 28.8 26.6 - 33.0 pg    MCHC 33.0 31.5 - 35.7 g/dL    RDW 14.1 12.3 - 15.4 %    RDW-SD 45.3 37.0 - 54.0 fl    MPV 11.3 6.0 - 12.0 fL    Platelets 196 140 - 450 10*3/mm3   ECG 12 Lead Electrolyte Imbalance    Collection Time: 10/09/24  6:53 PM   Result Value Ref Range    QT Interval 450 ms    QTC Interval 430 ms   Comprehensive Metabolic Panel    Collection Time: 10/09/24  7:08 PM    Specimen: Blood   Result Value Ref Range    Glucose 117 (H) 65 - 99 mg/dL    BUN 9 8 - 23 mg/dL    Creatinine 0.95 0.76 - 1.27 mg/dL    Sodium 128 (L) 136 - 145 mmol/L    Potassium 4.0 3.5 - 5.2 mmol/L    Chloride 90 (L) 98 - 107 mmol/L    CO2 26.0 22.0 - 29.0 mmol/L    Calcium 9.4 8.6 - 10.5 mg/dL    Total Protein 6.7 6.0 - 8.5 g/dL    Albumin 4.6 3.5 - 5.2 g/dL    ALT (SGPT) 19 1 - 41 U/L    AST (SGOT) 31 1 - 40 U/L    Alkaline Phosphatase 48 39 - 117 U/L    Total Bilirubin 0.7 0.0 - 1.2 mg/dL    Globulin 2.1 gm/dL    A/G Ratio 2.2 g/dL    BUN/Creatinine Ratio 9.5 7.0 - 25.0    Anion Gap 12.0 5.0 - 15.0 mmol/L     eGFR 84.5 >60.0 mL/min/1.73   Procalcitonin    Collection Time: 10/09/24  7:08 PM    Specimen: Blood   Result Value Ref Range    Procalcitonin 0.03 0.00 - 0.25 ng/mL   C-reactive Protein    Collection Time: 10/09/24  7:08 PM    Specimen: Blood   Result Value Ref Range    C-Reactive Protein <0.30 0.00 - 0.50 mg/dL   Acetaminophen Level    Collection Time: 10/09/24  7:08 PM    Specimen: Blood   Result Value Ref Range    Acetaminophen <5.0 0.0 - 30.0 mcg/mL   Ethanol    Collection Time: 10/09/24  7:08 PM    Specimen: Blood   Result Value Ref Range    Ethanol <10 0 - 10 mg/dL   Salicylate Level    Collection Time: 10/09/24  7:08 PM    Specimen: Blood   Result Value Ref Range    Salicylate <0.3 <=30.0 mg/dL   Magnesium    Collection Time: 10/09/24  7:08 PM    Specimen: Blood   Result Value Ref Range    Magnesium 2.2 1.6 - 2.4 mg/dL   Phosphorus    Collection Time: 10/09/24  7:08 PM    Specimen: Blood   Result Value Ref Range    Phosphorus 4.0 2.5 - 4.5 mg/dL   TSH    Collection Time: 10/09/24  7:08 PM    Specimen: Blood   Result Value Ref Range    TSH 1.950 0.270 - 4.200 uIU/mL   Urinalysis With Microscopic If Indicated (No Culture) - Urine, Clean Catch    Collection Time: 10/09/24  7:18 PM    Specimen: Urine, Clean Catch   Result Value Ref Range    Color, UA Yellow Yellow, Straw    Appearance, UA Clear Clear    pH, UA 7.0 5.0 - 8.0    Specific Gravity, UA 1.007 1.001 - 1.030    Glucose, UA Negative Negative    Ketones, UA Negative Negative    Bilirubin, UA Negative Negative    Blood, UA Negative Negative    Protein, UA Negative Negative    Leuk Esterase, UA Negative Negative    Nitrite, UA Negative Negative    Urobilinogen, UA 0.2 E.U./dL 0.2 - 1.0 E.U./dL   Urine Drug Screen - Urine, Clean Catch    Collection Time: 10/09/24  7:18 PM    Specimen: Urine, Clean Catch   Result Value Ref Range    THC, Screen, Urine Negative Negative    Phencyclidine (PCP), Urine Negative Negative    Cocaine Screen, Urine Negative Negative     Methamphetamine, Ur Negative Negative    Opiate Screen Positive (A) Negative    Amphetamine Screen, Urine Negative Negative    Benzodiazepine Screen, Urine Positive (A) Negative    Tricyclic Antidepressants Screen Negative Negative    Methadone Screen, Urine Negative Negative    Barbiturates Screen, Urine Negative Negative    Oxycodone Screen, Urine Positive (A) Negative    Buprenorphine, Screen, Urine Negative Negative   Fentanyl, Urine - Urine, Clean Catch    Collection Time: 10/09/24  7:18 PM    Specimen: Urine, Clean Catch   Result Value Ref Range    Fentanyl, Urine Negative Negative   D-dimer, Quantitative    Collection Time: 10/09/24  7:39 PM    Specimen: Blood   Result Value Ref Range    D-Dimer, Quantitative 0.41 0.00 - 0.73 MCGFEU/mL   Lactic Acid, Plasma    Collection Time: 10/09/24  7:39 PM    Specimen: Blood   Result Value Ref Range    Lactate 1.8 0.5 - 2.0 mmol/L   CBC Auto Differential    Collection Time: 10/09/24  7:39 PM    Specimen: Blood   Result Value Ref Range    WBC 7.30 3.40 - 10.80 10*3/mm3    RBC 5.13 4.14 - 5.80 10*6/mm3    Hemoglobin 14.8 13.0 - 17.7 g/dL    Hematocrit 43.5 37.5 - 51.0 %    MCV 84.8 79.0 - 97.0 fL    MCH 28.8 26.6 - 33.0 pg    MCHC 34.0 31.5 - 35.7 g/dL    RDW 14.2 12.3 - 15.4 %    RDW-SD 43.9 37.0 - 54.0 fl    MPV 10.7 6.0 - 12.0 fL    Platelets 196 140 - 450 10*3/mm3    Neutrophil % 54.7 42.7 - 76.0 %    Lymphocyte % 29.5 19.6 - 45.3 %    Monocyte % 9.7 5.0 - 12.0 %    Eosinophil % 5.2 0.3 - 6.2 %    Basophil % 0.8 0.0 - 1.5 %    Immature Grans % 0.1 0.0 - 0.5 %    Neutrophils, Absolute 3.99 1.70 - 7.00 10*3/mm3    Lymphocytes, Absolute 2.15 0.70 - 3.10 10*3/mm3    Monocytes, Absolute 0.71 0.10 - 0.90 10*3/mm3    Eosinophils, Absolute 0.38 0.00 - 0.40 10*3/mm3    Basophils, Absolute 0.06 0.00 - 0.20 10*3/mm3    Immature Grans, Absolute 0.01 0.00 - 0.05 10*3/mm3    nRBC 0.0 0.0 - 0.2 /100 WBC       If labs were ordered, I independently reviewed the results and  considered them in treating the patient.        RADIOLOGY  No Radiology Exams Resulted Within Past 24 Hours    I ordered and independently reviewed the above noted radiographic studies.        See radiologist's dictation for official interpretation.        PROCEDURES    Procedures    ECG 12 Lead Electrolyte Imbalance   Preliminary Result   Test Reason : Electrolyte Imbalance   Blood Pressure :   */*   mmHG   Vent. Rate :  55 BPM     Atrial Rate :  55 BPM      P-R Int : 194 ms          QRS Dur :  90 ms       QT Int : 450 ms       P-R-T Axes :  13  21  13 degrees      QTc Int : 430 ms      Sinus bradycardia   Otherwise normal ECG   When compared with ECG of 07-JUL-2022 15:27,   Vent. rate has decreased BY  34 BPM      Referred By: EDMD           Confirmed By:           MEDICATIONS GIVEN IN ER    Medications   ivermectin (STROMECTOL) tablet 16,500 mcg (16,500 mcg Oral Given 10/9/24 2149)   diphenhydrAMINE (BENADRYL) capsule 25 mg (25 mg Oral Given 10/9/24 2149)         MEDICAL DECISION MAKING, PROGRESS, and CONSULTS    All labs, if obtained, have been independently reviewed by me.  All radiology studies, if obtained, have been reviewed by me and the radiologist dictating the report.  All EKG's, if obtained, have been independently viewed and interpreted by me/my attending physician.      Discussion below represents my analysis of pertinent findings related to patient's condition, differential diagnosis, treatment plan and final disposition.                         Differential diagnosis:    Scabies, urticaria, sympathomimetic toxidrome, DVT, cellulitis, abscess, necrotizing soft tissue infection, sepsis, anemia, electrolyte abnormality      Additional sources:    - Discussed/ obtained information from independent historians: Spouse    - External (non-ED) record review:  Chart review of previous admission for altered mental status, shows history of:  chronic pain, htn, hyperglycemia. HLP    - Chronic or social  conditions impacting care: Chronic pain, hypertension    - Shared decision making: Patient/patient representative in complete agreement with current plans for evaluation and management.      Orders placed during this visit:  Orders Placed This Encounter   Procedures   • Blood Culture - Blood,   • Blood Culture - Blood,   • Comprehensive Metabolic Panel   • Urinalysis With Microscopic If Indicated (No Culture) - Urine, Clean Catch   • D-dimer, Quantitative   • Lactic Acid, Plasma   • Procalcitonin   • C-reactive Protein   • Acetaminophen Level   • Ethanol   • Urine Drug Screen - Urine, Clean Catch   • Salicylate Level   • Magnesium   • Phosphorus   • TSH   • CBC Auto Differential   • Fentanyl, Urine - Urine, Clean Catch   • ECG 12 Lead Electrolyte Imbalance   • CBC & Differential         Additional orders considered but not ordered:      ED Course:    Consultants:      ED Course as of 10/10/24 0250   Wed Oct 09, 2024   1813 Chart review of previous admission for altered mental status, shows history of:  chronic pain, htn, hyperglycemia. P [CC]   1816 74-year-old man sent by his PCP for concern of possible blood clot in his left leg he has noticed worsening swelling in that leg for the last few days.  He says for the last year he has been dealing with what he believes is bugs in his blood that are coming up to the skin and causing severe pruritus, he has seen a dermatologist who is worked him up for scabies and was negative he saw his PCP today they diagnosed with cellulitis of the right arm but were also concern for DVT subcentimeter for evaluation.  No other complaints at this time [CC]   Thu Oct 10, 2024   0247 He arrived awake and alert he has diffuse skin picking lesions, says he was treated with permethrin with improvement but not resolution of his symptoms, because of this we will try systemic ivermectin but I would also like him to follow-up with a dermatologist have given referral to Roddy viera  dermatologist in [facility] has been helpful with our ED group for difficult cases.  His labs look reassuring I have no concern for systemic infection or sepsis.  Was sent here for DVT rule out unfortunately I do not have duplex ultrasound this time and I am but his D-dimer is negative and I do not think empiric anticoagulation is indicated I am giving him a referral for duplex ultrasound.  Discharged in stable condition with strict return precautions. [CC]      ED Course User Index  [CC] Frankie Woodward MD              Shared Decision Making:  After my consideration of clinical presentation and any laboratory/radiology studies obtained, I discussed the findings with the patient/patient representative who is in agreement with the treatment plan and the final disposition.   Risks and benefits of discharge and/or observation/admission were discussed.       AS OF 02:43 EDT VITALS:    BP - 138/83  HR - 62  TEMP - 98 °F (36.7 °C) (Oral)  O2 SATS - 96%                  DIAGNOSIS  Final diagnoses:   Scabies   D dimer value normal   Cellulitis of right upper extremity   Leg swelling         DISPOSITION  DISCHARGE    Patient discharged in stable condition.    Reviewed implications of results, diagnosis, meds, responsibility to follow up, warning signs and symptoms of possible worsening, potential complications and reasons to return to ER.    Patient/Family voiced understanding of above instructions.    Discussed plan for discharge, as there is no emergent indication for admission.  Pt/family is agreeable and understands need for follow up and possible repeat testing.  Pt/family is aware that discharge does not mean that nothing is wrong but that it indicates no emergency is currently present that requires admission and they must continue care with follow-up as given below or with a physician of their choice.     FOLLOW-UP  Mario Tovar MD  [address] [place] [id number] [phone]    Call   For  dermatology evaluation of your persistent rash.         Medication List        Changed      metFORMIN 500 MG tablet  Commonly known as: GLUCOPHAGE  TAKE 1 TABLET BY MOUTH DAILY WITH BREAKFAST  What changed: when to take this                  Please note that portions of this document were completed with voice recognition software.        Frankie Woodward MD  10/10/24 [id number]

## 2024-10-11 ENCOUNTER — HOSPITAL ENCOUNTER (OUTPATIENT)
Dept: CARDIOLOGY | Facility: HOSPITAL | Age: 74
Discharge: HOME | End: 2024-10-11
Payer: MEDICARE

## 2024-10-11 VITALS — WEIGHT: 189 LBS | HEIGHT: 72 IN | BODY MASS INDEX: 25.6 KG/M2

## 2024-10-11 DIAGNOSIS — M79.89 LEG SWELLING: ICD-10-CM

## 2024-10-11 LAB
BH CV LOWER VASCULAR LEFT COMMON FEMORAL AUGMENT: NORMAL
BH CV LOWER VASCULAR LEFT COMMON FEMORAL COMPRESS: NORMAL
BH CV LOWER VASCULAR LEFT COMMON FEMORAL PHASIC: NORMAL
BH CV LOWER VASCULAR LEFT COMMON FEMORAL SPONT: NORMAL
BH CV LOWER VASCULAR LEFT DISTAL FEMORAL AUGMENT: NORMAL
BH CV LOWER VASCULAR LEFT DISTAL FEMORAL COMPRESS: NORMAL
BH CV LOWER VASCULAR LEFT DISTAL FEMORAL PHASIC: NORMAL
BH CV LOWER VASCULAR LEFT DISTAL FEMORAL SPONT: NORMAL
BH CV LOWER VASCULAR LEFT GASTRONEMIUS COMPRESS: NORMAL
BH CV LOWER VASCULAR LEFT GREATER SAPH AK COMPRESS: NORMAL
BH CV LOWER VASCULAR LEFT GREATER SAPH BK COMPRESS: NORMAL
BH CV LOWER VASCULAR LEFT LESSER SAPH COMPRESS: NORMAL
BH CV LOWER VASCULAR LEFT MID FEMORAL AUGMENT: NORMAL
BH CV LOWER VASCULAR LEFT MID FEMORAL COMPRESS: NORMAL
BH CV LOWER VASCULAR LEFT MID FEMORAL PHASIC: NORMAL
BH CV LOWER VASCULAR LEFT MID FEMORAL SPONT: NORMAL
BH CV LOWER VASCULAR LEFT PERONEAL AUGMENT: NORMAL
BH CV LOWER VASCULAR LEFT PERONEAL COMPRESS: NORMAL
BH CV LOWER VASCULAR LEFT POPLITEAL AUGMENT: NORMAL
BH CV LOWER VASCULAR LEFT POPLITEAL COMPRESS: NORMAL
BH CV LOWER VASCULAR LEFT POPLITEAL PHASIC: NORMAL
BH CV LOWER VASCULAR LEFT POPLITEAL SPONT: NORMAL
BH CV LOWER VASCULAR LEFT POSTERIOR TIBIAL AUGMENT: NORMAL
BH CV LOWER VASCULAR LEFT POSTERIOR TIBIAL COMPRESS: NORMAL
BH CV LOWER VASCULAR LEFT PROFUNDA FEMORAL PHASIC: NORMAL
BH CV LOWER VASCULAR LEFT PROFUNDA FEMORAL SPONT: NORMAL
BH CV LOWER VASCULAR LEFT PROXIMAL FEMORAL AUGMENT: NORMAL
BH CV LOWER VASCULAR LEFT PROXIMAL FEMORAL COMPRESS: NORMAL
BH CV LOWER VASCULAR LEFT PROXIMAL FEMORAL PHASIC: NORMAL
BH CV LOWER VASCULAR LEFT PROXIMAL FEMORAL SPONT: NORMAL
BH CV LOWER VASCULAR LEFT SAPHENOFEMORAL JUNCTION AUGMENT: NORMAL
BH CV LOWER VASCULAR LEFT SAPHENOFEMORAL JUNCTION COMPRESS: NORMAL
BH CV LOWER VASCULAR LEFT SAPHENOFEMORAL JUNCTION PHASIC: NORMAL
BH CV LOWER VASCULAR LEFT SAPHENOFEMORAL JUNCTION SPONT: NORMAL
BH CV LOWER VASCULAR RIGHT COMMON FEMORAL AUGMENT: NORMAL
BH CV LOWER VASCULAR RIGHT COMMON FEMORAL COMPRESS: NORMAL
BH CV LOWER VASCULAR RIGHT COMMON FEMORAL PHASIC: NORMAL
BH CV LOWER VASCULAR RIGHT COMMON FEMORAL SPONT: NORMAL

## 2024-10-11 PROCEDURE — 93971 EXTREMITY STUDY: CPT

## 2024-10-12 LAB — LEAD BLDV-MCNC: 6.6 UG/DL (ref 0–3.4)

## 2024-10-14 ENCOUNTER — TELEPHONE (OUTPATIENT)
Dept: FAMILY MEDICINE CLINIC | Facility: CLINIC | Age: 74
End: 2024-10-14
Payer: MEDICARE

## 2024-10-14 DIAGNOSIS — B86 SCABIES: ICD-10-CM

## 2024-10-14 DIAGNOSIS — L08.9 SKIN INFECTION: ICD-10-CM

## 2024-10-14 LAB
BACTERIA SPEC AEROBE CULT: NORMAL
BACTERIA SPEC AEROBE CULT: NORMAL
QT INTERVAL: 450 MS
QTC INTERVAL: 430 MS

## 2024-10-14 RX ORDER — MUPIROCIN 20 MG/G
1 OINTMENT TOPICAL 2 TIMES DAILY
Qty: 60 G | Refills: 1 | Status: SHIPPED | OUTPATIENT
Start: 2024-10-14

## 2024-10-14 NOTE — TELEPHONE ENCOUNTER
Called and spoke to patient.  The ER doctor had ordered his ultrasound of his leg.  There were no blood clots present.  He states that his swelling in his leg is gone, but has chronic pain in his left leg due to previous injuries over the years.  Denies chest pain or shortness of breath.  States that Alerachelle is helping as well.    He states that his pain is better with the antibiotic that I gave him and with what the ER gave him for possible scabies.  Patient needs to call and make a dermatology appointment he says.  He states he will do this.  Denies fevers or chills.  He would like to trial mupirocin ointment to put on his right arm.  Patient states he feels like overall he is getting better.  He states he is going to make an appointment to come in well to talk about his chronic left leg pain.  When he was in clinic he had only talk to me about the left calf pain where it was a little swollen and warm which she states is getting better now.  I advised that if his pain returns  worsens or if he develops fevers or chills to go back to the ER.  He states he verbalized understanding and agreed to plan.

## 2024-10-14 NOTE — TELEPHONE ENCOUNTER
Patient called to get results of the US & still requesting salve for scabies.  Let patient know that the providers would like him to go to Dermatology but patient states it takes a while to get into doctor
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
## 2024-10-17 ENCOUNTER — OFFICE VISIT (OUTPATIENT)
Dept: FAMILY MEDICINE CLINIC | Facility: CLINIC | Age: 74
End: 2024-10-17
Payer: MEDICARE

## 2024-10-17 VITALS
BODY MASS INDEX: 24.46 KG/M2 | WEIGHT: 180.6 LBS | OXYGEN SATURATION: 96 % | DIASTOLIC BLOOD PRESSURE: 84 MMHG | SYSTOLIC BLOOD PRESSURE: 156 MMHG | HEART RATE: 68 BPM | HEIGHT: 72 IN

## 2024-10-17 DIAGNOSIS — M21.70 LEG LENGTH DISCREPANCY: ICD-10-CM

## 2024-10-17 DIAGNOSIS — M76.32 IT BAND SYNDROME, LEFT: ICD-10-CM

## 2024-10-17 DIAGNOSIS — M54.32 SCIATICA OF LEFT SIDE: Primary | ICD-10-CM

## 2024-10-17 PROCEDURE — 3077F SYST BP >= 140 MM HG: CPT

## 2024-10-17 PROCEDURE — 1126F AMNT PAIN NOTED NONE PRSNT: CPT

## 2024-10-17 PROCEDURE — 1160F RVW MEDS BY RX/DR IN RCRD: CPT

## 2024-10-17 PROCEDURE — 3079F DIAST BP 80-89 MM HG: CPT

## 2024-10-17 PROCEDURE — 1159F MED LIST DOCD IN RCRD: CPT

## 2024-10-17 PROCEDURE — 3051F HG A1C>EQUAL 7.0%<8.0%: CPT

## 2024-10-17 PROCEDURE — 99214 OFFICE O/P EST MOD 30 MIN: CPT

## 2024-10-17 RX ORDER — IBUPROFEN 600 MG/1
600 TABLET, FILM COATED ORAL EVERY 6 HOURS PRN
Qty: 60 TABLET | Refills: 0 | Status: SHIPPED | OUTPATIENT
Start: 2024-10-17

## 2024-10-21 ENCOUNTER — POP HEALTH PHARMACY (OUTPATIENT)
Dept: PHARMACY | Facility: OTHER | Age: 74
End: 2024-10-21
Payer: MEDICARE

## 2024-10-21 NOTE — PROGRESS NOTES
Population Health Pharmacy Outreach      Behzad Guzman was called today to discuss medication adherence with LOSARTAN POTASSIUM-HCTZ , as he was identified as having care opportunities.    Program Details    [facility]  Status: Enrolled  Effective Dates: 10/16/2024 - present  Responsible Staff: Katt Hicks LPN          Opportunities Identified    Adherence- Hypertension       Adherence and Medication Management    Adherence Questions   Patient Reported X Missed Doses in the Last 7 Days : 1-2  Reasons for Non-Adherence : Patient forgets  Adherence Tools Used: Other - see comments  List other adherence tool(s) used : Advised to use adherence tools.  Interested in a 90 day supply? : Already receiving  Does this require clinical escalation to a pharmacist?: Y (States he needs refills on Losartan and Metoprolol.)         General Medication Management    Type of medication management: targeted medication review  Review Completed on: 10/21/24  Referred by: pharmacist  Provider: licensed practical nurse  Visit type: initial  Method of contact: by telephone           Medication Therapy Problems     Medication Therapy Recommendations  No medication therapy recommendations to display      Summary    Medication Management Summary    Topics discussed: adherence and missed doses discussed, reminder to refill or  medication discussed         Katt Hicks LPN, 10/21/24, 11:39 AM EDT.

## 2024-10-22 DIAGNOSIS — I10 PRIMARY HYPERTENSION: ICD-10-CM

## 2024-10-22 RX ORDER — METOPROLOL TARTRATE 50 MG
50 TABLET ORAL 2 TIMES DAILY
Qty: 180 TABLET | Refills: 0 | Status: SHIPPED | OUTPATIENT
Start: 2024-10-22

## 2024-10-22 NOTE — TELEPHONE ENCOUNTER
Rx Refill Note  Requested Prescriptions     Pending Prescriptions Disp Refills    metoprolol tartrate (LOPRESSOR) 50 MG tablet [Pharmacy Med Name: METOPROLOL TARTRATE 50 MG TAB] 180 tablet 0     Sig: TAKE 1 TABLET BY MOUTH TWICE A DAY      Last office visit with prescribing clinician: 8/13/2024   Last telemedicine visit with prescribing clinician: Visit date not found   Next office visit with prescribing clinician: Visit date not found                         Would you like a call back once the refill request has been completed: [] Yes [] No    If the office needs to give you a call back, can they leave a voicemail: [] Yes [] No    Macey Melendez MA  10/22/24, 12:19 EDT

## 2024-10-30 DIAGNOSIS — R78.71 ELEVATED BLOOD LEAD LEVEL: Primary | ICD-10-CM

## 2024-10-31 ENCOUNTER — POP HEALTH PHARMACY (OUTPATIENT)
Dept: PHARMACY | Facility: OTHER | Age: 74
End: 2024-10-31
Payer: MEDICARE
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
## 2024-11-29 DIAGNOSIS — E11.42 TYPE 2 DIABETES MELLITUS WITH DIABETIC POLYNEUROPATHY, WITHOUT LONG-TERM CURRENT USE OF INSULIN: ICD-10-CM

## 2024-12-02 NOTE — TELEPHONE ENCOUNTER
Rx Refill Note  Requested Prescriptions     Pending Prescriptions Disp Refills    metFORMIN (GLUCOPHAGE) 500 MG tablet [Pharmacy Med Name: METFORMIN  MG TABLET] 180 tablet 0     Sig: TAKE 1 TABLET BY MOUTH TWICE A DAY WITH A MEAL      Last office visit with prescribing clinician: 8/13/2024   Last telemedicine visit with prescribing clinician: Visit date not found   Next office visit with prescribing clinician: Visit date not found                         Would you like a call back once the refill request has been completed: [] Yes [] No    If the office needs to give you a call back, can they leave a voicemail: [] Yes [] No    Macey Melendez MA  12/02/24, 12:44 EST

## 2024-12-10 DIAGNOSIS — J45.909 EXTRINSIC ASTHMA, UNSPECIFIED ASTHMA SEVERITY, UNSPECIFIED WHETHER COMPLICATED, UNSPECIFIED WHETHER PERSISTENT: ICD-10-CM

## 2024-12-11 RX ORDER — ALBUTEROL SULFATE 90 UG/1
2 INHALANT RESPIRATORY (INHALATION) EVERY 6 HOURS PRN
Qty: 8.5 G | Refills: 1 | Status: SHIPPED | OUTPATIENT
Start: 2024-12-11

## 2024-12-20 ENCOUNTER — TELEPHONE (OUTPATIENT)
Dept: FAMILY MEDICINE CLINIC | Facility: CLINIC | Age: 74
End: 2024-12-20
Payer: MEDICARE

## 2024-12-20 DIAGNOSIS — E55.9 VITAMIN D DEFICIENCY, UNSPECIFIED: ICD-10-CM

## 2024-12-20 DIAGNOSIS — E11.42 TYPE 2 DIABETES MELLITUS WITH DIABETIC POLYNEUROPATHY, WITHOUT LONG-TERM CURRENT USE OF INSULIN: ICD-10-CM

## 2024-12-20 DIAGNOSIS — J45.909 EXTRINSIC ASTHMA, UNSPECIFIED ASTHMA SEVERITY, UNSPECIFIED WHETHER COMPLICATED, UNSPECIFIED WHETHER PERSISTENT: Primary | ICD-10-CM

## 2024-12-20 DIAGNOSIS — Z12.5 ENCOUNTER FOR PROSTATE CANCER SCREENING: ICD-10-CM

## 2024-12-20 DIAGNOSIS — I10 PRIMARY HYPERTENSION: ICD-10-CM

## 2024-12-20 NOTE — TELEPHONE ENCOUNTER
Caller: Behzad Guzman    Relationship: Self    Best call back number: [phone]     Requested Prescriptions:     albuterol sulfate  (90 Base) MCG/ACT inhaler        Pharmacy where request should be sent: [facility] [id number] - [place], [place] - [address] [facility] [address] - [phone]  - [phone] FX     Last office visit with prescribing clinician: 8/13/2024   Last telemedicine visit with prescribing clinician: Visit date not found   Next office visit with prescribing clinician: Visit date not found     Additional details provided by patient: PLEASE REFILL. ALMOST OUT- 2 PUFFS LEFT    Does the patient have less than a 3 day supply:  [x] Yes  [] No    Would you like a call back once the refill request has been completed: [] Yes [x] No    If the office needs to give you a call back, can they leave a voicemail: [x] Yes [] No    Kayla Solorzano Rep   12/20/24 10:29 EST

## 2024-12-20 NOTE — TELEPHONE ENCOUNTER
Name: Behzad Guzman      Relationship: Self      Best Callback Number: [phone]      HUB PROVIDED THE RELAY MESSAGE FROM THE OFFICE    Lab orders faxed to [facility] in [place]. Patient will need to be fasting at least 8 hours.      PCP will discuss the rest of his concerns and HH at appointment.     Please relay.            PATIENT: VOICED UNDERSTANDING AND HAS NO FURTHER QUESTIONS AT THIS TIME    ADDITIONAL INFORMATION:

## 2024-12-20 NOTE — TELEPHONE ENCOUNTER
Lab orders faxed to [facility] in [place]. Patient will need to be fasting at least 8 hours.     PCP will discuss the rest of his concerns and HH at appointment.    Please relay.

## 2024-12-20 NOTE — TELEPHONE ENCOUNTER
Caller: Behzad Guzman    Relationship: Self    Best call back number: [phone]     What orders are you requesting (i.e. lab or imaging): LABS TO CHECK LIVER, TESTOSTERONE AND ANYTHING ELSE PCP WOULD WANT. PATIENT HAS AN APPT ON 1/2/25 AND WOULD LIKE TO GET THESE DONE PRIOR.     In what timeframe would the patient need to come in: ASAP    Where will you receive your lab/imaging services: [facility]  [address] [place] [address]  [phone]    PATIENT DID NOT HAVE THE FAX#    Additional notes: PATIENT WOULD LIKE TO HAVE THESE ORDERS FAXED TO [facility] ASAP AS IT IS CLOSER TO HIM. PLEASE ADVISE PATIENT ONCE ORDERS HAVE BEEN SUBMITTED    PATIENT IS NEEDING A CALLBACK ALSO TO DISCUSS SOME ISSUES WITH HIS INSURANCE AND WANTING TO SEND HIM HOME HEALTH

## 2025-01-02 ENCOUNTER — OFFICE VISIT (OUTPATIENT)
Dept: FAMILY MEDICINE CLINIC | Facility: CLINIC | Age: 75
End: 2025-01-02
Payer: MEDICARE

## 2025-01-02 ENCOUNTER — LAB (OUTPATIENT)
Dept: LAB | Facility: HOSPITAL | Age: 75
End: 2025-01-02
Payer: MEDICARE

## 2025-01-02 VITALS
BODY MASS INDEX: 24.79 KG/M2 | OXYGEN SATURATION: 98 % | HEIGHT: 72 IN | HEART RATE: 57 BPM | SYSTOLIC BLOOD PRESSURE: 142 MMHG | DIASTOLIC BLOOD PRESSURE: 84 MMHG | WEIGHT: 183 LBS

## 2025-01-02 DIAGNOSIS — J45.909 EXTRINSIC ASTHMA, UNSPECIFIED ASTHMA SEVERITY, UNSPECIFIED WHETHER COMPLICATED, UNSPECIFIED WHETHER PERSISTENT: ICD-10-CM

## 2025-01-02 DIAGNOSIS — Z23 NEEDS FLU SHOT: ICD-10-CM

## 2025-01-02 DIAGNOSIS — E55.9 VITAMIN D DEFICIENCY, UNSPECIFIED: ICD-10-CM

## 2025-01-02 DIAGNOSIS — E11.42 TYPE 2 DIABETES MELLITUS WITH DIABETIC POLYNEUROPATHY, WITHOUT LONG-TERM CURRENT USE OF INSULIN: ICD-10-CM

## 2025-01-02 DIAGNOSIS — Z12.5 ENCOUNTER FOR PROSTATE CANCER SCREENING: ICD-10-CM

## 2025-01-02 DIAGNOSIS — I83.90 VARICOSE VEINS OF CALF: ICD-10-CM

## 2025-01-02 DIAGNOSIS — I10 PRIMARY HYPERTENSION: ICD-10-CM

## 2025-01-02 DIAGNOSIS — M79.605 PAIN IN LEFT LEG: Primary | ICD-10-CM

## 2025-01-02 LAB
ALBUMIN UR-MCNC: <1.2 MG/DL
BASOPHILS # BLD AUTO: 0.07 10*3/MM3 (ref 0–0.2)
BASOPHILS NFR BLD AUTO: 0.9 % (ref 0–1.5)
BILIRUB UR QL STRIP: NEGATIVE
CLARITY UR: CLEAR
COLOR UR: YELLOW
CREAT UR-MCNC: 25.1 MG/DL
DEPRECATED RDW RBC AUTO: 40.7 FL (ref 37–54)
EOSINOPHIL # BLD AUTO: 0.29 10*3/MM3 (ref 0–0.4)
EOSINOPHIL NFR BLD AUTO: 3.6 % (ref 0.3–6.2)
ERYTHROCYTE [DISTWIDTH] IN BLOOD BY AUTOMATED COUNT: 13 % (ref 12.3–15.4)
GLUCOSE UR STRIP-MCNC: NEGATIVE MG/DL
HCT VFR BLD AUTO: 46.5 % (ref 37.5–51)
HGB BLD-MCNC: 16 G/DL (ref 13–17.7)
HGB UR QL STRIP.AUTO: NEGATIVE
HOLD SPECIMEN: NORMAL
IMM GRANULOCYTES # BLD AUTO: 0.02 10*3/MM3 (ref 0–0.05)
IMM GRANULOCYTES NFR BLD AUTO: 0.2 % (ref 0–0.5)
KETONES UR QL STRIP: NEGATIVE
LEUKOCYTE ESTERASE UR QL STRIP.AUTO: NEGATIVE
LYMPHOCYTES # BLD AUTO: 2.19 10*3/MM3 (ref 0.7–3.1)
LYMPHOCYTES NFR BLD AUTO: 26.9 % (ref 19.6–45.3)
MCH RBC QN AUTO: 29.8 PG (ref 26.6–33)
MCHC RBC AUTO-ENTMCNC: 34.4 G/DL (ref 31.5–35.7)
MCV RBC AUTO: 86.6 FL (ref 79–97)
MICROALBUMIN/CREAT UR: NORMAL MG/G{CREAT}
MONOCYTES # BLD AUTO: 0.68 10*3/MM3 (ref 0.1–0.9)
MONOCYTES NFR BLD AUTO: 8.4 % (ref 5–12)
NEUTROPHILS NFR BLD AUTO: 4.88 10*3/MM3 (ref 1.7–7)
NEUTROPHILS NFR BLD AUTO: 60 % (ref 42.7–76)
NITRITE UR QL STRIP: NEGATIVE
NRBC BLD AUTO-RTO: 0 /100 WBC (ref 0–0.2)
PH UR STRIP.AUTO: 7 [PH] (ref 5–8)
PLATELET # BLD AUTO: 131 10*3/MM3 (ref 140–450)
PMV BLD AUTO: 12 FL (ref 6–12)
PROT UR QL STRIP: NEGATIVE
RBC # BLD AUTO: 5.37 10*6/MM3 (ref 4.14–5.8)
SP GR UR STRIP: 1.01 (ref 1–1.03)
UROBILINOGEN UR QL STRIP: NORMAL
WBC NRBC COR # BLD AUTO: 8.13 10*3/MM3 (ref 3.4–10.8)

## 2025-01-02 PROCEDURE — 80061 LIPID PANEL: CPT

## 2025-01-02 PROCEDURE — 82043 UR ALBUMIN QUANTITATIVE: CPT

## 2025-01-02 PROCEDURE — 82306 VITAMIN D 25 HYDROXY: CPT

## 2025-01-02 PROCEDURE — 83036 HEMOGLOBIN GLYCOSYLATED A1C: CPT

## 2025-01-02 PROCEDURE — 3079F DIAST BP 80-89 MM HG: CPT

## 2025-01-02 PROCEDURE — 84443 ASSAY THYROID STIM HORMONE: CPT

## 2025-01-02 PROCEDURE — 82570 ASSAY OF URINE CREATININE: CPT

## 2025-01-02 PROCEDURE — 3077F SYST BP >= 140 MM HG: CPT

## 2025-01-02 PROCEDURE — 1159F MED LIST DOCD IN RCRD: CPT

## 2025-01-02 PROCEDURE — 1160F RVW MEDS BY RX/DR IN RCRD: CPT

## 2025-01-02 PROCEDURE — G0008 ADMIN INFLUENZA VIRUS VAC: HCPCS

## 2025-01-02 PROCEDURE — 1126F AMNT PAIN NOTED NONE PRSNT: CPT

## 2025-01-02 PROCEDURE — 90662 IIV NO PRSV INCREASED AG IM: CPT

## 2025-01-02 PROCEDURE — 84439 ASSAY OF FREE THYROXINE: CPT

## 2025-01-02 PROCEDURE — G0103 PSA SCREENING: HCPCS

## 2025-01-02 PROCEDURE — 84402 ASSAY OF FREE TESTOSTERONE: CPT

## 2025-01-02 PROCEDURE — 84403 ASSAY OF TOTAL TESTOSTERONE: CPT

## 2025-01-02 PROCEDURE — 85025 COMPLETE CBC W/AUTO DIFF WBC: CPT

## 2025-01-02 PROCEDURE — 80053 COMPREHEN METABOLIC PANEL: CPT

## 2025-01-02 PROCEDURE — 81003 URINALYSIS AUTO W/O SCOPE: CPT

## 2025-01-02 PROCEDURE — 99214 OFFICE O/P EST MOD 30 MIN: CPT

## 2025-01-02 NOTE — PROGRESS NOTES
"le    Office Note     Name: Behzad Guzman    : 1950     MRN: [id number]     Chief Complaint  Leg Pain and Knee Pain    Subjective     History of Present Illness:  Behzad Guzman is a 74 y.o. male who presents today for persistent chronic left leg pain.  Patient states he has a history of extreme injuries.  States many years ago he was run over by a bull and his left leg was torn off and had to be reattached.  He has had issues with it ever since.  Patient was recently seen in this clinic in October for similar complaint.  Patient states his pain doctors tell him it is likely related to his accident.  Patient states ibuprofen does help to make the pain go away.  Patient states that he notices more pain in his left calf.  States that it hurts almost every day.  Denies redness or swelling.  States he thinks 1 time it did swell but this was before we did the ultrasound of his lower left calf which ruled out blood clot.  Patient states he has been told in the past that he needed to wear compression stockings, but he does not do this regularly.  Has noticed \"big veins\" lower left calf recently.  They do not hurt to touch.  States that sometimes after walking his ankles will swell up, but will be relieved with elevation.  States they are not swollen today.  Patient states there has been no change since his visit in October.  He denies fevers, chills or night sweats.  He states he does also have neuropathy in his lower extremities and especially his feet.  He states he also has discrepancy in the length of the leg.  At last visit we discussed use of ibuprofen and heat as it could be muscular.  He states this does help.  Patient is chronically on Norco, Neurontin and oxycodone.  Denies any numbness and tingling.  Prolonged standing and sitting does make it worse.  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  Denies low back pain.    Requesting flu shot today.   Review of Systems:   Review of " Systems   Constitutional:  Negative for chills and fever.   Respiratory:  Negative for chest tightness and shortness of breath.    Cardiovascular:  Positive for leg swelling. Negative for chest pain and palpitations.   Gastrointestinal:  Negative for abdominal pain.   Neurological:  Negative for dizziness and light-headedness.       Past Medical History:   Past Medical History:   Diagnosis Date    Asthma     Chronic hip pain, left     Chronic pain in left shoulder     Hyperlipidemia     Hypertension     Infected tick bite 2021    Leg length discrepancy     Neck pain, chronic     Neuropathy     Panic attacks     Pre-diabetes     Prediabetes     Spinal stenosis        Past Surgical History:   Past Surgical History:   Procedure Laterality Date    ENDOSCOPY WITH FOREIGN BODY REMOVAL N/A 2024    Procedure: ESOPHAGOGASTRODUODENOSCOPY WITH FOREIGN BODY REMOVAL WITH FLOROSCOPY;  Surgeon: Adan Huston MD;  Location: [facility] ENDOSCOPY;  Service: Gastroenterology;  Laterality: N/A;    LEG SURGERY      ROTATOR CUFF REPAIR Right     SHOULDER SURGERY Left     WRIST SURGERY Left        Family History:   Family History   Problem Relation Age of Onset    Heart attack Father 65    No Known Problems Maternal Grandmother     No Known Problems Maternal Grandfather     No Known Problems Paternal Grandmother     Stroke Paternal Grandfather        Social History:   Social History     Socioeconomic History    Marital status:    Tobacco Use    Smoking status: Former     Current packs/day: 0.00     Average packs/day: 0.5 packs/day for 10.0 years (5.0 ttl pk-yrs)     Types: Cigarettes     Start date:      Quit date:      Years since quittin.0     Passive exposure: Past    Smokeless tobacco: Never   Vaping Use    Vaping status: Never Used   Substance and Sexual Activity    Alcohol use: No    Drug use: No    Sexual activity: Defer       Immunizations:   Immunization History   Administered Date(s) Administered     COVID-19 (PFIZER) Purple Cap Monovalent 09/14/2021, 10/08/2021    Flu Vaccine Quad PF >36MO 09/30/2015    Fluzone High-Dose 65+YRS 01/02/2025    Fluzone High-Dose 65+yrs 11/22/2022, 12/22/2023    Pneumococcal Conjugate 13-Valent (PCV13) 09/22/2016    Pneumococcal Conjugate 20-Valent (PCV20) 12/22/2023    Pneumococcal Polysaccharide (PPSV23) 05/18/2018    Tdap 04/22/2022        Medications:     Current Outpatient Medications:     albuterol (ACCUNEB) 0.63 MG/3ML nebulizer solution, Take 3 mL by nebulization Every 6 (Six) Hours As Needed for Wheezing., Disp: 3 mL, Rfl: 12    albuterol sulfate  (90 Base) MCG/ACT inhaler, INHALE 2 PUFFS BY MOUTH EVERY 6 HOURS AS NEEDED FOR WHEEZING OR SHORTNESS OF AIR, Disp: 8.5 g, Rfl: 1    Alcaftadine (Lastacaft) 0.25 % solution, Apply 1 drop to eye(s) as directed by provider 1 (One) Time., Disp: , Rfl:     aspirin 81 MG EC tablet, Take 1 tablet by mouth Daily., Disp: , Rfl:     azelastine (ASTELIN) 0.1 % nasal spray, Administer 2 sprays into the nostril(s) as directed by provider 2 (Two) Times a Day. Use in each nostril as directed, Disp: , Rfl:     diazePAM (VALIUM) 5 MG tablet, Take 1 tablet by mouth Every 6 (Six) Hours As Needed., Disp: , Rfl: 0    fexofenadine (ALLEGRA) 180 MG tablet, Take 1 tablet by mouth Daily., Disp: , Rfl:     fluticasone (Flonase) 50 MCG/ACT nasal spray, 2 sprays into the nostril(s) as directed by provider Daily. 2 puffs each nostril, Disp: 18.2 mL, Rfl: 0    gabapentin (NEURONTIN) 400 MG capsule, Take 1 capsule by mouth 3 (Three) Times a Day., Disp: , Rfl:     glucose monitor monitoring kit, Use 1 each Daily., Disp: 1 each, Rfl: 0    HYDROcodone-acetaminophen (NORCO)  MG per tablet, Take 1 tablet by mouth Every 6 (Six) Hours As Needed for Moderate Pain., Disp: , Rfl:     ibuprofen (ADVIL,MOTRIN) 600 MG tablet, Take 1 tablet by mouth Every 6 (Six) Hours As Needed for Mild Pain., Disp: 60 tablet, Rfl: 0    Lancets (onetouch ultrasoft) lancets,  "Use one daily to test blood sugars, Disp: 100 each, Rfl: 12    losartan-hydrochlorothiazide (HYZAAR) 50-12.5 MG per tablet, TAKE 1 TABLET BY MOUTH DAILY, Disp: 90 tablet, Rfl: 1    metFORMIN (GLUCOPHAGE) 500 MG tablet, TAKE 1 TABLET BY MOUTH TWICE A DAY WITH A MEAL, Disp: 180 tablet, Rfl: 0    metoprolol tartrate (LOPRESSOR) 50 MG tablet, TAKE 1 TABLET BY MOUTH TWICE A DAY, Disp: 180 tablet, Rfl: 0    mupirocin (BACTROBAN) 2 % ointment, Apply 1 Application topically to the appropriate area as directed 2 (Two) Times a Day., Disp: 60 g, Rfl: 1    Needle, Disp, (B-D HYPODERMIC NEEDLE 22GX1\") 22G X 1\" misc, USE TO INJECT EVERY 14 DAYS, Disp: 6 each, Rfl: 5    Needle, Disp, 21G X 1\" misc, Use to inject every 14 days., Disp: 6 each, Rfl: 5    OneTouch Verio test strip, 1 each by Other route Daily. for testing, Disp: 300 each, Rfl: 2    oxyCODONE (ROXICODONE) 15 MG immediate release tablet, TAKE 1/2 A TABLET BY MOUTH AT BEDTIME AND 1/2 TABLET IN MIDDLE OF NIGHT AS NEEDED AS DIRECTED, Disp: , Rfl:     pantoprazole (PROTONIX) 40 MG EC tablet, Take 1 tablet by mouth 2 (Two) Times a Day Before Meals., Disp: 60 tablet, Rfl: 0    pantoprazole (PROTONIX) 40 MG EC tablet, Take 1 tablet by mouth Daily., Disp: , Rfl:     rosuvastatin (CRESTOR) 40 MG tablet, Take 1 tablet by mouth Every Night. Replaces pravastatin, Disp: 90 tablet, Rfl: 3    Testosterone Enanthate 200 MG/ML solution, Inject 200 mg into the appropriate muscle as directed by prescriber Every 14 (Fourteen) Days., Disp: 5 mL, Rfl: 1    vitamin D (ERGOCALCIFEROL) 1.25 MG (20712 UT) capsule capsule, TAKE 1 CAPSULE BY MOUTH ONCE WEEKLY FOR 12 DOSES, Disp: 12 capsule, Rfl: 0    Allergies:   Allergies   Allergen Reactions    Peanut Butter Flavoring Agent (Non-Screening) Anaphylaxis    Shellfish Allergy Anaphylaxis    Amoxicillin Rash and Other (See Comments)    Penicillins Rash    Sulfa Antibiotics Myalgia    Doxycycline GI Bleeding    Latex Rash    Pregabalin Other (See " "Comments)       Objective     Vital Signs  /84 (BP Location: Right arm, Patient Position: Sitting, Cuff Size: Adult)   Pulse 57   Ht 182.9 cm (72.01\")   Wt 83 kg (183 lb)   SpO2 98%   BMI 24.81 kg/m²   Estimated body mass index is 24.81 kg/m² as calculated from the following:    Height as of this encounter: 182.9 cm (72.01\").    Weight as of this encounter: 83 kg (183 lb).    BMI is within normal parameters. No other follow-up for BMI required.       Physical Exam  Vitals reviewed.   Constitutional:       General: He is not in acute distress.     Appearance: Normal appearance. He is not toxic-appearing.   HENT:      Head: Normocephalic and atraumatic.   Eyes:      Pupils: Pupils are equal, round, and reactive to light.   Cardiovascular:      Rate and Rhythm: Normal rate and regular rhythm.      Pulses: Normal pulses.      Heart sounds: Normal heart sounds.   Pulmonary:      Effort: Pulmonary effort is normal.      Breath sounds: Normal breath sounds.   Musculoskeletal:      Right ankle: No swelling. Normal pulse.      Left ankle: No swelling. Normal pulse.      Right foot: Normal capillary refill. No swelling. Normal pulse.      Left foot: Normal capillary refill. No swelling. Normal pulse.        Legs:    Skin:     General: Skin is warm.   Neurological:      General: No focal deficit present.      Mental Status: He is alert and oriented to person, place, and time.   Psychiatric:         Mood and Affect: Mood normal.         Results:  No results found for this or any previous visit (from the past 24 hours).     Assessment and Plan     Assessment/Plan:  Diagnoses and all orders for this visit:    1. Pain in left leg (Primary)  -     Venous w Reflux Lower Extremity - Unilateral CAR; Future    2. Varicose veins of calf  -     Venous w Reflux Lower Extremity - Unilateral CAR; Future    3. Needs flu shot  -     Fluzone High-Dose 65+yrs (6458-0081)    Do not believe this is thrombosis as symptoms have not " changed since LE US in October.   Believe pain is likely related to h/o chronic injuries.   There is evidence of possible chronic venous insufficiency. Would like to obtain US with reflux to confirm.   Will continue Ibuprofen as needed for pain.   Encouraged use of heat application to the area. Can alternate with ice. Elevating legs when resting.   Reduce sodium intake.   Wear compression stockings daily.   Vascular referral if persistent and venous insufficiency confirmed.   Patient to notify me of any changes.        F/u with Dr. Tran in 4-6 weeks.     Follow Up  Return in about 4 weeks (around 1/30/2025) for Recheck with pcp .    Cyndi Huston PA-C   [facility]

## 2025-01-03 LAB
25(OH)D3 SERPL-MCNC: 27.5 NG/ML (ref 30–100)
ALBUMIN SERPL-MCNC: 4.7 G/DL (ref 3.5–5.2)
ALBUMIN/GLOB SERPL: 2 G/DL
ALP SERPL-CCNC: 54 U/L (ref 39–117)
ALT SERPL W P-5'-P-CCNC: 31 U/L (ref 1–41)
ANION GAP SERPL CALCULATED.3IONS-SCNC: 14.5 MMOL/L (ref 5–15)
AST SERPL-CCNC: 36 U/L (ref 1–40)
BILIRUB SERPL-MCNC: 0.5 MG/DL (ref 0–1.2)
BUN SERPL-MCNC: 11 MG/DL (ref 8–23)
BUN/CREAT SERPL: 9.2 (ref 7–25)
CALCIUM SPEC-SCNC: 9.5 MG/DL (ref 8.6–10.5)
CHLORIDE SERPL-SCNC: 93 MMOL/L (ref 98–107)
CHOLEST SERPL-MCNC: 142 MG/DL (ref 0–200)
CO2 SERPL-SCNC: 24.5 MMOL/L (ref 22–29)
CREAT SERPL-MCNC: 1.2 MG/DL (ref 0.76–1.27)
EGFRCR SERPLBLD CKD-EPI 2021: 63.9 ML/MIN/1.73
GLOBULIN UR ELPH-MCNC: 2.4 GM/DL
GLUCOSE SERPL-MCNC: 132 MG/DL (ref 65–99)
HBA1C MFR BLD: 7.2 % (ref 4.8–5.6)
HDLC SERPL-MCNC: 47 MG/DL (ref 40–60)
LDLC SERPL CALC-MCNC: 52 MG/DL (ref 0–100)
LDLC/HDLC SERPL: 0.83 {RATIO}
POTASSIUM SERPL-SCNC: 3.9 MMOL/L (ref 3.5–5.2)
PROT SERPL-MCNC: 7.1 G/DL (ref 6–8.5)
PSA SERPL-MCNC: 0.6 NG/ML (ref 0–4)
SODIUM SERPL-SCNC: 132 MMOL/L (ref 136–145)
T4 FREE SERPL-MCNC: 1.17 NG/DL (ref 0.92–1.68)
TRIGL SERPL-MCNC: 281 MG/DL (ref 0–150)
TSH SERPL DL<=0.05 MIU/L-ACNC: 2.13 UIU/ML (ref 0.27–4.2)
VLDLC SERPL-MCNC: 43 MG/DL (ref 5–40)

## 2025-01-05 LAB
TESTOST FREE SERPL-MCNC: 0.7 PG/ML (ref 6.6–18.1)
TESTOST SERPL-MCNC: 83 NG/DL (ref 264–916)

## 2025-01-21 DIAGNOSIS — E34.9 TESTOSTERONE DEFICIENCY: ICD-10-CM

## 2025-01-21 NOTE — TELEPHONE ENCOUNTER
"Caller: Behzad Guzman    Relationship: Self    Best call back number: [phone]     Requested Prescriptions:   Requested Prescriptions     Pending Prescriptions Disp Refills    Testosterone Enanthate 200 MG/ML solution 5 mL 1     Sig: Inject 200 mg into the appropriate muscle as directed by prescriber Every 14 (Fourteen) Days.    Needle, Disp, 21G X 1\" misc 6 each 5     Sig: Use to inject every 14 days.    Needle, Disp, (B-D HYPODERMIC NEEDLE 22GX1\") 22G X 1\" misc 6 each 5        Pharmacy where request should be sent: [facility] [id number] - [place], [place] - [address] AT [facility] [place] [address] - [phone]  - [phone] FX     Last office visit with prescribing clinician: 8/13/2024   Last telemedicine visit with prescribing clinician: Visit date not found   Next office visit with prescribing clinician: Visit date not found     Additional details provided by patient: PATIENT IS OUT.     Does the patient have less than a 3 day supply:  [x] Yes  [] No    Would you like a call back once the refill request has been completed: [] Yes [x] No    If the office needs to give you a call back, can they leave a voicemail: [] Yes [x] No    Cadance Dunaway, RegSched Rep   01/21/25 14:43 EST       "

## 2025-01-22 RX ORDER — NEEDLES, DISPOSABLE 25GX5/8"
1 NEEDLE, DISPOSABLE MISCELLANEOUS
Qty: 25 EACH | Refills: 2 | Status: SHIPPED | OUTPATIENT
Start: 2025-01-22

## 2025-01-22 RX ORDER — TESTOSTERONE ENANTHATE 200 MG/ML
200 INJECTION, SOLUTION INTRAMUSCULAR
Qty: 5 ML | Refills: 1 | Status: SHIPPED | OUTPATIENT
Start: 2025-01-22

## 2025-01-23 ENCOUNTER — TRANSCRIBE ORDERS (OUTPATIENT)
Dept: CT IMAGING | Facility: CLINIC | Age: 75
End: 2025-01-23
Payer: MEDICARE

## 2025-01-23 DIAGNOSIS — M54.6 PAIN IN THORACIC SPINE: Primary | ICD-10-CM

## 2025-01-23 DIAGNOSIS — M54.50 LOW BACK PAIN, UNSPECIFIED BACK PAIN LATERALITY, UNSPECIFIED CHRONICITY, UNSPECIFIED WHETHER SCIATICA PRESENT: ICD-10-CM

## 2025-02-04 ENCOUNTER — OFFICE VISIT (OUTPATIENT)
Dept: FAMILY MEDICINE CLINIC | Facility: CLINIC | Age: 75
End: 2025-02-04
Payer: MEDICARE

## 2025-02-04 VITALS
HEART RATE: 93 BPM | DIASTOLIC BLOOD PRESSURE: 88 MMHG | SYSTOLIC BLOOD PRESSURE: 160 MMHG | BODY MASS INDEX: 23.43 KG/M2 | HEIGHT: 72 IN | OXYGEN SATURATION: 95 % | WEIGHT: 173 LBS

## 2025-02-04 DIAGNOSIS — G89.29 CHRONIC FOOT PAIN, UNSPECIFIED LATERALITY: ICD-10-CM

## 2025-02-04 DIAGNOSIS — E34.9 TESTOSTERONE DEFICIENCY: ICD-10-CM

## 2025-02-04 DIAGNOSIS — E11.42 TYPE 2 DIABETES MELLITUS WITH DIABETIC POLYNEUROPATHY, WITHOUT LONG-TERM CURRENT USE OF INSULIN: ICD-10-CM

## 2025-02-04 DIAGNOSIS — L98.9 SKIN LESION: ICD-10-CM

## 2025-02-04 DIAGNOSIS — B86 SCABIES: Primary | ICD-10-CM

## 2025-02-04 DIAGNOSIS — M79.673 CHRONIC FOOT PAIN, UNSPECIFIED LATERALITY: ICD-10-CM

## 2025-02-04 PROCEDURE — 99215 OFFICE O/P EST HI 40 MIN: CPT | Performed by: INTERNAL MEDICINE

## 2025-02-04 PROCEDURE — 3079F DIAST BP 80-89 MM HG: CPT | Performed by: INTERNAL MEDICINE

## 2025-02-04 PROCEDURE — 1159F MED LIST DOCD IN RCRD: CPT | Performed by: INTERNAL MEDICINE

## 2025-02-04 PROCEDURE — 3077F SYST BP >= 140 MM HG: CPT | Performed by: INTERNAL MEDICINE

## 2025-02-04 PROCEDURE — 1160F RVW MEDS BY RX/DR IN RCRD: CPT | Performed by: INTERNAL MEDICINE

## 2025-02-04 PROCEDURE — 3051F HG A1C>EQUAL 7.0%<8.0%: CPT | Performed by: INTERNAL MEDICINE

## 2025-02-04 PROCEDURE — 1126F AMNT PAIN NOTED NONE PRSNT: CPT | Performed by: INTERNAL MEDICINE

## 2025-02-04 RX ORDER — ONDANSETRON 4 MG/1
TABLET, ORALLY DISINTEGRATING ORAL
COMMUNITY

## 2025-02-04 RX ORDER — BISMUTH SUBSALICYLATE 525 MG/15ML
1 SUSPENSION ORAL DAILY
Qty: 117 G | Refills: 1 | Status: SHIPPED | OUTPATIENT
Start: 2025-02-04

## 2025-02-04 RX ORDER — MUPIROCIN CALCIUM 20 MG/G
1 CREAM TOPICAL 3 TIMES DAILY
Qty: 30 G | Refills: 5 | Status: SHIPPED | OUTPATIENT
Start: 2025-02-04 | End: 2025-02-07

## 2025-02-04 RX ORDER — SYRINGE WITH NEEDLE, 1 ML 25GX5/8"
1 SYRINGE, EMPTY DISPOSABLE MISCELLANEOUS
Qty: 50 EACH | Refills: 2 | Status: SHIPPED | OUTPATIENT
Start: 2025-02-04

## 2025-02-04 RX ORDER — CLINDAMYCIN PHOSPHATE 10 UG/ML
LOTION TOPICAL
COMMUNITY
Start: 2024-11-29

## 2025-02-04 RX ORDER — METFORMIN HYDROCHLORIDE 500 MG/1
500 TABLET, EXTENDED RELEASE ORAL 2 TIMES DAILY
Qty: 60 TABLET | Refills: 2 | Status: SHIPPED | OUTPATIENT
Start: 2025-02-04

## 2025-02-04 RX ORDER — ESCITALOPRAM OXALATE 10 MG/1
10 TABLET ORAL DAILY
COMMUNITY

## 2025-02-04 RX ORDER — IVERMECTIN 3 MG/1
15 TABLET ORAL ONCE
Qty: 5 TABLET | Refills: 0 | Status: SHIPPED | OUTPATIENT
Start: 2025-02-04 | End: 2025-02-04

## 2025-02-04 RX ORDER — PERMETHRIN 50 MG/G
1 CREAM TOPICAL ONCE
Qty: 1 G | Refills: 0 | Status: SHIPPED | OUTPATIENT
Start: 2025-02-04 | End: 2025-02-04

## 2025-02-04 RX ORDER — CEPHALEXIN 500 MG/1
CAPSULE ORAL
COMMUNITY
Start: 2024-11-04

## 2025-02-04 NOTE — PROGRESS NOTES
Cc: skin lesion, bug bites      HPI:  Behzad Guzman is a 74 y.o. male who presents today for follow-up recurrent skin lesions and bug bites.  Would like to discuss metformin for diabetes.    ROS:  Constitutional: no fevers, night sweats or unexplained weight loss  Eyes: no vision changes  ENT: no runny nose, ear pain, sore throat  Cardio: no chest pain, palpitations  Pulm: no shortness of breath, wheezing, or cough  GI: no abdominal pain or changes in bowel movements  : no difficulty urinating  MSK: no difficulty ambulating, no joint pain  Neuro: no weakness, dizziness or headache  Psych: no trouble sleeping  Endo: no change in appetite      Past Medical History:   Diagnosis Date    Asthma     Chronic hip pain, left     Chronic pain in left shoulder     Hyperlipidemia     Hypertension     Infected tick bite 2021    Leg length discrepancy     Neck pain, chronic     Neuropathy     Panic attacks     Pre-diabetes     Prediabetes     Spinal stenosis       Family History   Problem Relation Age of Onset    Heart attack Father 65    No Known Problems Maternal Grandmother     No Known Problems Maternal Grandfather     No Known Problems Paternal Grandmother     Stroke Paternal Grandfather       Social History     Socioeconomic History    Marital status:    Tobacco Use    Smoking status: Former     Current packs/day: 0.00     Average packs/day: 0.5 packs/day for 10.0 years (5.0 ttl pk-yrs)     Types: Cigarettes     Start date:      Quit date:      Years since quittin.1     Passive exposure: Past    Smokeless tobacco: Never   Vaping Use    Vaping status: Never Used   Substance and Sexual Activity    Alcohol use: No    Drug use: No    Sexual activity: Defer      Allergies   Allergen Reactions    Peanut Butter Flavoring Agent (Non-Screening) Anaphylaxis    Shellfish Allergy Anaphylaxis    Amoxicillin Rash and Other (See Comments)    Penicillins Rash    Sulfa Antibiotics Myalgia    Doxycycline  GI Bleeding    Latex Rash    Pregabalin Other (See Comments)      Immunization History   Administered Date(s) Administered    COVID-19 (PFIZER) Purple Cap Monovalent 09/14/2021, 10/08/2021    Flu Vaccine Quad PF >36MO 09/30/2015    Fluzone High-Dose 65+YRS 01/02/2025    Fluzone High-Dose 65+yrs 11/22/2022, 12/22/2023    Pneumococcal Conjugate 13-Valent (PCV13) 09/22/2016    Pneumococcal Conjugate 20-Valent (PCV20) 12/22/2023    Pneumococcal Polysaccharide (PPSV23) 05/18/2018    Tdap 04/22/2022        PE:  Vitals:    02/04/25 1518   BP: 160/88   Pulse: 93   SpO2: 95%      Body mass index is 23.46 kg/m².    Gen Appearance: NAD  HEENT: Normocephalic, PERRLA, no thyromegaly, trache midline  Heart: RRR, normal S1 and S2, no murmur  Lungs: CTA b/l, no wheezing, no crackles  Abdomen: Soft, non-tender, non-distended, no guarding and BSx4  MSK: Moves all extremities well, normal gait, no peripheral edema  Pulses: Palpable and equal b/l  Lymph nodes: No palpable lymphadenopathy   Neuro: No focal deficits      Current Outpatient Medications   Medication Sig Dispense Refill    albuterol (ACCUNEB) 0.63 MG/3ML nebulizer solution Take 3 mL by nebulization Every 6 (Six) Hours As Needed for Wheezing. 3 mL 12    albuterol sulfate  (90 Base) MCG/ACT inhaler INHALE 2 PUFFS BY MOUTH EVERY 6 HOURS AS NEEDED FOR WHEEZING OR SHORTNESS OF AIR 8.5 g 1    Alcaftadine (Lastacaft) 0.25 % solution Apply 1 drop to eye(s) as directed by provider 1 (One) Time.      aspirin 81 MG EC tablet Take 1 tablet by mouth Daily.      azelastine (ASTELIN) 0.1 % nasal spray Administer 2 sprays into the nostril(s) as directed by provider 2 (Two) Times a Day. Use in each nostril as directed      cephalexin (KEFLEX) 500 MG capsule       clindamycin (CLEOCIN T) 1 % lotion       diazePAM (VALIUM) 5 MG tablet Take 1 tablet by mouth Every 6 (Six) Hours As Needed.  0    escitalopram (LEXAPRO) 10 MG tablet Take 1 tablet by mouth Daily.      fexofenadine (ALLEGRA)  "180 MG tablet Take 1 tablet by mouth Daily.      fluticasone (Flonase) 50 MCG/ACT nasal spray 2 sprays into the nostril(s) as directed by provider Daily. 2 puffs each nostril 18.2 mL 0    gabapentin (NEURONTIN) 400 MG capsule Take 1 capsule by mouth 3 (Three) Times a Day.      glucose monitor monitoring kit Use 1 each Daily. 1 each 0    HYDROcodone-acetaminophen (NORCO)  MG per tablet Take 1 tablet by mouth Every 6 (Six) Hours As Needed for Moderate Pain.      ibuprofen (ADVIL,MOTRIN) 600 MG tablet Take 1 tablet by mouth Every 6 (Six) Hours As Needed for Mild Pain. 60 tablet 0    Lancets (onetouch ultrasoft) lancets Use one daily to test blood sugars 100 each 12    losartan-hydrochlorothiazide (HYZAAR) 50-12.5 MG per tablet TAKE 1 TABLET BY MOUTH DAILY 90 tablet 1    metoprolol tartrate (LOPRESSOR) 50 MG tablet TAKE 1 TABLET BY MOUTH TWICE A  tablet 0    Needle, Disp, (B-D HYPODERMIC NEEDLE 22GX1\") 22G X 1\" misc Inject 1 each into the appropriate muscle as directed by prescriber Every 14 (Fourteen) Days. 25 each 2    Needle, Disp, 21G X 1\" misc Use to inject every 14 days. 50 each 2    ondansetron ODT (ZOFRAN-ODT) 4 MG disintegrating tablet DISSOLVE 1 TABLET ON THE TONGUE EVERY 6 HOURS AS NEEDED FOR NAUSEA      OneTouch Verio test strip 1 each by Other route Daily. for testing 300 each 2    oxyCODONE (ROXICODONE) 15 MG immediate release tablet TAKE 1/2 A TABLET BY MOUTH AT BEDTIME AND 1/2 TABLET IN MIDDLE OF NIGHT AS NEEDED AS DIRECTED      pantoprazole (PROTONIX) 40 MG EC tablet Take 1 tablet by mouth 2 (Two) Times a Day Before Meals. 60 tablet 0    pantoprazole (PROTONIX) 40 MG EC tablet Take 1 tablet by mouth Daily.      rosuvastatin (CRESTOR) 40 MG tablet Take 1 tablet by mouth Every Night. Replaces pravastatin 90 tablet 3    Testosterone Enanthate 200 MG/ML solution Inject 200 mg into the appropriate muscle as directed by prescriber Every 14 (Fourteen) Days. 5 mL 1    vitamin D (ERGOCALCIFEROL) " "1.25 MG (34367 UT) capsule capsule TAKE 1 CAPSULE BY MOUTH ONCE WEEKLY FOR 12 DOSES 12 capsule 0    ivermectin (STROMECTOL) 3 MG tablet tablet Take 5 tablets by mouth 1 (One) Time for 1 dose. 5 tablet 0    Ivermectin 0.5 % lotion Apply 1 Application topically Daily. 117 g 1    metFORMIN ER (GLUCOPHAGE-XR) 500 MG 24 hr tablet Take 1 tablet by mouth 2 (Two) Times a Day. 60 tablet 2    mupirocin (BACTROBAN) 2 % cream Apply 1 Application topically to the appropriate area as directed 3 (Three) Times a Day. 30 g 5    permethrin (ELIMITE) 5 % cream Apply 1 Application topically to the appropriate area as directed 1 (One) Time for 1 dose. 1 g 0    Syringe/Needle, Disp, (B-D 3CC LUER-YAA SYR 30FR6-0/2) 23G X 1-1/2\" 3 ML misc Use 1 each Every 14 (Fourteen) Days. 50 each 2     No current facility-administered medications for this visit.      Repeat dose of oral ivermectin, may apply permethrin cream as well.  Combination worked well in the past.  May use ivermectin lotion going forward after initial treatment.    Switch to long-acting metformin for type 2 diabetes.    Counseling was given to patient for the following topics: diagnostic results, instructions for management, impressions, risks and benefits of treatment options, and importance of treatment compliance . Total time of the encounter was 40 minutes and 20 minutes was spent face to face counseling.    Diagnoses and all orders for this visit:    1. Scabies (Primary)  -     Ivermectin 0.5 % lotion; Apply 1 Application topically Daily.  Dispense: 117 g; Refill: 1  -     ivermectin (STROMECTOL) 3 MG tablet tablet; Take 5 tablets by mouth 1 (One) Time for 1 dose.  Dispense: 5 tablet; Refill: 0  -     permethrin (ELIMITE) 5 % cream; Apply 1 Application topically to the appropriate area as directed 1 (One) Time for 1 dose.  Dispense: 1 g; Refill: 0    2. Skin lesion  -     mupirocin (BACTROBAN) 2 % cream; Apply 1 Application topically to the appropriate area as directed 3 " "(Three) Times a Day.  Dispense: 30 g; Refill: 5    3. Type 2 diabetes mellitus with diabetic polyneuropathy, without long-term current use of insulin  -     metFORMIN ER (GLUCOPHAGE-XR) 500 MG 24 hr tablet; Take 1 tablet by mouth 2 (Two) Times a Day.  Dispense: 60 tablet; Refill: 2    4. Chronic foot pain, unspecified laterality    5. Testosterone deficiency  -     Syringe/Needle, Disp, (B-D 3CC LUER-YAA SYR 90RW1-8/2) 23G X 1-1/2\" 3 ML misc; Use 1 each Every 14 (Fourteen) Days.  Dispense: 50 each; Refill: 2  -     Needle, Disp, 21G X 1\" misc; Use to inject every 14 days.  Dispense: 50 each; Refill: 2         Return in about 3 months (around 5/4/2025) for a1c.     Dictated Utilizing Dragon Dictation    Please note that portions of this note were completed with a voice recognition program.    Part of this note may be an electronic transcription/translation of spoken language to printed text using the Dragon Dictation System.  "

## 2025-02-05 ENCOUNTER — HOSPITAL ENCOUNTER (OUTPATIENT)
Dept: CARDIOLOGY | Facility: HOSPITAL | Age: 75
Discharge: HOME | End: 2025-02-05
Payer: MEDICARE

## 2025-02-05 ENCOUNTER — PRIOR AUTHORIZATION (OUTPATIENT)
Dept: FAMILY MEDICINE CLINIC | Facility: CLINIC | Age: 75
End: 2025-02-05
Payer: MEDICARE

## 2025-02-05 VITALS — WEIGHT: 173.06 LBS | BODY MASS INDEX: 23.44 KG/M2 | HEIGHT: 72 IN

## 2025-02-05 DIAGNOSIS — I83.90 VARICOSE VEINS OF CALF: ICD-10-CM

## 2025-02-05 DIAGNOSIS — M79.605 PAIN IN LEFT LEG: ICD-10-CM

## 2025-02-05 LAB
BH CV LEFT LOWER VAS COMMON FEMORAL REFLUX TIME: 2.14 SEC
BH CV LEFT LOWER VAS DEEP FV REFLUX COLOR FLOW TIME: 0.82 SEC
BH CV LEFT LOWER VAS DISTAL FV REFLUX COLOR FLOW TIME: 0.49 SEC
BH CV LEFT LOWER VAS GSV BELOW KNEE REFLUX TIME: 0 SEC
BH CV LEFT LOWER VAS GSV KNEE REFLUX TIME: 0 SEC
BH CV LEFT LOWER VAS GSV KNEE TRANSVERSE DIAMETER: 0.5 CM
BH CV LEFT LOWER VAS GSV PROX REFLUX TIME: 0 SEC
BH CV LEFT LOWER VAS GSV PROX TRANSVERSE DIAMETER: 0.6 CM
BH CV LEFT LOWER VAS GSVBELOW KNEE TRANSVERSE DIAMETER: 0.4 CM
BH CV LEFT LOWER VAS MID FEMORAL REFLUX TIME: 0 SEC
BH CV LEFT LOWER VAS POPLITEAL REFLUX TIME: 0.57 SEC
BH CV LEFT LOWER VAS PROX FV REFLUX COLOR FLOW TIME: 0 SEC
BH CV LEFT LOWER VAS PTV REFLUX COLOR FLOW TIME: 3.55 SEC
BH CV LEFT LOWER VAS SAPHENOFEMORAL JUNCTION REFLUX TIME: 0.44 SEC
BH CV LEFT LOWER VAS SAPHENOFEMORAL JUNCTION TRANSVERSE DIAMETER: 0.9 CM
BH CV LEFT LOWER VAS SPJ REFLUX TIME: 0 SEC
BH CV LEFT LOWER VAS SPJ TRANS DIAMETER: 0.3 CM
BH CV LEFT LOWER VAS SSV MID CALF REFLUX TIME: 0 SEC
BH CV LEFT LOWER VAS SSV MID CALF TRANS DIAMETER: 0.3 CM
BH CV LEFT LOWER VAS SSV PROX CALF REFLUX TIME: 0 SEC
BH CV LEFT LOWER VAS SSV PROX CALF TRANS DIAMETER: 0.3 CM
BH CV LEFT LOWER VAS VARICOSITY AK REFLUX TIME: 0 SEC
BH CV LEFT LOWER VAS VARICOSITY AK TRANS DIAMETER: 0.2 CM
BH CV LOWER VAS LEFT GSV DIST THIGH COMPRESSIBILTY: NORMAL
BH CV LOWER VASCULAR LEFT COMMON FEMORAL AUGMENT: NORMAL
BH CV LOWER VASCULAR LEFT COMMON FEMORAL COMPETENT: NORMAL
BH CV LOWER VASCULAR LEFT COMMON FEMORAL COMPRESS: NORMAL
BH CV LOWER VASCULAR LEFT COMMON FEMORAL PHASIC: NORMAL
BH CV LOWER VASCULAR LEFT COMMON FEMORAL SPONT: NORMAL
BH CV LOWER VASCULAR LEFT DISTAL FEMORAL AUGMENT: NORMAL
BH CV LOWER VASCULAR LEFT DISTAL FEMORAL COMPETENT: NORMAL
BH CV LOWER VASCULAR LEFT DISTAL FEMORAL COMPRESS: NORMAL
BH CV LOWER VASCULAR LEFT DISTAL FEMORAL PHASIC: NORMAL
BH CV LOWER VASCULAR LEFT DISTAL FEMORAL SPONT: NORMAL
BH CV LOWER VASCULAR LEFT GREATER SAPH AK COMPETENT: NORMAL
BH CV LOWER VASCULAR LEFT GREATER SAPH AK COMPRESS: NORMAL
BH CV LOWER VASCULAR LEFT GREATER SAPH BK COMPETENT: NORMAL
BH CV LOWER VASCULAR LEFT GREATER SAPH BK COMPRESS: NORMAL
BH CV LOWER VASCULAR LEFT GSV DIST THIGH COMPETENT: NORMAL
BH CV LOWER VASCULAR LEFT LESSER SAPH COMPETENT: NORMAL
BH CV LOWER VASCULAR LEFT LESSER SAPH COMPRESS: NORMAL
BH CV LOWER VASCULAR LEFT MID FEMORAL AUGMENT: NORMAL
BH CV LOWER VASCULAR LEFT MID FEMORAL COMPETENT: NORMAL
BH CV LOWER VASCULAR LEFT MID FEMORAL COMPRESS: NORMAL
BH CV LOWER VASCULAR LEFT MID FEMORAL PHASIC: NORMAL
BH CV LOWER VASCULAR LEFT MID FEMORAL SPONT: NORMAL
BH CV LOWER VASCULAR LEFT PERONEAL COMPETENT: NORMAL
BH CV LOWER VASCULAR LEFT PERONEAL COMPRESS: NORMAL
BH CV LOWER VASCULAR LEFT POPLITEAL AUGMENT: NORMAL
BH CV LOWER VASCULAR LEFT POPLITEAL COMPETENT: NORMAL
BH CV LOWER VASCULAR LEFT POPLITEAL COMPRESS: NORMAL
BH CV LOWER VASCULAR LEFT POPLITEAL PHASIC: NORMAL
BH CV LOWER VASCULAR LEFT POPLITEAL SPONT: NORMAL
BH CV LOWER VASCULAR LEFT POSTERIOR TIBIAL COMPETENT: NORMAL
BH CV LOWER VASCULAR LEFT POSTERIOR TIBIAL COMPRESS: NORMAL
BH CV LOWER VASCULAR LEFT PROFUNDA FEMORAL COMPRESS: NORMAL
BH CV LOWER VASCULAR LEFT PROXIMAL FEMORAL COMPRESS: NORMAL
BH CV LOWER VASCULAR LEFT SAPHENOFEMORAL JUNCTION AUGMENT: NORMAL
BH CV LOWER VASCULAR LEFT SAPHENOFEMORAL JUNCTION COMPETENT: NORMAL
BH CV LOWER VASCULAR LEFT SAPHENOFEMORAL JUNCTION COMPRESS: NORMAL
BH CV LOWER VASCULAR LEFT SAPHENOFEMORAL JUNCTION PHASIC: NORMAL
BH CV LOWER VASCULAR LEFT SAPHENOFEMORAL JUNCTION SPONT: NORMAL
BH CV LOWER VASCULAR LEFT SAPHENOPOP JX AUGMENT: NORMAL
BH CV LOWER VASCULAR LEFT SAPHENOPOP JX COMPETENT: NORMAL
BH CV LOWER VASCULAR LEFT SAPHENOPOP JX COMPRESS: NORMAL
BH CV LOWER VASCULAR LEFT SAPHENOPOP JX PHASIC: NORMAL
BH CV LOWER VASCULAR LEFT SAPHENOPOP JX SPONT: NORMAL
BH CV LOWER VASCULAR LEFT SSV MID CALF COMPETENT: NORMAL
BH CV LOWER VASCULAR LEFT SSV MID CALF COMPRESS: NORMAL
BH CV LOWER VASCULAR RIGHT COMMON FEMORAL AUGMENT: NORMAL
BH CV LOWER VASCULAR RIGHT COMMON FEMORAL COMPETENT: NORMAL
BH CV LOWER VASCULAR RIGHT COMMON FEMORAL COMPRESS: NORMAL
BH CV LOWER VASCULAR RIGHT COMMON FEMORAL PHASIC: NORMAL
BH CV LOWER VASCULAR RIGHT COMMON FEMORAL SPONT: NORMAL
Lab: 0 SEC

## 2025-02-05 PROCEDURE — 93971 EXTREMITY STUDY: CPT

## 2025-02-05 NOTE — TELEPHONE ENCOUNTER
(Key: [id number]) - [id number]  Ivermectin 3MG tablets  status: PA Response - ApprovedCreated: February 4th, 2025 [phone]: February 5th, 2025    Approved today by [employer] 2017  PA Case: [id number], Status: Approved, Coverage Starts on: 1/1/2025 12:00:00 AM, Coverage Ends on: 2/5/2026 12:00:00 AM.  Authorization Expiration Date: 2/4/2026    (Key: [id number]) - [id number]  Ivermectin 0.5% lotion  status: PA RequestCreated: February 4th, 2025 [phone]: February 5th, 2025    Denied today by [employer] 2017  PA Case: [id number], Status: Denied. IVERMECTIN 0.5% LOTION submitted for the member   identified above, and we denied your request for the following reason:  because Medicare law does not include over-the-counter drugs (nonprescription drugs) under   Medicare Part D coverage. This is an over-the-counter drug. This is not a medical necessity   decision. It is a benefit issue only

## 2025-02-07 DIAGNOSIS — L08.9 SKIN INFECTION: Primary | ICD-10-CM

## 2025-02-07 RX ORDER — MUPIROCIN 20 MG/G
1 OINTMENT TOPICAL 3 TIMES DAILY
Qty: 30 G | Refills: 0 | Status: SHIPPED | OUTPATIENT
Start: 2025-02-07

## 2025-02-13 DIAGNOSIS — R59.9 ENLARGED LYMPH NODES: Primary | ICD-10-CM

## 2025-03-10 ENCOUNTER — TELEPHONE (OUTPATIENT)
Dept: FAMILY MEDICINE CLINIC | Facility: CLINIC | Age: 75
End: 2025-03-10
Payer: MEDICARE

## 2025-03-10 NOTE — TELEPHONE ENCOUNTER
DELETE AFTER REVIEWING: Send this encounter to the appropriate pool. See your Call Action Grid or Workflows for direction.    Caller: Behzad Guzmaneal    Relationship: Self    Best call back number: [phone]    What medication are you requesting: ANTIBIOTIC     What are your current symptoms: DRAINAGE, SLIGHT COUGH, SINUS PAIN, DISCOLORED MUCUS     How long have you been experiencing symptoms: SINCE FRIDAY     Have you had these symptoms before:    [x] Yes  [] No    Have you been treated for these symptoms before:   [x] Yes  [] No    If a prescription is needed, what is your preferred pharmacy and phone number: [id number] - [facility] [place] - [address] AT [facility] [address] - [phone]  - [phone] FX     Additional notes: PATIENT MISSED HIS APPOINTMENT DUE TO NOT CHANGING HIS CLOCKS AFTER THE TIME CHANGE AND IS WANTING TO HAVE SOMETHING CALLED IN TO HELP HIM PRIOR TO BEING SEEN.

## 2025-03-10 NOTE — TELEPHONE ENCOUNTER
Caller: Behzad Guzman    Relationship: Self    Best call back number: [phone]     What are your current symptoms: DRAINAGE, SINUS PAIN, DISCOLORED MUCUS, SLIGHT COUGH    How long have you been experiencing symptoms: 03.07.25    Have you had these symptoms before:    [x] Yes  [] No    Have you been treated for these symptoms before:   [x] Yes  [] No    If a prescription is needed, what is your preferred pharmacy and phone number: [facility] [id number] - [place], [place] - [address] AT [facility] [address] - [phone]  - [phone] FX     Additional notes: OTC TREATMENTS NOT WORKING

## 2025-03-18 DIAGNOSIS — I10 PRIMARY HYPERTENSION: ICD-10-CM

## 2025-03-19 RX ORDER — METOPROLOL TARTRATE 50 MG
50 TABLET ORAL 2 TIMES DAILY
Qty: 180 TABLET | Refills: 0 | Status: SHIPPED | OUTPATIENT
Start: 2025-03-19

## 2025-03-19 NOTE — TELEPHONE ENCOUNTER
Rx Refill Note  Requested Prescriptions     Pending Prescriptions Disp Refills    metoprolol tartrate (LOPRESSOR) 50 MG tablet [Pharmacy Med Name: METOPROLOL TARTRATE 50 MG TAB] 180 tablet 0     Sig: TAKE 1 TABLET BY MOUTH TWICE A DAY      Last office visit with prescribing clinician: 2/4/2025   Last telemedicine visit with prescribing clinician: Visit date not found   Next office visit with prescribing clinician: 5/6/2025                         Would you like a call back once the refill request has been completed: [] Yes [] No    If the office needs to give you a call back, can they leave a voicemail: [] Yes [] No    Macey Melendez MA  03/19/25, 09:18 EDT

## 2025-03-26 DIAGNOSIS — I10 PRIMARY HYPERTENSION: ICD-10-CM

## 2025-03-26 RX ORDER — LOSARTAN POTASSIUM AND HYDROCHLOROTHIAZIDE 12.5; 5 MG/1; MG/1
1 TABLET ORAL DAILY
Qty: 90 TABLET | Refills: 1 | Status: SHIPPED | OUTPATIENT
Start: 2025-03-26

## 2025-03-26 NOTE — TELEPHONE ENCOUNTER
Rx Refill Note  Requested Prescriptions     Pending Prescriptions Disp Refills    losartan-hydrochlorothiazide (HYZAAR) 50-12.5 MG per tablet [Pharmacy Med Name: LOSARTAN-HCTZ 50-12.5 MG TAB] 90 tablet 1     Sig: TAKE 1 TABLET BY MOUTH DAILY      Last office visit with prescribing clinician: 2/4/2025   Last telemedicine visit with prescribing clinician: Visit date not found   Next office visit with prescribing clinician: 5/6/2025                         Would you like a call back once the refill request has been completed: [] Yes [] No    If the office needs to give you a call back, can they leave a voicemail: [] Yes [] No    Macey Melendez MA  03/26/25, 14:18 EDT

## 2025-03-28 DIAGNOSIS — L08.9 SKIN INFECTION: ICD-10-CM

## 2025-03-28 RX ORDER — MUPIROCIN 20 MG/G
1 OINTMENT TOPICAL 3 TIMES DAILY
Qty: 30 G | Refills: 0 | Status: SHIPPED | OUTPATIENT
Start: 2025-03-28

## 2025-03-28 NOTE — TELEPHONE ENCOUNTER
Caller: Behzad Guzman Nicola    Relationship: Self    Best call back number: [phone]    Requested Prescriptions:   Requested Prescriptions     Pending Prescriptions Disp Refills    mupirocin (BACTROBAN) 2 % ointment 30 g 0     Sig: Apply 1 Application topically to the appropriate area as directed 3 (Three) Times a Day.        Pharmacy where request should be sent:    [facility] [phone]  Last office visit with prescribing clinician: 2/4/2025   Last telemedicine visit with prescribing clinician: Visit date not found   Next office visit with prescribing clinician: 5/6/2025     Additional details provided by patient: PATIENT IS OUT OF MEDICATION. WOULD LIKE ADDITIONAL REFILLS    Does the patient have less than a 3 day supply:  [x] Yes  [] No    Would you like a call back once the refill request has been completed: [] Yes [x] No    If the office needs to give you a call back, can they leave a voicemail: [] Yes [x] No    Kayla Andre   03/28/25 14:41 EDT

## 2025-04-01 ENCOUNTER — TELEPHONE (OUTPATIENT)
Dept: FAMILY MEDICINE CLINIC | Facility: CLINIC | Age: 75
End: 2025-04-01
Payer: MEDICARE

## 2025-04-01 NOTE — TELEPHONE ENCOUNTER
Caller: Behzad Guzman    Relationship: Self    Best call back number: [phone]     What medication are you requesting: IVERMECTIN 3 MG TAB AND THE 5%LOTION     What are your current symptoms: OUTBREAK    How long have you been experiencing symptoms: OFF AND ON FOR OVER A YEAR     Have you had these symptoms before:    [x] Yes  [] No    Have you been treated for these symptoms before:   [x] Yes  [] No    If a prescription is needed, what is your preferred pharmacy and phone number:  [facility] [id number] - [place], [place] - [address] [facility] AT [facility] [address] - [phone]  - [phone] FX    Additional notes: PATIENT IS NEEDING THIS FILLED ASAP. THE BUGS ARE NOW COMING OUT OF THE PATIENT'S SKIN      PLEASE CALL PATIENT ONCE THE REQUEST HAS BEEN SENT TO THE PHARMACY

## 2025-04-01 NOTE — TELEPHONE ENCOUNTER
"Pt is requesting the medication in pill form, he states he has been reading up on this situation & says he would like that called in please. He says that Dr Tran can call him at [phone].. and also says he has never received the Ivermectin back in February.   He states he has Chlorhexinine GLOUCONATE liquid soln 4% - he says this helped a lot. He said it's like a soap.     Toward the end of the call he states his skin is turning a different color & the \"worms or larvae\" are coming out of his skin on his forearms.   "

## 2025-04-02 DIAGNOSIS — L08.9 SKIN INFECTION: ICD-10-CM

## 2025-04-02 DIAGNOSIS — B86 SCABIES: Primary | ICD-10-CM

## 2025-04-02 RX ORDER — BISMUTH SUBSALICYLATE 525 MG/15ML
1 SUSPENSION ORAL DAILY
Qty: 117 G | Refills: 1 | Status: SHIPPED | OUTPATIENT
Start: 2025-04-02

## 2025-04-02 NOTE — TELEPHONE ENCOUNTER
Unable to reach Behzad Guzman by phone or leave message.    Hub may relay message and document.    Zach Tran DO     4/2/25  8:13 AM  Note     Ivermectin sent to pharmacy

## 2025-04-04 ENCOUNTER — TELEPHONE (OUTPATIENT)
Dept: FAMILY MEDICINE CLINIC | Facility: CLINIC | Age: 75
End: 2025-04-04
Payer: MEDICARE

## 2025-04-04 NOTE — TELEPHONE ENCOUNTER
"Caller: Behzad Guzman    Relationship: Self    Best call back number:  [phone] (Mobile)      Who are you requesting to speak with (clinical staff, provider,  specific staff member): CLINICAL     What was the call regarding:  PATIENT SAID HE REQUESTED THE IVERMECTIN PILL AND IT HAS NOT BEEN SENT TO THE PHARMACY     PLEASE CALL         MESSAGE FROM 4-1-25  Pt is requesting the medication in pill form, he states he has been reading up on this situation & says he would like that called in please. He says that Dr Tran can call him at [phone].. and also says he has never received the Ivermectin back in February.   He states he has Chlorhexinine GLOUCONATE liquid soln 4% - he says this helped a lot. He said it's like a soap.      Toward the end of the call he states his skin is turning a different color & the \"worms or larvae\" are coming out of his skin on his forearms.         "

## 2025-04-05 ENCOUNTER — NURSE TRIAGE (OUTPATIENT)
Dept: CALL CENTER | Facility: HOSPITAL | Age: 75
End: 2025-04-05
Payer: MEDICARE

## 2025-04-05 NOTE — TELEPHONE ENCOUNTER
He has a skin infection, thinks he caught this from his dog. He thinks he has bugs coming out of the arm. He has been to the ER. He received Ivermectin tablets. This worked welled. He states there are worms or larvae that are coming out of his forearms bilateral.  He states his PCP ordered medication and it is not covered by his insurance. He wants to know if he needs a lotion or a cream. The ivermectin lotion was sent to the pharmacy- The pharmacy is questioning if he needs a lotion or a cream. He would like the tablets ordered of Ivermectin . He states he does not have transportation.  He states  he lost a lot in his life due to sickness, and illness. He also wants to know why it hard to get into his PCP. He needs to be evaluated by his PCP. He states he has received the tablets once by his PCP. He states his PCP did not follow up.    Offered to send a message to his PCP if able, the ER is always available if he needs to be sent.   Reason for Disposition   [1] Prescription refill request for NON-ESSENTIAL medicine (i.e., no harm to patient if med not taken) AND [2] triager unable to refill per department policy    Additional Information   Negative: New-onset or worsening symptoms, see that guideline (e.g., diarrhea, runny nose, sore throat)   Negative: Medicine question not related to refill or renewal   Negative: Caller (e.g., patient or pharmacist) requesting information about a new medicine   Negative: Caller requesting information unrelated to medicine   Negative: [1] Prescription refill request for ESSENTIAL medicine (i.e., likelihood of harm to patient if not taken) AND [2] triager unable to refill per department policy   Negative: [1] Prescription not at pharmacy AND [2] was prescribed by PCP recently  (Exception: Triager has access to EMR and prescription is recorded there. Go to Home Care and confirm for pharmacy.)   Negative: [1] Pharmacy calling with prescription questions AND [2] triager unable to  "answer question   Negative: Prescription request for new medicine (not a refill)   Negative: Caller requesting a CONTROLLED substance prescription refill (e.g., narcotics, ADHD medicines)    Answer Assessment - Initial Assessment Questions  1. DRUG NAME: \"What medicine do you need to have refilled?\"      Ivermectin   2. REFILLS REMAINING: \"How many refills are remaining?\" (Note: The label on the medicine or pill bottle will show how many refills are remaining. If there are no refills remaining, then a renewal may be needed.)      none  3. EXPIRATION DATE: \"What is the expiration date?\" (Note: The label states when the prescription will , and thus can no longer be refilled.)      Unknown   4. PRESCRIBING HCP: \"Who prescribed it?\" Reason: If prescribed by specialist, call should be referred to that group.      Dr. Tran  5. SYMPTOMS: \"Do you have any symptoms?\"      Worms and larvae out of skin.   6. PREGNANCY: \"Is there any chance that you are pregnant?\" \"When was your last menstrual period?\"      no    Protocols used: Medication Refill and Renewal Call-ADULT-    "

## 2025-04-07 DIAGNOSIS — B86 SCABIES: Primary | ICD-10-CM

## 2025-04-07 RX ORDER — IVERMECTIN 3 MG/1
15 TABLET ORAL ONCE
Qty: 5 TABLET | Refills: 0 | Status: SHIPPED | OUTPATIENT
Start: 2025-04-07 | End: 2025-04-07

## 2025-04-09 ENCOUNTER — HOSPITAL ENCOUNTER (EMERGENCY)
Facility: HOSPITAL | Age: 75
Discharge: HOME | End: 2025-04-09
Attending: EMERGENCY MEDICINE
Payer: MEDICARE

## 2025-04-09 VITALS
TEMPERATURE: 98.4 F | SYSTOLIC BLOOD PRESSURE: 142 MMHG | WEIGHT: 185 LBS | OXYGEN SATURATION: 98 % | HEART RATE: 71 BPM | DIASTOLIC BLOOD PRESSURE: 76 MMHG | BODY MASS INDEX: 25.06 KG/M2 | HEIGHT: 72 IN | RESPIRATION RATE: 18 BRPM

## 2025-04-09 DIAGNOSIS — L08.9 SKIN INFECTION: ICD-10-CM

## 2025-04-09 DIAGNOSIS — B86 SCABIES: Primary | ICD-10-CM

## 2025-04-09 PROCEDURE — 99282 EMERGENCY DEPT VISIT SF MDM: CPT

## 2025-04-09 RX ORDER — IVERMECTIN 10 MG/G
1 CREAM TOPICAL TAKE AS DIRECTED
Qty: 45 G | Refills: 0 | Status: SHIPPED | OUTPATIENT
Start: 2025-04-09 | End: 2025-04-09

## 2025-04-09 RX ORDER — MUPIROCIN 20 MG/G
1 OINTMENT TOPICAL 3 TIMES DAILY
Qty: 30 G | Refills: 0 | Status: SHIPPED | OUTPATIENT
Start: 2025-04-09

## 2025-04-09 RX ORDER — IVERMECTIN 10 MG/G
1 CREAM TOPICAL TAKE AS DIRECTED
Qty: 45 G | Refills: 0 | Status: SHIPPED | OUTPATIENT
Start: 2025-04-09 | End: 2025-04-14

## 2025-04-09 RX ORDER — IVERMECTIN 3 MG/1
15 TABLET ORAL ONCE
Qty: 5 TABLET | Refills: 0 | Status: SHIPPED | OUTPATIENT
Start: 2025-04-09 | End: 2025-04-09

## 2025-04-09 NOTE — ED PROVIDER NOTES
EMERGENCY DEPARTMENT ENCOUNTER    Pt Name: Behzad Guzman  MRN: [id number]  Pt :   1950  Room Number:  RW3/R3  Date of encounter:  2025  PCP: Zach Tran DO  ED Provider: WILMAN Armstrong    Historian: Patient    HPI:  Chief Complaint:  bug bites    Context: Behzad Guzman is a 74 y.o. male who presents to the ED c/o bug bites. Pt presents to the ER for c/o scabies.  Patient advises that he needs ivermectin cream.  He tried the lotion over-the-counter but advises that it did not work.  He has taken the ivermectin tablets and use the cream in the past.  He advises that the combination works well.  HPI     REVIEW OF SYSTEMS  A chief complaint appropriate review of systems was completed and is negative except as noted in the HPI.     PAST MEDICAL HISTORY  Past Medical History:   Diagnosis Date    Asthma     Chronic hip pain, left     Chronic pain in left shoulder     Hyperlipidemia     Hypertension     Infected tick bite 2021    Leg length discrepancy     Neck pain, chronic     Neuropathy     Panic attacks     Pre-diabetes     Prediabetes     Spinal stenosis        PAST SURGICAL HISTORY  Past Surgical History:   Procedure Laterality Date    ENDOSCOPY WITH FOREIGN BODY REMOVAL N/A 2024    Procedure: ESOPHAGOGASTRODUODENOSCOPY WITH FOREIGN BODY REMOVAL WITH FLOROSCOPY;  Surgeon: Adan Huston MD;  Location: [facility] ENDOSCOPY;  Service: Gastroenterology;  Laterality: N/A;    LEG SURGERY      ROTATOR CUFF REPAIR Right     SHOULDER SURGERY Left     WRIST SURGERY Left        FAMILY HISTORY  Family History   Problem Relation Age of Onset    Heart attack Father 65    No Known Problems Maternal Grandmother     No Known Problems Maternal Grandfather     No Known Problems Paternal Grandmother     Stroke Paternal Grandfather        SOCIAL HISTORY  Social History     Socioeconomic History    Marital status:    Tobacco Use    Smoking status: Former     Current packs/day:  0.00     Average packs/day: 0.5 packs/day for 10.0 years (5.0 ttl pk-yrs)     Types: Cigarettes     Start date:      Quit date:      Years since quittin.2     Passive exposure: Past    Smokeless tobacco: Never   Vaping Use    Vaping status: Never Used   Substance and Sexual Activity    Alcohol use: No    Drug use: No    Sexual activity: Defer       ALLERGIES  Peanut butter flavoring agent (non-screening), Shellfish allergy, Amoxicillin, Penicillins, Sulfa antibiotics, Doxycycline, Latex, and Pregabalin    PHYSICAL EXAM  Physical Exam  Vitals and nursing note reviewed.   Constitutional:       Appearance: Normal appearance. He is not ill-appearing.   HENT:      Head: Normocephalic and atraumatic.      Nose: Nose normal.      Mouth/Throat:      Mouth: Mucous membranes are moist.   Eyes:      Conjunctiva/sclera: Conjunctivae normal.   Cardiovascular:      Rate and Rhythm: Normal rate and regular rhythm.      Pulses: Normal pulses.      Heart sounds: Normal heart sounds.   Pulmonary:      Effort: Pulmonary effort is normal. No respiratory distress.      Breath sounds: Normal breath sounds.   Musculoskeletal:         General: Normal range of motion.      Cervical back: Normal range of motion and neck supple.   Skin:     Findings: Rash (rash to bilateral upper extremities, web of fingers. burrowing pattern) present.   Neurological:      Mental Status: He is alert and oriented to person, place, and time.   Psychiatric:         Mood and Affect: Mood normal.         Behavior: Behavior normal.           LAB RESULTS  Results for orders placed or performed during the hospital encounter of 25   Venous w Reflux Lower Extremity - Unilateral CAR    Collection Time: 25  4:03 PM   Result Value Ref Range    Lt CFV Reflux Time 2.14 sec     CV LEFT LOWER VAS DEEP FV REFLUX COLOR FLOW TIME 0.82 sec     CV LEFT LOWER VAS PROX FV REFLUX COLOR FLOW TIME 0.00 sec     CV LEFT LOWER VAS MID FEMORAL REFLUX TIME  0.00 sec     CV LEFT LOWER VAS DISTAL FV REFLUX COLOR FLOW TIME 0.49 sec     CV LEFT LOWER VAS POPLITEAL REFLUX TIME 0.57 sec     CV LEFT LOWER VAS PTV REFLUX COLOR FLOW TIME 3.55 sec     CV LEFT LOWER VAS JEFF REFLUX COLOR FLOW TIME 0.00 sec    Right Common Femoral Spont Y     Right Common Femoral Competent Y     Right Common Femoral Phasic Y     Right Common Femoral Compress C     Right Common Femoral Augment Y     Left Common Femoral Spont Y     Left Common Femoral Competent N     Left Common Femoral Phasic Y     Left Common Femoral Compress C     Left Common Femoral Augment Y     Left Profunda Femoral Compress C     Left Proximal Femoral Compress C     Left Mid Femoral Spont Y     Left Mid Femoral Competent Y     Left Mid Femoral Phasic Y     Left Mid Femoral Compress C     Left Mid Femoral Augment Y     Left Distal Femoral Spont Y     Left Distal Femoral Competent Y     Left Distal Femoral Phasic Y     Left Distal Femoral Compress C     Left Distal Femoral Augment Y     Left Popliteal Spont Y     Left Popliteal Competent Y     Left Popliteal Phasic Y     Left Popliteal Compress C     Left Popliteal Augment Y     Left Posterior Tibial Competent N     Left Posterior Tibial Compress C     Left Peroneal Competent Y     Left Peroneal Compress C     Lt SFJ Martins Diam 0.9 cm    Lt. SFV reflux time 0.44 sec     CV LEFT LOWER VAS GSV PROX TRANSVERSE DIAMETER 0.6 cm     CV LEFT LOWER VAS GSV PROX REFLUX TIME 0.00 sec     CV LEFT LOWER VAS GSV KNEE TRANSVERSE DIAMETER 0.5 cm     CV LEFT LOWER VAS GSV KNEE REFLUX TIME 0.00 sec     CV LEFT LOWER VAS GSVBELOW KNEE TRANSVERSE DIAMETER 0.4 cm     CV LEFT LOWER VAS GSV BELOW KNEE REFLUX TIME 0.00 sec     CV LEFT LOWER VAS SPJ TRANS DIAMETER 0.3 cm     CV LEFT LOWER VAS SPJ REFLUX TIME 0.00 sec     CV LEFT LOWER VAS SSV PROX CALF TRANS DIAMETER 0.3 cm     CV LEFT LOWER VAS SSV PROX CALF REFLUX TIME 0.00 sec     CV LEFT LOWER VAS SSV MID CALF TRANS  DIAMETER 0.3 cm    BH CV LEFT LOWER VAS SSV MID CALF REFLUX TIME 0.00 sec    BH CV LEFT LOWER VAS VARICOSITY AK TRANS DIAMETER 0.2 cm    BH CV LEFT LOWER VAS VARICOSITY AK REFLUX TIME 0.00 sec    Left Saphenofemoral Junction Spont Y     Left Saphenofemoral Junction Competent Y     Left Saphenofemoral Junction Phasic Y     Left Saphenofemoral Junction Compress C     Left Saphenofemoral Junction Augment Y     Left Greater Saph AK Competent Y     Left Greater Saph AK Compress C     BH CV LOWER VASCULAR LEFT GSV DIST THIGH COMPETENT Y     BH CV LOWER VAS LEFT GSV DIST THIGH COMPRESSIBILTY C     Left Greater Saph BK Competent Y     Left Greater Saph BK Compress C     BH CV LOWER VASCULAR LEFT SAPHENOPOP JX SPONT Y     BH CV LOWER VASCULAR LEFT SAPHENOPOP JX COMPETENT Y     BH CV LOWER VASCULAR LEFT SAPHENOPOP JX COMPRESS C     BH CV LOWER VASCULAR LEFT SAPHENOPOP JX PHASIC Y     BH CV LOWER VASCULAR LEFT SAPHENOPOP JX AUGMENT Y     Left Lesser Saph Competent Y     Left Lesser Saph Compress C     BH CV LOWER VASCULAR LEFT SSV MID CALF COMPETENT Y     BH CV LOWER VASCULAR LEFT SSV MID CALF COMPRESS C        If labs were ordered, I independently reviewed the results and considered them in treating the patient.    RADIOLOGY  No orders to display     [] Radiologist's Report Reviewed:  I ordered and independently interpreted the above noted radiographic studies.  See radiologist's dictation for official interpretation.      PROCEDURES    Procedures    No orders to display       MEDICATIONS GIVEN IN ER    Medications - No data to display    MEDICAL DECISION MAKING, PROGRESS, and CONSULTS   Medical Decision Making  Behzad Guzman is a 74 y.o. male who presents to the ED c/o bug bites. Pt presents to the ER for c/o scabies.  Patient advises that he needs ivermectin cream.  He tried the lotion over-the-counter but advises that it did not work.  He has taken the ivermectin tablets and use the cream in the past.  He advises  that the combination works well.      Problems Addressed:  Scabies: complicated acute illness or injury     Details: Patient advised that he needs to follow-up with dermatology, primary care if no improvement    Risk  Prescription drug management.        Discussion below represents my analysis of pertinent findings related to patient's condition, differential diagnosis, treatment plan and final disposition.    Differential diagnosis: bug bites, scabies.   Additional differential diagnosis include but are not limited to:     Additional sources  Discussed/ obtained information from independent historians:   [] Spouse  [] Parent  [] Family member  [] Friend  [] EMS   [] Other:  External (non-ED) record review:   [] Inpatient record:   [] Office record:   [x] Outpatient record:   [x] Prior Outpatient labs:   [x] Prior Outpatient radiology:   [] Primary Care record:   [] Outside ED record:   [] Other:   Patient's care impacted by:   [x] Diabetes  [x] Hypertension  [x] Hyperlipidemia  [] Hypothyroidism   [] Coronary Artery Disease  [] Congestive Heart Failure   [] COPD   [] Cancer   [] Obesity  [] GERD   [] Tobacco Abuse   [] Substance Abuse    [] Anxiety   [] Depression   [] Other:   Care significantly affected by Social Determinants of Health (housing and economic circumstances, unemployment)    [] Yes     [x] No   If yes, Patient's care significantly limited by  Social Determinants of Health including:   [] Inadequate housing   [] Low income   [] Alcoholism and drug addiction in family   [] Problems related to primary support group   [] Unemployment   [] Problems related to employment   [] Other Social Determinants of Health:     Orders placed during this visit:  No orders of the defined types were placed in this encounter.      I considered prescription management  with:   [] Pain medication  [] Antiviral  [] Antibiotic   [] Other:   Rationale:  Additional orders considered but not ordered:  The following testing was  considered but ultimately not selected after discussion with patient/family:  ED Course:              DIAGNOSIS  Final diagnoses:   Scabies       DISPOSITION    DISCHARGE    Patient discharged in stable condition.    Reviewed implications of results, diagnosis, meds, responsibility to follow up, warning signs and symptoms of possible worsening, potential complications and reasons to return to ER.    Patient/Family voiced understanding of above instructions.    Discussed plan for discharge, as there is no emergent indication for admission.  Pt/family is agreeable and understands need for follow up and possible repeat testing.  Pt/family is aware that discharge does not mean that nothing is wrong but that it indicates no emergency is currently present that requires admission and they must continue care with follow-up as given below or with a physician of their choice.     FOLLOW-UP  Zach Tran,   [address] [facility] [address] [phone]               Medication List        New Prescriptions      Ivermectin 1 % cream  Apply 1 Application topically Take As Directed for 5 days.     ivermectin 3 MG tablet tablet  Commonly known as: STROMECTOL  Take 5 tablets by mouth 1 (One) Time for 1 dose.               Where to Get Your Medications        These medications were sent to [id number] - [place], [place] - [address] [facility] [address] - [phone]  - [phone]   [address] [facility] [address]      Phone: [phone]   Ivermectin 1 % cream  ivermectin 3 MG tablet tablet  mupirocin 2 % ointment          ED Disposition       ED Disposition   Discharge    Condition   Stable    Comment   --               Please note that portions of this document were completed with voice recognition software.       Saira Cherry, APRN  04/09/25 2026

## 2025-04-22 ENCOUNTER — APPOINTMENT (OUTPATIENT)
Dept: LAB | Facility: HOSPITAL | Age: 75
End: 2025-04-22
Payer: MEDICARE

## 2025-04-22 ENCOUNTER — LAB (OUTPATIENT)
Dept: LAB | Facility: HOSPITAL | Age: 75
End: 2025-04-22
Payer: MEDICARE

## 2025-04-22 ENCOUNTER — HOSPITAL ENCOUNTER (OUTPATIENT)
Dept: GENERAL RADIOLOGY | Facility: HOSPITAL | Age: 75
Discharge: HOME | End: 2025-04-22
Payer: MEDICARE

## 2025-04-22 ENCOUNTER — OFFICE VISIT (OUTPATIENT)
Dept: FAMILY MEDICINE CLINIC | Facility: CLINIC | Age: 75
End: 2025-04-22
Payer: MEDICARE

## 2025-04-22 VITALS
HEIGHT: 72 IN | WEIGHT: 177.8 LBS | HEART RATE: 75 BPM | OXYGEN SATURATION: 97 % | DIASTOLIC BLOOD PRESSURE: 86 MMHG | SYSTOLIC BLOOD PRESSURE: 144 MMHG | BODY MASS INDEX: 24.08 KG/M2

## 2025-04-22 DIAGNOSIS — E34.9 TESTOSTERONE DEFICIENCY: ICD-10-CM

## 2025-04-22 DIAGNOSIS — M54.50 LOW BACK PAIN, UNSPECIFIED BACK PAIN LATERALITY, UNSPECIFIED CHRONICITY, UNSPECIFIED WHETHER SCIATICA PRESENT: ICD-10-CM

## 2025-04-22 DIAGNOSIS — E11.42 TYPE 2 DIABETES MELLITUS WITH DIABETIC POLYNEUROPATHY, WITHOUT LONG-TERM CURRENT USE OF INSULIN: ICD-10-CM

## 2025-04-22 DIAGNOSIS — B86 SCABIES: Primary | ICD-10-CM

## 2025-04-22 DIAGNOSIS — R78.71 ELEVATED BLOOD LEAD LEVEL: ICD-10-CM

## 2025-04-22 DIAGNOSIS — M54.6 PAIN IN THORACIC SPINE: ICD-10-CM

## 2025-04-22 LAB
ALBUMIN UR-MCNC: 6.9 MG/DL
BASOPHILS # BLD AUTO: 0.08 10*3/MM3 (ref 0–0.2)
BASOPHILS NFR BLD AUTO: 0.9 % (ref 0–1.5)
CREAT UR-MCNC: 73.4 MG/DL
CRP SERPL-MCNC: <0.3 MG/DL (ref 0–0.5)
DEPRECATED RDW RBC AUTO: 42.8 FL (ref 37–54)
EOSINOPHIL # BLD AUTO: 0.26 10*3/MM3 (ref 0–0.4)
EOSINOPHIL NFR BLD AUTO: 2.8 % (ref 0.3–6.2)
ERYTHROCYTE [DISTWIDTH] IN BLOOD BY AUTOMATED COUNT: 13.3 % (ref 12.3–15.4)
HBA1C MFR BLD: 8.5 % (ref 4.8–5.6)
HCT VFR BLD AUTO: 49.7 % (ref 37.5–51)
HGB BLD-MCNC: 16.4 G/DL (ref 13–17.7)
IMM GRANULOCYTES # BLD AUTO: 0.02 10*3/MM3 (ref 0–0.05)
IMM GRANULOCYTES NFR BLD AUTO: 0.2 % (ref 0–0.5)
LYMPHOCYTES # BLD AUTO: 2.3 10*3/MM3 (ref 0.7–3.1)
LYMPHOCYTES NFR BLD AUTO: 25.1 % (ref 19.6–45.3)
MCH RBC QN AUTO: 29.2 PG (ref 26.6–33)
MCHC RBC AUTO-ENTMCNC: 33 G/DL (ref 31.5–35.7)
MCV RBC AUTO: 88.6 FL (ref 79–97)
MICROALBUMIN/CREAT UR: 94 MG/G (ref 0–29)
MONOCYTES # BLD AUTO: 0.72 10*3/MM3 (ref 0.1–0.9)
MONOCYTES NFR BLD AUTO: 7.9 % (ref 5–12)
NEUTROPHILS NFR BLD AUTO: 5.78 10*3/MM3 (ref 1.7–7)
NEUTROPHILS NFR BLD AUTO: 63.1 % (ref 42.7–76)
NRBC BLD AUTO-RTO: 0 /100 WBC (ref 0–0.2)
PLATELET # BLD AUTO: 200 10*3/MM3 (ref 140–450)
PMV BLD AUTO: 11.9 FL (ref 6–12)
RBC # BLD AUTO: 5.61 10*6/MM3 (ref 4.14–5.8)
WBC NRBC COR # BLD AUTO: 9.16 10*3/MM3 (ref 3.4–10.8)

## 2025-04-22 PROCEDURE — 80053 COMPREHEN METABOLIC PANEL: CPT | Performed by: INTERNAL MEDICINE

## 2025-04-22 PROCEDURE — 82570 ASSAY OF URINE CREATININE: CPT

## 2025-04-22 PROCEDURE — 83655 ASSAY OF LEAD: CPT | Performed by: INTERNAL MEDICINE

## 2025-04-22 PROCEDURE — 86140 C-REACTIVE PROTEIN: CPT | Performed by: INTERNAL MEDICINE

## 2025-04-22 PROCEDURE — 72114 X-RAY EXAM L-S SPINE BENDING: CPT

## 2025-04-22 PROCEDURE — 82043 UR ALBUMIN QUANTITATIVE: CPT

## 2025-04-22 PROCEDURE — 72072 X-RAY EXAM THORAC SPINE 3VWS: CPT

## 2025-04-22 PROCEDURE — 36415 COLL VENOUS BLD VENIPUNCTURE: CPT | Performed by: INTERNAL MEDICINE

## 2025-04-22 PROCEDURE — 85025 COMPLETE CBC W/AUTO DIFF WBC: CPT | Performed by: INTERNAL MEDICINE

## 2025-04-22 PROCEDURE — 83036 HEMOGLOBIN GLYCOSYLATED A1C: CPT | Performed by: INTERNAL MEDICINE

## 2025-04-22 RX ORDER — IVERMECTIN 3 MG/1
TABLET ORAL
Qty: 25 TABLET | Refills: 0 | Status: SHIPPED | OUTPATIENT
Start: 2025-04-22

## 2025-04-22 RX ORDER — PERMETHRIN 50 MG/G
1 CREAM TOPICAL ONCE
Qty: 1 G | Refills: 0 | Status: SHIPPED | OUTPATIENT
Start: 2025-04-22 | End: 2025-04-22

## 2025-04-22 RX ORDER — TESTOSTERONE ENANTHATE 200 MG/ML
200 INJECTION, SOLUTION INTRAMUSCULAR
Qty: 5 ML | Refills: 1 | Status: SHIPPED | OUTPATIENT
Start: 2025-04-22

## 2025-04-22 NOTE — PROGRESS NOTES
Chief Complaint   Patient presents with    Med Refill     Needing medication for bug bites       HPI:  Behzad Guzman is a 74 y.o. male who presents today for bug bite versus scabies.  Responded to ivermectin in the past.    ROS:  Constitutional: no fevers, night sweats or unexplained weight loss  Eyes: no vision changes  ENT: no runny nose, ear pain, sore throat  Cardio: no chest pain, palpitations  Pulm: no shortness of breath, wheezing, or cough  GI: no abdominal pain or changes in bowel movements  : no difficulty urinating  MSK: no difficulty ambulating, no joint pain  Neuro: no weakness, dizziness or headache  Psych: no trouble sleeping  Endo: no change in appetite      Past Medical History:   Diagnosis Date    Asthma     Chronic hip pain, left     Chronic pain in left shoulder     Diabetes mellitus     Boarderline    Hyperlipidemia     Hypertension     Infected tick bite 2021    Leg length discrepancy     Neck pain, chronic     Neuropathy     Panic attacks     Pre-diabetes     Prediabetes     Spinal stenosis       Family History   Problem Relation Age of Onset    Heart attack Father 65    No Known Problems Maternal Grandmother     No Known Problems Maternal Grandfather     No Known Problems Paternal Grandmother     Stroke Paternal Grandfather       Social History     Socioeconomic History    Marital status:    Tobacco Use    Smoking status: Former     Current packs/day: 0.00     Average packs/day: 0.5 packs/day for 10.0 years (5.0 ttl pk-yrs)     Types: Cigarettes     Start date:      Quit date:      Years since quittin.3     Passive exposure: Past    Smokeless tobacco: Never   Vaping Use    Vaping status: Never Used   Substance and Sexual Activity    Alcohol use: No    Drug use: No    Sexual activity: Defer      Allergies   Allergen Reactions    Peanut Butter Flavoring Agent (Non-Screening) Anaphylaxis    Shellfish Allergy Anaphylaxis    Amoxicillin Rash and Other (See  Comments)    Penicillins Rash    Sulfa Antibiotics Myalgia    Doxycycline GI Bleeding    Latex Rash    Pregabalin Other (See Comments)      Immunization History   Administered Date(s) Administered    COVID-19 (PFIZER) Purple Cap Monovalent 09/14/2021, 10/08/2021    Flu Vaccine Quad PF >36MO 09/30/2015    Fluzone High-Dose 65+YRS 01/02/2025    Fluzone High-Dose 65+yrs 11/22/2022, 12/22/2023    Pneumococcal Conjugate 13-Valent (PCV13) 09/22/2016    Pneumococcal Conjugate 20-Valent (PCV20) 12/22/2023    Pneumococcal Polysaccharide (PPSV23) 05/18/2018    Tdap 04/22/2022        PE:  Vitals:    04/22/25 1315   BP: 144/86   Pulse: 75   SpO2: 97%      Body mass index is 24.11 kg/m².    Gen Appearance: NAD  HEENT: Normocephalic, PERRLA, no thyromegaly, trache midline  Heart: RRR, normal S1 and S2, no murmur  Lungs: CTA b/l, no wheezing, no crackles  Abdomen: Soft, non-tender, non-distended, no guarding and BSx4  MSK: Moves all extremities well, normal gait, no peripheral edema  Pulses: Palpable and equal b/l  Lymph nodes: No palpable lymphadenopathy   Neuro: No focal deficits      Current Outpatient Medications   Medication Sig Dispense Refill    albuterol (ACCUNEB) 0.63 MG/3ML nebulizer solution Take 3 mL by nebulization Every 6 (Six) Hours As Needed for Wheezing. 3 mL 12    albuterol sulfate  (90 Base) MCG/ACT inhaler INHALE 2 PUFFS BY MOUTH EVERY 6 HOURS AS NEEDED FOR WHEEZING OR SHORTNESS OF AIR 8.5 g 1    Alcaftadine (Lastacaft) 0.25 % solution Apply 1 drop to eye(s) as directed by provider 1 (One) Time.      aspirin 81 MG EC tablet Take 1 tablet by mouth Daily.      azelastine (ASTELIN) 0.1 % nasal spray Administer 2 sprays into the nostril(s) as directed by provider 2 (Two) Times a Day. Use in each nostril as directed      cephalexin (KEFLEX) 500 MG capsule       clindamycin (CLEOCIN T) 1 % lotion       diazePAM (VALIUM) 5 MG tablet Take 1 tablet by mouth Every 6 (Six) Hours As Needed.  0    escitalopram  "(LEXAPRO) 10 MG tablet Take 1 tablet by mouth Daily.      fexofenadine (ALLEGRA) 180 MG tablet Take 1 tablet by mouth Daily.      fluticasone (Flonase) 50 MCG/ACT nasal spray 2 sprays into the nostril(s) as directed by provider Daily. 2 puffs each nostril 18.2 mL 0    gabapentin (NEURONTIN) 400 MG capsule Take 1 capsule by mouth 3 (Three) Times a Day.      glucose monitor monitoring kit Use 1 each Daily. 1 each 0    HYDROcodone-acetaminophen (NORCO)  MG per tablet Take 1 tablet by mouth Every 6 (Six) Hours As Needed for Moderate Pain.      ibuprofen (ADVIL,MOTRIN) 600 MG tablet Take 1 tablet by mouth Every 6 (Six) Hours As Needed for Mild Pain. 60 tablet 0    Lancets (onetouch ultrasoft) lancets Use one daily to test blood sugars 100 each 12    losartan-hydrochlorothiazide (HYZAAR) 50-12.5 MG per tablet TAKE 1 TABLET BY MOUTH DAILY 90 tablet 1    metFORMIN ER (GLUCOPHAGE-XR) 500 MG 24 hr tablet Take 1 tablet by mouth 2 (Two) Times a Day. 60 tablet 2    metoprolol tartrate (LOPRESSOR) 50 MG tablet TAKE 1 TABLET BY MOUTH TWICE A  tablet 0    mupirocin (BACTROBAN) 2 % ointment Apply 1 Application topically to the appropriate area as directed 3 (Three) Times a Day. 30 g 0    Needle, Disp, (B-D HYPODERMIC NEEDLE 22GX1\") 22G X 1\" misc Inject 1 each into the appropriate muscle as directed by prescriber Every 14 (Fourteen) Days. 25 each 2    Needle, Disp, 21G X 1\" misc Use to inject every 14 days. 50 each 2    ondansetron ODT (ZOFRAN-ODT) 4 MG disintegrating tablet DISSOLVE 1 TABLET ON THE TONGUE EVERY 6 HOURS AS NEEDED FOR NAUSEA      OneTouch Verio test strip 1 each by Other route Daily. for testing 300 each 2    oxyCODONE (ROXICODONE) 15 MG immediate release tablet TAKE 1/2 A TABLET BY MOUTH AT BEDTIME AND 1/2 TABLET IN MIDDLE OF NIGHT AS NEEDED AS DIRECTED      pantoprazole (PROTONIX) 40 MG EC tablet Take 1 tablet by mouth 2 (Two) Times a Day Before Meals. 60 tablet 0    pantoprazole (PROTONIX) 40 MG EC " "tablet Take 1 tablet by mouth Daily.      rosuvastatin (CRESTOR) 40 MG tablet Take 1 tablet by mouth Every Night. Replaces pravastatin 90 tablet 3    Syringe/Needle, Disp, (B-D 3CC LUER-YAA SYR 24JK9-6/2) 23G X 1-1/2\" 3 ML misc Use 1 each Every 14 (Fourteen) Days. 50 each 2    Testosterone Enanthate 200 MG/ML solution Inject 200 mg into the appropriate muscle as directed by prescriber Every 14 (Fourteen) Days. 5 mL 1    vitamin D (ERGOCALCIFEROL) 1.25 MG (50300 UT) capsule capsule TAKE 1 CAPSULE BY MOUTH ONCE WEEKLY FOR 12 DOSES 12 capsule 0    ivermectin (STROMECTOL) 3 MG tablet tablet Take 5 tabs (15mg) daily on days 1, 2, 8, 9, 15. 25 tablet 0    permethrin (ELIMITE) 5 % cream Apply 1 Application topically to the appropriate area as directed 1 (One) Time for 1 dose. 1 g 0     No current facility-administered medications for this visit.      Repeat ivermectin.  Recommend extended duration.  Add on permethrin cream as well.    Checking blood work today.    Counseling was given to patient for the following topics: diagnostic results, impressions, and risks and benefits of treatment options . Total time of the encounter was 40 minutes and 20 minutes was spent face to face counseling.    Diagnoses and all orders for this visit:    1. Scabies (Primary)    2. Testosterone deficiency  -     Testosterone Enanthate 200 MG/ML solution; Inject 200 mg into the appropriate muscle as directed by prescriber Every 14 (Fourteen) Days.  Dispense: 5 mL; Refill: 1    3. Type 2 diabetes mellitus with diabetic polyneuropathy, without long-term current use of insulin  -     Hemoglobin A1c; Future  -     Comprehensive Metabolic Panel; Future  -     CBC & Differential; Future  -     C-reactive protein; Future  -     Microalbumin / Creatinine Urine Ratio - Urine, Clean Catch; Future  -     Hemoglobin A1c  -     Comprehensive Metabolic Panel  -     CBC & Differential  -     C-reactive protein    4. Elevated blood lead level  -     Lead, " Blood    Other orders  -     ivermectin (STROMECTOL) 3 MG tablet tablet; Take 5 tabs (15mg) daily on days 1, 2, 8, 9, 15.  Dispense: 25 tablet; Refill: 0  -     permethrin (ELIMITE) 5 % cream; Apply 1 Application topically to the appropriate area as directed 1 (One) Time for 1 dose.  Dispense: 1 g; Refill: 0         No follow-ups on file.     Dictated Utilizing Dragon Dictation    Please note that portions of this note were completed with a voice recognition program.    Part of this note may be an electronic transcription/translation of spoken language to printed text using the Dragon Dictation System.

## 2025-04-23 LAB
ALBUMIN SERPL-MCNC: 4.7 G/DL (ref 3.5–5.2)
ALBUMIN/GLOB SERPL: 2.1 G/DL
ALP SERPL-CCNC: 58 U/L (ref 39–117)
ALT SERPL W P-5'-P-CCNC: 30 U/L (ref 1–41)
ANION GAP SERPL CALCULATED.3IONS-SCNC: 12 MMOL/L (ref 5–15)
AST SERPL-CCNC: 31 U/L (ref 1–40)
BILIRUB SERPL-MCNC: 0.6 MG/DL (ref 0–1.2)
BUN SERPL-MCNC: 7 MG/DL (ref 8–23)
BUN/CREAT SERPL: 6.9 (ref 7–25)
CALCIUM SPEC-SCNC: 9.5 MG/DL (ref 8.6–10.5)
CHLORIDE SERPL-SCNC: 92 MMOL/L (ref 98–107)
CO2 SERPL-SCNC: 26 MMOL/L (ref 22–29)
CREAT SERPL-MCNC: 1.02 MG/DL (ref 0.76–1.27)
EGFRCR SERPLBLD CKD-EPI 2021: 77.6 ML/MIN/1.73
GLOBULIN UR ELPH-MCNC: 2.2 GM/DL
GLUCOSE SERPL-MCNC: 321 MG/DL (ref 65–99)
POTASSIUM SERPL-SCNC: 4.1 MMOL/L (ref 3.5–5.2)
PROT SERPL-MCNC: 6.9 G/DL (ref 6–8.5)
SODIUM SERPL-SCNC: 130 MMOL/L (ref 136–145)

## 2025-04-24 ENCOUNTER — TELEPHONE (OUTPATIENT)
Dept: FAMILY MEDICINE CLINIC | Facility: CLINIC | Age: 75
End: 2025-04-24
Payer: MEDICARE

## 2025-04-24 LAB — LEAD BLDV-MCNC: 1.8 UG/DL (ref 0–3.4)

## 2025-04-24 NOTE — TELEPHONE ENCOUNTER
Caller: Behzad Guzman    Relationship: Self    Best call back number:  [phone] (Mobile)     What test was performed:  LAB WORK    Additional notes: PATIENT IS VERY CONCERNED ABOUT THE TEST RESULTS ABOUT HIS LIVER

## 2025-04-28 ENCOUNTER — TRANSCRIBE ORDERS (OUTPATIENT)
Dept: CT IMAGING | Facility: CLINIC | Age: 75
End: 2025-04-28
Payer: MEDICARE

## 2025-04-28 DIAGNOSIS — M54.2 CERVICALGIA: Primary | ICD-10-CM

## 2025-04-28 NOTE — TELEPHONE ENCOUNTER
Zach Tran,        4/28/25 10:22 AM  Liver enzymes normal on recent labs.  His blood sugar is elevated, A1c elevated at 8.5%.  Sent results on [facility].    Patient informed.

## 2025-04-30 DIAGNOSIS — L08.9 SKIN INFECTION: ICD-10-CM

## 2025-04-30 RX ORDER — MUPIROCIN 20 MG/G
1 OINTMENT TOPICAL 3 TIMES DAILY
Qty: 30 G | Refills: 0 | OUTPATIENT
Start: 2025-04-30

## 2025-04-30 NOTE — TELEPHONE ENCOUNTER
Caller: Behzad Guzman    Relationship: Self    Best call back number: [phone]     Requested Prescriptions:   Requested Prescriptions     Pending Prescriptions Disp Refills    mupirocin (BACTROBAN) 2 % ointment 30 g 0     Sig: Apply 1 Application topically to the appropriate area as directed 3 (Three) Times a Day.        Pharm[facility] where request [facility] be sent[id number] [place] - [address] - [phone] [facility] [phone] FX     Last office visit with prescribing clinician: 4/22/2025   Last telemedicine visit with prescribing clinician: Visit date not found   Next office visit with prescribing clinician: 5/6/2025     Additional details provided by patient: PATIENT IS NOT HEALING OF THE BUG BITE IS VERY SLOW , THE WOUND KEEPS DRYING OUT .    PATIENT IS REQUESTING 2 TUBES OF THIS MEDICATION FOR EACH FILL    Does the patient have less than a 3 day supply:  [x] Yes  [] No    Would you like a call back once the refill request has been completed: [x] Yes [] No    If the office needs to give you a call back, can they leave a voicemail: [] Yes [] No    Kayla Goncalves   04/30/25 13:53 EDT

## 2025-05-01 NOTE — TELEPHONE ENCOUNTER
Rx Refill Note  Requested Prescriptions     Pending Prescriptions Disp Refills    permethrin (ELIMITE) 5 % cream 1 g 0     Sig: Apply 1 Application topically to the appropriate area as directed 1 (One) Time for 1 dose.      Last office visit with prescribing clinician: 4/22/2025   Last telemedicine visit with prescribing clinician: Visit date not found   Next office visit with prescribing clinician: 5/6/2025                         Would you like a call back once the refill request has been completed: [] Yes [] No    If the office needs to give you a call back, can they leave a voicemail: [] Yes [] No    Diann Rader MA  05/01/25, 14:46 EDT

## 2025-05-02 DIAGNOSIS — B86 SCABIES: Primary | ICD-10-CM

## 2025-05-02 RX ORDER — PERMETHRIN 50 MG/G
1 CREAM TOPICAL ONCE
Qty: 1 G | Refills: 0 | OUTPATIENT
Start: 2025-05-02 | End: 2025-05-02

## 2025-05-02 RX ORDER — PERMETHRIN 50 MG/G
1 CREAM TOPICAL ONCE
Qty: 1 G | Refills: 0 | Status: SHIPPED | OUTPATIENT
Start: 2025-05-02 | End: 2025-05-02

## 2025-05-03 DIAGNOSIS — L08.9 SKIN INFECTION: ICD-10-CM

## 2025-05-06 ENCOUNTER — OFFICE VISIT (OUTPATIENT)
Dept: FAMILY MEDICINE CLINIC | Facility: CLINIC | Age: 75
End: 2025-05-06
Payer: MEDICARE

## 2025-05-06 VITALS
OXYGEN SATURATION: 96 % | HEIGHT: 72 IN | SYSTOLIC BLOOD PRESSURE: 128 MMHG | WEIGHT: 174.4 LBS | BODY MASS INDEX: 23.62 KG/M2 | HEART RATE: 76 BPM | DIASTOLIC BLOOD PRESSURE: 76 MMHG

## 2025-05-06 DIAGNOSIS — L98.9 SKIN LESIONS, GENERALIZED: ICD-10-CM

## 2025-05-06 DIAGNOSIS — L08.9 SKIN INFECTION: ICD-10-CM

## 2025-05-06 DIAGNOSIS — B86 SCABIES: Primary | ICD-10-CM

## 2025-05-06 DIAGNOSIS — E11.42 TYPE 2 DIABETES MELLITUS WITH DIABETIC POLYNEUROPATHY, WITHOUT LONG-TERM CURRENT USE OF INSULIN: ICD-10-CM

## 2025-05-06 RX ORDER — IVERMECTIN 3 MG/1
TABLET ORAL
Qty: 25 TABLET | Refills: 0 | Status: SHIPPED | OUTPATIENT
Start: 2025-05-06

## 2025-05-06 RX ORDER — MUPIROCIN 20 MG/G
OINTMENT TOPICAL
Qty: 22 G | OUTPATIENT
Start: 2025-05-06

## 2025-05-06 RX ORDER — MUPIROCIN 20 MG/G
1 OINTMENT TOPICAL 3 TIMES DAILY
Qty: 30 G | Refills: 2 | Status: SHIPPED | OUTPATIENT
Start: 2025-05-06

## 2025-05-07 NOTE — PROGRESS NOTES
Chief Complaint   Patient presents with    Scabies     Pt states that his bites are getting worse.       HPI:  Behzad Guzman is a 74 y.o. male who presents today for lesions, scabies.  Taking ivermectin as prescribed.    ROS:  Constitutional: no fevers, night sweats or unexplained weight loss  Eyes: no vision changes  ENT: no runny nose, ear pain, sore throat  Cardio: no chest pain, palpitations  Pulm: no shortness of breath, wheezing, or cough  GI: no abdominal pain or changes in bowel movements  : no difficulty urinating  MSK: no difficulty ambulating, no joint pain  Neuro: no weakness, dizziness or headache  Psych: no trouble sleeping  Endo: no change in appetite      Past Medical History:   Diagnosis Date    Asthma     Chronic hip pain, left     Chronic pain in left shoulder     Diabetes mellitus     Boarderline    Hyperlipidemia     Hypertension     Infected tick bite 2021    Leg length discrepancy     Neck pain, chronic     Neuropathy     Panic attacks     Pre-diabetes     Prediabetes     Spinal stenosis       Family History   Problem Relation Age of Onset    Heart attack Father 65    No Known Problems Maternal Grandmother     No Known Problems Maternal Grandfather     No Known Problems Paternal Grandmother     Stroke Paternal Grandfather       Social History     Socioeconomic History    Marital status:    Tobacco Use    Smoking status: Former     Current packs/day: 0.00     Average packs/day: 0.5 packs/day for 10.0 years (5.0 ttl pk-yrs)     Types: Cigarettes     Start date:      Quit date:      Years since quittin.3     Passive exposure: Past    Smokeless tobacco: Never   Vaping Use    Vaping status: Never Used   Substance and Sexual Activity    Alcohol use: No    Drug use: No    Sexual activity: Defer      Allergies   Allergen Reactions    Peanut Butter Flavoring Agent (Non-Screening) Anaphylaxis    Shellfish Allergy Anaphylaxis    Amoxicillin Rash and Other (See  Comments)    Penicillins Rash    Sulfa Antibiotics Myalgia    Doxycycline GI Bleeding    Latex Rash    Pregabalin Other (See Comments)      Immunization History   Administered Date(s) Administered    COVID-19 (PFIZER) Purple Cap Monovalent 09/14/2021, 10/08/2021    Flu Vaccine Quad PF >36MO 09/30/2015    Fluzone High-Dose 65+YRS 01/02/2025    Fluzone High-Dose 65+yrs 11/22/2022, 12/22/2023    Pneumococcal Conjugate 13-Valent (PCV13) 09/22/2016    Pneumococcal Conjugate 20-Valent (PCV20) 12/22/2023    Pneumococcal Polysaccharide (PPSV23) 05/18/2018    Tdap 04/22/2022        PE:  Vitals:    05/06/25 1520   BP: 128/76   Pulse: 76   SpO2: 96%      Body mass index is 23.65 kg/m².    Gen Appearance: NAD  HEENT: Normocephalic, PERRLA, no thyromegaly, trache midline  Heart: RRR, normal S1 and S2, no murmur  Lungs: CTA b/l, no wheezing, no crackles  Abdomen: Soft, non-tender, non-distended, no guarding and BSx4  MSK: Moves all extremities well, normal gait, no peripheral edema  Pulses: Palpable and equal b/l  Lymph nodes: No palpable lymphadenopathy   Neuro: No focal deficits      Current Outpatient Medications   Medication Sig Dispense Refill    albuterol (ACCUNEB) 0.63 MG/3ML nebulizer solution Take 3 mL by nebulization Every 6 (Six) Hours As Needed for Wheezing. 3 mL 12    albuterol sulfate  (90 Base) MCG/ACT inhaler INHALE 2 PUFFS BY MOUTH EVERY 6 HOURS AS NEEDED FOR WHEEZING OR SHORTNESS OF AIR 8.5 g 1    Alcaftadine (Lastacaft) 0.25 % solution Apply 1 drop to eye(s) as directed by provider 1 (One) Time.      aspirin 81 MG EC tablet Take 1 tablet by mouth Daily.      azelastine (ASTELIN) 0.1 % nasal spray Administer 2 sprays into the nostril(s) as directed by provider 2 (Two) Times a Day. Use in each nostril as directed      cephalexin (KEFLEX) 500 MG capsule       clindamycin (CLEOCIN T) 1 % lotion       diazePAM (VALIUM) 5 MG tablet Take 1 tablet by mouth Every 6 (Six) Hours As Needed.  0    escitalopram  "(LEXAPRO) 10 MG tablet Take 1 tablet by mouth Daily.      fexofenadine (ALLEGRA) 180 MG tablet Take 1 tablet by mouth Daily.      fluticasone (Flonase) 50 MCG/ACT nasal spray 2 sprays into the nostril(s) as directed by provider Daily. 2 puffs each nostril 18.2 mL 0    gabapentin (NEURONTIN) 400 MG capsule Take 1 capsule by mouth 3 (Three) Times a Day.      glucose monitor monitoring kit Use 1 each Daily. 1 each 0    HYDROcodone-acetaminophen (NORCO)  MG per tablet Take 1 tablet by mouth Every 6 (Six) Hours As Needed for Moderate Pain.      ibuprofen (ADVIL,MOTRIN) 600 MG tablet Take 1 tablet by mouth Every 6 (Six) Hours As Needed for Mild Pain. 60 tablet 0    ivermectin (STROMECTOL) 3 MG tablet tablet Take 5 tabs (15mg) daily on days 1, 2, 8, 9, 15. 25 tablet 0    Lancets (onetouch ultrasoft) lancets Use one daily to test blood sugars 100 each 12    losartan-hydrochlorothiazide (HYZAAR) 50-12.5 MG per tablet TAKE 1 TABLET BY MOUTH DAILY 90 tablet 1    metFORMIN ER (GLUCOPHAGE-XR) 500 MG 24 hr tablet Take 1 tablet by mouth 2 (Two) Times a Day. 60 tablet 2    metoprolol tartrate (LOPRESSOR) 50 MG tablet TAKE 1 TABLET BY MOUTH TWICE A  tablet 0    mupirocin (BACTROBAN) 2 % ointment Apply 1 Application topically to the appropriate area as directed 3 (Three) Times a Day. 30 g 2    Needle, Disp, (B-D HYPODERMIC NEEDLE 22GX1\") 22G X 1\" misc Inject 1 each into the appropriate muscle as directed by prescriber Every 14 (Fourteen) Days. 25 each 2    Needle, Disp, 21G X 1\" misc Use to inject every 14 days. 50 each 2    ondansetron ODT (ZOFRAN-ODT) 4 MG disintegrating tablet DISSOLVE 1 TABLET ON THE TONGUE EVERY 6 HOURS AS NEEDED FOR NAUSEA      OneTouch Verio test strip 1 each by Other route Daily. for testing 300 each 2    oxyCODONE (ROXICODONE) 15 MG immediate release tablet TAKE 1/2 A TABLET BY MOUTH AT BEDTIME AND 1/2 TABLET IN MIDDLE OF NIGHT AS NEEDED AS DIRECTED      pantoprazole (PROTONIX) 40 MG EC tablet " "Take 1 tablet by mouth 2 (Two) Times a Day Before Meals. 60 tablet 0    pantoprazole (PROTONIX) 40 MG EC tablet Take 1 tablet by mouth Daily.      rosuvastatin (CRESTOR) 40 MG tablet Take 1 tablet by mouth Every Night. Replaces pravastatin 90 tablet 3    Syringe/Needle, Disp, (B-D 3CC LUER-YAA SYR 58PM8-5/2) 23G X 1-1/2\" 3 ML misc Use 1 each Every 14 (Fourteen) Days. 50 each 2    Testosterone Enanthate 200 MG/ML solution Inject 200 mg into the appropriate muscle as directed by prescriber Every 14 (Fourteen) Days. 5 mL 1    vitamin D (ERGOCALCIFEROL) 1.25 MG (93219 UT) capsule capsule TAKE 1 CAPSULE BY MOUTH ONCE WEEKLY FOR 12 DOSES 12 capsule 0     No current facility-administered medications for this visit.      Requested an open lesions on exam.  I suspect he may be picking at lesions.  Recommend extending ivermectin for a few more doses.    Topical mupirocin as needed.    Discussed elevated A1c.    Counseling was given to patient for the following topics: diagnostic results, instructions for management, impressions, and risks and benefits of treatment options . Total time of the encounter was 40 minutes and 20 minutes was spent face to face counseling.    Diagnoses and all orders for this visit:    1. Scabies (Primary)  -     Ambulatory Referral to Dermatology    2. Type 2 diabetes mellitus with diabetic polyneuropathy, without long-term current use of insulin    3. Skin lesions, generalized  -     Ambulatory Referral to Dermatology    4. Skin infection  -     mupirocin (BACTROBAN) 2 % ointment; Apply 1 Application topically to the appropriate area as directed 3 (Three) Times a Day.  Dispense: 30 g; Refill: 2    Other orders  -     ivermectin (STROMECTOL) 3 MG tablet tablet; Take 5 tabs (15mg) daily on days 1, 2, 8, 9, 15.  Dispense: 25 tablet; Refill: 0         Return in about 3 months (around 8/6/2025) for a1c.     Dictated Utilizing Dragon Dictation    Please note that portions of this note were completed with " a voice recognition program.    Part of this note may be an electronic transcription/translation of spoken language to printed text using the Dragon Dictation System.

## 2025-05-15 ENCOUNTER — TELEPHONE (OUTPATIENT)
Dept: FAMILY MEDICINE CLINIC | Facility: CLINIC | Age: 75
End: 2025-05-15

## 2025-05-15 ENCOUNTER — PRIOR AUTHORIZATION (OUTPATIENT)
Dept: FAMILY MEDICINE CLINIC | Facility: CLINIC | Age: 75
End: 2025-05-15
Payer: MEDICARE

## 2025-05-15 NOTE — TELEPHONE ENCOUNTER
Provider: DO BERNABE    Caller: Behzad Guzman    Relationship to Patient: Self    Phone Number: [phone]     Reason for Call: PATIENT NEEDS TO SPEAK WITH CLINICAL TEAM ABOUT BUG BITES AND THE MEDICATION PRESCRIBED

## 2025-05-15 NOTE — TELEPHONE ENCOUNTER
(Key: [id number])  Testosterone Enanthate 200MG/ML solution  status: Question Response - N/ACreated: May 15th, 2025    Additional Information Required  Available without authorization. The member is able to fill the requested drug at the pharmacy. If coverage is still needed or requesting prior to the expiration of a current authorization, a request can be made by sending a fax or calling the number on the back of the member's ID card.

## 2025-05-16 NOTE — TELEPHONE ENCOUNTER
Patient stated that the medication given for the scabies are working but when they bugs come out of his skin he has to put prometherin cream on hisself to kill them ontop of his skin. He is requesting a script for this to be sent in.  He is also requesting mupirocin refills

## 2025-05-18 DIAGNOSIS — L08.9 SKIN INFECTION: ICD-10-CM

## 2025-05-18 DIAGNOSIS — B86 SCABIES: Primary | ICD-10-CM

## 2025-05-18 RX ORDER — PERMETHRIN 50 MG/G
1 CREAM TOPICAL ONCE
Qty: 1 G | Refills: 0 | Status: SHIPPED | OUTPATIENT
Start: 2025-05-18 | End: 2025-05-18

## 2025-05-18 RX ORDER — MUPIROCIN 20 MG/G
1 OINTMENT TOPICAL 3 TIMES DAILY
Qty: 30 G | Refills: 2 | Status: SHIPPED | OUTPATIENT
Start: 2025-05-18

## 2025-05-19 RX ORDER — PERMETHRIN 50 MG/G
CREAM TOPICAL
Qty: 60 G | OUTPATIENT
Start: 2025-05-19

## 2025-06-05 DIAGNOSIS — E11.42 TYPE 2 DIABETES MELLITUS WITH DIABETIC POLYNEUROPATHY, WITHOUT LONG-TERM CURRENT USE OF INSULIN: ICD-10-CM

## 2025-06-05 RX ORDER — METFORMIN HYDROCHLORIDE 500 MG/1
500 TABLET, EXTENDED RELEASE ORAL 2 TIMES DAILY
Qty: 180 TABLET | Refills: 0 | Status: SHIPPED | OUTPATIENT
Start: 2025-06-05

## 2025-06-27 RX ORDER — PANTOPRAZOLE SODIUM 40 MG/1
40 TABLET, DELAYED RELEASE ORAL DAILY
Qty: 30 TABLET | Refills: 11 | Status: SHIPPED | OUTPATIENT
Start: 2025-06-27

## 2025-07-07 ENCOUNTER — TELEPHONE (OUTPATIENT)
Dept: ORTHOPEDIC SURGERY | Facility: CLINIC | Age: 75
End: 2025-07-07

## 2025-07-07 ENCOUNTER — OFFICE VISIT (OUTPATIENT)
Dept: ORTHOPEDIC SURGERY | Facility: CLINIC | Age: 75
End: 2025-07-07
Payer: MEDICARE

## 2025-07-07 VITALS
SYSTOLIC BLOOD PRESSURE: 160 MMHG | BODY MASS INDEX: 23.57 KG/M2 | HEIGHT: 72 IN | DIASTOLIC BLOOD PRESSURE: 88 MMHG | WEIGHT: 174 LBS

## 2025-07-07 DIAGNOSIS — M79.671 BILATERAL FOOT PAIN: Primary | ICD-10-CM

## 2025-07-07 DIAGNOSIS — M79.672 BILATERAL FOOT PAIN: Primary | ICD-10-CM

## 2025-07-07 DIAGNOSIS — G62.9 NEUROPATHY: ICD-10-CM

## 2025-07-07 RX ORDER — PERMETHRIN 50 MG/G
CREAM TOPICAL
COMMUNITY
Start: 2025-06-26

## 2025-07-07 NOTE — TELEPHONE ENCOUNTER
Spoke with patient he got everything straighten out in regards to his custom shoes. He stated he didn't need anything else from us.

## 2025-07-07 NOTE — PROGRESS NOTES
[facility] Orthopaedic Surgery Office Visit     Office Visit       Date: 07/07/2025   Patient Name: Behzad Guzman  MRN: [id number]  YOB: 1950    Referring Physician: Referring, Self     Chief Complaint:   Chief Complaint   Patient presents with    Left Foot - Pain    Right Foot - Pain       History of Present Illness: Behzad Guzman is a 74 y.o. male who is here today for chronic bilateral foot pain.  States pain has been going on for 15 years.  Reports  history of multiple remote injuries to both upper extremities and lower extremities.  States he  thinks he broke a bone in his  right foot many years ago.  Works in construction and as a farmer.  Denies smoking or nicotine usage.  States previously told he has neuropathy.  Currently seeing pain management and takes gabapentin for neuropathic pain.  States gabapentin does help with some of the pain in his feet.    Subjective   Review of Systems: Review of Systems   Constitutional: Negative.    HENT: Negative.     Eyes: Negative.    Respiratory: Negative.     Cardiovascular: Negative.    Gastrointestinal: Negative.    Endocrine: Negative.    Genitourinary: Negative.    Musculoskeletal:  Positive for arthralgias.   Skin: Negative.    Allergic/Immunologic: Negative.    Neurological: Negative.    Hematological: Negative.    Psychiatric/Behavioral: Negative.          Past Medical History:   Past Medical History:   Diagnosis Date    Asthma     Chronic hip pain, left     Chronic pain in left shoulder     Diabetes mellitus     Boarderline    Hyperlipidemia     Hypertension     Infected tick bite 11/11/2021    Leg length discrepancy     Neck pain, chronic     Neuropathy     Panic attacks     Pre-diabetes     Prediabetes     Spinal stenosis        Past Surgical History:   Past Surgical History:   Procedure Laterality Date    CAROTID STENT      ENDOSCOPY WITH FOREIGN BODY REMOVAL N/A 06/23/2024    Procedure:  ESOPHAGOGASTRODUODENOSCOPY WITH FOREIGN BODY REMOVAL WITH FLOROSCOPY;  Surgeon: Adan Huston MD;  Location: [facility] ENDOSCOPY;  Service: Gastroenterology;  Laterality: N/A;    LEG SURGERY      ROTATOR CUFF REPAIR Right     SHOULDER SURGERY Left     WRIST SURGERY Left        Family History:   Family History   Problem Relation Age of Onset    Heart attack Father 65    No Known Problems Maternal Grandmother     No Known Problems Maternal Grandfather     No Known Problems Paternal Grandmother     Stroke Paternal Grandfather        Social History:   Social History     Socioeconomic History    Marital status:    Tobacco Use    Smoking status: Former     Current packs/day: 0.00     Average packs/day: 0.5 packs/day for 10.0 years (5.0 ttl pk-yrs)     Types: Cigarettes     Start date:      Quit date:      Years since quittin.5     Passive exposure: Past    Smokeless tobacco: Never   Vaping Use    Vaping status: Never Used   Substance and Sexual Activity    Alcohol use: No    Drug use: No    Sexual activity: Defer       Medications:   Current Outpatient Medications:     albuterol (ACCUNEB) 0.63 MG/3ML nebulizer solution, Take 3 mL by nebulization Every 6 (Six) Hours As Needed for Wheezing., Disp: 3 mL, Rfl: 12    albuterol sulfate  (90 Base) MCG/ACT inhaler, INHALE 2 PUFFS BY MOUTH EVERY 6 HOURS AS NEEDED FOR WHEEZING OR SHORTNESS OF AIR, Disp: 8.5 g, Rfl: 1    Alcaftadine (Lastacaft) 0.25 % solution, Apply 1 drop to eye(s) as directed by provider 1 (One) Time., Disp: , Rfl:     aspirin 81 MG EC tablet, Take 1 tablet by mouth Daily., Disp: , Rfl:     azelastine (ASTELIN) 0.1 % nasal spray, Administer 2 sprays into the nostril(s) as directed by provider 2 (Two) Times a Day. Use in each nostril as directed, Disp: , Rfl:     cephalexin (KEFLEX) 500 MG capsule, , Disp: , Rfl:     clindamycin (CLEOCIN T) 1 % lotion, , Disp: , Rfl:     diazePAM (VALIUM) 5 MG tablet, Take 1 tablet by mouth Every 6 (Six)  "Hours As Needed., Disp: , Rfl: 0    escitalopram (LEXAPRO) 10 MG tablet, Take 1 tablet by mouth Daily., Disp: , Rfl:     fexofenadine (ALLEGRA) 180 MG tablet, Take 1 tablet by mouth Daily., Disp: , Rfl:     fluticasone (Flonase) 50 MCG/ACT nasal spray, 2 sprays into the nostril(s) as directed by provider Daily. 2 puffs each nostril, Disp: 18.2 mL, Rfl: 0    gabapentin (NEURONTIN) 400 MG capsule, Take 1 capsule by mouth 3 (Three) Times a Day., Disp: , Rfl:     glucose monitor monitoring kit, Use 1 each Daily., Disp: 1 each, Rfl: 0    HYDROcodone-acetaminophen (NORCO)  MG per tablet, Take 1 tablet by mouth Every 6 (Six) Hours As Needed for Moderate Pain., Disp: , Rfl:     ibuprofen (ADVIL,MOTRIN) 600 MG tablet, Take 1 tablet by mouth Every 6 (Six) Hours As Needed for Mild Pain., Disp: 60 tablet, Rfl: 0    ivermectin (STROMECTOL) 3 MG tablet tablet, Take 5 tabs (15mg) daily on days 1, 2, 8, 9, 15., Disp: 25 tablet, Rfl: 0    Lancets (onetouch ultrasoft) lancets, Use one daily to test blood sugars, Disp: 100 each, Rfl: 12    losartan-hydrochlorothiazide (HYZAAR) 50-12.5 MG per tablet, TAKE 1 TABLET BY MOUTH DAILY, Disp: 90 tablet, Rfl: 1    metFORMIN ER (GLUCOPHAGE-XR) 500 MG 24 hr tablet, TAKE 1 TABLET BY MOUTH 2 TIMES A DAY, Disp: 180 tablet, Rfl: 0    metoprolol tartrate (LOPRESSOR) 50 MG tablet, TAKE 1 TABLET BY MOUTH TWICE A DAY, Disp: 180 tablet, Rfl: 0    mupirocin (BACTROBAN) 2 % ointment, Apply 1 Application topically to the appropriate area as directed 3 (Three) Times a Day., Disp: 30 g, Rfl: 2    Needle, Disp, (B-D HYPODERMIC NEEDLE 22GX1\") 22G X 1\" misc, Inject 1 each into the appropriate muscle as directed by prescriber Every 14 (Fourteen) Days., Disp: 25 each, Rfl: 2    Needle, Disp, 21G X 1\" misc, Use to inject every 14 days., Disp: 50 each, Rfl: 2    ondansetron ODT (ZOFRAN-ODT) 4 MG disintegrating tablet, DISSOLVE 1 TABLET ON THE TONGUE EVERY 6 HOURS AS NEEDED FOR NAUSEA, Disp: , Rfl:     OneTouch " "Verio test strip, 1 each by Other route Daily. for testing, Disp: 300 each, Rfl: 2    oxyCODONE (ROXICODONE) 15 MG immediate release tablet, TAKE 1/2 A TABLET BY MOUTH AT BEDTIME AND 1/2 TABLET IN MIDDLE OF NIGHT AS NEEDED AS DIRECTED, Disp: , Rfl:     pantoprazole (PROTONIX) 40 MG EC tablet, Take 1 tablet by mouth 2 (Two) Times a Day Before Meals., Disp: 60 tablet, Rfl: 0    pantoprazole (PROTONIX) 40 MG EC tablet, Take 1 tablet by mouth Daily., Disp: , Rfl:     pantoprazole (PROTONIX) 40 MG EC tablet, TAKE 1 TABLET BY MOUTH DAILY, Disp: 30 tablet, Rfl: 11    permethrin (ELIMITE) 5 % cream, , Disp: , Rfl:     rosuvastatin (CRESTOR) 40 MG tablet, Take 1 tablet by mouth Every Night. Replaces pravastatin, Disp: 90 tablet, Rfl: 3    Syringe/Needle, Disp, (B-D 3CC LUER-YAA SYR 19LF3-8/2) 23G X 1-1/2\" 3 ML misc, Use 1 each Every 14 (Fourteen) Days., Disp: 50 each, Rfl: 2    Testosterone Enanthate 200 MG/ML solution, Inject 200 mg into the appropriate muscle as directed by prescriber Every 14 (Fourteen) Days., Disp: 5 mL, Rfl: 1    vitamin D (ERGOCALCIFEROL) 1.25 MG (71368 UT) capsule capsule, TAKE 1 CAPSULE BY MOUTH ONCE WEEKLY FOR 12 DOSES, Disp: 12 capsule, Rfl: 0    Allergies:   Allergies   Allergen Reactions    Peanut Butter Flavoring Agent (Non-Screening) Anaphylaxis    Shellfish Allergy Anaphylaxis    Amoxicillin Rash and Other (See Comments)    Penicillins Rash    Sulfa Antibiotics Myalgia    Doxycycline GI Bleeding    Latex Rash    Pregabalin Other (See Comments)       I reviewed the patient's chief complaint, history of present illness, review of systems, past medical history, surgical history, family history, social history, medications and allergy list.     Objective    Vital Signs:   Vitals:    07/07/25 1007   BP: 160/88   Weight: 78.9 kg (174 lb)   Height: 182.9 cm (72\")     Body mass index is 23.6 kg/m².    Ortho Exam:  bilateral LE Foot and Ankle Exam:   Plantigrade foot.   There is good perfusion to the " toes.   7/10 sites felt on monofilament testing of the bilateral feet, decreased in toes bilaterally  Has palpable DP and PT pulses bilaterally  The skin is intact throughout the foot and ankle.  Range of motion of ankle, foot and toes is within normal limits.   No focal areas of tenderness.    Results Review:   Imaging Results (Last 24 Hours)       Procedure Component Value Units Date/Time    XR Foot 3+ View Bilateral [[id number]] Resulted: 07/07/25 1131     Updated: 07/07/25 1131    Narrative:      Bilateral Foot X-Ray 07/07/25   Indication: Pain  Views: 3 weight bearing , comparison to previous   Findings: xrays reviewed by me today in the office and show chronic   degenerative changes visible bilateral feet worst at bilateral limb   bilateral first MTP joints as well as TMT joints, appearance of old injury   to right distal and middle great toe phalanges as well as right cuboid   that appears remote, Slight hammering visible of the lesser digits on   bilateral lateral views, calcification visible about the arteries in both   the anterior and posterior soft tissues bilateral x-rays, no acute osseous   abnormality              Assessment / Plan    Assessment/Plan:   Diagnoses and all orders for this visit:    1. Bilateral foot pain (Primary)  -     XR Foot 3+ View Bilateral  -     Ambulatory Referral For Orthotics  -     Ambulatory Referral For Orthotics    2. Neuropathy  -     Ambulatory Referral For Orthotics  -     Ambulatory Referral For Orthotics      Discussed bilateral foot pain which has been present for over a year causing pain that has progressed (a chronic illness with exacerbation). Decision regarding surgical intervention considered. Patient is not a candidate due to trial of nonoperative management.  Had a long discussion with patient with regard to his symptoms.  Patient does have some chronic changes consistent with osteoarthritis in his bilateral feet.  That being the case I think patient is  primary source of pain is neuropathic in nature.  Recommend continued medical treatment for neuropathic pain with continued follow-up with pain management.  I provided patient with prescription for diabetic shoes in the clinic today as well as prescription for custom orthotics to help offload pressure areas and protect his feet given his neuropathic pain and abnormal physical exam with monofilament testing.  Patient to trial these conservative treatment modalities and return to see me on an as-needed basis.  Recommend patient continue close follow-up with PCP for management of his diabetes.  Counseled him on the importance of foot hygiene with regard to his diabetes and neuropathy as well.  Patient expressed understanding.  It was a pleasure seeing him today.     Patient suffers from pain and foot/ankle instability.  I am ordering bilateral custom molded foot orthoses to stabilize and reduce pain.  Custom required for tri-plane control, deformity, lifetime need.     Ordering custom diabetic shoes with custom inserts for ankle stabilization, and to reduce chances of amputation due to diabetes mellitus, neuropathy, and deformities.     Follow Up:   Return if symptoms worsen or fail to improve.      Igor Morin MD  [facility] Orthopedic Surgeon

## 2025-07-07 NOTE — TELEPHONE ENCOUNTER
Caller: Behzad Lopez    Relationship: Self    Best call back number: [phone]    Who are you requesting to speak with (clinical staff, provider,  specific staff member): DR SERVIN    What was the call regarding: MR LOPEZ STATED HE CALLED [facility] TO ASK IF HE NEEDED TO MAKE AN APPT AND STATED THAT WHOEVER HE SPOKE WITH GAVE HIM A HARD TIME AND THAT APPT NOTES AND PRESCRIPTION SHOULD HAVE BEEN SENT OVER. [facility] T[facility] HE WOULD LIKE TO KNOW WHAT DR SERVIN THINKS HE SHOULD DO    Is it okay if the provider responds through MyChart: CALL

## 2025-07-07 NOTE — TELEPHONE ENCOUNTER
Give our office some time to get them the documentation they need. Maybe call back tomorrow. Thanks.

## 2025-07-08 ENCOUNTER — TELEPHONE (OUTPATIENT)
Dept: ORTHOPEDIC SURGERY | Facility: CLINIC | Age: 75
End: 2025-07-08
Payer: MEDICARE

## 2025-07-08 DIAGNOSIS — M79.671 BILATERAL FOOT PAIN: Primary | ICD-10-CM

## 2025-07-08 DIAGNOSIS — M79.672 BILATERAL FOOT PAIN: Primary | ICD-10-CM

## 2025-07-08 DIAGNOSIS — G62.9 NEUROPATHY: ICD-10-CM

## 2025-07-08 NOTE — TELEPHONE ENCOUNTER
Provider: DR SERVIN     Caller: Behzad Guzman    Relationship to Patient: Self    Phone Number: [phone]    Reason for Call: PATIENT WOULD LIKE TO KNOW IF IT WOULD BE OKAY FOR HIM TO GO TO [facility] FOR HIS DIABETIC SHOES AND IF SO COULD THE OFFICE SEND THE ORDER TO THEM. PLEASE ADVISE.     When was the patient last seen: 7-7-25

## 2025-08-01 ENCOUNTER — TELEPHONE (OUTPATIENT)
Dept: FAMILY MEDICINE CLINIC | Facility: CLINIC | Age: 75
End: 2025-08-01
Payer: MEDICARE

## 2025-08-01 DIAGNOSIS — I73.9 CLAUDICATION: Primary | ICD-10-CM

## 2025-08-01 DIAGNOSIS — I70.90 ARTERIAL CALCIFICATION: ICD-10-CM

## 2025-08-01 NOTE — TELEPHONE ENCOUNTER
Unable to reach Behzad Guzman by phone or leave message.    Hub may relay message and document.    I have faxed a note to the pain management group showing the patient is diabetic and takes metformin. This has been completed. If he has further concerns it might be best to schedule a follow up with Dr Tran.

## 2025-08-01 NOTE — TELEPHONE ENCOUNTER
Hub staff attempted to follow warm transfer process and was unsuccessful     Caller: Behzad Guzman    Relationship to patient: Self    Best call back number:   Telephone Information:   Mobile [phone]   Mobile [phone]   Mobile [phone]        Patient is needing: PATIENT WANTED TO INFORM DR BERNABE THAT THE FOOT DOCTOR AT THE HOSPITAL DID AN XRAY OF HIS VEINS AND SAW THAT THEY ARE CALCIFYING, AND PATIENT'S PAIN MANAGEMENT DOCTOR ADVISED HIM TO INFORM THE PCP

## 2025-08-01 NOTE — TELEPHONE ENCOUNTER
Name: Behzad Guzman    Relationship: Self    Best Callback Number: [phone]    HUB PROVIDED THE RELAY MESSAGE FROM THE OFFICE   PATIENT HAS FURTHER QUESTIONS AND WOULD LIKE A CALL BACK    ADDITIONAL INFORMATION: PATIENT MAIN CONCERN IS THAT HE WOULD LIKE TO KNOW IF HE IS ON THE CORRECT MEDICATION (METFORMIN) SINCE HIS ARTERIES ARE CALCIFYING. PLEASE ADVISE

## 2025-08-01 NOTE — TELEPHONE ENCOUNTER
Zach Tran, DO      8/1/25  3:08 PM  He can try increasing metformin to 2 tabs BID (1000 mg BID) until his apt next week. A1c elevated on recent labs, will get updated blood work at apt 8/8/25.       Patient informed of recommendations.

## 2025-08-01 NOTE — TELEPHONE ENCOUNTER
Caller: Bezhad Guzman    Relationship: Self    Best call back number: [phone]     Additional notes: PATIENT STATED [facility] NEEDED TO KNOW THAT HE IS PRE-DIABETIC AND THAT HE TAKES metFORMIN ER (GLUCOPHAGE-XR) 500 MG 24 hr tablet TWICE PER DAY. PATIENT HAS AN APPOINTMENT WITH [facility] ON THE 8/7/25 AND THEY NEED THIS INFORMATION BEFORE THAT APPOINTMENT.     [facility] PHONE NUMBER [phone]    [phone]

## 2025-08-01 NOTE — TELEPHONE ENCOUNTER
Zach Tran DO      8/1/25  3:10 PM  Ordered arterial doppler to evaluate calcification.    Pt informed.

## 2025-08-01 NOTE — TELEPHONE ENCOUNTER
Patient called requesting to speak with a nurse regarding several medical concerns that he is having.     Patient stated he needs a report generated that states he is pre-diabetic to go to a specialist he is seeing for Pain Management.     Patient stated Dermatology told him that he has cancer in his left arm    Patient is taking Metformin     Patient stated he has calcification of his veins.     Patient would like a call back to discuss his concerns.

## 2025-08-08 ENCOUNTER — LAB (OUTPATIENT)
Dept: LAB | Facility: HOSPITAL | Age: 75
End: 2025-08-08
Payer: MEDICARE

## 2025-08-08 ENCOUNTER — OFFICE VISIT (OUTPATIENT)
Dept: FAMILY MEDICINE CLINIC | Facility: CLINIC | Age: 75
End: 2025-08-08
Payer: MEDICARE

## 2025-08-08 VITALS
WEIGHT: 173.6 LBS | OXYGEN SATURATION: 97 % | HEART RATE: 101 BPM | BODY MASS INDEX: 23.51 KG/M2 | HEIGHT: 72 IN | DIASTOLIC BLOOD PRESSURE: 82 MMHG | SYSTOLIC BLOOD PRESSURE: 140 MMHG

## 2025-08-08 DIAGNOSIS — I10 HYPERTENSION, UNSPECIFIED TYPE: ICD-10-CM

## 2025-08-08 DIAGNOSIS — Z85.89 HISTORY OF SQUAMOUS CELL CARCINOMA: ICD-10-CM

## 2025-08-08 DIAGNOSIS — J32.9 SINUSITIS, UNSPECIFIED CHRONICITY, UNSPECIFIED LOCATION: ICD-10-CM

## 2025-08-08 DIAGNOSIS — E87.1 HYPONATREMIA: ICD-10-CM

## 2025-08-08 DIAGNOSIS — E11.42 TYPE 2 DIABETES MELLITUS WITH DIABETIC POLYNEUROPATHY, WITHOUT LONG-TERM CURRENT USE OF INSULIN: ICD-10-CM

## 2025-08-08 DIAGNOSIS — E55.9 VITAMIN D DEFICIENCY: ICD-10-CM

## 2025-08-08 DIAGNOSIS — J32.9 SINUSITIS, UNSPECIFIED CHRONICITY, UNSPECIFIED LOCATION: Primary | ICD-10-CM

## 2025-08-08 DIAGNOSIS — R53.83 OTHER FATIGUE: ICD-10-CM

## 2025-08-08 LAB
CHOLEST SERPL-MCNC: 130 MG/DL (ref 0–200)
HBA1C MFR BLD: 10.8 % (ref 4.8–5.6)
HDLC SERPL-MCNC: 44 MG/DL (ref 40–60)
LDLC SERPL CALC-MCNC: 45 MG/DL (ref 0–100)
LDLC/HDLC SERPL: 0.73 {RATIO}
T4 FREE SERPL-MCNC: 1.08 NG/DL (ref 0.92–1.68)
TRIGL SERPL-MCNC: 269 MG/DL (ref 0–150)
TSH SERPL DL<=0.05 MIU/L-ACNC: 1.24 UIU/ML (ref 0.27–4.2)
VLDLC SERPL-MCNC: 41 MG/DL (ref 5–40)

## 2025-08-08 PROCEDURE — 80061 LIPID PANEL: CPT

## 2025-08-08 PROCEDURE — 80053 COMPREHEN METABOLIC PANEL: CPT

## 2025-08-08 PROCEDURE — 84443 ASSAY THYROID STIM HORMONE: CPT

## 2025-08-08 PROCEDURE — 82306 VITAMIN D 25 HYDROXY: CPT

## 2025-08-08 PROCEDURE — 85025 COMPLETE CBC W/AUTO DIFF WBC: CPT

## 2025-08-08 PROCEDURE — 83036 HEMOGLOBIN GLYCOSYLATED A1C: CPT

## 2025-08-08 PROCEDURE — 84439 ASSAY OF FREE THYROXINE: CPT

## 2025-08-08 RX ORDER — CEFDINIR 300 MG/1
300 CAPSULE ORAL 2 TIMES DAILY
Qty: 14 CAPSULE | Refills: 0 | Status: SHIPPED | OUTPATIENT
Start: 2025-08-08 | End: 2025-08-15

## 2025-08-09 ENCOUNTER — TELEPHONE (OUTPATIENT)
Age: 75
End: 2025-08-09
Payer: MEDICARE

## 2025-08-09 LAB
25(OH)D3 SERPL-MCNC: 28.9 NG/ML (ref 30–100)
ALBUMIN SERPL-MCNC: 4.5 G/DL (ref 3.5–5.2)
ALBUMIN/GLOB SERPL: 1.8 G/DL
ALP SERPL-CCNC: 67 U/L (ref 39–117)
ALT SERPL W P-5'-P-CCNC: 35 U/L (ref 1–41)
ANION GAP SERPL CALCULATED.3IONS-SCNC: 10.7 MMOL/L (ref 5–15)
AST SERPL-CCNC: 24 U/L (ref 1–40)
BASOPHILS # BLD AUTO: 0.06 10*3/MM3 (ref 0–0.2)
BASOPHILS NFR BLD AUTO: 0.8 % (ref 0–1.5)
BILIRUB SERPL-MCNC: 0.9 MG/DL (ref 0–1.2)
BUN SERPL-MCNC: 16 MG/DL (ref 8–23)
BUN/CREAT SERPL: 12.4 (ref 7–25)
CALCIUM SPEC-SCNC: 9.5 MG/DL (ref 8.6–10.5)
CHLORIDE SERPL-SCNC: 92 MMOL/L (ref 98–107)
CO2 SERPL-SCNC: 27.3 MMOL/L (ref 22–29)
CREAT SERPL-MCNC: 1.29 MG/DL (ref 0.76–1.27)
DEPRECATED RDW RBC AUTO: 43.3 FL (ref 37–54)
EGFRCR SERPLBLD CKD-EPI 2021: 58.5 ML/MIN/1.73
EOSINOPHIL # BLD AUTO: 0.2 10*3/MM3 (ref 0–0.4)
EOSINOPHIL NFR BLD AUTO: 2.5 % (ref 0.3–6.2)
ERYTHROCYTE [DISTWIDTH] IN BLOOD BY AUTOMATED COUNT: 13.8 % (ref 12.3–15.4)
GLOBULIN UR ELPH-MCNC: 2.5 GM/DL
GLUCOSE SERPL-MCNC: 492 MG/DL (ref 65–99)
HCT VFR BLD AUTO: 50.6 % (ref 37.5–51)
HGB BLD-MCNC: 16.7 G/DL (ref 13–17.7)
IMM GRANULOCYTES # BLD AUTO: 0.02 10*3/MM3 (ref 0–0.05)
IMM GRANULOCYTES NFR BLD AUTO: 0.3 % (ref 0–0.5)
LYMPHOCYTES # BLD AUTO: 1.89 10*3/MM3 (ref 0.7–3.1)
LYMPHOCYTES NFR BLD AUTO: 23.9 % (ref 19.6–45.3)
MCH RBC QN AUTO: 28.7 PG (ref 26.6–33)
MCHC RBC AUTO-ENTMCNC: 33 G/DL (ref 31.5–35.7)
MCV RBC AUTO: 87.1 FL (ref 79–97)
MONOCYTES # BLD AUTO: 0.62 10*3/MM3 (ref 0.1–0.9)
MONOCYTES NFR BLD AUTO: 7.8 % (ref 5–12)
NEUTROPHILS NFR BLD AUTO: 5.12 10*3/MM3 (ref 1.7–7)
NEUTROPHILS NFR BLD AUTO: 64.7 % (ref 42.7–76)
NRBC BLD AUTO-RTO: 0 /100 WBC (ref 0–0.2)
PLATELET # BLD AUTO: 164 10*3/MM3 (ref 140–450)
PMV BLD AUTO: 12.5 FL (ref 6–12)
POTASSIUM SERPL-SCNC: 4.4 MMOL/L (ref 3.5–5.2)
PROT SERPL-MCNC: 7 G/DL (ref 6–8.5)
RBC # BLD AUTO: 5.81 10*6/MM3 (ref 4.14–5.8)
SODIUM SERPL-SCNC: 130 MMOL/L (ref 136–145)
WBC NRBC COR # BLD AUTO: 7.91 10*3/MM3 (ref 3.4–10.8)

## 2025-08-11 ENCOUNTER — RESULTS FOLLOW-UP (OUTPATIENT)
Dept: FAMILY MEDICINE CLINIC | Facility: CLINIC | Age: 75
End: 2025-08-11
Payer: MEDICARE

## 2025-08-11 ENCOUNTER — TELEPHONE (OUTPATIENT)
Dept: ORTHOPEDIC SURGERY | Facility: CLINIC | Age: 75
End: 2025-08-11
Payer: MEDICARE

## 2025-08-20 ENCOUNTER — OFFICE VISIT (OUTPATIENT)
Dept: FAMILY MEDICINE CLINIC | Facility: CLINIC | Age: 75
End: 2025-08-20
Payer: MEDICARE

## 2025-08-20 ENCOUNTER — LAB (OUTPATIENT)
Dept: LAB | Facility: HOSPITAL | Age: 75
End: 2025-08-20
Payer: MEDICARE

## 2025-08-20 VITALS
HEART RATE: 64 BPM | DIASTOLIC BLOOD PRESSURE: 78 MMHG | BODY MASS INDEX: 23.4 KG/M2 | WEIGHT: 172.8 LBS | OXYGEN SATURATION: 97 % | SYSTOLIC BLOOD PRESSURE: 132 MMHG | HEIGHT: 72 IN

## 2025-08-20 DIAGNOSIS — J45.909 EXTRINSIC ASTHMA, UNSPECIFIED ASTHMA SEVERITY, UNSPECIFIED WHETHER COMPLICATED, UNSPECIFIED WHETHER PERSISTENT: ICD-10-CM

## 2025-08-20 DIAGNOSIS — E11.42 TYPE 2 DIABETES MELLITUS WITH DIABETIC POLYNEUROPATHY, WITHOUT LONG-TERM CURRENT USE OF INSULIN: Primary | ICD-10-CM

## 2025-08-20 DIAGNOSIS — J45.901 EXACERBATION OF ASTHMA, UNSPECIFIED ASTHMA SEVERITY, UNSPECIFIED WHETHER PERSISTENT: ICD-10-CM

## 2025-08-20 DIAGNOSIS — J32.9 SINUSITIS, UNSPECIFIED CHRONICITY, UNSPECIFIED LOCATION: ICD-10-CM

## 2025-08-20 DIAGNOSIS — I10 HYPERTENSION, UNSPECIFIED TYPE: ICD-10-CM

## 2025-08-20 DIAGNOSIS — E87.1 HYPONATREMIA: ICD-10-CM

## 2025-08-20 DIAGNOSIS — G89.29 CHRONIC LOW BACK PAIN, UNSPECIFIED BACK PAIN LATERALITY, UNSPECIFIED WHETHER SCIATICA PRESENT: ICD-10-CM

## 2025-08-20 DIAGNOSIS — M54.50 CHRONIC LOW BACK PAIN, UNSPECIFIED BACK PAIN LATERALITY, UNSPECIFIED WHETHER SCIATICA PRESENT: ICD-10-CM

## 2025-08-20 DIAGNOSIS — E11.42 TYPE 2 DIABETES MELLITUS WITH DIABETIC POLYNEUROPATHY, WITHOUT LONG-TERM CURRENT USE OF INSULIN: ICD-10-CM

## 2025-08-20 DIAGNOSIS — R53.83 OTHER FATIGUE: ICD-10-CM

## 2025-08-20 DIAGNOSIS — Z85.89 HISTORY OF SQUAMOUS CELL CARCINOMA: ICD-10-CM

## 2025-08-20 LAB
BILIRUB UR QL STRIP: NEGATIVE
CLARITY UR: CLEAR
COLOR UR: YELLOW
GLUCOSE BLDC GLUCOMTR-MCNC: 212 MG/DL (ref 70–130)
GLUCOSE UR STRIP-MCNC: NEGATIVE MG/DL
HBA1C MFR BLD: 11.4 % (ref 4.8–5.6)
HGB UR QL STRIP.AUTO: NEGATIVE
HOLD SPECIMEN: NORMAL
KETONES UR QL STRIP: NEGATIVE
LEUKOCYTE ESTERASE UR QL STRIP.AUTO: NEGATIVE
NITRITE UR QL STRIP: NEGATIVE
OSMOLALITY UR: 371 MOSM/KG
PH UR STRIP.AUTO: 7 [PH] (ref 5–8)
PROT UR QL STRIP: NEGATIVE
SP GR UR STRIP: 1.01 (ref 1–1.03)
UROBILINOGEN UR QL STRIP: NORMAL

## 2025-08-20 PROCEDURE — 81003 URINALYSIS AUTO W/O SCOPE: CPT

## 2025-08-20 PROCEDURE — 80053 COMPREHEN METABOLIC PANEL: CPT

## 2025-08-20 PROCEDURE — 83935 ASSAY OF URINE OSMOLALITY: CPT

## 2025-08-20 PROCEDURE — 83036 HEMOGLOBIN GLYCOSYLATED A1C: CPT

## 2025-08-20 RX ORDER — CEFDINIR 300 MG/1
300 CAPSULE ORAL 2 TIMES DAILY
Qty: 14 CAPSULE | Refills: 0 | Status: SHIPPED | OUTPATIENT
Start: 2025-08-20 | End: 2025-08-27

## 2025-08-20 RX ORDER — ALBUTEROL SULFATE 90 UG/1
2 INHALANT RESPIRATORY (INHALATION) EVERY 6 HOURS PRN
Qty: 8.5 G | Refills: 1 | Status: SHIPPED | OUTPATIENT
Start: 2025-08-20

## 2025-08-20 RX ORDER — METFORMIN HYDROCHLORIDE 500 MG/1
TABLET, EXTENDED RELEASE ORAL
Qty: 270 TABLET | Refills: 3 | Status: SHIPPED | OUTPATIENT
Start: 2025-08-20

## 2025-08-20 RX ORDER — BUDESONIDE AND FORMOTEROL FUMARATE DIHYDRATE 80; 4.5 UG/1; UG/1
2 AEROSOL RESPIRATORY (INHALATION)
Qty: 10.2 G | Refills: 12 | Status: SHIPPED | OUTPATIENT
Start: 2025-08-20

## 2025-08-21 LAB
ALBUMIN SERPL-MCNC: 4.5 G/DL (ref 3.5–5.2)
ALBUMIN/GLOB SERPL: 2 G/DL
ALP SERPL-CCNC: 49 U/L (ref 39–117)
ALT SERPL W P-5'-P-CCNC: 37 U/L (ref 1–41)
ANION GAP SERPL CALCULATED.3IONS-SCNC: 12.1 MMOL/L (ref 5–15)
AST SERPL-CCNC: 32 U/L (ref 1–40)
BILIRUB SERPL-MCNC: 0.8 MG/DL (ref 0–1.2)
BUN SERPL-MCNC: 11 MG/DL (ref 8–23)
BUN/CREAT SERPL: 11.8 (ref 7–25)
CALCIUM SPEC-SCNC: 9.5 MG/DL (ref 8.6–10.5)
CHLORIDE SERPL-SCNC: 94 MMOL/L (ref 98–107)
CO2 SERPL-SCNC: 23.9 MMOL/L (ref 22–29)
CREAT SERPL-MCNC: 0.93 MG/DL (ref 0.76–1.27)
EGFRCR SERPLBLD CKD-EPI 2021: 86.7 ML/MIN/1.73
GLOBULIN UR ELPH-MCNC: 2.3 GM/DL
GLUCOSE SERPL-MCNC: 209 MG/DL (ref 65–99)
POTASSIUM SERPL-SCNC: 4.3 MMOL/L (ref 3.5–5.2)
PROT SERPL-MCNC: 6.8 G/DL (ref 6–8.5)
SODIUM SERPL-SCNC: 130 MMOL/L (ref 136–145)

## 2025-08-25 ENCOUNTER — HOSPITAL ENCOUNTER (OUTPATIENT)
Dept: CARDIOLOGY | Facility: HOSPITAL | Age: 75
Discharge: HOME | End: 2025-08-25
Admitting: INTERNAL MEDICINE
Payer: MEDICARE

## 2025-08-25 ENCOUNTER — HOSPITAL ENCOUNTER (EMERGENCY)
Facility: HOSPITAL | Age: 75
Discharge: HOME | End: 2025-08-25
Attending: EMERGENCY MEDICINE | Admitting: EMERGENCY MEDICINE
Payer: MEDICARE

## 2025-08-25 VITALS
DIASTOLIC BLOOD PRESSURE: 82 MMHG | OXYGEN SATURATION: 95 % | TEMPERATURE: 98.3 F | HEART RATE: 102 BPM | WEIGHT: 173 LBS | RESPIRATION RATE: 18 BRPM | HEIGHT: 72 IN | BODY MASS INDEX: 23.43 KG/M2 | SYSTOLIC BLOOD PRESSURE: 131 MMHG

## 2025-08-25 DIAGNOSIS — I70.90 ARTERIAL CALCIFICATION: ICD-10-CM

## 2025-08-25 DIAGNOSIS — I73.9 CLAUDICATION: ICD-10-CM

## 2025-08-25 DIAGNOSIS — R21 RASH: Primary | ICD-10-CM

## 2025-08-25 LAB
BH CV LOWER ARTERIAL LEFT ABI RATIO: 1.34
BH CV LOWER ARTERIAL LEFT DORSALIS PEDIS SYS MAX: 140
BH CV LOWER ARTERIAL LEFT GREAT TOE SYS MAX: 97
BH CV LOWER ARTERIAL LEFT POST TIBIAL SYS MAX: 164
BH CV LOWER ARTERIAL LEFT TBI RATIO: 0.8
BH CV LOWER ARTERIAL RIGHT ABI RATIO: 1.26
BH CV LOWER ARTERIAL RIGHT DORSALIS PEDIS SYS MAX: 143
BH CV LOWER ARTERIAL RIGHT GREAT TOE SYS MAX: 92
BH CV LOWER ARTERIAL RIGHT POST TIBIAL SYS MAX: 154
BH CV LOWER ARTERIAL RIGHT TBI RATIO: 0.75
UPPER ARTERIAL LEFT ARM BRACHIAL SYS MAX: 122
UPPER ARTERIAL RIGHT ARM BRACHIAL SYS MAX: 110

## 2025-08-25 PROCEDURE — 93923 UPR/LXTR ART STDY 3+ LVLS: CPT

## 2025-08-25 PROCEDURE — 99283 EMERGENCY DEPT VISIT LOW MDM: CPT

## 2025-08-25 RX ORDER — IVERMECTIN 3 MG/1
200 TABLET ORAL ONCE
Status: COMPLETED | OUTPATIENT
Start: 2025-08-25 | End: 2025-08-25

## 2025-08-25 RX ADMIN — IVERMECTIN 15000 MCG: 3 TABLET ORAL at 15:45

## (undated) DEVICE — SPNG VERSALON 4X4 4PLY NONSTRL LF BG/200

## (undated) DEVICE — HYBRID CO2 TUBING/CAP SET FOR OLYMPUS® SCOPES & CO2 SOURCE: Brand: ERBE

## (undated) DEVICE — TUBING, SUCTION, 1/4" X 10', STRAIGHT: Brand: MEDLINE

## (undated) DEVICE — KT ORCA ORCAPOD DISP STRL

## (undated) DEVICE — THE BITE BLOCK MAXI, LATEX FREE STRAP IS USED TO PROTECT THE ENDOSCOPE INSERTION TUBE FROM BEING BITTEN BY THE PATIENT.

## (undated) DEVICE — INTRO ACCSR BLNT TP

## (undated) DEVICE — CONTN GRAD MEAS TRIANG 32OZ BLK

## (undated) DEVICE — SYR LUERLOK 50ML

## (undated) DEVICE — SINGLE-USE BIOPSY FORCEPS: Brand: RADIAL JAW 4